# Patient Record
Sex: MALE | Race: WHITE | NOT HISPANIC OR LATINO
[De-identification: names, ages, dates, MRNs, and addresses within clinical notes are randomized per-mention and may not be internally consistent; named-entity substitution may affect disease eponyms.]

---

## 2017-02-17 ENCOUNTER — APPOINTMENT (OUTPATIENT)
Dept: NEUROLOGY | Facility: CLINIC | Age: 72
End: 2017-02-17

## 2017-02-17 VITALS
OXYGEN SATURATION: 98 % | HEART RATE: 76 BPM | HEIGHT: 74 IN | BODY MASS INDEX: 27.21 KG/M2 | DIASTOLIC BLOOD PRESSURE: 72 MMHG | WEIGHT: 212 LBS | SYSTOLIC BLOOD PRESSURE: 124 MMHG

## 2017-03-07 ENCOUNTER — LABORATORY RESULT (OUTPATIENT)
Age: 72
End: 2017-03-07

## 2017-03-07 ENCOUNTER — APPOINTMENT (OUTPATIENT)
Dept: FAMILY MEDICINE | Facility: CLINIC | Age: 72
End: 2017-03-07

## 2017-03-07 VITALS
HEIGHT: 74 IN | BODY MASS INDEX: 27.21 KG/M2 | SYSTOLIC BLOOD PRESSURE: 140 MMHG | TEMPERATURE: 98.4 F | OXYGEN SATURATION: 99 % | HEART RATE: 90 BPM | WEIGHT: 212 LBS | RESPIRATION RATE: 15 BRPM | DIASTOLIC BLOOD PRESSURE: 78 MMHG

## 2017-03-07 DIAGNOSIS — Z12.5 ENCOUNTER FOR SCREENING FOR MALIGNANT NEOPLASM OF PROSTATE: ICD-10-CM

## 2017-03-08 LAB
25(OH)D3 SERPL-MCNC: 37 NG/ML
ALBUMIN SERPL ELPH-MCNC: 4.5 G/DL
ALP BLD-CCNC: 34 U/L
ALT SERPL-CCNC: 13 U/L
ANION GAP SERPL CALC-SCNC: 12 MMOL/L
APPEARANCE: CLEAR
AST SERPL-CCNC: 19 U/L
BASOPHILS # BLD AUTO: 0.01 K/UL
BASOPHILS NFR BLD AUTO: 0.2 %
BILIRUB SERPL-MCNC: 1.1 MG/DL
BILIRUBIN URINE: NEGATIVE
BLOOD URINE: NEGATIVE
BUN SERPL-MCNC: 14 MG/DL
CALCIUM SERPL-MCNC: 10 MG/DL
CHLORIDE SERPL-SCNC: 91 MMOL/L
CHOLEST SERPL-MCNC: 148 MG/DL
CHOLEST/HDLC SERPL: 2.1 RATIO
CO2 SERPL-SCNC: 24 MMOL/L
COLOR: ABNORMAL
CREAT SERPL-MCNC: 1.12 MG/DL
EOSINOPHIL # BLD AUTO: 0.06 K/UL
EOSINOPHIL NFR BLD AUTO: 1 %
FERRITIN SERPL-MCNC: 645.5 NG/ML
FOLATE SERPL-MCNC: >20 NG/ML
GLUCOSE QUALITATIVE U: NORMAL MG/DL
GLUCOSE SERPL-MCNC: 109 MG/DL
HBA1C MFR BLD HPLC: 5.8 %
HCT VFR BLD CALC: 36.1 %
HDLC SERPL-MCNC: 71 MG/DL
HGB BLD-MCNC: 12.6 G/DL
IMM GRANULOCYTES NFR BLD AUTO: 0.2 %
IRON SATN MFR SERPL: 40 %
IRON SERPL-MCNC: 104 UG/DL
KETONES URINE: ABNORMAL
LDLC SERPL CALC-MCNC: 52 MG/DL
LEUKOCYTE ESTERASE URINE: NEGATIVE
LYMPHOCYTES # BLD AUTO: 1.91 K/UL
LYMPHOCYTES NFR BLD AUTO: 32.6 %
MAN DIFF?: NORMAL
MCHC RBC-ENTMCNC: 31.9 PG
MCHC RBC-ENTMCNC: 34.9 GM/DL
MCV RBC AUTO: 91.4 FL
MONOCYTES # BLD AUTO: 0.63 K/UL
MONOCYTES NFR BLD AUTO: 10.8 %
NEUTROPHILS # BLD AUTO: 3.23 K/UL
NEUTROPHILS NFR BLD AUTO: 55.2 %
NITRITE URINE: NEGATIVE
PH URINE: 7
PLATELET # BLD AUTO: 221 K/UL
POTASSIUM SERPL-SCNC: 5.3 MMOL/L
PROT SERPL-MCNC: 7 G/DL
PROTEIN URINE: NEGATIVE MG/DL
PSA SERPL-MCNC: NORMAL NG/ML
RBC # BLD: 3.95 M/UL
RBC # FLD: 12.6 %
SODIUM SERPL-SCNC: 128 MMOL/L
SPECIFIC GRAVITY URINE: 1.02
TIBC SERPL-MCNC: 260 UG/DL
TRIGL SERPL-MCNC: 123 MG/DL
TSH SERPL-ACNC: 2.27 UIU/ML
UIBC SERPL-MCNC: 156 UG/DL
UROBILINOGEN URINE: 1 MG/DL
VIT B12 SERPL-MCNC: 724 PG/ML
WBC # FLD AUTO: 5.85 K/UL

## 2017-03-10 ENCOUNTER — APPOINTMENT (OUTPATIENT)
Dept: FAMILY MEDICINE | Facility: CLINIC | Age: 72
End: 2017-03-10

## 2017-03-10 VITALS
HEART RATE: 70 BPM | WEIGHT: 209 LBS | RESPIRATION RATE: 15 BRPM | OXYGEN SATURATION: 98 % | SYSTOLIC BLOOD PRESSURE: 140 MMHG | TEMPERATURE: 98.4 F | BODY MASS INDEX: 26.82 KG/M2 | HEIGHT: 74 IN | DIASTOLIC BLOOD PRESSURE: 70 MMHG

## 2017-03-13 ENCOUNTER — OTHER (OUTPATIENT)
Age: 72
End: 2017-03-13

## 2017-03-13 LAB
ANION GAP SERPL CALC-SCNC: 11 MMOL/L
BUN SERPL-MCNC: 16 MG/DL
CALCIUM SERPL-MCNC: 10.2 MG/DL
CHLORIDE SERPL-SCNC: 89 MMOL/L
CO2 SERPL-SCNC: 27 MMOL/L
CREAT SERPL-MCNC: 1.08 MG/DL
CRP SERPL-MCNC: 0.94 MG/DL
ERYTHROCYTE [SEDIMENTATION RATE] IN BLOOD BY WESTERGREN METHOD: 11 MM/HR
GLUCOSE SERPL-MCNC: 101 MG/DL
OSMOLALITY SERPL: 276 MOS/KG
OSMOLALITY UR: 337 MOS/KG
POTASSIUM SERPL-SCNC: 4.3 MMOL/L
SODIUM ?TM SUB UR QN: <20 MMOL/L
SODIUM SERPL-SCNC: 127 MMOL/L
TRANSFERRIN SERPL-MCNC: 239 MG/DL

## 2017-03-14 ENCOUNTER — APPOINTMENT (OUTPATIENT)
Dept: HEMATOLOGY ONCOLOGY | Facility: CLINIC | Age: 72
End: 2017-03-14

## 2017-03-17 ENCOUNTER — APPOINTMENT (OUTPATIENT)
Dept: NEUROLOGY | Facility: CLINIC | Age: 72
End: 2017-03-17

## 2017-03-17 VITALS
DIASTOLIC BLOOD PRESSURE: 72 MMHG | WEIGHT: 212 LBS | BODY MASS INDEX: 27.21 KG/M2 | HEART RATE: 71 BPM | SYSTOLIC BLOOD PRESSURE: 118 MMHG | HEIGHT: 74 IN | OXYGEN SATURATION: 99 %

## 2017-03-20 ENCOUNTER — APPOINTMENT (OUTPATIENT)
Dept: CT IMAGING | Facility: HOSPITAL | Age: 72
End: 2017-03-20

## 2017-03-20 ENCOUNTER — OUTPATIENT (OUTPATIENT)
Dept: OUTPATIENT SERVICES | Facility: HOSPITAL | Age: 72
LOS: 1 days | End: 2017-03-20
Payer: MEDICARE

## 2017-03-20 DIAGNOSIS — K42.9 UMBILICAL HERNIA WITHOUT OBSTRUCTION OR GANGRENE: ICD-10-CM

## 2017-03-20 PROCEDURE — 74178 CT ABD&PLV WO CNTR FLWD CNTR: CPT

## 2017-03-22 ENCOUNTER — FORM ENCOUNTER (OUTPATIENT)
Age: 72
End: 2017-03-22

## 2017-03-23 ENCOUNTER — APPOINTMENT (OUTPATIENT)
Dept: RADIOLOGY | Facility: HOSPITAL | Age: 72
End: 2017-03-23

## 2017-03-23 ENCOUNTER — OUTPATIENT (OUTPATIENT)
Dept: OUTPATIENT SERVICES | Facility: HOSPITAL | Age: 72
LOS: 1 days | End: 2017-03-23
Payer: MEDICARE

## 2017-03-23 DIAGNOSIS — E87.1 HYPO-OSMOLALITY AND HYPONATREMIA: ICD-10-CM

## 2017-03-23 PROCEDURE — 71046 X-RAY EXAM CHEST 2 VIEWS: CPT

## 2017-03-28 ENCOUNTER — APPOINTMENT (OUTPATIENT)
Dept: HEMATOLOGY ONCOLOGY | Facility: CLINIC | Age: 72
End: 2017-03-28

## 2017-03-30 ENCOUNTER — APPOINTMENT (OUTPATIENT)
Dept: HEMATOLOGY ONCOLOGY | Facility: CLINIC | Age: 72
End: 2017-03-30

## 2017-03-30 VITALS
BODY MASS INDEX: 28.1 KG/M2 | WEIGHT: 212 LBS | HEIGHT: 73 IN | DIASTOLIC BLOOD PRESSURE: 60 MMHG | HEART RATE: 72 BPM | TEMPERATURE: 96.9 F | SYSTOLIC BLOOD PRESSURE: 146 MMHG

## 2017-03-30 RX ORDER — VENLAFAXINE HYDROCHLORIDE 75 MG/1
75 CAPSULE, EXTENDED RELEASE ORAL DAILY
Qty: 60 | Refills: 0 | Status: COMPLETED | COMMUNITY
Start: 2017-02-17 | End: 2017-03-30

## 2017-04-05 ENCOUNTER — LABORATORY RESULT (OUTPATIENT)
Age: 72
End: 2017-04-05

## 2017-04-05 ENCOUNTER — RX RENEWAL (OUTPATIENT)
Age: 72
End: 2017-04-05

## 2017-04-14 ENCOUNTER — APPOINTMENT (OUTPATIENT)
Dept: HEMATOLOGY ONCOLOGY | Facility: CLINIC | Age: 72
End: 2017-04-14

## 2017-04-14 VITALS
BODY MASS INDEX: 27.71 KG/M2 | TEMPERATURE: 97.9 F | WEIGHT: 210 LBS | SYSTOLIC BLOOD PRESSURE: 130 MMHG | DIASTOLIC BLOOD PRESSURE: 70 MMHG | HEART RATE: 72 BPM

## 2017-05-16 ENCOUNTER — OUTPATIENT (OUTPATIENT)
Dept: OUTPATIENT SERVICES | Facility: HOSPITAL | Age: 72
LOS: 1 days | End: 2017-05-16
Payer: MEDICARE

## 2017-05-16 ENCOUNTER — APPOINTMENT (OUTPATIENT)
Dept: NEUROLOGY | Facility: CLINIC | Age: 72
End: 2017-05-16

## 2017-05-16 VITALS
DIASTOLIC BLOOD PRESSURE: 60 MMHG | SYSTOLIC BLOOD PRESSURE: 135 MMHG | BODY MASS INDEX: 26.31 KG/M2 | HEIGHT: 74 IN | WEIGHT: 205 LBS

## 2017-05-16 PROCEDURE — 81003 URINALYSIS AUTO W/O SCOPE: CPT

## 2017-05-16 PROCEDURE — G0463: CPT

## 2017-05-16 PROCEDURE — 87086 URINE CULTURE/COLONY COUNT: CPT

## 2017-05-16 PROCEDURE — 80048 BASIC METABOLIC PNL TOTAL CA: CPT

## 2017-05-16 PROCEDURE — 85025 COMPLETE CBC W/AUTO DIFF WBC: CPT

## 2017-05-16 PROCEDURE — 93010 ELECTROCARDIOGRAM REPORT: CPT | Mod: NC

## 2017-05-16 PROCEDURE — 36415 COLL VENOUS BLD VENIPUNCTURE: CPT

## 2017-05-16 PROCEDURE — 93005 ELECTROCARDIOGRAM TRACING: CPT

## 2017-05-23 ENCOUNTER — APPOINTMENT (OUTPATIENT)
Dept: FAMILY MEDICINE | Facility: CLINIC | Age: 72
End: 2017-05-23

## 2017-05-23 VITALS
DIASTOLIC BLOOD PRESSURE: 80 MMHG | SYSTOLIC BLOOD PRESSURE: 160 MMHG | OXYGEN SATURATION: 98 % | RESPIRATION RATE: 14 BRPM | BODY MASS INDEX: 26.31 KG/M2 | WEIGHT: 205 LBS | TEMPERATURE: 98.2 F | HEART RATE: 82 BPM | HEIGHT: 74 IN

## 2017-05-23 DIAGNOSIS — N32.0 BLADDER-NECK OBSTRUCTION: ICD-10-CM

## 2017-05-23 RX ORDER — VITAMIN A 2400 MCG
100 CAPSULE ORAL
Qty: 90 | Refills: 0 | Status: DISCONTINUED | COMMUNITY
Start: 2017-05-16 | End: 2017-05-23

## 2017-05-24 ENCOUNTER — OTHER (OUTPATIENT)
Age: 72
End: 2017-05-24

## 2017-05-24 LAB
ANION GAP SERPL CALC-SCNC: 18 MMOL/L
BUN SERPL-MCNC: 15 MG/DL
CALCIUM SERPL-MCNC: 10.4 MG/DL
CHLORIDE SERPL-SCNC: 100 MMOL/L
CO2 SERPL-SCNC: 22 MMOL/L
CREAT SERPL-MCNC: 1.13 MG/DL
GLUCOSE SERPL-MCNC: 108 MG/DL
POTASSIUM SERPL-SCNC: 5.5 MMOL/L
SODIUM SERPL-SCNC: 140 MMOL/L

## 2017-05-25 LAB
ANION GAP SERPL CALC-SCNC: 15 MMOL/L
BUN SERPL-MCNC: 18 MG/DL
CALCIUM SERPL-MCNC: 9 MG/DL
CHLORIDE SERPL-SCNC: 96 MMOL/L
CO2 SERPL-SCNC: 23 MMOL/L
CREAT SERPL-MCNC: 0.98 MG/DL
GLUCOSE SERPL-MCNC: 84 MG/DL
POTASSIUM SERPL-SCNC: 4.1 MMOL/L
SODIUM SERPL-SCNC: 134 MMOL/L

## 2017-05-30 ENCOUNTER — RX RENEWAL (OUTPATIENT)
Age: 72
End: 2017-05-30

## 2017-05-30 ENCOUNTER — OUTPATIENT (OUTPATIENT)
Dept: OUTPATIENT SERVICES | Facility: HOSPITAL | Age: 72
LOS: 1 days | Discharge: HOME | End: 2017-05-30
Payer: MEDICARE

## 2017-05-30 DIAGNOSIS — E78.5 HYPERLIPIDEMIA, UNSPECIFIED: ICD-10-CM

## 2017-05-30 DIAGNOSIS — N32.0 BLADDER-NECK OBSTRUCTION: ICD-10-CM

## 2017-05-30 DIAGNOSIS — Z85.46 PERSONAL HISTORY OF MALIGNANT NEOPLASM OF PROSTATE: ICD-10-CM

## 2017-05-30 DIAGNOSIS — I10 ESSENTIAL (PRIMARY) HYPERTENSION: ICD-10-CM

## 2017-05-30 DIAGNOSIS — R31.9 HEMATURIA, UNSPECIFIED: ICD-10-CM

## 2017-05-30 PROCEDURE — 52000 CYSTOURETHROSCOPY: CPT

## 2017-06-01 ENCOUNTER — TRANSCRIPTION ENCOUNTER (OUTPATIENT)
Age: 72
End: 2017-06-01

## 2017-06-12 ENCOUNTER — FORM ENCOUNTER (OUTPATIENT)
Age: 72
End: 2017-06-12

## 2017-06-13 ENCOUNTER — OUTPATIENT (OUTPATIENT)
Dept: OUTPATIENT SERVICES | Facility: HOSPITAL | Age: 72
LOS: 1 days | End: 2017-06-13
Payer: MEDICARE

## 2017-06-13 ENCOUNTER — APPOINTMENT (OUTPATIENT)
Dept: RADIOLOGY | Facility: HOSPITAL | Age: 72
End: 2017-06-13

## 2017-06-13 DIAGNOSIS — M19.041 PRIMARY OSTEOARTHRITIS, RIGHT HAND: ICD-10-CM

## 2017-06-13 PROCEDURE — 73140 X-RAY EXAM OF FINGER(S): CPT

## 2017-06-13 PROCEDURE — 73140 X-RAY EXAM OF FINGER(S): CPT | Mod: 26,RT

## 2017-06-22 ENCOUNTER — LABORATORY RESULT (OUTPATIENT)
Age: 72
End: 2017-06-22

## 2017-06-23 ENCOUNTER — RESULT REVIEW (OUTPATIENT)
Age: 72
End: 2017-06-23

## 2017-06-25 ENCOUNTER — FORM ENCOUNTER (OUTPATIENT)
Age: 72
End: 2017-06-25

## 2017-06-26 ENCOUNTER — APPOINTMENT (OUTPATIENT)
Dept: RADIOLOGY | Facility: HOSPITAL | Age: 72
End: 2017-06-26

## 2017-06-26 ENCOUNTER — OUTPATIENT (OUTPATIENT)
Dept: OUTPATIENT SERVICES | Facility: HOSPITAL | Age: 72
LOS: 1 days | End: 2017-06-26
Payer: MEDICARE

## 2017-06-26 ENCOUNTER — APPOINTMENT (OUTPATIENT)
Dept: FAMILY MEDICINE | Facility: CLINIC | Age: 72
End: 2017-06-26

## 2017-06-26 VITALS
TEMPERATURE: 98.2 F | BODY MASS INDEX: 26.31 KG/M2 | HEART RATE: 73 BPM | DIASTOLIC BLOOD PRESSURE: 78 MMHG | OXYGEN SATURATION: 98 % | RESPIRATION RATE: 15 BRPM | HEIGHT: 74 IN | SYSTOLIC BLOOD PRESSURE: 130 MMHG | WEIGHT: 205 LBS
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
## 2017-06-26 DIAGNOSIS — M25.40 EFFUSION, UNSPECIFIED JOINT: ICD-10-CM

## 2017-06-26 DIAGNOSIS — M54.5 LOW BACK PAIN: ICD-10-CM

## 2017-06-26 PROCEDURE — 72110 X-RAY EXAM L-2 SPINE 4/>VWS: CPT

## 2017-06-26 RX ORDER — DULOXETINE HYDROCHLORIDE 30 MG/1
30 CAPSULE, DELAYED RELEASE PELLETS ORAL TWICE DAILY
Qty: 120 | Refills: 0 | Status: DISCONTINUED | COMMUNITY
Start: 2017-03-17 | End: 2017-06-26

## 2017-06-29 ENCOUNTER — APPOINTMENT (OUTPATIENT)
Dept: HEMATOLOGY ONCOLOGY | Facility: CLINIC | Age: 72
End: 2017-06-29

## 2017-06-29 VITALS
HEART RATE: 72 BPM | BODY MASS INDEX: 26.83 KG/M2 | WEIGHT: 209 LBS | SYSTOLIC BLOOD PRESSURE: 122 MMHG | DIASTOLIC BLOOD PRESSURE: 68 MMHG | TEMPERATURE: 98 F

## 2017-06-30 ENCOUNTER — TRANSCRIPTION ENCOUNTER (OUTPATIENT)
Age: 72
End: 2017-06-30

## 2017-07-03 ENCOUNTER — APPOINTMENT (OUTPATIENT)
Dept: ORTHOPEDIC SURGERY | Facility: CLINIC | Age: 72
End: 2017-07-03

## 2017-07-03 VITALS
BODY MASS INDEX: 26.69 KG/M2 | DIASTOLIC BLOOD PRESSURE: 64 MMHG | SYSTOLIC BLOOD PRESSURE: 138 MMHG | HEART RATE: 91 BPM | WEIGHT: 208 LBS | HEIGHT: 74 IN

## 2017-07-03 NOTE — CONSULT LETTER
[Dear  ___] : Dear  [unfilled], [I had the pleasure of evaluating your patient, [unfilled].] : I had the pleasure of evaluating your patient, [unfilled]. [FreeTextEntry2] : Lizeth Ross [FreeTextEntry1] : Thank you for this referral. I have enclosed my note for your review. Please feel free to contact my office if you have additional questions regarding this patient.\par \par Regards,\par Bernabe Stone MD, FACS, FAAOS\par \par  of Orthopaedic Surgery\par [employer] [facility] [employer]\par Spinal Reconstruction Surgery\par Minimally Invasive Spinal Surgery\par [facility]

## 2017-07-03 NOTE — DISCUSSION/SUMMARY
[Medication Risks Reviewed] : Medication risks reviewed [de-identified] : The patient presents for evaluation of acute compression fracture at T12 vertebral body after a fall at home. There appears to be some progression of his fracture based on comparison x-rays. I have recommended bracing for him in a prescription for TLSO was provided. I was recommended an MRI of the lumbar spine to better evaluate the T12 fracture seen along with a bone density study as well. I will see him back after the MRI has been performed to discuss further treatment options and to  his response to the bracing. He is not interested prescription medication I recommend use of Tylenol as well as NSAIDs as needed. If there is further compression of his fracture deformity or increased pain or followup cement augmentation to consider at that time.\par \par The patient was educated regarding their condition, treatment options as well as prescribed course of treatment. \par Risks and benefits as well as alternatives to the proposed treatment were also provided to the patient \par They were given the opportunity to have all their questions answered to their satisfaction.\par \par Vital signs were reviewed with the patient and the patient was instructed to followup with their primary care provider for further management.\par \par Healthy lifestyle recommendations were also made including a tobacco free lifestyle, proper diet, and weight control.

## 2017-07-03 NOTE — HISTORY OF PRESENT ILLNESS
[Pain] : pain [Stable] : stable [___ wks] : [unfilled] week(s) ago [8] : currently ~his/her~ pain is 8 out of 10 [Constant] : ~He/She~ states the symptoms seem to be constant [Joint Pain] : joint pain [Joint Stiffness] : joint stiffness [Joint Swelling] : joint swelling [Muscle Aches] : muscle aches [All Other ROS Normal] : All other review of systems are negative except as noted [All Hx] : past medical, family, and social [All] : medication and allergy [FreeTextEntry1] : Low back pain [FreeTextEntry2] : Pt presents here today for evaluation of low back pain following fall at home 6/21/17. pt states slipped while walking into his kitchen, landing on his back on the rug. Pt states seen by chiropractor same day for approximately 3 sessions with no relief noted. pt states then seen by PCP who ordered lumbar xrays with established diagnosis of mild compression fx of T12. Pt states 8/10 in intensity in the morning, easing up slightly throughout the day. Currently taking ibuprofen prn for relief. Pt states pain localized to low back, denies radiation, denies numbness, tingling/weakness.  [de-identified] : laying down.

## 2017-07-03 NOTE — PHYSICAL EXAM
[Poor Appearance] : well-appearing [Acute Distress] : not in acute distress [Obese] : not obese [Abl Mood] : in a normal mood [Abl Affect] : with normal affect [Poor Coordination] : normal coordination [Disorientation] : oriented x 3 [Stooped] : stooped [Limited] : is limited [Painful] : is painful [SLR] : negative straight leg raise [LE] : Sensory: Intact in bilateral lower extremities [1+] : left ankle jerk 1+ [Plantar Reflex Right Only] : absent on the right [Plantar Reflex Left Only] : absent on the left [DTR Reflexes Clonus Of Right Ankle (___ Beats)] : absent on the right [DTR Reflexes Clonus Of Left Ankle (___ Beats)] : absent on the left [DP] : dorsalis pedis 2+ and symmetric bilaterally [PT] : posterior tibial 2+ and symmetric bilaterally [FreeTextEntry2] : The pt is awake, alert and oriented to self, place and time, is comfortable and in no acute distress. Gait examination reveals a narrow based, non-ataxic, non-antalgic gait. Can heel and toe walk without difficulty. Inspection of neck, back and lower extremities bilaterally reveals no rashes or ecchymotic lesions.  There is no obvious abnormal spinal curvature in the sagittal and coronal planes. There is no tenderness over the cervical, thoracic or lumbar spine, or the  upper and lower extremities musculature. Paraspinal lumbar tenderness noted. There is no sacroiliac tenderness. No greater trochanteric tenderness bilaterally. No atrophy or abnormal movements noted in the upper or lower extremities. There is no swelling noted in the upper or lower extremities bilaterally. No cervical lymphadenopathy noted anteriorly. No joint laxity noted in the upper and lower extremity joints bilaterally.\par  Hip range of motion is degrees internal rotation 30° external rotation without pain. Full range of motion of the shoulders bilaterally with no significant pain\par Negative straight leg raise to 45° in the sitting position bilaterally. There is no groin pain with hip internal rotation and a negative CUCA test bilaterally.  [de-identified] : He can forward flex to mid tibia with increased back pain. Extension is 30° with less pain. [de-identified] : Painful change of position [de-identified] : AP and lateral images of the lumbar spine obtained today were compared to x-rays obtained on June 26, 2017 [facility]. Minimal right-sided thoracolumbar curve noted. The lateral projection compression deformities seen of the T12 vertebral body with segmental kyphosis measuring approximately 26 there is interval change in kyphosis as well as compression deformity of T12. No additional fracture. Degenerative changes seen at the L1-2\par _________________________________________________________________________\par \par Patient  ID:  [id number]          Patient  Name:  SHANE JUNG            \par YOB: 1945          Sex:  M\par \par EXAM:    SPINE  LUMBOSACRAL  MIN  4  VIEW\par \par PROCEDURE  DATE:    06/26/2017\par \par INTERPRETATION:    INDICATION:  Lower  back  pain  following  fall\par \par PRIORS:  No  prior  conventional  radiographic  evaluations  of  the  lumbar  spine\par available;  reformatted  imaging  of  the  lumbar  spine  is  available  on  CT\par examination  performed  March 20, 2017.\par \par VIEWS:  AP  radiography  of  the  pelvis  and  AP  and  lateral  radiographs  of  the\par lumbar  spine  were  performed.\par \par FINDINGS:\par \par There  is  no  evidence  for  acute  fracture  of  the  osseous  pelvis.  No  widening\par of  the  symphysis  pubis  or  sacroiliac  joints  identified.  No  hip  dislocation\par is  noted.  The  lumbar  vertebral  bodies  maintain  normal  height.  Since  prior\par evaluation  there  is  evidence  for  partial  loss  of  height  of  the  T12  vertebral\par body,  suggesting  mild  compression  fracture.  No  significant  intervertebral\par disc  space  narrowing  is  identified.    Stable  calcification  is  identified\par within  the  L1-L2  intervertebral  disc  space.  There  is  no  evidence  for  acute\par fracture  in  the  lumbar  spine.  Vertebral  alignment  appears  unremarkable.  No\par fractures  evident  within  the  visualized  portions  of  the  sacrum.  Metallic\par clips  overlie  the  mid  to  lower  pelvis.  Vascular  calcifications  identified.\par \par IMPRESSION:  Partial  loss  of  height  of  the  T12  vertebral  body  suggesting  mild\par compression  fracture,  representing  an  interval  change  from  prior  examination\par dated  March  20,  2017.  No  evidence  for  compression  fracture  deformity  of  the\par lumbar  vertebrae.\par \par PRISCILA CALLOWAY\par This  document  has  been  electronically  signed.  Jun 26 2017    2:17PM

## 2017-07-05 ENCOUNTER — FORM ENCOUNTER (OUTPATIENT)
Age: 72
End: 2017-07-05

## 2017-07-06 ENCOUNTER — OUTPATIENT (OUTPATIENT)
Dept: OUTPATIENT SERVICES | Facility: HOSPITAL | Age: 72
LOS: 1 days | End: 2017-07-06
Payer: MEDICARE

## 2017-07-06 ENCOUNTER — APPOINTMENT (OUTPATIENT)
Dept: MRI IMAGING | Facility: HOSPITAL | Age: 72
End: 2017-07-06

## 2017-07-06 DIAGNOSIS — M51.26 OTHER INTERVERTEBRAL DISC DISPLACEMENT, LUMBAR REGION: ICD-10-CM

## 2017-07-06 DIAGNOSIS — M51.27 OTHER INTERVERTEBRAL DISC DISPLACEMENT, LUMBOSACRAL REGION: ICD-10-CM

## 2017-07-06 PROCEDURE — 72148 MRI LUMBAR SPINE W/O DYE: CPT

## 2017-07-12 ENCOUNTER — FORM ENCOUNTER (OUTPATIENT)
Age: 72
End: 2017-07-12

## 2017-07-13 ENCOUNTER — OUTPATIENT (OUTPATIENT)
Dept: OUTPATIENT SERVICES | Facility: HOSPITAL | Age: 72
LOS: 1 days | End: 2017-07-13
Payer: MEDICARE

## 2017-07-13 ENCOUNTER — APPOINTMENT (OUTPATIENT)
Dept: RADIOLOGY | Facility: HOSPITAL | Age: 72
End: 2017-07-13

## 2017-07-13 DIAGNOSIS — M85.9 DISORDER OF BONE DENSITY AND STRUCTURE, UNSPECIFIED: ICD-10-CM

## 2017-07-13 PROCEDURE — 77080 DXA BONE DENSITY AXIAL: CPT | Mod: 26

## 2017-07-13 PROCEDURE — 77080 DXA BONE DENSITY AXIAL: CPT

## 2017-07-17 ENCOUNTER — CHART COPY (OUTPATIENT)
Age: 72
End: 2017-07-17

## 2017-07-17 ENCOUNTER — APPOINTMENT (OUTPATIENT)
Dept: ORTHOPEDIC SURGERY | Facility: CLINIC | Age: 72
End: 2017-07-17

## 2017-07-17 VITALS
HEIGHT: 74 IN | BODY MASS INDEX: 26.56 KG/M2 | WEIGHT: 207 LBS | HEART RATE: 93 BPM | DIASTOLIC BLOOD PRESSURE: 70 MMHG | SYSTOLIC BLOOD PRESSURE: 113 MMHG

## 2017-07-20 ENCOUNTER — OUTPATIENT (OUTPATIENT)
Dept: OUTPATIENT SERVICES | Facility: HOSPITAL | Age: 72
LOS: 1 days | End: 2017-07-20
Payer: MEDICARE

## 2017-07-20 VITALS
SYSTOLIC BLOOD PRESSURE: 138 MMHG | HEIGHT: 74 IN | OXYGEN SATURATION: 98 % | RESPIRATION RATE: 14 BRPM | DIASTOLIC BLOOD PRESSURE: 72 MMHG | TEMPERATURE: 98 F | HEART RATE: 87 BPM | WEIGHT: 207.01 LBS

## 2017-07-20 DIAGNOSIS — M84.48XD PATHOLOGICAL FRACTURE, OTHER SITE, SUBSEQUENT ENCOUNTER FOR FRACTURE WITH ROUTINE HEALING: ICD-10-CM

## 2017-07-20 DIAGNOSIS — Z98.890 OTHER SPECIFIED POSTPROCEDURAL STATES: Chronic | ICD-10-CM

## 2017-07-20 DIAGNOSIS — Z01.818 ENCOUNTER FOR OTHER PREPROCEDURAL EXAMINATION: ICD-10-CM

## 2017-07-20 DIAGNOSIS — Z90.79 ACQUIRED ABSENCE OF OTHER GENITAL ORGAN(S): Chronic | ICD-10-CM

## 2017-07-20 DIAGNOSIS — M54.5 LOW BACK PAIN: ICD-10-CM

## 2017-07-20 DIAGNOSIS — M54.40 LUMBAGO WITH SCIATICA, UNSPECIFIED SIDE: ICD-10-CM

## 2017-07-20 DIAGNOSIS — S22.009A UNSPECIFIED FRACTURE OF UNSPECIFIED THORACIC VERTEBRA, INITIAL ENCOUNTER FOR CLOSED FRACTURE: ICD-10-CM

## 2017-07-20 LAB
ANION GAP SERPL CALC-SCNC: 4 MMOL/L — LOW (ref 5–17)
APTT BLD: 29.1 SEC — SIGNIFICANT CHANGE UP (ref 27.5–37.4)
BLD GP AB SCN SERPL QL: SIGNIFICANT CHANGE UP
BUN SERPL-MCNC: 17 MG/DL — SIGNIFICANT CHANGE UP (ref 7–23)
CALCIUM SERPL-MCNC: 9.3 MG/DL — SIGNIFICANT CHANGE UP (ref 8.4–10.5)
CHLORIDE SERPL-SCNC: 97 MMOL/L — SIGNIFICANT CHANGE UP (ref 96–108)
CO2 SERPL-SCNC: 30 MMOL/L — SIGNIFICANT CHANGE UP (ref 22–31)
CREAT SERPL-MCNC: 1.15 MG/DL — SIGNIFICANT CHANGE UP (ref 0.5–1.3)
GLUCOSE SERPL-MCNC: 110 MG/DL — HIGH (ref 70–99)
HCT VFR BLD CALC: 35.9 % — LOW (ref 39–50)
HGB BLD-MCNC: 12.4 G/DL — LOW (ref 13–17)
INR BLD: 1.04 RATIO — SIGNIFICANT CHANGE UP (ref 0.88–1.16)
MCHC RBC-ENTMCNC: 32 PG — SIGNIFICANT CHANGE UP (ref 27–34)
MCHC RBC-ENTMCNC: 34.5 GM/DL — SIGNIFICANT CHANGE UP (ref 32–36)
MCV RBC AUTO: 92.7 FL — SIGNIFICANT CHANGE UP (ref 80–100)
PLATELET # BLD AUTO: 201 K/UL — SIGNIFICANT CHANGE UP (ref 150–400)
POTASSIUM SERPL-MCNC: 4.4 MMOL/L — SIGNIFICANT CHANGE UP (ref 3.5–5.3)
POTASSIUM SERPL-SCNC: 4.4 MMOL/L — SIGNIFICANT CHANGE UP (ref 3.5–5.3)
PROTHROM AB SERPL-ACNC: 11.3 SEC — SIGNIFICANT CHANGE UP (ref 9.8–12.7)
RBC # BLD: 3.87 M/UL — LOW (ref 4.2–5.8)
RBC # FLD: 11.3 % — SIGNIFICANT CHANGE UP (ref 10.3–14.5)
SODIUM SERPL-SCNC: 131 MMOL/L — LOW (ref 135–145)
WBC # BLD: 7.4 K/UL — SIGNIFICANT CHANGE UP (ref 3.8–10.5)
WBC # FLD AUTO: 7.4 K/UL — SIGNIFICANT CHANGE UP (ref 3.8–10.5)

## 2017-07-20 PROCEDURE — 86901 BLOOD TYPING SEROLOGIC RH(D): CPT

## 2017-07-20 PROCEDURE — 85730 THROMBOPLASTIN TIME PARTIAL: CPT

## 2017-07-20 PROCEDURE — 86850 RBC ANTIBODY SCREEN: CPT

## 2017-07-20 PROCEDURE — 85610 PROTHROMBIN TIME: CPT

## 2017-07-20 PROCEDURE — G0463: CPT

## 2017-07-20 PROCEDURE — 85027 COMPLETE CBC AUTOMATED: CPT

## 2017-07-20 PROCEDURE — 80048 BASIC METABOLIC PNL TOTAL CA: CPT

## 2017-07-20 PROCEDURE — 86900 BLOOD TYPING SEROLOGIC ABO: CPT

## 2017-07-20 PROCEDURE — 36415 COLL VENOUS BLD VENIPUNCTURE: CPT

## 2017-07-20 NOTE — H&P PST ADULT - RS GEN PE MLT RESP DETAILS PC
airway patent/no rhonchi/good air movement/breath sounds equal/no wheezes/clear to auscultation bilaterally/respirations non-labored/no rales

## 2017-07-20 NOTE — H&P PST ADULT - HISTORY OF PRESENT ILLNESS
72 yo male presents s/p fall at home on June 21, 2017 resulting in a fracture at T12. Now with c/o 5-6/10 mid back pain. Denies radiation to the arms or legs. Pain unrelieved with tylenol.

## 2017-07-20 NOTE — H&P PST ADULT - NSANTHOSAYNRD_GEN_A_CORE
neck 15 inches/No. TRAM screening performed.  STOP BANG Legend: 0-2 = LOW Risk; 3-4 = INTERMEDIATE Risk; 5-8 = HIGH Risk

## 2017-07-20 NOTE — H&P PST ADULT - FAMILY HISTORY
Mother  Still living? No  Family history of myocardial infarction, Age at diagnosis: Age Unknown     Father  Still living? No  Family history of Alzheimer's disease, Age at diagnosis: Age Unknown

## 2017-07-20 NOTE — H&P PST ADULT - PSH
S/P colonoscopy  2017, negative  S/P cystoscopy  June 2017, "removal of scar tissue at the base of the bladder"  S/P prostatectomy  2008

## 2017-07-20 NOTE — H&P PST ADULT - PMH
Anemia  mild, cause unknown  BPH (benign prostatic hypertrophy)    Hyperlipidemia    Hypertension    Neuropathy  bilateral feet, cause unknown  Nocturia  x2-3  Osteoarthritis    Prostate cancer  2008, no chemo or RT  Thoracic spine fracture  T12, June 21, 2017

## 2017-07-20 NOTE — H&P PST ADULT - PROBLEM SELECTOR PLAN 1
kyphoplasty. medical clearance requested. nifedipine AM of surgery with sips of water. pepcid instructions reviewed and verbalized understanding

## 2017-07-20 NOTE — PROVIDER CONTACT NOTE (OTHER) - ASSESSMENT
The Spine Pre-Operative Education packet was given to the patient on 7/17/17. The patient and I reviewed the information included in the packet. All his questions were answered and he gave a clear understanding of the instructions. He was advised to call the office at any time with further questions or concerns.

## 2017-07-21 ENCOUNTER — OTHER (OUTPATIENT)
Age: 72
End: 2017-07-21

## 2017-07-25 ENCOUNTER — APPOINTMENT (OUTPATIENT)
Dept: ORTHOPEDIC SURGERY | Facility: CLINIC | Age: 72
End: 2017-07-25

## 2017-07-25 LAB
ALBUMIN SERPL ELPH-MCNC: 4.4 G/DL
ALP BLD-CCNC: 70 U/L
ALT SERPL-CCNC: 24 U/L
ANION GAP SERPL CALC-SCNC: 14 MMOL/L
AST SERPL-CCNC: 19 U/L
BILIRUB SERPL-MCNC: 0.7 MG/DL
BUN SERPL-MCNC: 21 MG/DL
CALCIUM SERPL-MCNC: 10.3 MG/DL
CHLORIDE SERPL-SCNC: 97 MMOL/L
CO2 SERPL-SCNC: 24 MMOL/L
CREAT SERPL-MCNC: 1.16 MG/DL
GLUCOSE SERPL-MCNC: 110 MG/DL
OSMOLALITY SERPL: 290 MOS/KG
OSMOLALITY UR: 774 MOS/KG
POTASSIUM SERPL-SCNC: 5.3 MMOL/L
PROT SERPL-MCNC: 7.2 G/DL
SODIUM SERPL-SCNC: 135 MMOL/L

## 2017-07-26 ENCOUNTER — APPOINTMENT (OUTPATIENT)
Dept: FAMILY MEDICINE | Facility: CLINIC | Age: 72
End: 2017-07-26

## 2017-07-26 VITALS
HEIGHT: 74 IN | SYSTOLIC BLOOD PRESSURE: 148 MMHG | WEIGHT: 207 LBS | DIASTOLIC BLOOD PRESSURE: 80 MMHG | OXYGEN SATURATION: 98 % | TEMPERATURE: 98 F | RESPIRATION RATE: 16 BRPM | HEART RATE: 91 BPM | BODY MASS INDEX: 26.56 KG/M2

## 2017-07-26 DIAGNOSIS — M54.5 LOW BACK PAIN: ICD-10-CM

## 2017-07-26 DIAGNOSIS — Z01.818 ENCOUNTER FOR OTHER PREPROCEDURAL EXAMINATION: ICD-10-CM

## 2017-07-26 DIAGNOSIS — M84.48XD PATHOLOGICAL FRACTURE, OTHER SITE, SUBSEQUENT ENCOUNTER FOR FRACTURE WITH ROUTINE HEALING: ICD-10-CM

## 2017-07-26 RX ORDER — SODIUM SULFATE, POTASSIUM SULFATE, MAGNESIUM SULFATE 17.5; 3.13; 1.6 G/ML; G/ML; G/ML
17.5-3.13-1.6 SOLUTION, CONCENTRATE ORAL
Qty: 354 | Refills: 0 | Status: DISCONTINUED | COMMUNITY
Start: 2017-03-24 | End: 2017-07-26

## 2017-07-26 RX ORDER — MELOXICAM 7.5 MG/1
7.5 TABLET ORAL DAILY
Qty: 30 | Refills: 0 | Status: DISCONTINUED | COMMUNITY
Start: 2017-06-26 | End: 2017-07-26

## 2017-07-26 RX ORDER — FAMOTIDINE 10 MG/ML
0 INJECTION INTRAVENOUS
Qty: 0 | Refills: 0 | COMMUNITY
Start: 2017-07-26 | End: 2017-07-27

## 2017-07-26 RX ORDER — DULOXETINE HYDROCHLORIDE 30 MG/1
30 CAPSULE, DELAYED RELEASE PELLETS ORAL TWICE DAILY
Qty: 180 | Refills: 0 | Status: DISCONTINUED | COMMUNITY
End: 2017-07-26

## 2017-07-27 ENCOUNTER — OUTPATIENT (OUTPATIENT)
Dept: OUTPATIENT SERVICES | Facility: HOSPITAL | Age: 72
LOS: 1 days | End: 2017-07-27
Payer: MEDICARE

## 2017-07-27 ENCOUNTER — RESULT REVIEW (OUTPATIENT)
Age: 72
End: 2017-07-27

## 2017-07-27 ENCOUNTER — APPOINTMENT (OUTPATIENT)
Dept: ORTHOPEDIC SURGERY | Facility: HOSPITAL | Age: 72
End: 2017-07-27
Payer: MEDICARE

## 2017-07-27 VITALS
OXYGEN SATURATION: 100 % | HEART RATE: 85 BPM | RESPIRATION RATE: 12 BRPM | DIASTOLIC BLOOD PRESSURE: 69 MMHG | HEIGHT: 74 IN | SYSTOLIC BLOOD PRESSURE: 154 MMHG | TEMPERATURE: 98 F | WEIGHT: 199.74 LBS

## 2017-07-27 VITALS
HEART RATE: 81 BPM | OXYGEN SATURATION: 99 % | DIASTOLIC BLOOD PRESSURE: 76 MMHG | SYSTOLIC BLOOD PRESSURE: 139 MMHG | RESPIRATION RATE: 16 BRPM

## 2017-07-27 DIAGNOSIS — Z98.890 OTHER SPECIFIED POSTPROCEDURAL STATES: Chronic | ICD-10-CM

## 2017-07-27 DIAGNOSIS — M84.48XD PATHOLOGICAL FRACTURE, OTHER SITE, SUBSEQUENT ENCOUNTER FOR FRACTURE WITH ROUTINE HEALING: ICD-10-CM

## 2017-07-27 DIAGNOSIS — Z90.79 ACQUIRED ABSENCE OF OTHER GENITAL ORGAN(S): Chronic | ICD-10-CM

## 2017-07-27 DIAGNOSIS — M54.40 LUMBAGO WITH SCIATICA, UNSPECIFIED SIDE: ICD-10-CM

## 2017-07-27 PROCEDURE — 76000 FLUOROSCOPY <1 HR PHYS/QHP: CPT

## 2017-07-27 PROCEDURE — C1713: CPT

## 2017-07-27 PROCEDURE — C1726: CPT

## 2017-07-27 PROCEDURE — 88304 TISSUE EXAM BY PATHOLOGIST: CPT

## 2017-07-27 PROCEDURE — G8978: CPT | Mod: CI,CI

## 2017-07-27 PROCEDURE — 97161 PT EVAL LOW COMPLEX 20 MIN: CPT | Mod: CI,CI

## 2017-07-27 PROCEDURE — G8979: CPT | Mod: CI,CI

## 2017-07-27 PROCEDURE — 88304 TISSUE EXAM BY PATHOLOGIST: CPT | Mod: 26

## 2017-07-27 PROCEDURE — ZZZZZ: CPT

## 2017-07-27 PROCEDURE — G8980: CPT | Mod: CI,CI

## 2017-07-27 RX ORDER — CEFAZOLIN SODIUM 1 G
2000 VIAL (EA) INJECTION ONCE
Qty: 0 | Refills: 0 | Status: COMPLETED | OUTPATIENT
Start: 2017-07-27 | End: 2017-07-27

## 2017-07-27 RX ORDER — HYDROMORPHONE HYDROCHLORIDE 2 MG/ML
0.5 INJECTION INTRAMUSCULAR; INTRAVENOUS; SUBCUTANEOUS
Qty: 0 | Refills: 0 | Status: DISCONTINUED | OUTPATIENT
Start: 2017-07-27 | End: 2017-07-28

## 2017-07-27 RX ORDER — OXYCODONE AND ACETAMINOPHEN 5; 325 MG/1; MG/1
1 TABLET ORAL EVERY 4 HOURS
Qty: 0 | Refills: 0 | Status: DISCONTINUED | OUTPATIENT
Start: 2017-07-27 | End: 2017-07-28

## 2017-07-27 RX ORDER — OXYCODONE HYDROCHLORIDE 5 MG/1
1 TABLET ORAL
Qty: 10 | Refills: 0 | OUTPATIENT
Start: 2017-07-27

## 2017-07-27 RX ORDER — SODIUM CHLORIDE 9 MG/ML
1000 INJECTION, SOLUTION INTRAVENOUS
Qty: 0 | Refills: 0 | Status: DISCONTINUED | OUTPATIENT
Start: 2017-07-27 | End: 2017-07-28

## 2017-07-27 RX ORDER — ONDANSETRON 8 MG/1
4 TABLET, FILM COATED ORAL ONCE
Qty: 0 | Refills: 0 | Status: DISCONTINUED | OUTPATIENT
Start: 2017-07-27 | End: 2017-07-28

## 2017-07-27 NOTE — ASU DISCHARGE PLAN (ADULT/PEDIATRIC). - MEDICATION SUMMARY - MEDICATIONS TO STOP TAKING
I will STOP taking the medications listed below when I get home from the hospital:    Pepcid 20 mg oral tablet  --  by mouth twice, preoperatively

## 2017-07-27 NOTE — ASU DISCHARGE PLAN (ADULT/PEDIATRIC). - INSTRUCTIONS
For Constipation :   • Increase your water intake. Drink at least 8 glasses of water daily.  • Try adding fiber to your diet by eating fruits, vegetables and foods that are rich in grains.  • If you do experience constipation, you may take an over-the-counter stool softener/laxative such as Skylar Colace, Senekot or  Milk of Magnesia.

## 2017-07-27 NOTE — ASU DISCHARGE PLAN (ADULT/PEDIATRIC). - SPECIAL INSTRUCTIONS
You have just had spine surgery.  Please take care of your back by adhering to some basic recommendations.    Your surgeon recommends taking acetaminophen (tylenol) 1000mg 3 times per day for the next 10 days.  This can help to minimize the need for stronger medications.    No heavy lifting. Do not lift more than 10 pounds.  Avoid twisting and bending over.  Do NOT drive a car.  You may be driven in a car.    You may shower if the dressing is the clear plastic type or if you cover the existing dressing with plastic and tape to prevent it from getting wet.  Change dressing with dry, sterile gauze and tape if it becomes loose, wet or soiled.     Walking is your best exercise.  It is best not to remain in one position for long periods such as long car rides.    Constipation is a common problem associated with surgery and pain medications.  You should ensure that you are drinking plenty of fluids and increasing your fruit and vegetable intake.  Over the counter preparations such as colace, senokot and milk of magnesia may be helpful in relieving and prevention this problem.    Smoking should be avoided.  Tobacco products are associated with back problems.       Call the doctor with questions, fevers, severe headache, bowel or bladder dysfunction, new numbness/weakness or new pain not relieved by medication, ice pack and change of position.

## 2017-07-27 NOTE — ASU DISCHARGE PLAN (ADULT/PEDIATRIC). - MEDICATION SUMMARY - MEDICATIONS TO TAKE
I will START or STAY ON the medications listed below when I get home from the hospital:    oxyCODONE 5 mg oral tablet  -- 1 tab(s) by mouth every 4 hours as needed for pain.  MDD:6 tabs  -- Caution federal law prohibits the transfer of this drug to any person other  than the person for whom it was prescribed.  It is very important that you take or use this exactly as directed.  Do not skip doses or discontinue unless directed by your doctor.  May cause drowsiness.  Alcohol may intensify this effect.  Use care when operating dangerous machinery.  This prescription cannot be refilled.  Using more of this medication than prescribed may cause serious breathing problems.    -- Indication: For moderate to severe pain    Tylenol 500 mg oral tablet  -- 1 tab(s) by mouth 3 times a day, As Needed - for moderate pain  -- Indication: For mild pain    terazosin 5 mg oral capsule  -- 1 cap(s) by mouth once a day (at bedtime)  -- Indication: For Prostate    gabapentin 800 mg oral tablet  -- 1 tab(s) by mouth 3 times a day  -- Indication: For nerve pain    atorvastatin 10 mg oral tablet  -- 1 tab(s) by mouth once a day (at bedtime)  -- Indication: For cholesterol     NIFEdipine 30 mg oral tablet, extended release  -- 1 tab(s) by mouth once a day  -- Indication: For blood pressure

## 2017-07-27 NOTE — ASU PREOP CHECKLIST - TEMPERATURE IN FAHRENHEIT (DEGREES F)
Subjective Finding:    Chief compalint: Patient presents with:  Back Pain  , Pain Scale: 5/10, Intensity: sharp, Duration: 1 weeks, Change since last visit: , Radiating: no.    Date of injury:     Activities that the pain restricts:   Home/household activities: no.  Work duties: no.  Hobbies/social: no.  Sleep: no.  Makes symptoms better: rest.  Makes symptoms worse: activity, cervical extension and cervical flexion.  Have you seen anyone else for the symptoms? No.  Work related: no.  Automobile related injury: no.    Objective and Assessment:    Posture Analysis:   High shoulder: right.  Head tilt: .  High iliac crest: .  Head carriage: forward.  Thoracic Kyphosis: neutral.  Lumbar Lordosis: neutral.    Lumbar Range of Motion: flexion decreased.  Cervical Range of Motion: flexion decreased.  Thoracic Range of Motion: .  Extremity Range of Motion: .    Palpation:   Lumbar paraspine tightness with restricted ROM      Segmental dysfunction pre-treatment: T5  L4-5  Right SI.  C7      Assessment post-treatment:  Cervical: ROM increased and pain and tenderness decreased.  Thoracic: ROM increased.  Lumbar: ROM increased.    Comments: .      Complicating Factors: .    Plan / Procedure:    Expected release date: .  Treatment plan: PRN.  Instructed patient: ice 20 minutes every other hour as needed.  Short term goals: reduce pain.  Long term goals: restore normal function.  Prognosis: excellent.                                     
97.9

## 2017-07-31 LAB — SURGICAL PATHOLOGY FINAL REPORT - CH: SIGNIFICANT CHANGE UP

## 2017-08-08 ENCOUNTER — APPOINTMENT (OUTPATIENT)
Dept: ORTHOPEDIC SURGERY | Facility: CLINIC | Age: 72
End: 2017-08-08
Payer: MEDICARE

## 2017-08-08 PROCEDURE — 99024 POSTOP FOLLOW-UP VISIT: CPT

## 2017-08-08 PROCEDURE — 72070 X-RAY EXAM THORAC SPINE 2VWS: CPT

## 2017-09-08 ENCOUNTER — APPOINTMENT (OUTPATIENT)
Dept: ORTHOPEDIC SURGERY | Facility: CLINIC | Age: 72
End: 2017-09-08
Payer: MEDICARE

## 2017-09-08 VITALS
DIASTOLIC BLOOD PRESSURE: 65 MMHG | SYSTOLIC BLOOD PRESSURE: 108 MMHG | HEIGHT: 74 IN | HEART RATE: 69 BPM | WEIGHT: 207 LBS | BODY MASS INDEX: 26.56 KG/M2

## 2017-09-08 DIAGNOSIS — Z98.890 OTHER SPECIFIED POSTPROCEDURAL STATES: ICD-10-CM

## 2017-09-08 DIAGNOSIS — M40.204 UNSPECIFIED KYPHOSIS, THORACIC REGION: ICD-10-CM

## 2017-09-08 PROCEDURE — 99024 POSTOP FOLLOW-UP VISIT: CPT

## 2017-09-08 PROCEDURE — 72100 X-RAY EXAM L-S SPINE 2/3 VWS: CPT

## 2017-10-10 ENCOUNTER — RX RENEWAL (OUTPATIENT)
Age: 72
End: 2017-10-10

## 2017-10-12 ENCOUNTER — APPOINTMENT (OUTPATIENT)
Dept: HEMATOLOGY ONCOLOGY | Facility: CLINIC | Age: 72
End: 2017-10-12

## 2017-10-31 ENCOUNTER — APPOINTMENT (OUTPATIENT)
Dept: ENDOCRINOLOGY | Facility: CLINIC | Age: 72
End: 2017-10-31
Payer: MEDICARE

## 2017-10-31 VITALS
HEIGHT: 74 IN | SYSTOLIC BLOOD PRESSURE: 140 MMHG | RESPIRATION RATE: 16 BRPM | BODY MASS INDEX: 26.56 KG/M2 | DIASTOLIC BLOOD PRESSURE: 70 MMHG | HEART RATE: 86 BPM | WEIGHT: 207 LBS | OXYGEN SATURATION: 98 %

## 2017-10-31 PROCEDURE — 99205 OFFICE O/P NEW HI 60 MIN: CPT

## 2017-11-03 LAB
25(OH)D3 SERPL-MCNC: 38.9 NG/ML
CALCIUM SERPL-MCNC: 10.3 MG/DL
PARATHYROID HORMONE INTACT: 41 PG/ML
TSH SERPL-ACNC: 3.29 UIU/ML

## 2017-11-27 ENCOUNTER — OTHER (OUTPATIENT)
Age: 72
End: 2017-11-27

## 2017-11-28 ENCOUNTER — RX RENEWAL (OUTPATIENT)
Age: 72
End: 2017-11-28

## 2017-12-18 ENCOUNTER — RX RENEWAL (OUTPATIENT)
Age: 72
End: 2017-12-18

## 2018-01-26 ENCOUNTER — APPOINTMENT (OUTPATIENT)
Dept: NEUROLOGY | Facility: CLINIC | Age: 73
End: 2018-01-26
Payer: MEDICARE

## 2018-01-26 VITALS
SYSTOLIC BLOOD PRESSURE: 130 MMHG | TEMPERATURE: 97.8 F | BODY MASS INDEX: 26.56 KG/M2 | OXYGEN SATURATION: 98 % | DIASTOLIC BLOOD PRESSURE: 64 MMHG | HEART RATE: 82 BPM | HEIGHT: 74 IN | WEIGHT: 207 LBS

## 2018-01-26 PROCEDURE — 99214 OFFICE O/P EST MOD 30 MIN: CPT

## 2018-02-12 NOTE — PHYSICAL THERAPY INITIAL EVALUATION ADULT - LONG TERM MEMORY, REHAB EVAL
Diet for Stomach Ulcers and Gastritis   WHAT YOU NEED TO KNOW:   A diet for stomach ulcers and gastritis is a meal plan that limits foods that irritate your stomach  Certain foods may worsen symptoms such as stomach pain, bloating, heartburn, or indigestion  DISCHARGE INSTRUCTIONS:   Foods to limit or avoid:  You may need to avoid acidic, spicy, or high-fat foods  Not all foods affect everyone the same way  You will need to learn which foods worsen your symptoms and limit those foods  The following are some foods that may worsen ulcer or gastritis symptoms:  · Beverages:      ¨ Whole milk and chocolate milk    ¨ Hot cocoa and cola    ¨ Any beverage with caffeine    ¨ Regular and decaffeinated coffee    ¨ Peppermint and spearmint tea    ¨ Green and black tea, with or without caffeine    ¨ Orange and grapefruit juices    ¨ Drinks that contain alcohol    · Spices and seasonings:      ¨ Black and red pepper    ¨ Chili powder    ¨ Mustard seed and nutmeg    · Other foods:      ¨ Dairy foods made from whole milk or cream    ¨ Chocolate    ¨ Spicy or strongly flavored cheeses, such as jalapeno or black pepper    ¨ Highly seasoned, high-fat meats, such as sausage, salami, plascencia, ham, and cold cuts    ¨ Hot chiles and peppers    ¨ Tomato products, such as tomato paste, tomato sauce, or tomato juice  Foods to include:  Eat a variety of healthy foods from all the food groups  Eat fruits, vegetables, whole grains, and fat-free or low-fat dairy foods  Whole grains include whole-wheat breads, cereals, pasta, and brown rice  Choose lean meats, poultry (chicken and turkey), fish, beans, eggs, and nuts  A healthy meal plan is low in unhealthy fats, salt, and added sugar  Healthy fats include olive oil and canola oil  Ask your dietitian for more information about a healthy diet  Other helpful guidelines:   · Do not eat right before bedtime  Stop eating at least 2 hours before bedtime  · Eat small, frequent meals    Your stomach may tolerate small, frequent meals better than large meals  © 2017 [facility] Information is for End User's use only and may not be sold, redistributed or otherwise used for commercial purposes  All illustrations and images included in CareNotes® are the copyrighted property of A D A M [employer] Inc  or Frank Ackerman  The above information is an  only  It is not intended as medical advice for individual conditions or treatments  Talk to your doctor, nurse or pharmacist before following any medical regimen to see if it is safe and effective for you  intact

## 2018-04-11 ENCOUNTER — APPOINTMENT (OUTPATIENT)
Dept: FAMILY MEDICINE | Facility: CLINIC | Age: 73
End: 2018-04-11

## 2018-04-16 NOTE — H&P PST ADULT - TEACHING/LEARNING DEVELOPMENTAL CONSIDERATIONS
22 
none

## 2018-05-01 NOTE — H&P PST ADULT - SURGICAL SITE INCISION
Patient will continue alternating coumadin 7.5mg and 10mg and repeat INR in 2 weeks. He voices understanding and acceptance of this advice and will call back if any further questions or concerns.
no

## 2018-07-10 ENCOUNTER — APPOINTMENT (OUTPATIENT)
Dept: FAMILY MEDICINE | Facility: CLINIC | Age: 73
End: 2018-07-10
Payer: MEDICARE

## 2018-07-10 VITALS
HEIGHT: 74 IN | OXYGEN SATURATION: 97 % | TEMPERATURE: 97.7 F | WEIGHT: 213 LBS | HEART RATE: 82 BPM | BODY MASS INDEX: 27.34 KG/M2 | DIASTOLIC BLOOD PRESSURE: 68 MMHG | SYSTOLIC BLOOD PRESSURE: 142 MMHG

## 2018-07-10 DIAGNOSIS — Z23 ENCOUNTER FOR IMMUNIZATION: ICD-10-CM

## 2018-07-10 DIAGNOSIS — S22.080A WEDGE COMPRESSION FRACTURE OF T11-T12 VERTEBRA, INITIAL ENCOUNTER FOR CLOSED FRACTURE: ICD-10-CM

## 2018-07-10 PROCEDURE — G0009: CPT

## 2018-07-10 PROCEDURE — 99214 OFFICE O/P EST MOD 30 MIN: CPT | Mod: 25

## 2018-07-10 PROCEDURE — G0444 DEPRESSION SCREEN ANNUAL: CPT | Mod: 59

## 2018-07-10 PROCEDURE — 90732 PPSV23 VACC 2 YRS+ SUBQ/IM: CPT

## 2018-07-10 NOTE — COUNSELING
[Healthy eating counseling provided] : healthy eating [Activity counseling provided] : activity [Behavioral health counseling provided] : behavioral health  [Low Salt Diet] : Low salt diet [Walking] : Walking [Engage in a relaxing activity] : Engage in a relaxing activity [None] : None [Good understanding] : Patient has a good understanding of lifestyle changes and the steps needed to achieve self management goals [ - Annual Depression Screening] : Annual Depression Screening

## 2018-07-17 ENCOUNTER — RX RENEWAL (OUTPATIENT)
Age: 73
End: 2018-07-17

## 2018-08-21 ENCOUNTER — RX RENEWAL (OUTPATIENT)
Age: 73
End: 2018-08-21

## 2018-08-22 ENCOUNTER — RX RENEWAL (OUTPATIENT)
Age: 73
End: 2018-08-22

## 2018-09-07 LAB
25(OH)D3 SERPL-MCNC: 38.7 NG/ML
ALBUMIN SERPL ELPH-MCNC: 4.4 G/DL
ALP BLD-CCNC: 31 U/L
ALT SERPL-CCNC: 17 U/L
ANION GAP SERPL CALC-SCNC: 12 MMOL/L
APPEARANCE: CLEAR
AST SERPL-CCNC: 18 U/L
BACTERIA: NEGATIVE
BASOPHILS # BLD AUTO: 0.02 K/UL
BASOPHILS NFR BLD AUTO: 0.3 %
BILIRUB SERPL-MCNC: 1.2 MG/DL
BILIRUBIN URINE: NEGATIVE
BLOOD URINE: NEGATIVE
BUN SERPL-MCNC: 15 MG/DL
CALCIUM SERPL-MCNC: 9.7 MG/DL
CHLORIDE SERPL-SCNC: 99 MMOL/L
CHOLEST SERPL-MCNC: 132 MG/DL
CHOLEST/HDLC SERPL: 2.5 RATIO
CO2 SERPL-SCNC: 26 MMOL/L
COLOR: YELLOW
CREAT SERPL-MCNC: 0.92 MG/DL
CREAT SPEC-SCNC: 88 MG/DL
EOSINOPHIL # BLD AUTO: 0.13 K/UL
EOSINOPHIL NFR BLD AUTO: 2.1 %
GLUCOSE QUALITATIVE U: NEGATIVE MG/DL
GLUCOSE SERPL-MCNC: 97 MG/DL
HBA1C MFR BLD HPLC: 5.8 %
HCT VFR BLD CALC: 35.7 %
HDLC SERPL-MCNC: 53 MG/DL
HGB BLD-MCNC: 12.2 G/DL
HYALINE CASTS: 8 /LPF
IMM GRANULOCYTES NFR BLD AUTO: 0.3 %
KETONES URINE: NEGATIVE
LDLC SERPL CALC-MCNC: 62 MG/DL
LEUKOCYTE ESTERASE URINE: NEGATIVE
LYMPHOCYTES # BLD AUTO: 2.37 K/UL
LYMPHOCYTES NFR BLD AUTO: 38 %
MAN DIFF?: NORMAL
MCHC RBC-ENTMCNC: 31.7 PG
MCHC RBC-ENTMCNC: 34.2 GM/DL
MCV RBC AUTO: 92.7 FL
MICROALBUMIN 24H UR DL<=1MG/L-MCNC: <1.2 MG/DL
MICROALBUMIN/CREAT 24H UR-RTO: NORMAL
MICROSCOPIC-UA: NORMAL
MONOCYTES # BLD AUTO: 0.76 K/UL
MONOCYTES NFR BLD AUTO: 12.2 %
NEUTROPHILS # BLD AUTO: 2.94 K/UL
NEUTROPHILS NFR BLD AUTO: 47.1 %
NITRITE URINE: NEGATIVE
PH URINE: 7
PLATELET # BLD AUTO: 225 K/UL
POTASSIUM SERPL-SCNC: 5.2 MMOL/L
PROT SERPL-MCNC: 7 G/DL
PROTEIN URINE: NEGATIVE MG/DL
RBC # BLD: 3.85 M/UL
RBC # FLD: 12.9 %
RED BLOOD CELLS URINE: 3 /HPF
SODIUM SERPL-SCNC: 136 MMOL/L
SPECIFIC GRAVITY URINE: 1.02
SQUAMOUS EPITHELIAL CELLS: 0 /HPF
TRIGL SERPL-MCNC: 84 MG/DL
TSH SERPL-ACNC: 4.95 UIU/ML
UROBILINOGEN URINE: 1 MG/DL
VZV AB TITR SER: POSITIVE
VZV IGG SER IF-ACNC: 2661 INDEX
WBC # FLD AUTO: 6.24 K/UL
WHITE BLOOD CELLS URINE: 1 /HPF

## 2018-09-07 NOTE — HISTORY OF PRESENT ILLNESS
[FreeTextEntry1] : here for f/u HTN.  [de-identified] : here for f/u HTN. he takes nifedipine ER and was sent notice by [employer] to consider switching to amlodipine. denies chest pain , sob , fever, chills.

## 2018-09-07 NOTE — PLAN
[FreeTextEntry1] : fasting labs ordered. \par  f/u for CPE for results. monitor BP. switch to amlodipine after tapering off nifedipine. \par  start with 5 mg and go to 10 mg in a week if BP is uncontrolled. \par

## 2018-09-07 NOTE — HEALTH RISK ASSESSMENT
[Discussed at today's visit] : Advance Directives Discussed at today's visit [No changes since last discussed ___] : No changes since last discussed  [unfilled] [Patient reported bone density results were abnormal] : Patient reported bone density results were abnormal [Patient reported colonoscopy was normal] : Patient reported colonoscopy was normal [HIV test declined] : HIV test declined [Hepatitis C test declined] : Hepatitis C test declined [Fully functional (bathing, dressing, toileting, transferring, walking, feeding)] : Fully functional (bathing, dressing, toileting, transferring, walking, feeding) [Fully functional (using the telephone, shopping, preparing meals, housekeeping, doing laundry, using] : Fully functional and needs no help or supervision to perform IADLs (using the telephone, shopping, preparing meals, housekeeping, doing laundry, using transportation, managing medications and managing finances) [Independent] : managing finances [No falls in past year] : Patient reported no falls in the past year [0] : 2) Feeling down, depressed, or hopeless: Not at all (0) [Change in mental status noted] : No change in mental status noted [Language] : denies difficulty with language [BoneDensityDate] : 08/2017 [BoneDensityComments] : osteoporosis [ColonoscopyDate] : 05/2017 [ColonoscopyComments] : recommend repeat in 3 years due to poor prep. [] : No [de-identified] : dr. Mathis, dr. Wyatt(endocrine) [[place]] : 0

## 2018-09-12 ENCOUNTER — APPOINTMENT (OUTPATIENT)
Dept: FAMILY MEDICINE | Facility: CLINIC | Age: 73
End: 2018-09-12
Payer: MEDICARE

## 2018-09-12 VITALS
HEART RATE: 86 BPM | BODY MASS INDEX: 27.98 KG/M2 | TEMPERATURE: 97.8 F | HEIGHT: 74 IN | WEIGHT: 218 LBS | SYSTOLIC BLOOD PRESSURE: 144 MMHG | OXYGEN SATURATION: 98 % | DIASTOLIC BLOOD PRESSURE: 66 MMHG

## 2018-09-12 PROCEDURE — 93000 ELECTROCARDIOGRAM COMPLETE: CPT | Mod: 59

## 2018-09-12 PROCEDURE — G0439: CPT

## 2018-09-12 RX ORDER — NIFEDIPINE 30 MG/1
30 TABLET, FILM COATED, EXTENDED RELEASE ORAL
Qty: 90 | Refills: 3 | Status: DISCONTINUED | COMMUNITY
Start: 2017-04-05 | End: 2018-09-12

## 2018-09-12 NOTE — COUNSELING
[Weight management counseling provided] : Weight management [Healthy eating counseling provided] : healthy eating [Activity counseling provided] : activity [Behavioral health counseling provided] : behavioral health  [___ min/wk activity recommended] : [unfilled] min/wk activity recommended [Walking] : Walking [Engage in a relaxing activity] : Engage in a relaxing activity [None] : None [Good understanding] : Patient has a good understanding of lifestyle changes and the steps needed to achieve self management goals

## 2018-09-21 NOTE — BEGINNING OF VISIT
[Patient] : patient Implemented All Fall Risk Interventions:  [place] to call system. Call bell, personal items and telephone within reach. Instruct patient to call for assistance. Room bathroom lighting operational. Non-slip footwear when patient is off stretcher. Physically safe environment: no spills, clutter or unnecessary equipment. Stretcher in lowest position, wheels locked, appropriate side rails in place. Provide visual cue, wrist band, yellow gown, etc. Monitor gait and stability. Monitor for mental status changes and reorient to person, place, and time. Review medications for side effects contributing to fall risk. Reinforce activity limits and safety measures with patient and family.

## 2018-11-19 LAB
BASOPHILS # BLD AUTO: 0.02 K/UL
BASOPHILS NFR BLD AUTO: 0.3 %
EOSINOPHIL # BLD AUTO: 0.13 K/UL
EOSINOPHIL NFR BLD AUTO: 2.1 %
HCT VFR BLD CALC: 34.6 %
HGB BLD-MCNC: 11.5 G/DL
IMM GRANULOCYTES NFR BLD AUTO: 0.2 %
IRON SATN MFR SERPL: 46 %
IRON SERPL-MCNC: 118 UG/DL
LDH SERPL-CCNC: 155 U/L
LYMPHOCYTES # BLD AUTO: 2.17 K/UL
LYMPHOCYTES NFR BLD AUTO: 35.9 %
MAN DIFF?: NORMAL
MCHC RBC-ENTMCNC: 32.1 PG
MCHC RBC-ENTMCNC: 33.2 GM/DL
MCV RBC AUTO: 96.6 FL
MONOCYTES # BLD AUTO: 0.68 K/UL
MONOCYTES NFR BLD AUTO: 11.2 %
NEUTROPHILS # BLD AUTO: 3.04 K/UL
NEUTROPHILS NFR BLD AUTO: 50.3 %
PLATELET # BLD AUTO: 226 K/UL
RBC # BLD: 3.58 M/UL
RBC # BLD: 3.58 M/UL
RBC # FLD: 13.5 %
RETICS # AUTO: 1.6 %
RETICS AGGREG/RBC NFR: 56.2 K/UL
TIBC SERPL-MCNC: 258 UG/DL
UIBC SERPL-MCNC: 140 UG/DL
WBC # FLD AUTO: 6.05 K/UL

## 2018-11-20 LAB
FERRITIN SERPL-MCNC: 471 NG/ML
FOLATE SERPL-MCNC: >20 NG/ML
T3FREE SERPL-MCNC: 2.35 PG/ML
T4 FREE SERPL-MCNC: 0.9 NG/DL
THYROGLOB AB SERPL-ACNC: <20 IU/ML
THYROPEROXIDASE AB SERPL IA-ACNC: <10 IU/ML
TSH SERPL-ACNC: 2.78 UIU/ML
VIT B12 SERPL-MCNC: 602 PG/ML

## 2018-11-27 ENCOUNTER — RX RENEWAL (OUTPATIENT)
Age: 73
End: 2018-11-27

## 2019-01-28 ENCOUNTER — INPATIENT (INPATIENT)
Facility: HOSPITAL | Age: 74
LOS: 22 days | Discharge: HOME | DRG: 481 | End: 2019-02-20
Attending: ORTHOPAEDIC SURGERY | Admitting: ORTHOPAEDIC SURGERY
Payer: COMMERCIAL

## 2019-01-28 ENCOUNTER — TRANSCRIPTION ENCOUNTER (OUTPATIENT)
Age: 74
End: 2019-01-28

## 2019-01-28 VITALS
OXYGEN SATURATION: 99 % | RESPIRATION RATE: 16 BRPM | SYSTOLIC BLOOD PRESSURE: 160 MMHG | DIASTOLIC BLOOD PRESSURE: 80 MMHG | WEIGHT: 218.04 LBS | HEART RATE: 84 BPM | TEMPERATURE: 98 F

## 2019-01-28 DIAGNOSIS — I10 ESSENTIAL (PRIMARY) HYPERTENSION: ICD-10-CM

## 2019-01-28 DIAGNOSIS — Z98.890 OTHER SPECIFIED POSTPROCEDURAL STATES: Chronic | ICD-10-CM

## 2019-01-28 DIAGNOSIS — D64.9 ANEMIA, UNSPECIFIED: ICD-10-CM

## 2019-01-28 DIAGNOSIS — S72.009A FRACTURE OF UNSPECIFIED PART OF NECK OF UNSPECIFIED FEMUR, INITIAL ENCOUNTER FOR CLOSED FRACTURE: ICD-10-CM

## 2019-01-28 DIAGNOSIS — Z90.79 ACQUIRED ABSENCE OF OTHER GENITAL ORGAN(S): Chronic | ICD-10-CM

## 2019-01-28 DIAGNOSIS — E78.5 HYPERLIPIDEMIA, UNSPECIFIED: ICD-10-CM

## 2019-01-28 PROBLEM — S22.009A UNSPECIFIED FRACTURE OF UNSPECIFIED THORACIC VERTEBRA, INITIAL ENCOUNTER FOR CLOSED FRACTURE: Chronic | Status: ACTIVE | Noted: 2017-07-20

## 2019-01-28 PROBLEM — M19.90 UNSPECIFIED OSTEOARTHRITIS, UNSPECIFIED SITE: Chronic | Status: ACTIVE | Noted: 2017-07-20

## 2019-01-28 PROBLEM — R35.1 NOCTURIA: Chronic | Status: ACTIVE | Noted: 2017-07-20

## 2019-01-28 PROBLEM — C61 MALIGNANT NEOPLASM OF PROSTATE: Chronic | Status: ACTIVE | Noted: 2017-07-20

## 2019-01-28 PROBLEM — G62.9 POLYNEUROPATHY, UNSPECIFIED: Chronic | Status: ACTIVE | Noted: 2017-07-20

## 2019-01-28 PROBLEM — N40.0 BENIGN PROSTATIC HYPERPLASIA WITHOUT LOWER URINARY TRACT SYMPTOMS: Chronic | Status: ACTIVE | Noted: 2017-07-20

## 2019-01-28 LAB
ALBUMIN SERPL ELPH-MCNC: 3.9 G/DL — SIGNIFICANT CHANGE UP (ref 3.3–5)
ALP SERPL-CCNC: 28 U/L — LOW (ref 40–120)
ALT FLD-CCNC: 14 U/L — SIGNIFICANT CHANGE UP (ref 10–45)
ANION GAP SERPL CALC-SCNC: 12 MMOL/L — SIGNIFICANT CHANGE UP (ref 5–17)
APPEARANCE UR: CLEAR — SIGNIFICANT CHANGE UP
APTT BLD: 27.9 SEC — SIGNIFICANT CHANGE UP (ref 27.5–36.3)
AST SERPL-CCNC: 19 U/L — SIGNIFICANT CHANGE UP (ref 10–40)
BILIRUB SERPL-MCNC: 0.8 MG/DL — SIGNIFICANT CHANGE UP (ref 0.2–1.2)
BILIRUB UR-MCNC: NEGATIVE — SIGNIFICANT CHANGE UP
BLD GP AB SCN SERPL QL: NEGATIVE — SIGNIFICANT CHANGE UP
BUN SERPL-MCNC: 23 MG/DL — SIGNIFICANT CHANGE UP (ref 7–23)
CALCIUM SERPL-MCNC: 9.5 MG/DL — SIGNIFICANT CHANGE UP (ref 8.4–10.5)
CHLORIDE SERPL-SCNC: 100 MMOL/L — SIGNIFICANT CHANGE UP (ref 96–108)
CO2 SERPL-SCNC: 22 MMOL/L — SIGNIFICANT CHANGE UP (ref 22–31)
COLOR SPEC: YELLOW — SIGNIFICANT CHANGE UP
CREAT SERPL-MCNC: 1.25 MG/DL — SIGNIFICANT CHANGE UP (ref 0.5–1.3)
DIFF PNL FLD: NEGATIVE — SIGNIFICANT CHANGE UP
GLUCOSE SERPL-MCNC: 122 MG/DL — HIGH (ref 70–99)
GLUCOSE UR QL: NEGATIVE — SIGNIFICANT CHANGE UP
HCT VFR BLD CALC: 32.1 % — LOW (ref 39–50)
HGB BLD-MCNC: 11.3 G/DL — LOW (ref 13–17)
INR BLD: 1.04 RATIO — SIGNIFICANT CHANGE UP (ref 0.88–1.16)
KETONES UR-MCNC: ABNORMAL
LEUKOCYTE ESTERASE UR-ACNC: NEGATIVE — SIGNIFICANT CHANGE UP
MCHC RBC-ENTMCNC: 32.5 PG — SIGNIFICANT CHANGE UP (ref 27–34)
MCHC RBC-ENTMCNC: 35.4 GM/DL — SIGNIFICANT CHANGE UP (ref 32–36)
MCV RBC AUTO: 91.9 FL — SIGNIFICANT CHANGE UP (ref 80–100)
NITRITE UR-MCNC: NEGATIVE — SIGNIFICANT CHANGE UP
PH UR: 5.5 — SIGNIFICANT CHANGE UP (ref 5–8)
PLATELET # BLD AUTO: 181 K/UL — SIGNIFICANT CHANGE UP (ref 150–400)
POTASSIUM SERPL-MCNC: 4.2 MMOL/L — SIGNIFICANT CHANGE UP (ref 3.5–5.3)
POTASSIUM SERPL-SCNC: 4.2 MMOL/L — SIGNIFICANT CHANGE UP (ref 3.5–5.3)
PROT SERPL-MCNC: 6.6 G/DL — SIGNIFICANT CHANGE UP (ref 6–8.3)
PROT UR-MCNC: SIGNIFICANT CHANGE UP
PROTHROM AB SERPL-ACNC: 12 SEC — SIGNIFICANT CHANGE UP (ref 10–12.9)
RBC # BLD: 3.49 M/UL — LOW (ref 4.2–5.8)
RBC # FLD: 11.2 % — SIGNIFICANT CHANGE UP (ref 10.3–14.5)
RH IG SCN BLD-IMP: POSITIVE — SIGNIFICANT CHANGE UP
SODIUM SERPL-SCNC: 134 MMOL/L — LOW (ref 135–145)
SP GR SPEC: 1.02 — SIGNIFICANT CHANGE UP (ref 1.01–1.02)
UROBILINOGEN FLD QL: NEGATIVE — SIGNIFICANT CHANGE UP
WBC # BLD: 6.2 K/UL — SIGNIFICANT CHANGE UP (ref 3.8–10.5)
WBC # FLD AUTO: 6.2 K/UL — SIGNIFICANT CHANGE UP (ref 3.8–10.5)

## 2019-01-28 PROCEDURE — 73552 X-RAY EXAM OF FEMUR 2/>: CPT | Mod: 26,RT

## 2019-01-28 PROCEDURE — 93010 ELECTROCARDIOGRAM REPORT: CPT

## 2019-01-28 PROCEDURE — 71045 X-RAY EXAM CHEST 1 VIEW: CPT | Mod: 26

## 2019-01-28 PROCEDURE — 73502 X-RAY EXAM HIP UNI 2-3 VIEWS: CPT | Mod: 26,RT

## 2019-01-28 PROCEDURE — 99285 EMERGENCY DEPT VISIT HI MDM: CPT | Mod: GC,25

## 2019-01-28 RX ORDER — DIPHENHYDRAMINE HCL 50 MG
25 CAPSULE ORAL EVERY 4 HOURS
Qty: 0 | Refills: 0 | Status: DISCONTINUED | OUTPATIENT
Start: 2019-01-28 | End: 2019-02-20

## 2019-01-28 RX ORDER — CHLORHEXIDINE GLUCONATE 213 G/1000ML
1 SOLUTION TOPICAL ONCE
Qty: 0 | Refills: 0 | Status: COMPLETED | OUTPATIENT
Start: 2019-01-28 | End: 2019-01-29

## 2019-01-28 RX ORDER — SODIUM CHLORIDE 9 MG/ML
1000 INJECTION, SOLUTION INTRAVENOUS ONCE
Qty: 0 | Refills: 0 | Status: COMPLETED | OUTPATIENT
Start: 2019-01-28 | End: 2019-01-28

## 2019-01-28 RX ORDER — OXYCODONE HYDROCHLORIDE 5 MG/1
5 TABLET ORAL EVERY 4 HOURS
Qty: 0 | Refills: 0 | Status: DISCONTINUED | OUTPATIENT
Start: 2019-01-28 | End: 2019-02-04

## 2019-01-28 RX ORDER — HYDROMORPHONE HYDROCHLORIDE 2 MG/ML
0.5 INJECTION INTRAMUSCULAR; INTRAVENOUS; SUBCUTANEOUS EVERY 6 HOURS
Qty: 0 | Refills: 0 | Status: DISCONTINUED | OUTPATIENT
Start: 2019-01-28 | End: 2019-01-29

## 2019-01-28 RX ORDER — FOLIC ACID 0.8 MG
1 TABLET ORAL DAILY
Qty: 0 | Refills: 0 | Status: DISCONTINUED | OUTPATIENT
Start: 2019-01-28 | End: 2019-02-20

## 2019-01-28 RX ORDER — ATORVASTATIN CALCIUM 80 MG/1
10 TABLET, FILM COATED ORAL AT BEDTIME
Qty: 0 | Refills: 0 | Status: DISCONTINUED | OUTPATIENT
Start: 2019-01-28 | End: 2019-02-20

## 2019-01-28 RX ORDER — OXYCODONE HYDROCHLORIDE 5 MG/1
10 TABLET ORAL EVERY 4 HOURS
Qty: 0 | Refills: 0 | Status: DISCONTINUED | OUTPATIENT
Start: 2019-01-28 | End: 2019-02-04

## 2019-01-28 RX ORDER — AMLODIPINE BESYLATE 2.5 MG/1
10 TABLET ORAL ONCE
Qty: 0 | Refills: 0 | Status: COMPLETED | OUTPATIENT
Start: 2019-01-28 | End: 2019-01-28

## 2019-01-28 RX ORDER — ACETAMINOPHEN 500 MG
1000 TABLET ORAL ONCE
Qty: 0 | Refills: 0 | Status: COMPLETED | OUTPATIENT
Start: 2019-01-28 | End: 2019-01-28

## 2019-01-28 RX ORDER — GABAPENTIN 400 MG/1
800 CAPSULE ORAL ONCE
Qty: 0 | Refills: 0 | Status: DISCONTINUED | OUTPATIENT
Start: 2019-01-28 | End: 2019-02-20

## 2019-01-28 RX ORDER — TRAMADOL HYDROCHLORIDE 50 MG/1
50 TABLET ORAL EVERY 4 HOURS
Qty: 0 | Refills: 0 | Status: DISCONTINUED | OUTPATIENT
Start: 2019-01-28 | End: 2019-02-04

## 2019-01-28 RX ORDER — HEPARIN SODIUM 5000 [USP'U]/ML
5000 INJECTION INTRAVENOUS; SUBCUTANEOUS EVERY 12 HOURS
Qty: 0 | Refills: 0 | Status: COMPLETED | OUTPATIENT
Start: 2019-01-28 | End: 2019-01-28

## 2019-01-28 RX ORDER — MORPHINE SULFATE 50 MG/1
4 CAPSULE, EXTENDED RELEASE ORAL ONCE
Qty: 0 | Refills: 0 | Status: DISCONTINUED | OUTPATIENT
Start: 2019-01-28 | End: 2019-01-28

## 2019-01-28 RX ORDER — ASPIRIN/CALCIUM CARB/MAGNESIUM 324 MG
81 TABLET ORAL DAILY
Qty: 0 | Refills: 0 | Status: COMPLETED | OUTPATIENT
Start: 2019-01-28 | End: 2019-01-28

## 2019-01-28 RX ORDER — TERAZOSIN HYDROCHLORIDE 10 MG/1
5 CAPSULE ORAL DAILY
Qty: 0 | Refills: 0 | Status: DISCONTINUED | OUTPATIENT
Start: 2019-01-28 | End: 2019-02-09

## 2019-01-28 RX ORDER — LANOLIN ALCOHOL/MO/W.PET/CERES
3 CREAM (GRAM) TOPICAL AT BEDTIME
Qty: 0 | Refills: 0 | Status: DISCONTINUED | OUTPATIENT
Start: 2019-01-28 | End: 2019-02-20

## 2019-01-28 RX ORDER — ASCORBIC ACID 60 MG
500 TABLET,CHEWABLE ORAL
Qty: 0 | Refills: 0 | Status: DISCONTINUED | OUTPATIENT
Start: 2019-01-28 | End: 2019-02-20

## 2019-01-28 RX ORDER — HYDROMORPHONE HYDROCHLORIDE 2 MG/ML
1 INJECTION INTRAMUSCULAR; INTRAVENOUS; SUBCUTANEOUS
Qty: 0 | Refills: 0 | Status: DISCONTINUED | OUTPATIENT
Start: 2019-01-28 | End: 2019-01-28

## 2019-01-28 RX ORDER — DOCUSATE SODIUM 100 MG
100 CAPSULE ORAL THREE TIMES A DAY
Qty: 0 | Refills: 0 | Status: DISCONTINUED | OUTPATIENT
Start: 2019-01-28 | End: 2019-02-18

## 2019-01-28 RX ORDER — SODIUM CHLORIDE 9 MG/ML
1000 INJECTION, SOLUTION INTRAVENOUS
Qty: 0 | Refills: 0 | Status: DISCONTINUED | OUTPATIENT
Start: 2019-01-28 | End: 2019-02-04

## 2019-01-28 RX ADMIN — Medication 1000 MILLIGRAM(S): at 12:00

## 2019-01-28 RX ADMIN — HYDROMORPHONE HYDROCHLORIDE 0.5 MILLIGRAM(S): 2 INJECTION INTRAMUSCULAR; INTRAVENOUS; SUBCUTANEOUS at 22:56

## 2019-01-28 RX ADMIN — HEPARIN SODIUM 5000 UNIT(S): 5000 INJECTION INTRAVENOUS; SUBCUTANEOUS at 18:25

## 2019-01-28 RX ADMIN — SODIUM CHLORIDE 1000 MILLILITER(S): 9 INJECTION, SOLUTION INTRAVENOUS at 11:31

## 2019-01-28 RX ADMIN — OXYCODONE HYDROCHLORIDE 10 MILLIGRAM(S): 5 TABLET ORAL at 21:29

## 2019-01-28 RX ADMIN — ATORVASTATIN CALCIUM 10 MILLIGRAM(S): 80 TABLET, FILM COATED ORAL at 20:29

## 2019-01-28 RX ADMIN — Medication 400 MILLIGRAM(S): at 11:31

## 2019-01-28 RX ADMIN — Medication 500 MILLIGRAM(S): at 18:25

## 2019-01-28 RX ADMIN — Medication 1 MILLIGRAM(S): at 18:25

## 2019-01-28 RX ADMIN — OXYCODONE HYDROCHLORIDE 5 MILLIGRAM(S): 5 TABLET ORAL at 16:03

## 2019-01-28 RX ADMIN — OXYCODONE HYDROCHLORIDE 5 MILLIGRAM(S): 5 TABLET ORAL at 18:16

## 2019-01-28 RX ADMIN — MORPHINE SULFATE 4 MILLIGRAM(S): 50 CAPSULE, EXTENDED RELEASE ORAL at 11:30

## 2019-01-28 RX ADMIN — MORPHINE SULFATE 4 MILLIGRAM(S): 50 CAPSULE, EXTENDED RELEASE ORAL at 12:00

## 2019-01-28 RX ADMIN — OXYCODONE HYDROCHLORIDE 10 MILLIGRAM(S): 5 TABLET ORAL at 19:42

## 2019-01-28 RX ADMIN — HYDROMORPHONE HYDROCHLORIDE 1 MILLIGRAM(S): 2 INJECTION INTRAMUSCULAR; INTRAVENOUS; SUBCUTANEOUS at 23:14

## 2019-01-28 RX ADMIN — HYDROMORPHONE HYDROCHLORIDE 0.5 MILLIGRAM(S): 2 INJECTION INTRAMUSCULAR; INTRAVENOUS; SUBCUTANEOUS at 21:34

## 2019-01-28 NOTE — ED PROVIDER NOTE - OBJECTIVE STATEMENT
73M w/ pmh HTN, HLD, BPH, prostate cancer s/p prostatectomy p/w R hip pain - started around 10pm, pt was at home and tried to kick some trash but lost balance, spun around and fell down onto his R hip. Unable to ambulate since then, no pain elsewhere. No other complaints of fever, n/v/d/c, sob, cough, chest / abd pain. No recent travel, medication change, illness, or hospitalization. No preceding symptoms prior to fall.

## 2019-01-28 NOTE — ED PROVIDER NOTE - PHYSICAL EXAMINATION
*GEN:   comfortable, in no acute distress, AOx3  *EYES:   pupils equally round and reactive to light, extra-occular movements intact  *HEENT:   airway patent, moist mucosal membranes, no chipped teeth, full ROM neck w/out midline tenderness to palpation, no demarco sign, no septal hematoma or deviation, no maxillary/mandibular mobility  *CV:   regular rate and rhythm  *RESP:   clear to auscultation bilaterally, non-labored  *ABD:   soft, non-tender  *:   no cva/flank tenderness  *MSK:   no pelvic mobility, R hip internally rotated and shortened, very limited ROM 2/2 pain, no tenderness to palpation elsewhere, no limited ROM elsewhere  *SKIN:  no abrasions \ bruising \ laceration noted  *NEURO:   AOx3, cranial nerves intact throughout, strength 5/5 except for R hip, no focal loss of sensation, no pronator drift, finger/nose normal

## 2019-01-28 NOTE — H&P ADULT - NSHPLABSRESULTS_GEN_ALL_CORE
XRAY R Hip and Pelvis:     Comminuted fracture of the right proximal femur involving the   subtrochanteric femoral diaphysis and lesser trochanter.

## 2019-01-28 NOTE — H&P ADULT - NSHPPHYSICALEXAM_GEN_ALL_CORE
GEN: NAD, resting comfortably    Neuro: A&O x 3, no focal neurological disturbances     RLE  Skin intact, no ecchymosis or erythema   TTP over th R hip/groin area  non TTp over the medial/lateral knee joint lines   Full active/passive painless ROM of the knee joint  mild swelling over the R hip  SILT L3-S1  Unable to SLR  Pain with log roll  EHL/FHL/TA/GSc intact  Dp pulse 2+  compartments soft and compressible

## 2019-01-28 NOTE — H&P ADULT - ATTENDING COMMENTS
R Reverse obliquity IT Fx, indciated for ORIF. All RBAs discussed. All questions answered. Informed consent obtained.

## 2019-01-28 NOTE — ED ADULT NURSE NOTE - NSIMPLEMENTINTERV_GEN_ALL_ED
Implemented All Fall with Harm Risk Interventions:  [place] to call system. Call bell, personal items and telephone within reach. Instruct patient to call for assistance. Room bathroom lighting operational. Non-slip footwear when patient is off stretcher. Physically safe environment: no spills, clutter or unnecessary equipment. Stretcher in lowest position, wheels locked, appropriate side rails in place. Provide visual cue, wrist band, yellow gown, etc. Monitor gait and stability. Monitor for mental status changes and reorient to person, place, and time. Review medications for side effects contributing to fall risk. Reinforce activity limits and safety measures with patient and family. Provide visual clues: red socks.

## 2019-01-28 NOTE — CONSULT NOTE ADULT - SUBJECTIVE AND OBJECTIVE BOX
73M w/ pmh HTN, HLD, BPH, prostate cancer s/p prostatectomy p/w R hip pain - started around 10pm, pt was at home and tried to kick some trash but lost balance, spun around and fell down onto his R hip. Unable to ambulate since then, no pain elsewhere. No other complaints of fever, n/v/d/c, sob, cough, chest / abd pain. No recent travel, medication change, illness, or hospitalization. No preceding symptoms prior to fall. Patient was found to have a right hip fx and is scheduled for an ORIF of the right hip. Patient states he is very active, denies any CP or shortness of breath.     PAST MEDICAL & SURGICAL HISTORY:  Prostate cancer: 2008, no chemo or RT  Anemia: mild, cause unknown  Osteoarthritis  Hypertension  Hyperlipidemia  Neuropathy: bilateral feet, cause unknown  Thoracic spine fracture: T12, June 21, 2017  Nocturia: x2-3  BPH (benign prostatic hypertrophy)  History of kyphoplasty  S/P colonoscopy: 2017, negative  S/P cystoscopy: June 2017, &quot;removal of scar tissue at the base of the bladder&quot;  S/P prostatectomy: 2008        MEDICATIONS  (STANDING):  amLODIPine   Tablet 10 milliGRAM(s) Oral Once  aspirin enteric coated 81 milliGRAM(s) Oral daily  atorvastatin 10 milliGRAM(s) Oral at bedtime  gabapentin 800 milliGRAM(s) Oral Once    MEDICATIONS  (PRN):        CONSTITUTIONAL: No weakness, fevers or chills  EYES/ENT: No visual changes;  No vertigo or throat pain   NECK: No pain or stiffness  RESPIRATORY: No cough, wheezing, hemoptysis; No shortness of breath  CARDIOVASCULAR: No chest pain or palpitations  GASTROINTESTINAL: No abdominal or epigastric pain. No nausea, vomiting, or hematemesis; No diarrhea or constipation. No melena or hematochezia.  GENITOURINARY: No dysuria, frequency or hematuria  NEUROLOGICAL: No numbness or weakness  SKIN: No itching, burning, rashes, or lesions   MUSCULOSKELETAL: right hip pain    INTERVAL HPI/OVERNIGHT EVENTS:  T(C): 36.9 (01-28-19 @ 11:05), Max: 36.9 (01-28-19 @ 11:05)  HR: 84 (01-28-19 @ 11:05) (84 - 84)  BP: 160/80 (01-28-19 @ 11:05) (160/80 - 160/80)  RR: 16 (01-28-19 @ 11:05) (16 - 16)  SpO2: 99% (01-28-19 @ 11:05) (99% - 99%)  Wt(kg): --  I&O's Summary      PHYSICAL EXAM:  GENERAL: NAD, well-groomed, well-developed  HEAD:  Atraumatic, Normocephalic  EYES: EOMI, PERRLA, conjunctiva and sclera clear  ENMT: No tonsillar erythema, exudates, or enlargement; Moist mucous membranes, Good dentition, No lesions  NECK: Supple, No JVD, Normal thyroid  NERVOUS SYSTEM:  Alert & Oriented X3, Good concentration; Motor Strength 5/5 B/L upper and lower extremities; DTRs 2+ intact and symmetric  CHEST/LUNG: Clear to percussion bilaterally; No rales, rhonchi, wheezing, or rubs  HEART: Regular rate and rhythm; No murmurs, rubs, or gallops  ABDOMEN: Soft, Nontender, Nondistended; Bowel sounds present  EXTREMITIES: right leg externally rotated and shortened  LYMPH: No lymphadenopathy noted  SKIN: No rashes or lesions        LABS:                        11.3   6.2   )-----------( 181      ( 28 Jan 2019 11:51 )             32.1     01-28    134<L>  |  100  |  23  ----------------------------<  122<H>  4.2   |  22  |  1.25    Ca    9.5      28 Jan 2019 11:51    TPro  6.6  /  Alb  3.9  /  TBili  0.8  /  DBili  x   /  AST  19  /  ALT  14  /  AlkPhos  28<L>  01-28    PT/INR - ( 28 Jan 2019 11:51 )   PT: 12.0 sec;   INR: 1.04 ratio         PTT - ( 28 Jan 2019 11:51 )  PTT:27.9 sec    EKG NSR @ 87 with PVC                Radiology reports:

## 2019-01-28 NOTE — CONSULT NOTE ADULT - PROBLEM SELECTOR RECOMMENDATION 9
scheduled for ORIF of right hip  No contraindication to scheduled procedure  pain meds as needed  DVT and GI prophylaxis

## 2019-01-28 NOTE — H&P ADULT - ASSESSMENT
A/P: 73 y M s/p MF w/ R Hip Intertrochanteric Fracture with subtrochanteric extension       Imaging and case discussed with attending, Dr. Rodgers, Plan for OR tomorrow 1/29 for Operative fixation with orthopedic hardware (IMN) of the Right Hip    Analgesia  ice packs to affected area prn  Non weight bearing RLE  DVT ppx, hold all chemical dvt ppx after midnight  NPO after midnight except for medication  IV fluids while NPO  FU am Labs  FU Medicine, please document clearance for OR tomorrow 1/29  Attending aware and agrees with plan

## 2019-01-28 NOTE — ED ADULT NURSE NOTE - OBJECTIVE STATEMENT
73 year old male presents to ED via EMS complaining of right hip pain s/p fall. History of Prostate CA, anemia, osteoarthritis, HTN, HLD, BPH. Works at a  home, states that a garbage was overflowing and went to compact it, lost his balance and fell. denies hitting head, LOC, ha, chest pain, shortness of breath, abdominal pain, NVD, fevers, chills. Patient undressed and placed into gown, call bell in hand and side rails up with bed in lowest position for safety. blanket provided. Comfort and safety provided.

## 2019-01-28 NOTE — CONSULT NOTE ADULT - ATTENDING COMMENTS
The patient is medically stable, medically optimized and has no medical contraindication to surgery today as required. Exam time 30 minutes including > than 50 % for bedside discussion and counseling.

## 2019-01-28 NOTE — H&P ADULT - PSH
History of kyphoplasty    S/P colonoscopy  2017, negative  S/P cystoscopy  June 2017, "removal of scar tissue at the base of the bladder"  S/P prostatectomy  2008

## 2019-01-28 NOTE — H&P ADULT - HISTORY OF PRESENT ILLNESS
73 y M community ambulator without assistive devices presents to the ED with Right sided hip pain following a MF that occurred yesterday evening. Patient denies HH or LOC. He reports he was kick in and compress some trash in a garbage can , when he lost his balance and landed on his right hip. He has pain immediately after the fall and was unable to ambulate. Denies numbness or tingling in the RLE. Pt has a hx of HTN/HLD/BPH. prostate cancer s/prostatectomy. He does not follow with an orthopedic surgeon. Denies fever, chills, sob, chest pain, n/v. No other traumatic injuries or complaints at this time.

## 2019-01-29 LAB
ANION GAP SERPL CALC-SCNC: 13 MMOL/L — SIGNIFICANT CHANGE UP (ref 5–17)
ANION GAP SERPL CALC-SCNC: 9 MMOL/L — SIGNIFICANT CHANGE UP (ref 5–17)
APTT BLD: 26.7 SEC — LOW (ref 27.5–36.3)
BLD GP AB SCN SERPL QL: NEGATIVE — SIGNIFICANT CHANGE UP
BUN SERPL-MCNC: 15 MG/DL — SIGNIFICANT CHANGE UP (ref 7–23)
BUN SERPL-MCNC: 16 MG/DL — SIGNIFICANT CHANGE UP (ref 7–23)
CALCIUM SERPL-MCNC: 8.3 MG/DL — LOW (ref 8.4–10.5)
CALCIUM SERPL-MCNC: 8.8 MG/DL — SIGNIFICANT CHANGE UP (ref 8.4–10.5)
CHLORIDE SERPL-SCNC: 100 MMOL/L — SIGNIFICANT CHANGE UP (ref 96–108)
CHLORIDE SERPL-SCNC: 98 MMOL/L — SIGNIFICANT CHANGE UP (ref 96–108)
CO2 SERPL-SCNC: 22 MMOL/L — SIGNIFICANT CHANGE UP (ref 22–31)
CO2 SERPL-SCNC: 24 MMOL/L — SIGNIFICANT CHANGE UP (ref 22–31)
CREAT SERPL-MCNC: 1.02 MG/DL — SIGNIFICANT CHANGE UP (ref 0.5–1.3)
CREAT SERPL-MCNC: 1.25 MG/DL — SIGNIFICANT CHANGE UP (ref 0.5–1.3)
GLUCOSE SERPL-MCNC: 113 MG/DL — HIGH (ref 70–99)
GLUCOSE SERPL-MCNC: 151 MG/DL — HIGH (ref 70–99)
HCT VFR BLD CALC: 27.3 % — LOW (ref 39–50)
HCT VFR BLD CALC: 28.6 % — LOW (ref 39–50)
HGB BLD-MCNC: 9.6 G/DL — LOW (ref 13–17)
HGB BLD-MCNC: 9.8 G/DL — LOW (ref 13–17)
INR BLD: 1.08 RATIO — SIGNIFICANT CHANGE UP (ref 0.88–1.16)
MCHC RBC-ENTMCNC: 31.5 PG — SIGNIFICANT CHANGE UP (ref 27–34)
MCHC RBC-ENTMCNC: 33.2 PG — SIGNIFICANT CHANGE UP (ref 27–34)
MCHC RBC-ENTMCNC: 33.6 GM/DL — SIGNIFICANT CHANGE UP (ref 32–36)
MCHC RBC-ENTMCNC: 35.7 GM/DL — SIGNIFICANT CHANGE UP (ref 32–36)
MCV RBC AUTO: 92.9 FL — SIGNIFICANT CHANGE UP (ref 80–100)
MCV RBC AUTO: 93.8 FL — SIGNIFICANT CHANGE UP (ref 80–100)
PLATELET # BLD AUTO: 138 K/UL — LOW (ref 150–400)
PLATELET # BLD AUTO: 175 K/UL — SIGNIFICANT CHANGE UP (ref 150–400)
POTASSIUM SERPL-MCNC: 4.4 MMOL/L — SIGNIFICANT CHANGE UP (ref 3.5–5.3)
POTASSIUM SERPL-MCNC: 4.9 MMOL/L — SIGNIFICANT CHANGE UP (ref 3.5–5.3)
POTASSIUM SERPL-SCNC: 4.4 MMOL/L — SIGNIFICANT CHANGE UP (ref 3.5–5.3)
POTASSIUM SERPL-SCNC: 4.9 MMOL/L — SIGNIFICANT CHANGE UP (ref 3.5–5.3)
PROTHROM AB SERPL-ACNC: 12.4 SEC — SIGNIFICANT CHANGE UP (ref 10–13.1)
RBC # BLD: 2.94 M/UL — LOW (ref 4.2–5.8)
RBC # BLD: 3.05 M/UL — LOW (ref 4.2–5.8)
RBC # FLD: 11.6 % — SIGNIFICANT CHANGE UP (ref 10.3–14.5)
RBC # FLD: 13.2 % — SIGNIFICANT CHANGE UP (ref 10.3–14.5)
RH IG SCN BLD-IMP: POSITIVE — SIGNIFICANT CHANGE UP
SODIUM SERPL-SCNC: 133 MMOL/L — LOW (ref 135–145)
SODIUM SERPL-SCNC: 133 MMOL/L — LOW (ref 135–145)
WBC # BLD: 12.6 K/UL — HIGH (ref 3.8–10.5)
WBC # BLD: 8.46 K/UL — SIGNIFICANT CHANGE UP (ref 3.8–10.5)
WBC # FLD AUTO: 12.6 K/UL — HIGH (ref 3.8–10.5)
WBC # FLD AUTO: 8.46 K/UL — SIGNIFICANT CHANGE UP (ref 3.8–10.5)

## 2019-01-29 RX ORDER — ONDANSETRON 8 MG/1
4 TABLET, FILM COATED ORAL EVERY 6 HOURS
Qty: 0 | Refills: 0 | Status: DISCONTINUED | OUTPATIENT
Start: 2019-01-29 | End: 2019-02-20

## 2019-01-29 RX ORDER — HYDROMORPHONE HYDROCHLORIDE 2 MG/ML
0.25 INJECTION INTRAMUSCULAR; INTRAVENOUS; SUBCUTANEOUS
Qty: 0 | Refills: 0 | Status: DISCONTINUED | OUTPATIENT
Start: 2019-01-29 | End: 2019-01-29

## 2019-01-29 RX ORDER — OXYCODONE HYDROCHLORIDE 5 MG/1
10 TABLET ORAL EVERY 4 HOURS
Qty: 0 | Refills: 0 | Status: DISCONTINUED | OUTPATIENT
Start: 2019-01-29 | End: 2019-01-29

## 2019-01-29 RX ORDER — ONDANSETRON 8 MG/1
4 TABLET, FILM COATED ORAL ONCE
Qty: 0 | Refills: 0 | Status: DISCONTINUED | OUTPATIENT
Start: 2019-01-29 | End: 2019-01-29

## 2019-01-29 RX ORDER — DOCUSATE SODIUM 100 MG
100 CAPSULE ORAL THREE TIMES A DAY
Qty: 0 | Refills: 0 | Status: DISCONTINUED | OUTPATIENT
Start: 2019-01-29 | End: 2019-01-30

## 2019-01-29 RX ORDER — OXYCODONE HYDROCHLORIDE 5 MG/1
5 TABLET ORAL EVERY 4 HOURS
Qty: 0 | Refills: 0 | Status: DISCONTINUED | OUTPATIENT
Start: 2019-01-29 | End: 2019-01-29

## 2019-01-29 RX ORDER — SODIUM CHLORIDE 9 MG/ML
1000 INJECTION, SOLUTION INTRAVENOUS
Qty: 0 | Refills: 0 | Status: DISCONTINUED | OUTPATIENT
Start: 2019-01-29 | End: 2019-02-04

## 2019-01-29 RX ORDER — ENOXAPARIN SODIUM 100 MG/ML
40 INJECTION SUBCUTANEOUS EVERY 24 HOURS
Qty: 0 | Refills: 0 | Status: DISCONTINUED | OUTPATIENT
Start: 2019-01-29 | End: 2019-02-20

## 2019-01-29 RX ORDER — SODIUM CHLORIDE 9 MG/ML
1000 INJECTION, SOLUTION INTRAVENOUS
Qty: 0 | Refills: 0 | Status: DISCONTINUED | OUTPATIENT
Start: 2019-01-29 | End: 2019-01-29

## 2019-01-29 RX ORDER — SODIUM CHLORIDE 9 MG/ML
500 INJECTION INTRAMUSCULAR; INTRAVENOUS; SUBCUTANEOUS ONCE
Qty: 0 | Refills: 0 | Status: COMPLETED | OUTPATIENT
Start: 2019-01-29 | End: 2019-01-29

## 2019-01-29 RX ORDER — ACETAMINOPHEN 500 MG
650 TABLET ORAL EVERY 6 HOURS
Qty: 0 | Refills: 0 | Status: DISCONTINUED | OUTPATIENT
Start: 2019-01-29 | End: 2019-02-20

## 2019-01-29 RX ORDER — CEFAZOLIN SODIUM 1 G
2000 VIAL (EA) INJECTION EVERY 8 HOURS
Qty: 0 | Refills: 0 | Status: COMPLETED | OUTPATIENT
Start: 2019-01-30 | End: 2019-01-30

## 2019-01-29 RX ADMIN — SODIUM CHLORIDE 125 MILLILITER(S): 9 INJECTION, SOLUTION INTRAVENOUS at 20:49

## 2019-01-29 RX ADMIN — CHLORHEXIDINE GLUCONATE 1 APPLICATION(S): 213 SOLUTION TOPICAL at 06:45

## 2019-01-29 RX ADMIN — SODIUM CHLORIDE 1000 MILLILITER(S): 9 INJECTION INTRAMUSCULAR; INTRAVENOUS; SUBCUTANEOUS at 21:31

## 2019-01-29 RX ADMIN — OXYCODONE HYDROCHLORIDE 10 MILLIGRAM(S): 5 TABLET ORAL at 02:43

## 2019-01-29 RX ADMIN — Medication 500 MILLIGRAM(S): at 06:45

## 2019-01-29 RX ADMIN — OXYCODONE HYDROCHLORIDE 10 MILLIGRAM(S): 5 TABLET ORAL at 03:18

## 2019-01-29 RX ADMIN — ATORVASTATIN CALCIUM 10 MILLIGRAM(S): 80 TABLET, FILM COATED ORAL at 21:28

## 2019-01-29 RX ADMIN — SODIUM CHLORIDE 125 MILLILITER(S): 9 INJECTION, SOLUTION INTRAVENOUS at 21:31

## 2019-01-29 RX ADMIN — Medication 100 MILLIGRAM(S): at 21:28

## 2019-01-29 RX ADMIN — HYDROMORPHONE HYDROCHLORIDE 1 MILLIGRAM(S): 2 INJECTION INTRAMUSCULAR; INTRAVENOUS; SUBCUTANEOUS at 00:14

## 2019-01-29 RX ADMIN — SODIUM CHLORIDE 100 MILLILITER(S): 9 INJECTION, SOLUTION INTRAVENOUS at 21:32

## 2019-01-29 RX ADMIN — SODIUM CHLORIDE 125 MILLILITER(S): 9 INJECTION, SOLUTION INTRAVENOUS at 10:11

## 2019-01-29 RX ADMIN — TERAZOSIN HYDROCHLORIDE 5 MILLIGRAM(S): 10 CAPSULE ORAL at 06:52

## 2019-01-29 RX ADMIN — OXYCODONE HYDROCHLORIDE 10 MILLIGRAM(S): 5 TABLET ORAL at 10:40

## 2019-01-29 RX ADMIN — SODIUM CHLORIDE 125 MILLILITER(S): 9 INJECTION, SOLUTION INTRAVENOUS at 00:21

## 2019-01-29 RX ADMIN — Medication 100 MILLIGRAM(S): at 06:45

## 2019-01-29 RX ADMIN — HYDROMORPHONE HYDROCHLORIDE 0.5 MILLIGRAM(S): 2 INJECTION INTRAMUSCULAR; INTRAVENOUS; SUBCUTANEOUS at 16:30

## 2019-01-29 RX ADMIN — HYDROMORPHONE HYDROCHLORIDE 0.5 MILLIGRAM(S): 2 INJECTION INTRAMUSCULAR; INTRAVENOUS; SUBCUTANEOUS at 16:45

## 2019-01-29 RX ADMIN — OXYCODONE HYDROCHLORIDE 10 MILLIGRAM(S): 5 TABLET ORAL at 10:10

## 2019-01-29 NOTE — PROGRESS NOTE ADULT - SUBJECTIVE AND OBJECTIVE BOX
Patient is a 73y old  Male who presents with a chief complaint of Right Hip Fracture (2019 06:32)        MEDICATIONS  (STANDING):  ascorbic acid 500 milliGRAM(s) Oral two times a day  atorvastatin 10 milliGRAM(s) Oral at bedtime  docusate sodium 100 milliGRAM(s) Oral three times a day  folic acid 1 milliGRAM(s) Oral daily  gabapentin 800 milliGRAM(s) Oral Once  lactated ringers. 1000 milliLiter(s) (125 mL/Hr) IV Continuous <Continuous>  multivitamin 1 Tablet(s) Oral daily  terazosin 5 milliGRAM(s) Oral daily    MEDICATIONS  (PRN):  diphenhydrAMINE 25 milliGRAM(s) Oral every 4 hours PRN Rash and/or Itching  HYDROmorphone  Injectable 0.5 milliGRAM(s) IV Push every 6 hours PRN Severe Pain (7 - 10)  HYDROmorphone  Injectable 1 milliGRAM(s) IV Push every 3 hours PRN breakthrough pain  melatonin 3 milliGRAM(s) Oral at bedtime PRN Insomnia  oxyCODONE    IR 10 milliGRAM(s) Oral every 4 hours PRN Severe Pain (7 - 10)  oxyCODONE    IR 5 milliGRAM(s) Oral every 4 hours PRN Moderate Pain (4 - 6)  traMADol 50 milliGRAM(s) Oral every 4 hours PRN Mild Pain (1 - 3)      Allergies    No Known Allergies    Intolerances      VITALS:   T(C): 37 (19 @ 14:49), Max: 37.7 (19 @ 04:38)  HR: 92 (19 @ 14:49) (78 - 92)  BP: 150/65 (19 @ 14:49) (127/56 - 155/73)  RR: 18 (19 @ 14:49) (16 - 18)  SpO2: 95% (19 @ 14:49) (94% - 98%)  Wt(kg): --    PHYSICAL EXAM:  GENERAL: NAD, well nourished and conversant  HEAD:  Atraumatic  EYES: EOM, PERRLA, conjunctiva pink and sclera white  ENT: No tonsillar erythema, exudates, or enlargement, moist mucous membranes, good dentition, no lesions  NECK: Supple, No JVD, normal thyroid, carotids with normal upstrokes and no bruits  CHEST/LUNG: Clear to auscultation bilaterally, No rales, rhonchi, wheezing, or rubs  HEART: Regular rate and rhythm, No murmurs, rubs, or gallops  ABDOMEN: Soft, nondistended, no masses, guarding, tenderness or rebound, bowel sounds present  EXTREMITIES:  2+ Peripheral Pulses, No clubbing, cyanosis, or edema. No arthritis of shoulders, elbows, hands, hips, knees, ankles, or feet. No DJD C spine, T spine, or L/S spine  LYMPH: No lymphadenopathy noted  SKIN: No rashes or lesions  NERVOUS SYSTEM:  Alert & Oriented X3, normal cognitive function. Motor Strength 5/5 right upper and right lower.  5/5 left upper and left lower extremities, DTRs 2+ intact and symmetric    LABS:        CBC Full  -  ( 2019 07:55 )  WBC Count : 8.46 K/uL  Hemoglobin : 9.6 g/dL  Hematocrit : 28.6 %  Platelet Count - Automated : 175 K/uL  Mean Cell Volume : 93.8 fl  Mean Cell Hemoglobin : 31.5 pg  Mean Cell Hemoglobin Concentration : 33.6 gm/dL  Auto Neutrophil # : x  Auto Lymphocyte # : x  Auto Monocyte # : x  Auto Eosinophil # : x  Auto Basophil # : x  Auto Neutrophil % : x  Auto Lymphocyte % : x  Auto Monocyte % : x  Auto Eosinophil % : x  Auto Basophil % : x        133<L>  |  100  |  16  ----------------------------<  113<H>  4.4   |  24  |  1.02    Ca    8.8      2019 05:30    TPro  6.6  /  Alb  3.9  /  TBili  0.8  /  DBili  x   /  AST  19  /  ALT  14  /  AlkPhos  28<L>      LIVER FUNCTIONS - ( 2019 11:51 )  Alb: 3.9 g/dL / Pro: 6.6 g/dL / ALK PHOS: 28 U/L / ALT: 14 U/L / AST: 19 U/L / GGT: x           PT/INR - ( 2019 07:58 )   PT: 12.4 sec;   INR: 1.08 ratio         PTT - ( 2019 07:58 )  PTT:26.7 sec  Urinalysis Basic - ( 2019 21:35 )    Color: Yellow / Appearance: Clear / S.024 / pH: x  Gluc: x / Ketone: Small  / Bili: Negative / Urobili: Negative   Blood: x / Protein: Trace / Nitrite: Negative   Leuk Esterase: Negative / RBC: x / WBC x   Sq Epi: x / Non Sq Epi: x / Bacteria: x      CAPILLARY BLOOD GLUCOSE          RADIOLOGY & ADDITIONAL TESTS:      Consultant(s):    Care Discussed with Consultants/Other Providers [ ] YES  [ ] NO Patient is a 73y old  Male who presents with a chief complaint of Right Hip Fracture           MEDICATIONS  (STANDING):  ascorbic acid 500 milliGRAM(s) Oral two times a day  atorvastatin 10 milliGRAM(s) Oral at bedtime  docusate sodium 100 milliGRAM(s) Oral three times a day  folic acid 1 milliGRAM(s) Oral daily  gabapentin 800 milliGRAM(s) Oral Once  lactated ringers. 1000 milliLiter(s) (125 mL/Hr) IV Continuous <Continuous>  multivitamin 1 Tablet(s) Oral daily  terazosin 5 milliGRAM(s) Oral daily    MEDICATIONS  (PRN):  diphenhydrAMINE 25 milliGRAM(s) Oral every 4 hours PRN Rash and/or Itching  HYDROmorphone  Injectable 0.5 milliGRAM(s) IV Push every 6 hours PRN Severe Pain (7 - 10)  HYDROmorphone  Injectable 1 milliGRAM(s) IV Push every 3 hours PRN breakthrough pain  melatonin 3 milliGRAM(s) Oral at bedtime PRN Insomnia  oxyCODONE    IR 10 milliGRAM(s) Oral every 4 hours PRN Severe Pain (7 - 10)  oxyCODONE    IR 5 milliGRAM(s) Oral every 4 hours PRN Moderate Pain (4 - 6)  traMADol 50 milliGRAM(s) Oral every 4 hours PRN Mild Pain (1 - 3)      Allergies    No Known Allergies    Intolerances      VITALS:   T(C): 37 (19 @ 14:49), Max: 37.7 (19 @ 04:38)  HR: 92 (19 @ 14:49) (78 - 92)  BP: 150/65 (19 @ 14:49) (127/56 - 155/73)  RR: 18 (19 @ 14:49) (16 - 18)  SpO2: 95% (19 @ 14:49) (94% - 98%)  Wt(kg): --    PHYSICAL EXAM:  GENERAL: NAD, well nourished and conversant  HEAD:  Atraumatic  EYES: EOM, PERRLA, conjunctiva pink and sclera white  ENT: No tonsillar erythema, exudates, or enlargement, moist mucous membranes, good dentition, no lesions  NECK: Supple, No JVD, normal thyroid, carotids with normal upstrokes and no bruits  CHEST/LUNG: Clear to auscultation bilaterally, No rales, rhonchi, wheezing, or rubs  HEART: Regular rate and rhythm, No murmurs, rubs, or gallops  ABDOMEN: Soft, nondistended, no masses, guarding, tenderness or rebound, bowel sounds present  EXTREMITIES:  2+ Peripheral Pulses, No clubbing, cyanosis, or edema. No arthritis of shoulders, elbows, hands, hips, knees, ankles, or feet. No DJD C spine, T spine, or L/S spine  LYMPH: No lymphadenopathy noted  SKIN: No rashes or lesions  NERVOUS SYSTEM:  Alert & Oriented X3, normal cognitive function. Motor Strength 5/5 right upper and right lower.  5/5 left upper and left lower extremities, DTRs 2+ intact and symmetric    LABS:        CBC Full  -  ( 2019 07:55 )  WBC Count : 8.46 K/uL  Hemoglobin : 9.6 g/dL  Hematocrit : 28.6 %  Platelet Count - Automated : 175 K/uL  Mean Cell Volume : 93.8 fl  Mean Cell Hemoglobin : 31.5 pg  Mean Cell Hemoglobin Concentration : 33.6 gm/dL  Auto Neutrophil # : x  Auto Lymphocyte # : x  Auto Monocyte # : x  Auto Eosinophil # : x  Auto Basophil # : x  Auto Neutrophil % : x  Auto Lymphocyte % : x  Auto Monocyte % : x  Auto Eosinophil % : x  Auto Basophil % : x        133<L>  |  100  |  16  ----------------------------<  113<H>  4.4   |  24  |  1.02    Ca    8.8      2019 05:30    TPro  6.6  /  Alb  3.9  /  TBili  0.8  /  DBili  x   /  AST  19  /  ALT  14  /  AlkPhos  28<L>      LIVER FUNCTIONS - ( 2019 11:51 )  Alb: 3.9 g/dL / Pro: 6.6 g/dL / ALK PHOS: 28 U/L / ALT: 14 U/L / AST: 19 U/L / GGT: x           PT/INR - ( 2019 07:58 )   PT: 12.4 sec;   INR: 1.08 ratio         PTT - ( 2019 07:58 )  PTT:26.7 sec  Urinalysis Basic - ( 2019 21:35 )    Color: Yellow / Appearance: Clear / S.024 / pH: x  Gluc: x / Ketone: Small  / Bili: Negative / Urobili: Negative   Blood: x / Protein: Trace / Nitrite: Negative   Leuk Esterase: Negative / RBC: x / WBC x   Sq Epi: x / Non Sq Epi: x / Bacteria: x      CAPILLARY BLOOD GLUCOSE          RADIOLOGY & ADDITIONAL TESTS:      Consultant(s):    Care Discussed with Consultants/Other Providers [ ] YES  [ ] NO 73M w/ pmh HTN, HLD, BPH, prostate cancer s/p prostatectomy p/w R hip pain - started around 10pm, pt was at home and tried to kick some trash but lost balance, spun around and fell down onto his R hip. Unable to ambulate since then, no pain elsewhere. No other complaints of fever, n/v/d/c, sob, cough, chest / abd pain. No recent travel, medication change, illness, or hospitalization. No preceding symptoms prior to fall. Patient was found to have a right hip fx and is scheduled for an ORIF of the right hip. Patient states he is very active, denies any CP or shortness of breath.     MEDICATIONS  (STANDING):  ascorbic acid 500 milliGRAM(s) Oral two times a day  atorvastatin 10 milliGRAM(s) Oral at bedtime  docusate sodium 100 milliGRAM(s) Oral three times a day  folic acid 1 milliGRAM(s) Oral daily  gabapentin 800 milliGRAM(s) Oral Once  lactated ringers. 1000 milliLiter(s) (125 mL/Hr) IV Continuous <Continuous>  multivitamin 1 Tablet(s) Oral daily  terazosin 5 milliGRAM(s) Oral daily    MEDICATIONS  (PRN):  diphenhydrAMINE 25 milliGRAM(s) Oral every 4 hours PRN Rash and/or Itching  HYDROmorphone  Injectable 0.5 milliGRAM(s) IV Push every 6 hours PRN Severe Pain (7 - 10)  HYDROmorphone  Injectable 1 milliGRAM(s) IV Push every 3 hours PRN breakthrough pain  melatonin 3 milliGRAM(s) Oral at bedtime PRN Insomnia  oxyCODONE    IR 10 milliGRAM(s) Oral every 4 hours PRN Severe Pain (7 - 10)  oxyCODONE    IR 5 milliGRAM(s) Oral every 4 hours PRN Moderate Pain (4 - 6)  traMADol 50 milliGRAM(s) Oral every 4 hours PRN Mild Pain (1 - 3)      Allergies    No Known Allergies    Intolerances      VITALS:   T(C): 37 (19 @ 14:49), Max: 37.7 (19 @ 04:38)  HR: 92 (19 @ 14:49) (78 - 92)  BP: 150/65 (19 @ 14:49) (127/56 - 155/73)  RR: 18 (19 @ 14:49) (16 - 18)  SpO2: 95% (19 @ 14:49) (94% - 98%)  Wt(kg): --    PHYSICAL EXAM:  GENERAL: NAD, well nourished and conversant  HEAD:  Atraumatic  EYES: EOM, PERRLA, conjunctiva pink and sclera white  ENT: No tonsillar erythema, exudates, or enlargement, moist mucous membranes, good dentition, no lesions  NECK: Supple, No JVD, normal thyroid, carotids with normal upstrokes and no bruits  CHEST/LUNG: Clear to auscultation bilaterally, No rales, rhonchi, wheezing, or rubs  HEART: Regular rate and rhythm, No murmurs, rubs, or gallops  ABDOMEN: Soft, nondistended, no masses, guarding, tenderness or rebound, bowel sounds present  EXTREMITIES:  2+ Peripheral Pulses, No clubbing, cyanosis, or edema. No arthritis of shoulders, elbows, hands, hips, knees, ankles, or feet. No DJD C spine, T spine, or L/S spine  LYMPH: No lymphadenopathy noted  SKIN: No rashes or lesions  NERVOUS SYSTEM:  Alert & Oriented X3, normal cognitive function. Motor Strength 5/5 right upper and right lower.  5/5 left upper and left lower extremities, DTRs 2+ intact and symmetric    LABS:        CBC Full  -  ( 2019 07:55 )  WBC Count : 8.46 K/uL  Hemoglobin : 9.6 g/dL  Hematocrit : 28.6 %  Platelet Count - Automated : 175 K/uL  Mean Cell Volume : 93.8 fl  Mean Cell Hemoglobin : 31.5 pg  Mean Cell Hemoglobin Concentration : 33.6 gm/dL  Auto Neutrophil # : x  Auto Lymphocyte # : x  Auto Monocyte # : x  Auto Eosinophil # : x  Auto Basophil # : x  Auto Neutrophil % : x  Auto Lymphocyte % : x  Auto Monocyte % : x  Auto Eosinophil % : x  Auto Basophil % : x        133<L>  |  100  |  16  ----------------------------<  113<H>  4.4   |  24  |  1.02    Ca    8.8      2019 05:30    TPro  6.6  /  Alb  3.9  /  TBili  0.8  /  DBili  x   /  AST  19  /  ALT  14  /  AlkPhos  28<L>  01-28    LIVER FUNCTIONS - ( 2019 11:51 )  Alb: 3.9 g/dL / Pro: 6.6 g/dL / ALK PHOS: 28 U/L / ALT: 14 U/L / AST: 19 U/L / GGT: x           PT/INR - ( 2019 07:58 )   PT: 12.4 sec;   INR: 1.08 ratio         PTT - ( 2019 07:58 )  PTT:26.7 sec  Urinalysis Basic - ( 2019 21:35 )    Color: Yellow / Appearance: Clear / S.024 / pH: x  Gluc: x / Ketone: Small  / Bili: Negative / Urobili: Negative   Blood: x / Protein: Trace / Nitrite: Negative   Leuk Esterase: Negative / RBC: x / WBC x   Sq Epi: x / Non Sq Epi: x / Bacteria: x      CAPILLARY BLOOD GLUCOSE          RADIOLOGY & ADDITIONAL TESTS:      Consultant(s):    Care Discussed with Consultants/Other Providers [ ] YES  [ ] NO

## 2019-01-29 NOTE — PROGRESS NOTE ADULT - SUBJECTIVE AND OBJECTIVE BOX
ORTHO PROGRESS NOTE     Pt seen and examined at bedside. No significant overnight events. Pain well controlled.     Vital Signs Last 24 Hrs  T(C): 37.7 (29 Jan 2019 04:38), Max: 37.7 (29 Jan 2019 04:38)  T(F): 99.8 (29 Jan 2019 04:38), Max: 99.8 (29 Jan 2019 04:38)  HR: 80 (29 Jan 2019 04:38) (78 - 84)  BP: 134/68 (29 Jan 2019 04:38) (133/73 - 160/80)  BP(mean): --  RR: 18 (29 Jan 2019 04:38) (16 - 18)  SpO2: 94% (29 Jan 2019 04:38) (94% - 99%)    Gen: No apparent distress, alert  LE:  Dressing C/D/I;          +DF/PF. moving toes          SILT, wwp at foot          compartments soft    Labs:  CBC Full  -  ( 28 Jan 2019 11:51 )  WBC Count : 6.2 K/uL  Hemoglobin : 11.3 g/dL  Hematocrit : 32.1 %  Platelet Count - Automated : 181 K/uL  Mean Cell Volume : 91.9 fl  Mean Cell Hemoglobin : 32.5 pg  Mean Cell Hemoglobin Concentration : 35.4 gm/dL  Auto Neutrophil # : x  Auto Lymphocyte # : x  Auto Monocyte # : x  Auto Eosinophil # : x  Auto Basophil # : x  Auto Neutrophil % : x  Auto Lymphocyte % : x  Auto Monocyte % : x  Auto Eosinophil % : x  Auto Basophil % : x      01-29    133<L>  |  100  |  16  ----------------------------<  113<H>  4.4   |  24  |  1.02    Ca    8.8      29 Jan 2019 05:30    TPro  6.6  /  Alb  3.9  /  TBili  0.8  /  DBili  x   /  AST  19  /  ALT  14  /  AlkPhos  28<L>  01-28      A/P  Pt is a 73y old Male with R hip fracture    - Pain control/ Analgesia  -OR today  -NPO  -NWB

## 2019-01-29 NOTE — PROGRESS NOTE ADULT - ASSESSMENT
73M w/ pmh HTN, HLD, BPH, prostate cancer s/p prostatectomy p/w R hip pain - started around 10pm, pt was at home and tried to kick some trash but lost balance, spun around and fell down onto his R hip. Unable to ambulate since then, no pain elsewhere. No other complaints of fever, n/v/d/c, sob, cough, chest / abd pain. No recent travel, medication change, illness, or hospitalization. No preceding symptoms prior to fall. Patient was found to have a right hip fx and is scheduled for an ORIF of the right hip. Patient states he is very active, denies any CP or shortness of breath.

## 2019-01-29 NOTE — CHART NOTE - NSCHARTNOTEFT_GEN_A_CORE
Patient seen in RR. Resting without complaints. States pain is well controlled. Denies Chest Pain, SOB, N/V.    T(C): 36.9 (01-29-19 @ 21:00), Max: 37.7 (01-29-19 @ 04:38)  HR: 109 (01-29-19 @ 21:30) (80 - 109)  BP: 144/75 (01-29-19 @ 21:30) (127/56 - 160/69)  RR: 16 (01-29-19 @ 21:30) (16 - 18)  SpO2: 98% (01-29-19 @ 21:30) (94% - 100%)  Wt(kg): --    Exam:  Alert and [place], No Acute Distress  Card: +S1/S2, regular rhythm, tachycardic  Pulm: CTAB  Laterality: R hip dressings c/d/i  Calves soft, non-tender bilaterally  +PF/DF/EHL/FHL  SILT  + DP pulse    Xray: intra-op xrays in chart                          9.8    12.6  )-----------( 138      ( 29 Jan 2019 21:00 )             27.3    01-29    133<L>  |  98  |  15  ----------------------------<  151<H>  4.9   |  22  |  1.25    Ca    8.3<L>      29 Jan 2019 21:00    TPro  6.6  /  Alb  3.9  /  TBili  0.8  /  DBili  x   /  AST  19  /  ALT  14  /  AlkPhos  28<L>  01-28      A/P: Patient is a 73y Male S/p R IMN. Patient tachycardic, but denies CP, SOB, or pain. NAD  -PT/OT-WBAT   -IS encouraged  -500 cc NS bolus ordered - monitor vitals  -DVT PPx- Lovenox 40 mg SC  -Pain Control PRN  -Resume DASH Diet  -FU AM labs    Brooke Oliva PA-C  Team Pager #[phone]

## 2019-01-29 NOTE — BRIEF OPERATIVE NOTE - PROCEDURE
<<-----Click on this checkbox to enter Procedure Intramedullary nailing for fracture of femur  01/29/2019    Active  [facility]

## 2019-01-30 ENCOUNTER — TRANSCRIPTION ENCOUNTER (OUTPATIENT)
Age: 74
End: 2019-01-30

## 2019-01-30 LAB
ANION GAP SERPL CALC-SCNC: 10 MMOL/L — SIGNIFICANT CHANGE UP (ref 5–17)
BUN SERPL-MCNC: 17 MG/DL — SIGNIFICANT CHANGE UP (ref 7–23)
CALCIUM SERPL-MCNC: 8.5 MG/DL — SIGNIFICANT CHANGE UP (ref 8.4–10.5)
CHLORIDE SERPL-SCNC: 101 MMOL/L — SIGNIFICANT CHANGE UP (ref 96–108)
CO2 SERPL-SCNC: 23 MMOL/L — SIGNIFICANT CHANGE UP (ref 22–31)
CREAT SERPL-MCNC: 1.08 MG/DL — SIGNIFICANT CHANGE UP (ref 0.5–1.3)
GLUCOSE SERPL-MCNC: 153 MG/DL — HIGH (ref 70–99)
HCT VFR BLD CALC: 24.8 % — LOW (ref 39–50)
HGB BLD-MCNC: 8.5 G/DL — LOW (ref 13–17)
MCHC RBC-ENTMCNC: 31.4 PG — SIGNIFICANT CHANGE UP (ref 27–34)
MCHC RBC-ENTMCNC: 34.3 GM/DL — SIGNIFICANT CHANGE UP (ref 32–36)
MCV RBC AUTO: 91.5 FL — SIGNIFICANT CHANGE UP (ref 80–100)
PLATELET # BLD AUTO: 131 K/UL — LOW (ref 150–400)
POTASSIUM SERPL-MCNC: 4.7 MMOL/L — SIGNIFICANT CHANGE UP (ref 3.5–5.3)
POTASSIUM SERPL-SCNC: 4.7 MMOL/L — SIGNIFICANT CHANGE UP (ref 3.5–5.3)
RBC # BLD: 2.71 M/UL — LOW (ref 4.2–5.8)
RBC # FLD: 13.3 % — SIGNIFICANT CHANGE UP (ref 10.3–14.5)
SODIUM SERPL-SCNC: 134 MMOL/L — LOW (ref 135–145)
WBC # BLD: 9.05 K/UL — SIGNIFICANT CHANGE UP (ref 3.8–10.5)
WBC # FLD AUTO: 9.05 K/UL — SIGNIFICANT CHANGE UP (ref 3.8–10.5)

## 2019-01-30 RX ADMIN — OXYCODONE HYDROCHLORIDE 5 MILLIGRAM(S): 5 TABLET ORAL at 23:20

## 2019-01-30 RX ADMIN — Medication 500 MILLIGRAM(S): at 17:57

## 2019-01-30 RX ADMIN — OXYCODONE HYDROCHLORIDE 10 MILLIGRAM(S): 5 TABLET ORAL at 13:46

## 2019-01-30 RX ADMIN — Medication 100 MILLIGRAM(S): at 05:12

## 2019-01-30 RX ADMIN — Medication 100 MILLIGRAM(S): at 09:56

## 2019-01-30 RX ADMIN — OXYCODONE HYDROCHLORIDE 10 MILLIGRAM(S): 5 TABLET ORAL at 13:16

## 2019-01-30 RX ADMIN — SODIUM CHLORIDE 75 MILLILITER(S): 9 INJECTION, SOLUTION INTRAVENOUS at 07:30

## 2019-01-30 RX ADMIN — Medication 650 MILLIGRAM(S): at 23:20

## 2019-01-30 RX ADMIN — ATORVASTATIN CALCIUM 10 MILLIGRAM(S): 80 TABLET, FILM COATED ORAL at 22:49

## 2019-01-30 RX ADMIN — Medication 100 MILLIGRAM(S): at 22:49

## 2019-01-30 RX ADMIN — ENOXAPARIN SODIUM 40 MILLIGRAM(S): 100 INJECTION SUBCUTANEOUS at 09:56

## 2019-01-30 RX ADMIN — Medication 1 MILLIGRAM(S): at 12:36

## 2019-01-30 RX ADMIN — Medication 100 MILLIGRAM(S): at 02:26

## 2019-01-30 RX ADMIN — TERAZOSIN HYDROCHLORIDE 5 MILLIGRAM(S): 10 CAPSULE ORAL at 05:12

## 2019-01-30 RX ADMIN — Medication 500 MILLIGRAM(S): at 05:12

## 2019-01-30 RX ADMIN — Medication 100 MILLIGRAM(S): at 12:36

## 2019-01-30 RX ADMIN — OXYCODONE HYDROCHLORIDE 5 MILLIGRAM(S): 5 TABLET ORAL at 22:47

## 2019-01-30 RX ADMIN — OXYCODONE HYDROCHLORIDE 5 MILLIGRAM(S): 5 TABLET ORAL at 05:12

## 2019-01-30 RX ADMIN — Medication 1 TABLET(S): at 12:36

## 2019-01-30 RX ADMIN — Medication 650 MILLIGRAM(S): at 22:50

## 2019-01-30 RX ADMIN — OXYCODONE HYDROCHLORIDE 5 MILLIGRAM(S): 5 TABLET ORAL at 05:42

## 2019-01-30 NOTE — DISCHARGE NOTE ADULT - PLAN OF CARE
Pain control. Return to normal activities. Follow up with Dr. Rodgers   DVT Prophylaxis:   Activity: Weight Bearing as Tolerated on Right Lower Extremity. Follow up with Dr. Rodgers after discharge from rehab  DVT Prophylaxis:  Lovenox x 6 weeks    Activity: Weight Bearing as Tolerated on Right Lower Extremity. Follow up with Dr. Rodgers upon discharge from Rehab  DVT Prophylaxis:  Lovenox x 3 weeks    Activity: Weight Bearing as Tolerated on Right Lower Extremity.

## 2019-01-30 NOTE — DISCHARGE NOTE ADULT - OTHER SIGNIFICANT FINDINGS
H/H  8.5/26.4 < from: Transthoracic Echocardiogram (02.13.19 @ 14:48) >  Patient name: SHANE JUNG  YOB: 1945   Age: 73 (M)   MR#: [id number]  Study Date: 2/13/2019  ------------------------------------------------------------------------  PROCEDURE: Transthoracic echocardiogram with 2-D, M-Mode  and complete spectral and color flow Doppler.  INDICATION: Heart failure, unspecified (I50.9)  ------------------------------------------------------------------------  Dimensions:    Normal Values:  LA:     3.2    2.0 - 4.0 cm  Ao:     3.3    2.0 - 3.8 cm  SEPTUM: 1.0    0.6 - 1.2 cm  PWT:    1.0    0.6 - 1.1 cm  LVIDd:  5.3    3.0 - 5.6 cm  LVIDs:  3.3    1.8 - 4.0 cm  Derived variables:  LVMI: 89 g/m2  RWT: 0.37  Fractional short: 38 %  EF (Visual Estimate): 55 %  ------------------------------------------------------------------------  Observations:  Mitral Valve: Mitral annular calcification, otherwise  normal mitral valve. Mild mitral regurgitation.  Aortic Valve/Aorta: Aortic valve not well visualized.  Aortic Root: 3.3 cm.  Left Atrium: Normal left atrium.  LA volume index = 30  cc/m2.  LeftVentricle: Normal left ventricular systolic function.  No segmental wall motion abnormalities. Normal left  ventricular internal dimensions and wall thicknesses.  Right Heart: Normal right atrium. The right ventricle is  not well visualized; grossly normal right ventricular  systolic function. Tricuspid valve not well visualized,  probably normal. Normal pulmonic valve.  Pericardium/Pleura: Normal pericardium with no pericardial  effusion.  Hemodynamic: Estimated right atrial pressure is 8 mm Hg.  Estimated right ventricular systolic pressure equals 46 mm  Hg, assuming right atrial pressure equals 8 mm Hg,  consistent with mild pulmonary hypertension.  ------------------------------------------------------------------------  Conclusions:  1. Mitral annular calcification, otherwise normal mitral  valve. Mild mitral regurgitation.  2. Aortic valve not well visualized.  3. Normal left ventricular systolic function. No segmental  wall motion abnormalities.  4. The right ventricle is not well visualized; grossly  normal right ventricular systolic function.  5. Estimated pulmonary artery systolic pressure equals 46  mm Hg, assuming right atrial pressure equals 8 mm Hg,  consistent with mild pulmonary pressures.  ------------------------------------------------------------------------  Confirmed on  2/13/2019 - 18:19:03 by Lucinda Del Toro M.D.  ------------------------------------------------------------------------  < end of copied text >

## 2019-01-30 NOTE — DISCHARGE NOTE ADULT - ADDITIONAL INSTRUCTIONS
Continue weight bearing as tolerated ambulation/physical therapy with rolling walker. Follow up with Dr Rodgers 3-4 weeks post op. Follow up with your primary care provider once discharged from rehab facility. Continue weight bearing as tolerated ambulation/physical therapy with rolling walker. Follow up with Dr Rodgers 3-4 weeks post op. Follow up with your primary care provider once discharged from rehab facility.  Consider tapering Midodrine once the orthostatic episodes are corrected Continue weight bearing as tolerated ambulation/physical therapy with rolling walker. Follow up with Dr. Rodgers upon discharge from Rehab. Follow up with your primary care provider once discharged from rehab facility for general checkup/possible medication adjustment/Consider tapering Midodrine once the orthostatic episodes are corrected. Continue weight bearing as tolerated ambulation/physical therapy with rolling walker.   Follow up with Dr. Rodgers upon discharge from Rehab.   FOLLOW UP WITH DR. MORRIS (CARDIOLOGY) UPON DISCHARGE FROM REHAB. YOU WERE STARTED ON MIDODRINE FOR PERSISTANT HYPOTENSION AND ALL YOUR BLOOD PRESSURE MEDICATIONS WERE DISCONTINUED. PLEASE FOLLOW UP FOR POSSIBLE MIDODRINE TAPER.  Follow up with your primary care provider once discharged from rehab facility to discuss recent hospitalization/ general checkup/discuss antihypertensives discontinued this hospitalization.

## 2019-01-30 NOTE — PROGRESS NOTE ADULT - ASSESSMENT
Impression: Stable       Plan:   Continue present treatment                 Out of bed, ambulate, weight bearing as tolerated                  Physical therapy evaluation                  Continue to monitor                   Yosef Figueredo PA-C  Orthopaedic Surgery  Team pager [phone]  [phone]

## 2019-01-30 NOTE — DISCHARGE NOTE ADULT - NS AS ACTIVITY OBS
Do not drive or operate machinery/Do not make important decisions/Walking-Indoors allowed/No Heavy lifting/straining/Walking-Outdoors allowed/Stairs allowed No Heavy lifting/straining/Walking-Indoors allowed/Do not drive or operate machinery/Do not make important decisions/Continue weight bearing as tolerated ambulation/physical therapy with rolling walker/Walking-Outdoors allowed/Stairs allowed Do not drive or operate machinery/No Heavy lifting/straining/Do not make important decisions/Walking-Indoors allowed/Showering allowed/Continue weight bearing as tolerated ambulation/physical therapy with rolling walker/Walking-Outdoors allowed/Stairs allowed

## 2019-01-30 NOTE — DISCHARGE NOTE ADULT - PATIENT PORTAL LINK FT
You can access the [facility] Patient Portal, offered by [facility], by registering with the following website: [email or web address]

## 2019-01-30 NOTE — PROGRESS NOTE ADULT - ASSESSMENT
73M w/ pmh HTN, HLD, BPH, prostate cancer s/p prostatectomy p/w R hip pain - started around 10pm, pt was at home and tried to kick some trash but lost balance, spun around and fell down onto his R hip. Unable to ambulate since then, no pain elsewhere. No other complaints of fever, n/v/d/c, sob, cough, chest / abd pain. No recent travel, medication change, illness, or hospitalization. No preceding symptoms prior to fall. . Patient states he is very active, denies any CP or shortness of breath. Patient was found to have a right hip fx and is S/P ORIF of the right hip

## 2019-01-30 NOTE — PROGRESS NOTE ADULT - SUBJECTIVE AND OBJECTIVE BOX
73M w/ pmh HTN, HLD, BPH, prostate cancer s/p prostatectomy p/w R hip pain - started around 10pm, pt was at home and tried to kick some trash but lost balance, spun around and fell down onto his R hip. Unable to ambulate since then, no pain elsewhere. No other complaints of fever, n/v/d/c, sob, cough, chest / abd pain. No recent travel, medication change, illness, or hospitalization. No preceding symptoms prior to fall. Patient was found to have a right hip fx and is S/P ORIF of the right hip. Patient states he is very active, denies any CP or shortness of breath.     MEDICATIONS  (STANDING):  ascorbic acid 500 milliGRAM(s) Oral two times a day  atorvastatin 10 milliGRAM(s) Oral at bedtime  docusate sodium 100 milliGRAM(s) Oral three times a day  enoxaparin Injectable 40 milliGRAM(s) SubCutaneous every 24 hours  folic acid 1 milliGRAM(s) Oral daily  gabapentin 800 milliGRAM(s) Oral Once  lactated ringers. 1000 milliLiter(s) (125 mL/Hr) IV Continuous <Continuous>  lactated ringers. 1000 milliLiter(s) (75 mL/Hr) IV Continuous <Continuous>  multivitamin 1 Tablet(s) Oral daily  terazosin 5 milliGRAM(s) Oral daily    MEDICATIONS  (PRN):  acetaminophen   Tablet .. 650 milliGRAM(s) Oral every 6 hours PRN Mild Pain (1 - 3)  bisacodyl Suppository 10 milliGRAM(s) Rectal daily PRN If no bowel movement by POD#2  diphenhydrAMINE 25 milliGRAM(s) Oral every 4 hours PRN Rash and/or Itching  HYDROmorphone  Injectable 0.5 milliGRAM(s) IV Push every 6 hours PRN Severe Pain (7 - 10)  HYDROmorphone  Injectable 1 milliGRAM(s) IV Push every 3 hours PRN breakthrough pain  melatonin 3 milliGRAM(s) Oral at bedtime PRN Insomnia  ondansetron Injectable 4 milliGRAM(s) IV Push every 6 hours PRN Nausea and/or Vomiting  oxyCODONE    IR 10 milliGRAM(s) Oral every 4 hours PRN Severe Pain (7 - 10)  oxyCODONE    IR 5 milliGRAM(s) Oral every 4 hours PRN Moderate Pain (4 - 6)  traMADol 50 milliGRAM(s) Oral every 4 hours PRN Mild Pain (1 - 3)          VITALS:   T(C): 37.2 (19 @ 08:35), Max: 37.5 (19 @ 17:03)  HR: 100 (19 @ 08:35) (85 - 109)  BP: 149/66 (19 @ 08:35) (123/69 - 160/69)  RR: 16 (19 @ 08:35) (15 - 18)  SpO2: 94% (19 @ 08:35) (93% - 100%)  Wt(kg): --      PHYSICAL EXAM:  GENERAL: NAD, well nourished and conversant  HEAD:  Atraumatic  EYES: EOM, PERRLA, conjunctiva pink and sclera white  ENT: No tonsillar erythema, exudates, or enlargement, moist mucous membranes, good dentition, no lesions  NECK: Supple, No JVD, normal thyroid, carotids with normal upstrokes and no bruits  CHEST/LUNG: Clear to auscultation bilaterally, No rales, rhonchi, wheezing, or rubs  HEART: Regular rate and rhythm, No murmurs, rubs, or gallops  ABDOMEN: Soft, nondistended, no masses, guarding, tenderness or rebound, bowel sounds present  EXTREMITIES:  2+ Peripheral Pulses, No clubbing, cyanosis, or edema. No arthritis of shoulders, elbows, hands, hips, knees, ankles, or feet. No DJD C spine, T spine, or L/S spine  LYMPH: No lymphadenopathy noted  SKIN: No rashes or lesions  NERVOUS SYSTEM:  Alert & Oriented X3, normal cognitive function. Motor Strength 5/5 right upper and right lower.  5/5 left upper and left lower extremities, DTRs 2+ intact and symmetric  LABS:        CBC Full  -  ( 2019 08:02 )  WBC Count : 9.05 K/uL  Hemoglobin : 8.5 g/dL  Hematocrit : 24.8 %  Platelet Count - Automated : 131 K/uL  Mean Cell Volume : 91.5 fl  Mean Cell Hemoglobin : 31.4 pg  Mean Cell Hemoglobin Concentration : 34.3 gm/dL  Auto Neutrophil # : x  Auto Lymphocyte # : x  Auto Monocyte # : x  Auto Eosinophil # : x  Auto Basophil # : x  Auto Neutrophil % : x  Auto Lymphocyte % : x  Auto Monocyte % : x  Auto Eosinophil % : x  Auto Basophil % : x        134<L>  |  101  |  17  ----------------------------<  153<H>  4.7   |  23  |  1.08    Ca    8.5      2019 07:20    TPro  6.6  /  Alb  3.9  /  TBili  0.8  /  DBili  x   /  AST  19  /  ALT  14  /  AlkPhos  28<L>      LIVER FUNCTIONS - ( 2019 11:51 )  Alb: 3.9 g/dL / Pro: 6.6 g/dL / ALK PHOS: 28 U/L / ALT: 14 U/L / AST: 19 U/L / GGT: x           PT/INR - ( 2019 07:58 )   PT: 12.4 sec;   INR: 1.08 ratio         PTT - ( 2019 07:58 )  PTT:26.7 sec  Urinalysis Basic - ( 2019 21:35 )    Color: Yellow / Appearance: Clear / S.024 / pH: x  Gluc: x / Ketone: Small  / Bili: Negative / Urobili: Negative   Blood: x / Protein: Trace / Nitrite: Negative   Leuk Esterase: Negative / RBC: x / WBC x   Sq Epi: x / Non Sq Epi: x / Bacteria: x      CAPILLARY BLOOD GLUCOSE          RADIOLOGY & ADDITIONAL TESTS:

## 2019-01-30 NOTE — DISCHARGE NOTE ADULT - FINDINGS/TREATMENT
See OR dictation report Continue weight bearing as tolerated ambulation/physical therapy with rolling walker.

## 2019-01-30 NOTE — DISCHARGE NOTE ADULT - HOSPITAL COURSE
Reason for Admission: Right Hip Fracture	  History of Present Illness: 	  73 y M community ambulator without assistive devices presents to the ED with Right sided hip pain following a MF that occurred yesterday evening. Patient denies HH or LOC. He reports he was kick in and compress some trash in a garbage can , when he lost his balance and landed on his right hip. He has pain immediately after the fall and was unable to ambulate. Denies numbness or tingling in the RLE. Pt has a hx of HTN/HLD/BPH. prostate cancer s/prostatectomy. He does not follow with an orthopedic surgeon. Denies fever, chills, sob, chest pain, n/v. No other traumatic injuries or complaints at this time.     Review of Systems:  Other Review of Systems: All other review of systems negative, except as noted in HPI	      Allergies and Intolerances:        Allergies:  	No Known Allergies:     Home Medications:   * Patient Currently Takes Medications as of 27-Jul-2017 17:16 documented in Structured Notes  · 	oxyCODONE 5 mg oral tablet: 1 tab(s) orally every 4 hours as needed for pain.  MDD:6 tabs  · 	gabapentin 800 mg oral tablet: 1 tab(s) orally 3 times a day  · 	atorvastatin 10 mg oral tablet: 1 tab(s) orally once a day (at bedtime)  · 	NIFEdipine 30 mg oral tablet, extended release: 1 tab(s) orally once a day  · 	terazosin 5 mg oral capsule: 1 cap(s) orally once a day (at bedtime)  · 	Tylenol 500 mg oral tablet: 1 tab(s) orally 3 times a day, As Needed - for moderate pain    .    Patient History:    Past Medical History:  Anemia  mild, cause unknown  BPH (benign prostatic hypertrophy)    Hyperlipidemia    Hypertension    Neuropathy  bilateral feet, cause unknown  Nocturia  x2-3  Osteoarthritis    Prostate cancer  2008, no chemo or RT  Thoracic spine fracture  T12, June 21, 2017.     Past Surgical History:  History of kyphoplasty    S/P colonoscopy  2017, negative  S/P cystoscopy  June 2017, "removal of scar tissue at the base of the bladder"  S/P prostatectomy  2008.    Hospital Course:  1/28/19: Patient presented to [facility] s/p Fall. In the Emergency Room found to have a Right Intertrochanteric Fracture in need   of surgical fixation. Underwent Right Intramedullary Nailing without complications.   1/29/19: Received 1 Unit of Packed Red Blood Cells for postoperative anemia secondary to blood loss. Reason for Admission: Right Hip Fracture	  History of Present Illness: 	  73 y M community ambulator without assistive devices presents to the ED with Right sided hip pain following a MF that occurred yesterday evening. Patient denies HH or LOC. He reports he was kick in and compress some trash in a garbage can , when he lost his balance and landed on his right hip. He has pain immediately after the fall and was unable to ambulate. Denies numbness or tingling in the RLE. Pt has a hx of HTN/HLD/BPH. prostate cancer s/prostatectomy. He does not follow with an orthopedic surgeon. Denies fever, chills, sob, chest pain, n/v. No other traumatic injuries or complaints at this time.     Review of Systems:  Other Review of Systems: All other review of systems negative, except as noted in HPI	      Allergies and Intolerances:        Allergies:  	No Known Allergies:     Home Medications:   * Patient Currently Takes Medications as of 27-Jul-2017 17:16 documented in Structured Notes  · 	oxyCODONE 5 mg oral tablet: 1 tab(s) orally every 4 hours as needed for pain.  MDD:6 tabs  · 	gabapentin 800 mg oral tablet: 1 tab(s) orally 3 times a day  · 	atorvastatin 10 mg oral tablet: 1 tab(s) orally once a day (at bedtime)  · 	NIFEdipine 30 mg oral tablet, extended release: 1 tab(s) orally once a day  · 	terazosin 5 mg oral capsule: 1 cap(s) orally once a day (at bedtime)  · 	Tylenol 500 mg oral tablet: 1 tab(s) orally 3 times a day, As Needed - for moderate pain    .    Patient History:    Past Medical History:  Anemia  mild, cause unknown  BPH (benign prostatic hypertrophy)    Hyperlipidemia    Hypertension    Neuropathy  bilateral feet, cause unknown  Nocturia  x2-3  Osteoarthritis    Prostate cancer  2008, no chemo or RT  Thoracic spine fracture  T12, June 21, 2017.     Past Surgical History:  History of kyphoplasty    S/P colonoscopy  2017, negative  S/P cystoscopy  June 2017, "removal of scar tissue at the base of the bladder"  S/P prostatectomy  2008.    Hospital Course:  1/28/19: Patient presented to [facility] s/p Fall. In the Emergency Room found to have a Right Intertrochanteric Fracture in need   of surgical fixation. Underwent Right Intramedullary Nailing without complications.   Patient Received  Packed Red Blood Cells for postoperative anemia secondary to blood loss.    Patient had multiple episodes of orthostatic hypotension and was evaluated by cardiologist Dr. Hamilton, and was started on Midodrine, which  is to be tapered, in the future.   Patient was evaluated by physical/occupational therapists for weight bearing as tolerated ambulation, and advised that patient would benefit from admission to rehab facility. Patient should follow up with Dr Rodgers  3-4 weeks post op Reason for Admission: Right Hip Fracture	  History of Present Illness: 	  73 y M community ambulator without assistive devices presents to the ED with Right sided hip pain following a MF that occurred yesterday evening. Patient denies HH or LOC. He reports he was kick in and compress some trash in a garbage can , when he lost his balance and landed on his right hip. He has pain immediately after the fall and was unable to ambulate. Denies numbness or tingling in the RLE. Pt has a hx of HTN/HLD/BPH. prostate cancer s/prostatectomy. He does not follow with an orthopedic surgeon. Denies fever, chills, sob, chest pain, n/v. No other traumatic injuries or complaints at this time.     Review of Systems:  Other Review of Systems: All other review of systems negative, except as noted in HPI	      Allergies and Intolerances:        Allergies:  	No Known Allergies:     Home Medications:   * Patient Currently Takes Medications as of 27-Jul-2017 17:16 documented in Structured Notes  · 	oxyCODONE 5 mg oral tablet: 1 tab(s) orally every 4 hours as needed for pain.  MDD:6 tabs  · 	gabapentin 800 mg oral tablet: 1 tab(s) orally 3 times a day  · 	atorvastatin 10 mg oral tablet: 1 tab(s) orally once a day (at bedtime)  · 	NIFEdipine 30 mg oral tablet, extended release: 1 tab(s) orally once a day  · 	terazosin 5 mg oral capsule: 1 cap(s) orally once a day (at bedtime)  · 	Tylenol 500 mg oral tablet: 1 tab(s) orally 3 times a day, As Needed - for moderate pain    .    Patient History:    Past Medical History:  Anemia  mild, cause unknown  BPH (benign prostatic hypertrophy)    Hyperlipidemia    Hypertension    Neuropathy  bilateral feet, cause unknown  Nocturia  x2-3  Osteoarthritis    Prostate cancer  2008, no chemo or RT  Thoracic spine fracture  T12, June 21, 2017.     Past Surgical History:  History of kyphoplasty    S/P colonoscopy  2017, negative  S/P cystoscopy  June 2017, "removal of scar tissue at the base of the bladder"  S/P prostatectomy  2008.    Hospital Course:  1/28/19: Patient presented to [facility] s/p Fall. In the Emergency Room found to have a Right Intertrochanteric Fracture in need   of surgical fixation. Underwent Right Intramedullary Nailing without complications.   Patient Received  Packed Red Blood Cells for postoperative anemia secondary to blood loss.    Patient had multiple episodes of orthostatic hypotension and was evaluated by cardiologist Dr. Hamilton, and was started on Midodrine, which  is to be tapered, in the future. Patient underwent TTE postoperatively.  Patient was evaluated by physical/occupational therapists for weight bearing as tolerated ambulation, and advised that patient would benefit from admission to rehab facility. Patient should follow up with Dr Rodgers  3-4 weeks post op Reason for Admission: Right Hip Fracture	  History of Present Illness: 	  73 y M community ambulator without assistive devices presents to the ED with Right sided hip pain following a MF that occurred yesterday evening. Patient denies HH or LOC. He reports he was kick in and compress some trash in a garbage can , when he lost his balance and landed on his right hip. He has pain immediately after the fall and was unable to ambulate. Denies numbness or tingling in the RLE. Pt has a hx of HTN/HLD/BPH. prostate cancer s/prostatectomy. He does not follow with an orthopedic surgeon. Denies fever, chills, sob, chest pain, n/v. No other traumatic injuries or complaints at this time.     Review of Systems:  Other Review of Systems: All other review of systems negative, except as noted in HPI	      Allergies and Intolerances:        Allergies:  	No Known Allergies:     Home Medications:   * Patient Currently Takes Medications as of 27-Jul-2017 17:16 documented in Structured Notes  · 	oxyCODONE 5 mg oral tablet: 1 tab(s) orally every 4 hours as needed for pain.  MDD:6 tabs  · 	gabapentin 800 mg oral tablet: 1 tab(s) orally 3 times a day  · 	atorvastatin 10 mg oral tablet: 1 tab(s) orally once a day (at bedtime)  · 	NIFEdipine 30 mg oral tablet, extended release: 1 tab(s) orally once a day  · 	terazosin 5 mg oral capsule: 1 cap(s) orally once a day (at bedtime)  · 	Tylenol 500 mg oral tablet: 1 tab(s) orally 3 times a day, As Needed - for moderate pain    .    Patient History:    Past Medical History:  Anemia  mild, cause unknown  BPH (benign prostatic hypertrophy)    Hyperlipidemia    Hypertension    Neuropathy  bilateral feet, cause unknown  Nocturia  x2-3  Osteoarthritis    Prostate cancer  2008, no chemo or RT  Thoracic spine fracture  T12, June 21, 2017.     Past Surgical History:  History of kyphoplasty    S/P colonoscopy  2017, negative  S/P cystoscopy  June 2017, "removal of scar tissue at the base of the bladder"  S/P prostatectomy  2008.    Hospital Course:  1/28/19: Patient presented to [facility] s/p Fall. In the Emergency Room found to have a Right Intertrochanteric Fracture in need   of surgical fixation. Underwent Right Intramedullary Nailing without complications.   Patient Received Packed Red Blood Cells for postoperative anemia secondary to blood loss.    Patient had multiple episodes of orthostatic hypotension and was evaluated by cardiologist Dr. Hamilton, and was started on Midodrine, which is to be tapered, in the future. Patient underwent TTE postoperatively.  Patient was evaluated by physical/occupational therapists for weight bearing as tolerated ambulation, and advised that patient would benefit from admission to rehab facility. Patient should follow up with Dr. Rodgers upon discharge from Rehab. Reason for Admission: Right Hip Fracture	  History of Present Illness: 	  73 y M community ambulator without assistive devices presents to the ED with Right sided hip pain following a MF that occurred yesterday evening. Patient denies HH or LOC. He reports he was kick in and compress some trash in a garbage can , when he lost his balance and landed on his right hip. He has pain immediately after the fall and was unable to ambulate. Denies numbness or tingling in the RLE. Pt has a hx of HTN/HLD/BPH. prostate cancer s/prostatectomy. He does not follow with an orthopedic surgeon. Denies fever, chills, sob, chest pain, n/v. No other traumatic injuries or complaints at this time.     Review of Systems:  Other Review of Systems: All other review of systems negative, except as noted in HPI	      Allergies and Intolerances:        Allergies:  	No Known Allergies:     Home Medications:   * Patient Currently Takes Medications as of 27-Jul-2017 17:16 documented in Structured Notes  · 	oxyCODONE 5 mg oral tablet: 1 tab(s) orally every 4 hours as needed for pain.  MDD:6 tabs  · 	gabapentin 800 mg oral tablet: 1 tab(s) orally 3 times a day  · 	atorvastatin 10 mg oral tablet: 1 tab(s) orally once a day (at bedtime)  · 	NIFEdipine 30 mg oral tablet, extended release: 1 tab(s) orally once a day  · 	terazosin 5 mg oral capsule: 1 cap(s) orally once a day (at bedtime)  · 	Tylenol 500 mg oral tablet: 1 tab(s) orally 3 times a day, As Needed - for moderate pain    .    Patient History:    Past Medical History:  Anemia  mild, cause unknown  BPH (benign prostatic hypertrophy)    Hyperlipidemia    Hypertension    Neuropathy  bilateral feet, cause unknown  Nocturia  x2-3  Osteoarthritis    Prostate cancer  2008, no chemo or RT  Thoracic spine fracture  T12, June 21, 2017.     Past Surgical History:  History of kyphoplasty    S/P colonoscopy  2017, negative  S/P cystoscopy  June 2017, "removal of scar tissue at the base of the bladder"  S/P prostatectomy  2008.    Hospital Course:  1/28/19: Patient presented to [facility] s/p Fall. In the Emergency Room found to have a Right Intertrochanteric Fracture in need   of surgical fixation. Underwent Right Intramedullary Nailing without complications.   Patient Received Packed Red Blood Cells for postoperative anemia secondary to blood loss.    Patient had multiple episodes of orthostatic hypotension and was evaluated by cardiologist Dr. Hamilton, and was started on Midodrine, which is to be tapered, in the future. All home antihypertensives were discontinued. Patient underwent TTE postoperatively.  Patient was evaluated by physical/occupational therapists for weight bearing as tolerated ambulation, and advised that patient would benefit from admission to rehab facility. Patient should follow up with Dr. Rodgers upon discharge from Rehab.

## 2019-01-30 NOTE — PHYSICAL THERAPY INITIAL EVALUATION ADULT - TRANSFER SAFETY CONCERNS NOTED: SIT/STAND, REHAB EVAL
losing balance/decreased step length/stepping too close to front of assistive device/decreased weight-shifting ability/inability to maintain weight-bearing restrictions w/o assist

## 2019-01-30 NOTE — DISCHARGE NOTE ADULT - CARE PLAN
Principal Discharge DX:	Hip fracture  Goal:	Pain control. Return to normal activities.  Assessment and plan of treatment:	Follow up with Dr. Rodgers   DVT Prophylaxis:   Activity: Weight Bearing as Tolerated on Right Lower Extremity. Principal Discharge DX:	Hip fracture  Goal:	Pain control. Return to normal activities.  Assessment and plan of treatment:	Follow up with Dr. Rodgers after discharge from rehab  DVT Prophylaxis:  Lovenox x 6 weeks    Activity: Weight Bearing as Tolerated on Right Lower Extremity. Principal Discharge DX:	Hip fracture  Goal:	Pain control. Return to normal activities.  Assessment and plan of treatment:	Follow up with Dr. Rodgers upon discharge from Rehab  DVT Prophylaxis:  Lovenox x 3 weeks    Activity: Weight Bearing as Tolerated on Right Lower Extremity.

## 2019-01-30 NOTE — PROGRESS NOTE ADULT - SUBJECTIVE AND OBJECTIVE BOX
ORTHO  Patient is a 73y old  Male who presents with a chief complaint of Right Hip Fracture (29 Jan 2019 15:32)    Pt. resting without complaint    VS-  T(C): 37.3 (01-30-19 @ 02:15), Max: 37.5 (01-29-19 @ 17:03)  HR: 91 (01-30-19 @ 05:11) (82 - 109)  BP: 134/66 (01-30-19 @ 05:11) (123/69 - 160/69)  RR: 18 (01-30-19 @ 02:15) (15 - 18)  SpO2: 93% (01-30-19 @ 05:11) (93% - 100%)  Wt(kg): --    M.S.  A&O  Extremity- Right LE- aqua cell dressings- C/D/I  Neuro-              Motor- (+)ankle DF/PF              Sensation-grossly intact to light touch              Calves- soft, nontender- PAS                               9.8    12.6  )-----------( 138      ( 29 Jan 2019 21:00 )             27.3     01-29    133<L>  |  98  |  15  ----------------------------<  151<H>  4.9   |  22  |  1.25    Ca    8.3<L>      29 Jan 2019 21:00    TPro  6.6  /  Alb  3.9  /  TBili  0.8  /  DBili  x   /  AST  19  /  ALT  14  /  AlkPhos  28<L>  01-28

## 2019-01-30 NOTE — DISCHARGE NOTE ADULT - CARE PROVIDER_API CALL
Candido Rodgers)  Orthopedics  [address], [address]  [place] [address]  Phone: [phone]  Fax: [phone] Candido Rodgers)  Orthopedics  [address], [address]  [place] [address]  Phone: [phone]  Fax: [phone]  Follow Up Time:     Shorty Hamilton)  Cardiovascular Disease; Internal Medicine  [address], [address]  [place] [address]  Phone: [phone]  Fax: [phone]  Follow Up Time:

## 2019-01-30 NOTE — DISCHARGE NOTE ADULT - MEDICATION SUMMARY - MEDICATIONS TO TAKE
I will START or STAY ON the medications listed below when I get home from the hospital:    oxyCODONE 5 mg oral tablet  -- 1 tab(s) by mouth every 4 hours as needed for pain.  MDD:6 tabs  -- Caution federal law prohibits the transfer of this drug to any person other  than the person for whom it was prescribed.  It is very important that you take or use this exactly as directed.  Do not skip doses or discontinue unless directed by your doctor.  May cause drowsiness.  Alcohol may intensify this effect.  Use care when operating dangerous machinery.  This prescription cannot be refilled.  Using more of this medication than prescribed may cause serious breathing problems.    -- Indication: For moderate to severe pain    Tylenol 500 mg oral tablet  -- 1 tab(s) by mouth 3 times a day, As Needed - for moderate pain  -- Indication: For fever, H/A, mild pain    aspirin 81 mg oral tablet  -- 1 tab(s) by mouth once a day  -- Indication: For Antiplatelet therapy    terazosin 5 mg oral capsule  -- 1 cap(s) by mouth once a day (at bedtime)  -- Indication: For peripherally acting cardiovascular agent    enoxaparin  -- 40 milligram(s) subcutaneous once a day for 6 weeks post op  -- Indication: For Anticoagulation prophylaxis, stay on this medication for 6 weeks post op    gabapentin 800 mg oral tablet  -- 1 tab(s) by mouth once  -- Indication: For neuropathic pain    atorvastatin 10 mg oral tablet  -- 1 tab(s) by mouth once a day (at bedtime)  -- Indication: For Antihyperlipidemic agent    amLODIPine 10 mg oral tablet  -- 1 tab(s) by mouth once a day  -- Indication: For Antihypertensive agent, HOLD until Midodrine is D/C'd    docusate sodium 100 mg oral capsule  -- 1 cap(s) by mouth 3 times a day  -- Indication: For stool softener    midodrine 10 mg oral tablet  -- 1 tab(s) by mouth 3 times a day  -- Indication: For Cardiovascular agent for orthostatic hypotension (taper off as tolerated) I will START or STAY ON the medications listed below when I get home from the hospital:    oxyCODONE 5 mg oral tablet  -- 1 tab(s) by mouth every 4 hours as needed for pain.  MDD:6 tabs  -- Caution federal law prohibits the transfer of this drug to any person other  than the person for whom it was prescribed.  It is very important that you take or use this exactly as directed.  Do not skip doses or discontinue unless directed by your doctor.  May cause drowsiness.  Alcohol may intensify this effect.  Use care when operating dangerous machinery.  This prescription cannot be refilled.  Using more of this medication than prescribed may cause serious breathing problems.    -- Indication: For moderate to severe pain    Tylenol 500 mg oral tablet  -- 1 tab(s) by mouth 3 times a day, As Needed - for moderate pain  -- Indication: For fever, H/A, mild pain    aspirin 81 mg oral tablet  -- 1 tab(s) by mouth once a day  -- Indication: For Antiplatelet therapy    terazosin 5 mg oral capsule  -- 1 cap(s) by mouth once a day (at bedtime)  -- Indication: For peripherally acting cardiovascular agent    enoxaparin  -- 40 milligram(s) subcutaneous once a day for 6 weeks post op  -- Indication: For Anticoagulation prophylaxis, stay on this medication for 6 weeks post op    gabapentin 800 mg oral tablet  -- 1 tab(s) by mouth once  -- Indication: For neuropathic pain    atorvastatin 10 mg oral tablet  -- 1 tab(s) by mouth once a day (at bedtime)  -- Indication: For Antihyperlipidemic agent    amLODIPine 10 mg oral tablet  -- 1 tab(s) by mouth once a day  -- Indication: For Antihypertensive agent, HOLD until Midodrine is D/C'd    docusate sodium 100 mg oral capsule  -- 1 cap(s) by mouth 3 times a day  -- Indication: For stool softener    midodrine 10 mg oral tablet  -- 1 tab(s) by mouth 3 times a day    Started during this hospitalization for orthostatic B/P.    Consider tapering once B/P's improved  -- Indication: For for orthostatic B/P's I will START or STAY ON the medications listed below when I get home from the hospital:    oxyCODONE 5 mg oral tablet  -- 1 tab(s) by mouth every 4 hours as needed for pain.  MDD:6 tabs  -- Caution federal law prohibits the transfer of this drug to any person other  than the person for whom it was prescribed.  It is very important that you take or use this exactly as directed.  Do not skip doses or discontinue unless directed by your doctor.  May cause drowsiness.  Alcohol may intensify this effect.  Use care when operating dangerous machinery.  This prescription cannot be refilled.  Using more of this medication than prescribed may cause serious breathing problems.    -- Indication: For moderate to severe pain    Tylenol 500 mg oral tablet  -- 1 tab(s) by mouth 3 times a day, As Needed - for moderate pain  -- Indication: For fever, H/A, mild pain    aspirin 81 mg oral tablet  -- 1 tab(s) by mouth once a day  -- Indication: For Antiplatelet therapy    terazosin 5 mg oral capsule  -- 1 cap(s) by mouth once a day (at bedtime)  -- Indication: For peripherally acting cardiovascular agent    enoxaparin  -- 40 milligram(s) subcutaneous once a day for 6 weeks post op  -- Indication: For Anticoagulation prophylaxis, stay on this medication for 6 weeks post op    gabapentin 800 mg oral tablet  -- 1 tab(s) by mouth once  -- Indication: For neuropathic pain    atorvastatin 10 mg oral tablet  -- 1 tab(s) by mouth once a day (at bedtime)  -- Indication: For Antihyperlipidemic agent    amLODIPine 10 mg oral tablet  -- 1 tab(s) by mouth once a day  -- Indication: For Antihypertensive agent, HOLD until Midodrine is D/C'd    docusate sodium 100 mg oral capsule  -- 1 cap(s) by mouth 3 times a day  -- Indication: For stool softener    midodrine 10 mg oral tablet  -- 1 tab(s) by mouth 3 times a day  Hold if SBP>160    Started during this hospitalization for orthostatic B/P.    Consider tapering once B/P's improved  -- Indication: For Orthostatic B/P's I will START or STAY ON the medications listed below when I get home from the hospital:    oxyCODONE 5 mg oral tablet  -- 1 tab(s) by mouth every 4 hours as needed for pain.  MDD:6 tabs  -- Caution federal law prohibits the transfer of this drug to any person other  than the person for whom it was prescribed.  It is very important that you take or use this exactly as directed.  Do not skip doses or discontinue unless directed by your doctor.  May cause drowsiness.  Alcohol may intensify this effect.  Use care when operating dangerous machinery.  This prescription cannot be refilled.  Using more of this medication than prescribed may cause serious breathing problems.    -- Indication: For moderate to severe pain    Tylenol 500 mg oral tablet  -- 1 tab(s) by mouth 3 times a day, As Needed - for moderate pain  -- Indication: For fever, H/A, mild pain    aspirin 81 mg oral tablet  -- 1 tab(s) by mouth once a day  -- Indication: For Antiplatelet therapy    terazosin 5 mg oral capsule  -- 1 cap(s) by mouth once a day (at bedtime)  -- Indication: For peripherally acting cardiovascular agent    enoxaparin  -- 40 milligram(s) subcutaneous once a day for 6 weeks post op  -- Indication: For Anticoagulation prophylaxis, stay on this medication for 6 weeks post op    gabapentin 800 mg oral tablet  -- 1 tab(s) by mouth once  -- Indication: For neuropathic pain    atorvastatin 10 mg oral tablet  -- 1 tab(s) by mouth once a day (at bedtime)  -- Indication: For Antihyperlipidemic agent    amLODIPine 10 mg oral tablet  -- 1 tab(s) by mouth once a day  -- Indication: For Antihypertensive agent, HOLD until Midodrine is D/C'd    docusate sodium 100 mg oral capsule  -- 1 cap(s) by mouth 3 times a day, As Needed  -- Indication: For stool softener    midodrine 10 mg oral tablet  -- 1 tab(s) by mouth 3 times a day  Hold if SBP>160    Started during this hospitalization for orthostatic B/P.    Consider tapering once B/P's improved  -- Indication: For Orthostatic B/P's I will START or STAY ON the medications listed below when I get home from the hospital:    aspirin 81 mg oral tablet  -- 1 tab(s) by mouth once a day  -- Indication: For Antiplatelet therapy    acetaminophen 325 mg oral tablet  -- 2 tab(s) by mouth every 6 hours, As needed, Mild Pain (1 - 3)  -- Indication: For Mild pain    oxyCODONE 5 mg oral tablet  -- 1 tab(s) by mouth every 6 hours  as needed for moderate to severe pain. MDD:6 tabs   -- Caution federal law prohibits the transfer of this drug to any person other  than the person for whom it was prescribed.  It is very important that you take or use this exactly as directed.  Do not skip doses or discontinue unless directed by your doctor.  May cause drowsiness.  Alcohol may intensify this effect.  Use care when operating dangerous machinery.  This prescription cannot be refilled.  Using more of this medication than prescribed may cause serious breathing problems.    -- Indication: For Moderate to Severe Pain    terazosin 5 mg oral capsule  -- 1 cap(s) by mouth once a day (at bedtime)  -- Indication: For peripherally acting cardiovascular agent    enoxaparin  -- 40 milligram(s) subcutaneous once a day for 3 weeks  -- Indication: For DVT ppx    gabapentin 800 mg oral tablet  -- 1 tab(s) by mouth 3 times a day  -- Indication: For Home med    diphenhydrAMINE 25 mg oral capsule  -- 1 cap(s) by mouth every 4 hours, As needed, Rash and/or Itching  -- Indication: For Rash/itching    atorvastatin 10 mg oral tablet  -- 1 tab(s) by mouth once a day (at bedtime)  -- Indication: For Antihyperlipidemic agent    docusate sodium 100 mg oral capsule  -- 1 cap(s) by mouth 3 times a day while taking oxycodone  -- Indication: For Laxative    midodrine 10 mg oral tablet  -- 1 tab(s) by mouth 3 times a day  Hold if SBP>160    Started during this hospitalization for orthostatic B/P.    Consider tapering once B/P's improved  -- Indication: For Orthostatic B/P's    melatonin 3 mg oral tablet  -- 1 tab(s) by mouth once a day (at bedtime), As needed, Insomnia  -- Indication: For Insomnia

## 2019-01-30 NOTE — PHYSICAL THERAPY INITIAL EVALUATION ADULT - BALANCE TRAINING, PT EVAL
GOAL: Pt's static/dynamic sitting/standing balance will improve to good to increase stability, and decrease risk for falls when ambulating or performing ADL's

## 2019-01-30 NOTE — DISCHARGE NOTE ADULT - CARE PROVIDERS DIRECT ADDRESSES
[email or web address]

## 2019-01-30 NOTE — PHYSICAL THERAPY INITIAL EVALUATION ADULT - ACTIVE RANGE OF MOTION EXAMINATION, REHAB EVAL
nick. upper extremity Active ROM was WNL (within normal limits)/Decreased R hip flexion, knee ext/Left LE Active ROM was WFL (within functional limits)

## 2019-01-30 NOTE — DISCHARGE NOTE ADULT - MEDICATION SUMMARY - MEDICATIONS TO STOP TAKING
I will STOP taking the medications listed below when I get home from the hospital:    NIFEdipine 30 mg oral tablet, extended release  -- 1 tab(s) by mouth once a day    amLODIPine 10 mg oral tablet  -- 1 tab(s) by mouth once a day    Fish Oil 1200 mg oral capsule  -- 1 cap(s) by mouth once a day I will STOP taking the medications listed below when I get home from the hospital:    NIFEdipine 30 mg oral tablet, extended release  -- 1 tab(s) by mouth once a day    Tylenol 500 mg oral tablet  -- 1 tab(s) by mouth 3 times a day, As Needed - for moderate pain    amLODIPine 10 mg oral tablet  -- 1 tab(s) by mouth once a day    Fish Oil 1200 mg oral capsule  -- 1 cap(s) by mouth once a day

## 2019-01-30 NOTE — PHYSICAL THERAPY INITIAL EVALUATION ADULT - LIVES WITH, PROFILE
who has Alzheimers and needs care. Pt lives in a house with 8 steps and 1 railing. Pt was I and active PTA, ambulating without a device/spouse

## 2019-01-31 LAB
ANION GAP SERPL CALC-SCNC: 10 MMOL/L — SIGNIFICANT CHANGE UP (ref 5–17)
BUN SERPL-MCNC: 22 MG/DL — SIGNIFICANT CHANGE UP (ref 7–23)
CALCIUM SERPL-MCNC: 8.4 MG/DL — SIGNIFICANT CHANGE UP (ref 8.4–10.5)
CHLORIDE SERPL-SCNC: 101 MMOL/L — SIGNIFICANT CHANGE UP (ref 96–108)
CO2 SERPL-SCNC: 25 MMOL/L — SIGNIFICANT CHANGE UP (ref 22–31)
CREAT SERPL-MCNC: 1.02 MG/DL — SIGNIFICANT CHANGE UP (ref 0.5–1.3)
GLUCOSE SERPL-MCNC: 107 MG/DL — HIGH (ref 70–99)
HCT VFR BLD CALC: 24.7 % — LOW (ref 39–50)
HCT VFR BLD CALC: 28 % — LOW (ref 39–50)
HGB BLD-MCNC: 8.2 G/DL — LOW (ref 13–17)
HGB BLD-MCNC: 9.6 G/DL — LOW (ref 13–17)
MCHC RBC-ENTMCNC: 30.5 PG — SIGNIFICANT CHANGE UP (ref 27–34)
MCHC RBC-ENTMCNC: 31.5 PG — SIGNIFICANT CHANGE UP (ref 27–34)
MCHC RBC-ENTMCNC: 33.2 GM/DL — SIGNIFICANT CHANGE UP (ref 32–36)
MCHC RBC-ENTMCNC: 34.2 GM/DL — SIGNIFICANT CHANGE UP (ref 32–36)
MCV RBC AUTO: 91.8 FL — SIGNIFICANT CHANGE UP (ref 80–100)
MCV RBC AUTO: 92 FL — SIGNIFICANT CHANGE UP (ref 80–100)
PLATELET # BLD AUTO: 131 K/UL — LOW (ref 150–400)
PLATELET # BLD AUTO: 133 K/UL — LOW (ref 150–400)
POTASSIUM SERPL-MCNC: 4.7 MMOL/L — SIGNIFICANT CHANGE UP (ref 3.5–5.3)
POTASSIUM SERPL-SCNC: 4.7 MMOL/L — SIGNIFICANT CHANGE UP (ref 3.5–5.3)
RBC # BLD: 2.69 M/UL — LOW (ref 4.2–5.8)
RBC # BLD: 3.05 M/UL — LOW (ref 4.2–5.8)
RBC # FLD: 12 % — SIGNIFICANT CHANGE UP (ref 10.3–14.5)
RBC # FLD: 14.4 % — SIGNIFICANT CHANGE UP (ref 10.3–14.5)
SODIUM SERPL-SCNC: 136 MMOL/L — SIGNIFICANT CHANGE UP (ref 135–145)
WBC # BLD: 11.9 K/UL — HIGH (ref 3.8–10.5)
WBC # BLD: 13.13 K/UL — HIGH (ref 3.8–10.5)
WBC # FLD AUTO: 11.9 K/UL — HIGH (ref 3.8–10.5)
WBC # FLD AUTO: 13.13 K/UL — HIGH (ref 3.8–10.5)

## 2019-01-31 RX ADMIN — Medication 650 MILLIGRAM(S): at 06:49

## 2019-01-31 RX ADMIN — Medication 100 MILLIGRAM(S): at 06:08

## 2019-01-31 RX ADMIN — Medication 650 MILLIGRAM(S): at 06:08

## 2019-01-31 RX ADMIN — Medication 1 MILLIGRAM(S): at 12:11

## 2019-01-31 RX ADMIN — TERAZOSIN HYDROCHLORIDE 5 MILLIGRAM(S): 10 CAPSULE ORAL at 06:09

## 2019-01-31 RX ADMIN — Medication 100 MILLIGRAM(S): at 13:38

## 2019-01-31 RX ADMIN — OXYCODONE HYDROCHLORIDE 5 MILLIGRAM(S): 5 TABLET ORAL at 06:50

## 2019-01-31 RX ADMIN — ENOXAPARIN SODIUM 40 MILLIGRAM(S): 100 INJECTION SUBCUTANEOUS at 09:28

## 2019-01-31 RX ADMIN — Medication 500 MILLIGRAM(S): at 06:08

## 2019-01-31 RX ADMIN — Medication 100 MILLIGRAM(S): at 21:40

## 2019-01-31 RX ADMIN — OXYCODONE HYDROCHLORIDE 5 MILLIGRAM(S): 5 TABLET ORAL at 06:07

## 2019-01-31 RX ADMIN — ATORVASTATIN CALCIUM 10 MILLIGRAM(S): 80 TABLET, FILM COATED ORAL at 21:40

## 2019-01-31 RX ADMIN — Medication 500 MILLIGRAM(S): at 17:29

## 2019-01-31 RX ADMIN — Medication 1 TABLET(S): at 12:11

## 2019-01-31 NOTE — PROGRESS NOTE ADULT - ASSESSMENT
72 y/o M s/p right hip IM nail POD#2, physical therapy, rehab  Marilee Simmons PA-C  Orthopaedic Surgery  Team pager [phone]  [facility] [phone]  [phone]

## 2019-01-31 NOTE — PROGRESS NOTE ADULT - SUBJECTIVE AND OBJECTIVE BOX
73M w/ pmh HTN, HLD, BPH, prostate cancer s/p prostatectomy p/w R hip pain - started around 10pm, pt was at home and tried to kick some trash but lost balance, spun around and fell down onto his R hip. Unable to ambulate since then, no pain elsewhere. No other complaints of fever, n/v/d/c, sob, cough, chest / abd pain. No recent travel, medication change, illness, or hospitalization. No preceding symptoms prior to fall. Patient was found to have a right hip fx and is S/P ORIF of the right hip. Patient states he is very active, denies any CP or shortness of breath.       MEDICATIONS  (STANDING):  ascorbic acid 500 milliGRAM(s) Oral two times a day  atorvastatin 10 milliGRAM(s) Oral at bedtime  docusate sodium 100 milliGRAM(s) Oral three times a day  enoxaparin Injectable 40 milliGRAM(s) SubCutaneous every 24 hours  folic acid 1 milliGRAM(s) Oral daily  gabapentin 800 milliGRAM(s) Oral Once  lactated ringers. 1000 milliLiter(s) (125 mL/Hr) IV Continuous <Continuous>  lactated ringers. 1000 milliLiter(s) (75 mL/Hr) IV Continuous <Continuous>  multivitamin 1 Tablet(s) Oral daily  terazosin 5 milliGRAM(s) Oral daily    MEDICATIONS  (PRN):  acetaminophen   Tablet .. 650 milliGRAM(s) Oral every 6 hours PRN Mild Pain (1 - 3)  bisacodyl Suppository 10 milliGRAM(s) Rectal daily PRN If no bowel movement by POD#2  diphenhydrAMINE 25 milliGRAM(s) Oral every 4 hours PRN Rash and/or Itching  HYDROmorphone  Injectable 0.5 milliGRAM(s) IV Push every 6 hours PRN Severe Pain (7 - 10)  HYDROmorphone  Injectable 1 milliGRAM(s) IV Push every 3 hours PRN breakthrough pain  melatonin 3 milliGRAM(s) Oral at bedtime PRN Insomnia  ondansetron Injectable 4 milliGRAM(s) IV Push every 6 hours PRN Nausea and/or Vomiting  oxyCODONE    IR 10 milliGRAM(s) Oral every 4 hours PRN Severe Pain (7 - 10)  oxyCODONE    IR 5 milliGRAM(s) Oral every 4 hours PRN Moderate Pain (4 - 6)  traMADol 50 milliGRAM(s) Oral every 4 hours PRN Mild Pain (1 - 3)          VITALS:   T(C): 37.2 (01-31-19 @ 12:05), Max: 37.5 (01-31-19 @ 11:35)  HR: 89 (01-31-19 @ 12:05) (77 - 98)  BP: 131/61 (01-31-19 @ 12:05) (129/63 - 142/65)  RR: 18 (01-31-19 @ 12:05) (18 - 18)  SpO2: 94% (01-31-19 @ 12:05) (94% - 95%)  Wt(kg): --    YSICAL EXAM:  GENERAL: NAD, well nourished and conversant  HEAD:  Atraumatic  EYES: EOM, PERRLA, conjunctiva pink and sclera white  ENT: No tonsillar erythema, exudates, or enlargement, moist mucous membranes, good dentition, no lesions  NECK: Supple, No JVD, normal thyroid, carotids with normal upstrokes and no bruits  CHEST/LUNG: Clear to auscultation bilaterally, No rales, rhonchi, wheezing, or rubs  HEART: Regular rate and rhythm, No murmurs, rubs, or gallops  ABDOMEN: Soft, nondistended, no masses, guarding, tenderness or rebound, bowel sounds present  EXTREMITIES:  2+ Peripheral Pulses, No clubbing, cyanosis, or edema. No arthritis of shoulders, elbows, hands, hips, knees, ankles, or feet. No DJD C spine, T spine, or L/S spine  LYMPH: No lymphadenopathy noted  SKIN: No rashes or lesions  NERVOUS SYSTEM:  Alert & Oriented X3, normal cognitive function. Motor Strength 5/5 right upper and right lower.  5/5 left upper and left lower extremities, DTRs 2+ intact and symmetric    LABS:        CBC Full  -  ( 31 Jan 2019 08:45 )  WBC Count : 13.13 K/uL  Hemoglobin : 8.2 g/dL  Hematocrit : 24.7 %  Platelet Count - Automated : 133 K/uL  Mean Cell Volume : 91.8 fl  Mean Cell Hemoglobin : 30.5 pg  Mean Cell Hemoglobin Concentration : 33.2 gm/dL  Auto Neutrophil # : x  Auto Lymphocyte # : x  Auto Monocyte # : x  Auto Eosinophil # : x  Auto Basophil # : x  Auto Neutrophil % : x  Auto Lymphocyte % : x  Auto Monocyte % : x  Auto Eosinophil % : x  Auto Basophil % : x    01-31    136  |  101  |  22  ----------------------------<  107<H>  4.7   |  25  |  1.02    Ca    8.4      31 Jan 2019 07:14            CAPILLARY BLOOD GLUCOSE          RADIOLOGY & ADDITIONAL TESTS:

## 2019-01-31 NOTE — OCCUPATIONAL THERAPY INITIAL EVALUATION ADULT - ADDITIONAL COMMENTS
1/28 xray right hip- Comminuted fracture of the right proximal femur involving the subtrochanteric femoral diaphysis and lesser trochanter.

## 2019-01-31 NOTE — OCCUPATIONAL THERAPY INITIAL EVALUATION ADULT - PERTINENT HX OF CURRENT PROBLEM, REHAB EVAL
73 y M to the ED with Right sided hip pain following a MF that occurred yesterday evening. Patient denies HH or LOC. He reports he was kick in and compress some trash in a garbage can , when he lost his balance and landed on his right hip. He has pain immediately after the fall and was unable to ambulate.

## 2019-01-31 NOTE — PROGRESS NOTE ADULT - SUBJECTIVE AND OBJECTIVE BOX
Patient is a 73y old  Male who presents with a chief complaint of Right Hip Fracture   Patient s/p right hip IM nail POD#2  Patient comfortable  No complaints    T(C): 37.2 (01-30-19 @ 23:40), Max: 37.4 (01-30-19 @ 12:15)  HR: 98 (01-30-19 @ 23:40) (93 - 114)  BP: 142/65 (01-30-19 @ 23:40) (135/57 - 156/64)  RR: 18 (01-30-19 @ 23:40) (16 - 18)  SpO2: 95% (01-30-19 @ 23:40) (94% - 96%)    PHYSICAL EXAM:  NAD, Alert  [right ] Hip: Dressings C/D/I; sensation grossly intact to light touch; (+) DF/PF; (+) Distal Pulses; No Calf tenderness B/L, PAS     LABS:                     8.5    9.05  )-----------( 131      ( 30 Jan 2019 08:02 )             24.8   01-30  134<L>  |  101  |  17  ----------------------------<  153<H>  4.7   |  23  |  1.08  Ca    8.5      30 Jan 2019 07:20  PT/INR - ( 29 Jan 2019 07:58 )   PT: 12.4 sec;   INR: 1.08 ratio    PTT - ( 29 Jan 2019 07:58 )  PTT:26.7 sec

## 2019-02-01 LAB
HCT VFR BLD CALC: 27.7 % — LOW (ref 39–50)
HGB BLD-MCNC: 10 G/DL — LOW (ref 13–17)
MCHC RBC-ENTMCNC: 33.3 PG — SIGNIFICANT CHANGE UP (ref 27–34)
MCHC RBC-ENTMCNC: 36.2 GM/DL — HIGH (ref 32–36)
MCV RBC AUTO: 92.1 FL — SIGNIFICANT CHANGE UP (ref 80–100)
PLATELET # BLD AUTO: 145 K/UL — LOW (ref 150–400)
RBC # BLD: 3.01 M/UL — LOW (ref 4.2–5.8)
RBC # FLD: 12.3 % — SIGNIFICANT CHANGE UP (ref 10.3–14.5)
WBC # BLD: 10.7 K/UL — HIGH (ref 3.8–10.5)
WBC # FLD AUTO: 10.7 K/UL — HIGH (ref 3.8–10.5)

## 2019-02-01 RX ADMIN — Medication 650 MILLIGRAM(S): at 06:06

## 2019-02-01 RX ADMIN — Medication 1 TABLET(S): at 12:03

## 2019-02-01 RX ADMIN — Medication 650 MILLIGRAM(S): at 06:36

## 2019-02-01 RX ADMIN — OXYCODONE HYDROCHLORIDE 5 MILLIGRAM(S): 5 TABLET ORAL at 12:06

## 2019-02-01 RX ADMIN — OXYCODONE HYDROCHLORIDE 5 MILLIGRAM(S): 5 TABLET ORAL at 06:36

## 2019-02-01 RX ADMIN — Medication 650 MILLIGRAM(S): at 12:36

## 2019-02-01 RX ADMIN — TRAMADOL HYDROCHLORIDE 50 MILLIGRAM(S): 50 TABLET ORAL at 22:21

## 2019-02-01 RX ADMIN — ATORVASTATIN CALCIUM 10 MILLIGRAM(S): 80 TABLET, FILM COATED ORAL at 22:22

## 2019-02-01 RX ADMIN — Medication 500 MILLIGRAM(S): at 17:56

## 2019-02-01 RX ADMIN — Medication 100 MILLIGRAM(S): at 06:06

## 2019-02-01 RX ADMIN — Medication 1 MILLIGRAM(S): at 12:03

## 2019-02-01 RX ADMIN — OXYCODONE HYDROCHLORIDE 5 MILLIGRAM(S): 5 TABLET ORAL at 12:36

## 2019-02-01 RX ADMIN — Medication 500 MILLIGRAM(S): at 06:06

## 2019-02-01 RX ADMIN — Medication 650 MILLIGRAM(S): at 12:06

## 2019-02-01 RX ADMIN — TRAMADOL HYDROCHLORIDE 50 MILLIGRAM(S): 50 TABLET ORAL at 22:50

## 2019-02-01 RX ADMIN — Medication 100 MILLIGRAM(S): at 12:03

## 2019-02-01 RX ADMIN — ENOXAPARIN SODIUM 40 MILLIGRAM(S): 100 INJECTION SUBCUTANEOUS at 09:01

## 2019-02-01 RX ADMIN — TERAZOSIN HYDROCHLORIDE 5 MILLIGRAM(S): 10 CAPSULE ORAL at 06:06

## 2019-02-01 RX ADMIN — Medication 100 MILLIGRAM(S): at 22:22

## 2019-02-01 RX ADMIN — OXYCODONE HYDROCHLORIDE 5 MILLIGRAM(S): 5 TABLET ORAL at 06:07

## 2019-02-01 NOTE — PROGRESS NOTE ADULT - ASSESSMENT
72 y/o M s/p right hip IM nail POD#3, d/c to rehab once insurance auth  Marilee Simmons PA-C  Orthopaedic Surgery  Team pager [phone]  [facility] [phone]  [phone]

## 2019-02-01 NOTE — PROGRESS NOTE ADULT - SUBJECTIVE AND OBJECTIVE BOX
Patient is a 73y old  Male who presents with a chief complaint of Right Hip Fracture   Patient s/p right hip IM nail   POST OPERATIVE DAY #:  [3 ]   Patient comfortable  No complaints    T(C): 37.2 (02-01-19 @ 05:51), Max: 37.6 (02-01-19 @ 01:28)  HR: 92 (02-01-19 @ 05:51) (77 - 100)  BP: 142/68 (02-01-19 @ 05:51) (126/63 - 147/65)  RR: 18 (02-01-19 @ 05:51) (18 - 18)  SpO2: 95% (02-01-19 @ 05:51) (94% - 95%)    PHYSICAL EXAM:  NAD, Alert  [right ] Hip: Dressings C/D/I; sensation grossly intact to light touch; (+) DF/PF; (+) Distal Pulses; No Calf tenderness B/L, PAS     LABS:                     9.6    11.9  )-----------( 131      ( 31 Jan 2019 20:28 )             28.0   01-31  136  |  101  |  22  ----------------------------<  107<H>  4.7   |  25  |  1.02  Ca    8.4      31 Jan 2019 07:14

## 2019-02-02 DIAGNOSIS — S72.001S FRACTURE OF UNSPECIFIED PART OF NECK OF RIGHT FEMUR, SEQUELA: ICD-10-CM

## 2019-02-02 RX ORDER — GABAPENTIN 400 MG/1
800 CAPSULE ORAL THREE TIMES A DAY
Qty: 0 | Refills: 0 | Status: DISCONTINUED | OUTPATIENT
Start: 2019-02-02 | End: 2019-02-20

## 2019-02-02 RX ADMIN — Medication 100 MILLIGRAM(S): at 06:05

## 2019-02-02 RX ADMIN — TRAMADOL HYDROCHLORIDE 50 MILLIGRAM(S): 50 TABLET ORAL at 06:05

## 2019-02-02 RX ADMIN — OXYCODONE HYDROCHLORIDE 5 MILLIGRAM(S): 5 TABLET ORAL at 17:46

## 2019-02-02 RX ADMIN — OXYCODONE HYDROCHLORIDE 5 MILLIGRAM(S): 5 TABLET ORAL at 13:16

## 2019-02-02 RX ADMIN — ATORVASTATIN CALCIUM 10 MILLIGRAM(S): 80 TABLET, FILM COATED ORAL at 22:56

## 2019-02-02 RX ADMIN — TRAMADOL HYDROCHLORIDE 50 MILLIGRAM(S): 50 TABLET ORAL at 12:35

## 2019-02-02 RX ADMIN — GABAPENTIN 800 MILLIGRAM(S): 400 CAPSULE ORAL at 22:56

## 2019-02-02 RX ADMIN — TRAMADOL HYDROCHLORIDE 50 MILLIGRAM(S): 50 TABLET ORAL at 12:04

## 2019-02-02 RX ADMIN — Medication 1 TABLET(S): at 12:04

## 2019-02-02 RX ADMIN — Medication 100 MILLIGRAM(S): at 22:56

## 2019-02-02 RX ADMIN — TERAZOSIN HYDROCHLORIDE 5 MILLIGRAM(S): 10 CAPSULE ORAL at 06:05

## 2019-02-02 RX ADMIN — Medication 500 MILLIGRAM(S): at 17:42

## 2019-02-02 RX ADMIN — Medication 100 MILLIGRAM(S): at 13:18

## 2019-02-02 RX ADMIN — Medication 500 MILLIGRAM(S): at 06:05

## 2019-02-02 RX ADMIN — ENOXAPARIN SODIUM 40 MILLIGRAM(S): 100 INJECTION SUBCUTANEOUS at 09:25

## 2019-02-02 RX ADMIN — TRAMADOL HYDROCHLORIDE 50 MILLIGRAM(S): 50 TABLET ORAL at 06:35

## 2019-02-02 RX ADMIN — OXYCODONE HYDROCHLORIDE 5 MILLIGRAM(S): 5 TABLET ORAL at 18:15

## 2019-02-02 RX ADMIN — GABAPENTIN 800 MILLIGRAM(S): 400 CAPSULE ORAL at 15:10

## 2019-02-02 RX ADMIN — Medication 1 MILLIGRAM(S): at 12:04

## 2019-02-02 RX ADMIN — OXYCODONE HYDROCHLORIDE 5 MILLIGRAM(S): 5 TABLET ORAL at 13:45

## 2019-02-02 NOTE — PROGRESS NOTE ADULT - SUBJECTIVE AND OBJECTIVE BOX
73M w/ pmh HTN, HLD, BPH, prostate cancer s/p prostatectomy p/w R hip pain - started around 10pm, pt was at home and tried to kick some trash but lost balance, spun around and fell down onto his R hip. Unable to ambulate since then, no pain elsewhere. No other complaints of fever, n/v/d/c, sob, cough, chest / abd pain. No recent travel, medication change, illness, or hospitalization. No preceding symptoms prior to fall. Patient was found to have a right hip fx and is S/P ORIF of the right hip. Patient states he is very active, denies any CP or shortness of breath.       MEDICATIONS  (STANDING):  ascorbic acid 500 milliGRAM(s) Oral two times a day  atorvastatin 10 milliGRAM(s) Oral at bedtime  docusate sodium 100 milliGRAM(s) Oral three times a day  enoxaparin Injectable 40 milliGRAM(s) SubCutaneous every 24 hours  folic acid 1 milliGRAM(s) Oral daily  gabapentin 800 milliGRAM(s) Oral Once  lactated ringers. 1000 milliLiter(s) (125 mL/Hr) IV Continuous <Continuous>  lactated ringers. 1000 milliLiter(s) (75 mL/Hr) IV Continuous <Continuous>  multivitamin 1 Tablet(s) Oral daily  terazosin 5 milliGRAM(s) Oral daily    MEDICATIONS  (PRN):  acetaminophen   Tablet .. 650 milliGRAM(s) Oral every 6 hours PRN Mild Pain (1 - 3)  bisacodyl Suppository 10 milliGRAM(s) Rectal daily PRN If no bowel movement by POD#2  diphenhydrAMINE 25 milliGRAM(s) Oral every 4 hours PRN Rash and/or Itching  HYDROmorphone  Injectable 0.5 milliGRAM(s) IV Push every 6 hours PRN Severe Pain (7 - 10)  HYDROmorphone  Injectable 1 milliGRAM(s) IV Push every 3 hours PRN breakthrough pain  melatonin 3 milliGRAM(s) Oral at bedtime PRN Insomnia  ondansetron Injectable 4 milliGRAM(s) IV Push every 6 hours PRN Nausea and/or Vomiting  oxyCODONE    IR 10 milliGRAM(s) Oral every 4 hours PRN Severe Pain (7 - 10)  oxyCODONE    IR 5 milliGRAM(s) Oral every 4 hours PRN Moderate Pain (4 - 6)  traMADol 50 milliGRAM(s) Oral every 4 hours PRN Mild Pain (1 - 3)          VITALS:   T(C): 37.2 (02-02-19 @ 05:23), Max: 37.3 (02-01-19 @ 08:40)  T(F): 99 (02-02-19 @ 05:23), Max: 99.2 (02-01-19 @ 20:55)  HR: 89 (02-02-19 @ 05:23) (81 - 99)  BP: 136/64 (02-02-19 @ 05:23) (127/61 - 155/61)  RR: 18 (02-02-19 @ 05:23) (17 - 18)  SpO2: 95% (02-02-19 @ 05:23) (94% - 96%)    YSICAL EXAM:  GENERAL: NAD, well nourished and conversant  HEAD:  Atraumatic  EYES: EOM, PERRLA, conjunctiva pink and sclera white  ENT: No tonsillar erythema, exudates, or enlargement, moist mucous membranes, good dentition, no lesions  NECK: Supple, No JVD, normal thyroid, carotids with normal upstrokes and no bruits  CHEST/LUNG: Clear to auscultation bilaterally, No rales, rhonchi, wheezing, or rubs  HEART: Regular rate and rhythm, No murmurs, rubs, or gallops  ABDOMEN: Soft, nondistended, no masses, guarding, tenderness or rebound, bowel sounds present  EXTREMITIES:  2+ Peripheral Pulses, No clubbing, cyanosis, or edema. No arthritis of shoulders, elbows, hands, hips, knees, ankles, or feet. No DJD C spine, T spine, or L/S spine  LYMPH: No lymphadenopathy noted  SKIN: No rashes or lesions  NERVOUS SYSTEM:  Alert & Oriented X3, normal cognitive function. Motor Strength 5/5 right upper and right lower.  5/5 left upper and left lower extremities, DTRs 2+ intact and symmetric    LABS:                    CAPILLARY BLOOD GLUCOSE          RADIOLOGY & ADDITIONAL TESTS:

## 2019-02-02 NOTE — PROGRESS NOTE ADULT - SUBJECTIVE AND OBJECTIVE BOX
Patient is a 73y old  Male who presents with a chief complaint of Right Hip Fracture (01 Feb 2019 10:03)      POST OPERATIVE DAY #:  [4]   Patient comfortable  No complaints, Denies CP/SOB    VS:  T(C): 37.2 (02-02-19 @ 05:23), Max: 37.3 (02-01-19 @ 08:40)  T(F): 99 (02-02-19 @ 05:23), Max: 99.2 (02-01-19 @ 20:55)  HR: 89 (02-02-19 @ 05:23) (81 - 99)  BP: 136/64 (02-02-19 @ 05:23) (127/61 - 155/61)  RR: 18 (02-02-19 @ 05:23) (17 - 18)  SpO2: 95% (02-02-19 @ 05:23) (94% - 96%)  Wt(kg): --      PHYSICAL EXAM:  NAD, Alert  EXT: R Hip: Dressing C/D/I;   No Calf Tenderness  (+) DF/PF; (+) Distal Pulses;  Sensation: No gross deficits noted   B/L, PAS     LABS:                        10.0<L>  10.7<H> )-----------( 145<L>    ( 01 Feb 2019 07:04 )             27.7<L>

## 2019-02-03 RX ADMIN — GABAPENTIN 800 MILLIGRAM(S): 400 CAPSULE ORAL at 05:29

## 2019-02-03 RX ADMIN — Medication 100 MILLIGRAM(S): at 21:51

## 2019-02-03 RX ADMIN — GABAPENTIN 800 MILLIGRAM(S): 400 CAPSULE ORAL at 14:26

## 2019-02-03 RX ADMIN — OXYCODONE HYDROCHLORIDE 10 MILLIGRAM(S): 5 TABLET ORAL at 05:30

## 2019-02-03 RX ADMIN — OXYCODONE HYDROCHLORIDE 10 MILLIGRAM(S): 5 TABLET ORAL at 06:00

## 2019-02-03 RX ADMIN — ATORVASTATIN CALCIUM 10 MILLIGRAM(S): 80 TABLET, FILM COATED ORAL at 21:51

## 2019-02-03 RX ADMIN — Medication 500 MILLIGRAM(S): at 17:38

## 2019-02-03 RX ADMIN — TRAMADOL HYDROCHLORIDE 50 MILLIGRAM(S): 50 TABLET ORAL at 15:22

## 2019-02-03 RX ADMIN — OXYCODONE HYDROCHLORIDE 10 MILLIGRAM(S): 5 TABLET ORAL at 11:21

## 2019-02-03 RX ADMIN — Medication 500 MILLIGRAM(S): at 05:29

## 2019-02-03 RX ADMIN — ENOXAPARIN SODIUM 40 MILLIGRAM(S): 100 INJECTION SUBCUTANEOUS at 09:13

## 2019-02-03 RX ADMIN — Medication 650 MILLIGRAM(S): at 15:22

## 2019-02-03 RX ADMIN — OXYCODONE HYDROCHLORIDE 5 MILLIGRAM(S): 5 TABLET ORAL at 01:40

## 2019-02-03 RX ADMIN — Medication 1 TABLET(S): at 11:21

## 2019-02-03 RX ADMIN — Medication 650 MILLIGRAM(S): at 15:50

## 2019-02-03 RX ADMIN — Medication 1 MILLIGRAM(S): at 11:21

## 2019-02-03 RX ADMIN — OXYCODONE HYDROCHLORIDE 10 MILLIGRAM(S): 5 TABLET ORAL at 11:50

## 2019-02-03 RX ADMIN — GABAPENTIN 800 MILLIGRAM(S): 400 CAPSULE ORAL at 21:51

## 2019-02-03 RX ADMIN — TERAZOSIN HYDROCHLORIDE 5 MILLIGRAM(S): 10 CAPSULE ORAL at 05:29

## 2019-02-03 RX ADMIN — Medication 100 MILLIGRAM(S): at 14:26

## 2019-02-03 RX ADMIN — Medication 100 MILLIGRAM(S): at 05:29

## 2019-02-03 RX ADMIN — TRAMADOL HYDROCHLORIDE 50 MILLIGRAM(S): 50 TABLET ORAL at 15:50

## 2019-02-03 RX ADMIN — OXYCODONE HYDROCHLORIDE 5 MILLIGRAM(S): 5 TABLET ORAL at 01:10

## 2019-02-03 NOTE — PROGRESS NOTE ADULT - SUBJECTIVE AND OBJECTIVE BOX
Patient is a 73y old  Male who presents with a chief complaint of Right Hip Fracture (02 Feb 2019 07:25)      POST OPERATIVE DAY #:  [5 ]   Patient comfortable  No complaints    VS:  T(C): 37.6 (02-03-19 @ 04:30), Max: 37.8 (02-03-19 @ 00:39)  T(F): 99.6 (02-03-19 @ 04:30), Max: 100.1 (02-03-19 @ 00:39)  HR: 99 (02-03-19 @ 07:42) (80 - 99)  BP: 135/64 (02-03-19 @ 07:42) (130/60 - 146/58)  RR: 18 (02-03-19 @ 07:42) (18 - 18)  SpO2: 93% (02-03-19 @ 07:42) (93% - 97%)  Wt(kg): --      PHYSICAL EXAM:  NAD, Alert  EXT:   [x ] Proximal Hip: Dressing CHANGED  [x] distal dsgs dc'd  mild ecchymosis noted  No Calf Tenderness  (+) DF/PF; (+) EL / FHL 5/5  Sensation: No gross deficits noted  Pulses [  ]   B/L, PAS     LABS: NNL

## 2019-02-03 NOTE — PROGRESS NOTE ADULT - SUBJECTIVE AND OBJECTIVE BOX
73M w/ pmh HTN, HLD, BPH, prostate cancer s/p prostatectomy p/w R hip pain - started around 10pm, pt was at home and tried to kick some trash but lost balance, spun around and fell down onto his R hip. Unable to ambulate since then, no pain elsewhere. No other complaints of fever, n/v/d/c, sob, cough, chest / abd pain. No recent travel, medication change, illness, or hospitalization. No preceding symptoms prior to fall. Patient was found to have a right hip fx and is S/P ORIF of the right hip. Patient states he is very active, denies any CP or shortness of breath.       MEDICATIONS  (STANDING):  ascorbic acid 500 milliGRAM(s) Oral two times a day  atorvastatin 10 milliGRAM(s) Oral at bedtime  docusate sodium 100 milliGRAM(s) Oral three times a day  enoxaparin Injectable 40 milliGRAM(s) SubCutaneous every 24 hours  folic acid 1 milliGRAM(s) Oral daily  gabapentin 800 milliGRAM(s) Oral Once  lactated ringers. 1000 milliLiter(s) (125 mL/Hr) IV Continuous <Continuous>  lactated ringers. 1000 milliLiter(s) (75 mL/Hr) IV Continuous <Continuous>  multivitamin 1 Tablet(s) Oral daily  terazosin 5 milliGRAM(s) Oral daily    MEDICATIONS  (PRN):  acetaminophen   Tablet .. 650 milliGRAM(s) Oral every 6 hours PRN Mild Pain (1 - 3)  bisacodyl Suppository 10 milliGRAM(s) Rectal daily PRN If no bowel movement by POD#2  diphenhydrAMINE 25 milliGRAM(s) Oral every 4 hours PRN Rash and/or Itching  HYDROmorphone  Injectable 0.5 milliGRAM(s) IV Push every 6 hours PRN Severe Pain (7 - 10)  HYDROmorphone  Injectable 1 milliGRAM(s) IV Push every 3 hours PRN breakthrough pain  melatonin 3 milliGRAM(s) Oral at bedtime PRN Insomnia  ondansetron Injectable 4 milliGRAM(s) IV Push every 6 hours PRN Nausea and/or Vomiting  oxyCODONE    IR 10 milliGRAM(s) Oral every 4 hours PRN Severe Pain (7 - 10)  oxyCODONE    IR 5 milliGRAM(s) Oral every 4 hours PRN Moderate Pain (4 - 6)  traMADol 50 milliGRAM(s) Oral every 4 hours PRN Mild Pain (1 - 3)      T(C): 37.6 (02-03-19 @ 04:30), Max: 37.8 (02-03-19 @ 00:39)  T(F): 99.6 (02-03-19 @ 04:30), Max: 100.1 (02-03-19 @ 00:39)  HR: 99 (02-03-19 @ 07:42) (80 - 99)  BP: 135/64 (02-03-19 @ 07:42) (130/60 - 146/58)  RR: 18 (02-03-19 @ 07:42) (18 - 18)  SpO2: 93% (02-03-19 @ 07:42) (93% - 97%)          YSICAL EXAM:  GENERAL: NAD, well nourished and conversant  HEAD:  Atraumatic  EYES: EOM, PERRLA, conjunctiva pink and sclera white  ENT: No tonsillar erythema, exudates, or enlargement, moist mucous membranes, good dentition, no lesions  NECK: Supple, No JVD, normal thyroid, carotids with normal upstrokes and no bruits  CHEST/LUNG: Clear to auscultation bilaterally, No rales, rhonchi, wheezing, or rubs  HEART: Regular rate and rhythm, No murmurs, rubs, or gallops  ABDOMEN: Soft, nondistended, no masses, guarding, tenderness or rebound, bowel sounds present  EXTREMITIES:  2+ Peripheral Pulses, No clubbing, cyanosis, or edema. No arthritis of shoulders, elbows, hands, hips, knees, ankles, or feet. No DJD C spine, T spine, or L/S spine  LYMPH: No lymphadenopathy noted  SKIN: No rashes or lesions  NERVOUS SYSTEM:  Alert & Oriented X3, normal cognitive function. Motor Strength 5/5 right upper and right lower.  5/5 left upper and left lower extremities, DTRs 2+ intact and symmetric    LABS:      No labs              CAPILLARY BLOOD GLUCOSE          RADIOLOGY & ADDITIONAL TESTS:

## 2019-02-04 DIAGNOSIS — I95.1 ORTHOSTATIC HYPOTENSION: ICD-10-CM

## 2019-02-04 LAB
HCT VFR BLD CALC: 26.4 % — LOW (ref 39–50)
HGB BLD-MCNC: 9.2 G/DL — LOW (ref 13–17)
MCHC RBC-ENTMCNC: 32 PG — SIGNIFICANT CHANGE UP (ref 27–34)
MCHC RBC-ENTMCNC: 34.7 GM/DL — SIGNIFICANT CHANGE UP (ref 32–36)
MCV RBC AUTO: 92 FL — SIGNIFICANT CHANGE UP (ref 80–100)
PLATELET # BLD AUTO: 200 K/UL — SIGNIFICANT CHANGE UP (ref 150–400)
RBC # BLD: 2.86 M/UL — LOW (ref 4.2–5.8)
RBC # FLD: 11.7 % — SIGNIFICANT CHANGE UP (ref 10.3–14.5)
WBC # BLD: 9 K/UL — SIGNIFICANT CHANGE UP (ref 3.8–10.5)
WBC # FLD AUTO: 9 K/UL — SIGNIFICANT CHANGE UP (ref 3.8–10.5)

## 2019-02-04 RX ORDER — POLYETHYLENE GLYCOL 3350 17 G/17G
17 POWDER, FOR SOLUTION ORAL DAILY
Qty: 0 | Refills: 0 | Status: DISCONTINUED | OUTPATIENT
Start: 2019-02-04 | End: 2019-02-18

## 2019-02-04 RX ORDER — OXYCODONE HYDROCHLORIDE 5 MG/1
5 TABLET ORAL EVERY 4 HOURS
Qty: 0 | Refills: 0 | Status: DISCONTINUED | OUTPATIENT
Start: 2019-02-05 | End: 2019-02-11

## 2019-02-04 RX ORDER — SODIUM CHLORIDE 9 MG/ML
1000 INJECTION INTRAMUSCULAR; INTRAVENOUS; SUBCUTANEOUS ONCE
Qty: 0 | Refills: 0 | Status: COMPLETED | OUTPATIENT
Start: 2019-02-04 | End: 2019-02-04

## 2019-02-04 RX ORDER — OXYCODONE HYDROCHLORIDE 5 MG/1
10 TABLET ORAL EVERY 4 HOURS
Qty: 0 | Refills: 0 | Status: DISCONTINUED | OUTPATIENT
Start: 2019-02-05 | End: 2019-02-11

## 2019-02-04 RX ORDER — SODIUM CHLORIDE 9 MG/ML
1000 INJECTION INTRAMUSCULAR; INTRAVENOUS; SUBCUTANEOUS
Qty: 0 | Refills: 0 | Status: DISCONTINUED | OUTPATIENT
Start: 2019-02-04 | End: 2019-02-08

## 2019-02-04 RX ORDER — TRAMADOL HYDROCHLORIDE 50 MG/1
50 TABLET ORAL EVERY 4 HOURS
Qty: 0 | Refills: 0 | Status: DISCONTINUED | OUTPATIENT
Start: 2019-02-05 | End: 2019-02-12

## 2019-02-04 RX ADMIN — ATORVASTATIN CALCIUM 10 MILLIGRAM(S): 80 TABLET, FILM COATED ORAL at 21:37

## 2019-02-04 RX ADMIN — SODIUM CHLORIDE 100 MILLILITER(S): 9 INJECTION INTRAMUSCULAR; INTRAVENOUS; SUBCUTANEOUS at 10:58

## 2019-02-04 RX ADMIN — Medication 500 MILLIGRAM(S): at 18:04

## 2019-02-04 RX ADMIN — Medication 100 MILLIGRAM(S): at 05:23

## 2019-02-04 RX ADMIN — GABAPENTIN 800 MILLIGRAM(S): 400 CAPSULE ORAL at 14:13

## 2019-02-04 RX ADMIN — Medication 650 MILLIGRAM(S): at 18:04

## 2019-02-04 RX ADMIN — Medication 650 MILLIGRAM(S): at 05:54

## 2019-02-04 RX ADMIN — Medication 100 MILLIGRAM(S): at 14:13

## 2019-02-04 RX ADMIN — GABAPENTIN 800 MILLIGRAM(S): 400 CAPSULE ORAL at 05:23

## 2019-02-04 RX ADMIN — OXYCODONE HYDROCHLORIDE 5 MILLIGRAM(S): 5 TABLET ORAL at 18:35

## 2019-02-04 RX ADMIN — Medication 1 TABLET(S): at 14:13

## 2019-02-04 RX ADMIN — OXYCODONE HYDROCHLORIDE 5 MILLIGRAM(S): 5 TABLET ORAL at 10:15

## 2019-02-04 RX ADMIN — OXYCODONE HYDROCHLORIDE 5 MILLIGRAM(S): 5 TABLET ORAL at 05:24

## 2019-02-04 RX ADMIN — OXYCODONE HYDROCHLORIDE 5 MILLIGRAM(S): 5 TABLET ORAL at 09:46

## 2019-02-04 RX ADMIN — OXYCODONE HYDROCHLORIDE 5 MILLIGRAM(S): 5 TABLET ORAL at 05:54

## 2019-02-04 RX ADMIN — OXYCODONE HYDROCHLORIDE 5 MILLIGRAM(S): 5 TABLET ORAL at 18:05

## 2019-02-04 RX ADMIN — ENOXAPARIN SODIUM 40 MILLIGRAM(S): 100 INJECTION SUBCUTANEOUS at 08:29

## 2019-02-04 RX ADMIN — Medication 650 MILLIGRAM(S): at 18:34

## 2019-02-04 RX ADMIN — Medication 500 MILLIGRAM(S): at 05:23

## 2019-02-04 RX ADMIN — GABAPENTIN 800 MILLIGRAM(S): 400 CAPSULE ORAL at 21:37

## 2019-02-04 RX ADMIN — Medication 1 MILLIGRAM(S): at 14:13

## 2019-02-04 RX ADMIN — Medication 100 MILLIGRAM(S): at 21:37

## 2019-02-04 RX ADMIN — Medication 650 MILLIGRAM(S): at 05:25

## 2019-02-04 RX ADMIN — POLYETHYLENE GLYCOL 3350 17 GRAM(S): 17 POWDER, FOR SOLUTION ORAL at 05:23

## 2019-02-04 RX ADMIN — SODIUM CHLORIDE 1000 MILLILITER(S): 9 INJECTION INTRAMUSCULAR; INTRAVENOUS; SUBCUTANEOUS at 10:59

## 2019-02-04 NOTE — PROGRESS NOTE ADULT - SUBJECTIVE AND OBJECTIVE BOX
73M w/ pmh HTN, HLD, BPH, prostate cancer s/p prostatectomy p/w R hip pain - started around 10pm, pt was at home and tried to kick some trash but lost balance, spun around and fell down onto his R hip. Unable to ambulate since then, no pain elsewhere. No other complaints of fever, n/v/d/c, sob, cough, chest / abd pain. No recent travel, medication change, illness, or hospitalization. No preceding symptoms prior to fall. Patient was found to have a right hip fx and is S/P ORIF of the right hip. Patient states he is very active, denies any CP or shortness of breath. Patient with an episode of Orthostasis this am      MEDICATIONS  (STANDING):  ascorbic acid 500 milliGRAM(s) Oral two times a day  atorvastatin 10 milliGRAM(s) Oral at bedtime  docusate sodium 100 milliGRAM(s) Oral three times a day  enoxaparin Injectable 40 milliGRAM(s) SubCutaneous every 24 hours  folic acid 1 milliGRAM(s) Oral daily  gabapentin 800 milliGRAM(s) Oral Once  gabapentin 800 milliGRAM(s) Oral three times a day  multivitamin 1 Tablet(s) Oral daily  sodium chloride 0.9%. 1000 milliLiter(s) (100 mL/Hr) IV Continuous <Continuous>  terazosin 5 milliGRAM(s) Oral daily    MEDICATIONS  (PRN):  acetaminophen   Tablet .. 650 milliGRAM(s) Oral every 6 hours PRN Mild Pain (1 - 3)  bisacodyl Suppository 10 milliGRAM(s) Rectal daily PRN If no bowel movement by POD#2  diphenhydrAMINE 25 milliGRAM(s) Oral every 4 hours PRN Rash and/or Itching  HYDROmorphone  Injectable 0.5 milliGRAM(s) IV Push every 6 hours PRN Severe Pain (7 - 10)  HYDROmorphone  Injectable 1 milliGRAM(s) IV Push every 3 hours PRN breakthrough pain  melatonin 3 milliGRAM(s) Oral at bedtime PRN Insomnia  ondansetron Injectable 4 milliGRAM(s) IV Push every 6 hours PRN Nausea and/or Vomiting  oxyCODONE    IR 10 milliGRAM(s) Oral every 4 hours PRN Severe Pain (7 - 10)  oxyCODONE    IR 5 milliGRAM(s) Oral every 4 hours PRN Moderate Pain (4 - 6)  polyethylene glycol 3350 17 Gram(s) Oral daily PRN Constipation  traMADol 50 milliGRAM(s) Oral every 4 hours PRN Mild Pain (1 - 3)          VITALS:   T(C): 37.3 (02-04-19 @ 07:56), Max: 37.6 (02-03-19 @ 14:00)  HR: 92 (02-04-19 @ 07:56) (91 - 96)  BP: 138/57 (02-04-19 @ 07:56) (104/58 - 138/57)  RR: 18 (02-04-19 @ 07:56) (18 - 18)  SpO2: 95% (02-04-19 @ 07:56) (93% - 95%)  Wt(kg): --        LABS:        CBC Full  -  ( 04 Feb 2019 11:25 )  WBC Count : 9.0 K/uL  Hemoglobin : 9.2 g/dL  Hematocrit : 26.4 %  Platelet Count - Automated : 200 K/uL  Mean Cell Volume : 92.0 fl  Mean Cell Hemoglobin : 32.0 pg  Mean Cell Hemoglobin Concentration : 34.7 gm/dL  Auto Neutrophil # : x  Auto Lymphocyte # : x  Auto Monocyte # : x  Auto Eosinophil # : x  Auto Basophil # : x  Auto Neutrophil % : x  Auto Lymphocyte % : x  Auto Monocyte % : x  Auto Eosinophil % : x  Auto Basophil % : x                CAPILLARY BLOOD GLUCOSE          RADIOLOGY & ADDITIONAL TESTS: 73M w/ pmh HTN, HLD, BPH, prostate cancer s/p prostatectomy p/w R hip pain - started around 10pm, pt was at home and tried to kick some trash but lost balance, spun around and fell down onto his R hip. Unable to ambulate since then, no pain elsewhere. No other complaints of fever, n/v/d/c, sob, cough, chest / abd pain. No recent travel, medication change, illness, or hospitalization. No preceding symptoms prior to fall. Patient was found to have a right hip fx and is S/P ORIF of the right hip. Patient states he is very active, denies any CP or shortness of breath. Patient with an episode of Orthostasis this am      MEDICATIONS  (STANDING):  ascorbic acid 500 milliGRAM(s) Oral two times a day  atorvastatin 10 milliGRAM(s) Oral at bedtime  docusate sodium 100 milliGRAM(s) Oral three times a day  enoxaparin Injectable 40 milliGRAM(s) SubCutaneous every 24 hours  folic acid 1 milliGRAM(s) Oral daily  gabapentin 800 milliGRAM(s) Oral Once  gabapentin 800 milliGRAM(s) Oral three times a day  multivitamin 1 Tablet(s) Oral daily  sodium chloride 0.9%. 1000 milliLiter(s) (100 mL/Hr) IV Continuous <Continuous>  terazosin 5 milliGRAM(s) Oral daily    MEDICATIONS  (PRN):  acetaminophen   Tablet .. 650 milliGRAM(s) Oral every 6 hours PRN Mild Pain (1 - 3)  bisacodyl Suppository 10 milliGRAM(s) Rectal daily PRN If no bowel movement by POD#2  diphenhydrAMINE 25 milliGRAM(s) Oral every 4 hours PRN Rash and/or Itching  HYDROmorphone  Injectable 0.5 milliGRAM(s) IV Push every 6 hours PRN Severe Pain (7 - 10)  HYDROmorphone  Injectable 1 milliGRAM(s) IV Push every 3 hours PRN breakthrough pain  melatonin 3 milliGRAM(s) Oral at bedtime PRN Insomnia  ondansetron Injectable 4 milliGRAM(s) IV Push every 6 hours PRN Nausea and/or Vomiting  oxyCODONE    IR 10 milliGRAM(s) Oral every 4 hours PRN Severe Pain (7 - 10)  oxyCODONE    IR 5 milliGRAM(s) Oral every 4 hours PRN Moderate Pain (4 - 6)  polyethylene glycol 3350 17 Gram(s) Oral daily PRN Constipation  traMADol 50 milliGRAM(s) Oral every 4 hours PRN Mild Pain (1 - 3)          VITALS:   T(C): 37.3 (02-04-19 @ 07:56), Max: 37.6 (02-03-19 @ 14:00)  HR: 92 (02-04-19 @ 07:56) (91 - 96)  BP: 138/57 (02-04-19 @ 07:56) (104/58 - 138/57)  RR: 18 (02-04-19 @ 07:56) (18 - 18)  SpO2: 95% (02-04-19 @ 07:56) (93% - 95%)  Wt(kg): --    PHYSICAL EXAM:  GENERAL: NAD, well nourished and conversant  HEAD:  Atraumatic  EYES: EOM, PERRLA, conjunctiva pink and sclera white  ENT: No tonsillar erythema, exudates, or enlargement, moist mucous membranes, good dentition, no lesions  NECK: Supple, No JVD, normal thyroid, carotids with normal upstrokes and no bruits  CHEST/LUNG: Clear to auscultation bilaterally, No rales, rhonchi, wheezing, or rubs  HEART: Regular rate and rhythm, No murmurs, rubs, or gallops  ABDOMEN: Soft, nondistended, no masses, guarding, tenderness or rebound, bowel sounds present  EXTREMITIES:  4+ hip swelling and 3+ tenderness to touch. No erythema or incisional drainage.  LYMPH: No lymphadenopathy noted  SKIN: No rashes or lesions  NERVOUS SYSTEM:  Alert & Oriented X3, normal cognitive function. Motor Strength 5/5 right upper and right lower.  5/5 left upper and left lower extremities, DTRs 2+ intact and symmetric      LABS:        CBC Full  -  ( 04 Feb 2019 11:25 )  WBC Count : 9.0 K/uL  Hemoglobin : 9.2 g/dL  Hematocrit : 26.4 %  Platelet Count - Automated : 200 K/uL  Mean Cell Volume : 92.0 fl  Mean Cell Hemoglobin : 32.0 pg  Mean Cell Hemoglobin Concentration : 34.7 gm/dL  Auto Neutrophil # : x  Auto Lymphocyte # : x  Auto Monocyte # : x  Auto Eosinophil # : x  Auto Basophil # : x  Auto Neutrophil % : x  Auto Lymphocyte % : x  Auto Monocyte % : x  Auto Eosinophil % : x  Auto Basophil % : x                CAPILLARY BLOOD GLUCOSE          RADIOLOGY & ADDITIONAL TESTS:

## 2019-02-04 NOTE — PROGRESS NOTE ADULT - SUBJECTIVE AND OBJECTIVE BOX
Orthopaedic Surgery Progress Note    Subjective:   Patient seen and examined  No acute events overnight  Pain well controlled  OOBTC with PT    Objective:  T(C): 37.6 (02-04-19 @ 05:07), Max: 37.6 (02-03-19 @ 14:00)  HR: 96 (02-04-19 @ 05:07) (91 - 99)  BP: 104/58 (02-04-19 @ 05:07) (104/58 - 135/64)  RR: 18 (02-04-19 @ 05:07) (18 - 18)  SpO2: 93% (02-04-19 @ 05:07) (93% - 95%)  Wt(kg): --    02-02 @ 07:01  -  02-03 @ 07:00  --------------------------------------------------------  IN: 1030 mL / OUT: 900 mL / NET: 130 mL    02-03 @ 07:01  -  02-04 @ 06:24  --------------------------------------------------------  IN: 1270 mL / OUT: 1250 mL / NET: 20 mL        PE    NAD  RLE:   staple line C/D/I  motor intact GS/TA/EHL  SILT S/S/SP/DP  WWP, BCR    73y Male s/p R femur IMN  - Pain control  - WBAT  - PT/OT/OOB  - DVT ppx  - Dispo planning

## 2019-02-05 RX ADMIN — OXYCODONE HYDROCHLORIDE 10 MILLIGRAM(S): 5 TABLET ORAL at 06:58

## 2019-02-05 RX ADMIN — GABAPENTIN 800 MILLIGRAM(S): 400 CAPSULE ORAL at 13:38

## 2019-02-05 RX ADMIN — Medication 650 MILLIGRAM(S): at 07:26

## 2019-02-05 RX ADMIN — ATORVASTATIN CALCIUM 10 MILLIGRAM(S): 80 TABLET, FILM COATED ORAL at 22:39

## 2019-02-05 RX ADMIN — GABAPENTIN 800 MILLIGRAM(S): 400 CAPSULE ORAL at 22:39

## 2019-02-05 RX ADMIN — Medication 100 MILLIGRAM(S): at 06:56

## 2019-02-05 RX ADMIN — Medication 500 MILLIGRAM(S): at 18:11

## 2019-02-05 RX ADMIN — ENOXAPARIN SODIUM 40 MILLIGRAM(S): 100 INJECTION SUBCUTANEOUS at 08:01

## 2019-02-05 RX ADMIN — Medication 650 MILLIGRAM(S): at 12:32

## 2019-02-05 RX ADMIN — OXYCODONE HYDROCHLORIDE 10 MILLIGRAM(S): 5 TABLET ORAL at 07:26

## 2019-02-05 RX ADMIN — TERAZOSIN HYDROCHLORIDE 5 MILLIGRAM(S): 10 CAPSULE ORAL at 06:58

## 2019-02-05 RX ADMIN — Medication 100 MILLIGRAM(S): at 22:39

## 2019-02-05 RX ADMIN — OXYCODONE HYDROCHLORIDE 5 MILLIGRAM(S): 5 TABLET ORAL at 23:08

## 2019-02-05 RX ADMIN — Medication 650 MILLIGRAM(S): at 18:13

## 2019-02-05 RX ADMIN — OXYCODONE HYDROCHLORIDE 5 MILLIGRAM(S): 5 TABLET ORAL at 18:12

## 2019-02-05 RX ADMIN — Medication 500 MILLIGRAM(S): at 06:56

## 2019-02-05 RX ADMIN — Medication 650 MILLIGRAM(S): at 18:43

## 2019-02-05 RX ADMIN — Medication 650 MILLIGRAM(S): at 06:56

## 2019-02-05 RX ADMIN — Medication 650 MILLIGRAM(S): at 01:34

## 2019-02-05 RX ADMIN — GABAPENTIN 800 MILLIGRAM(S): 400 CAPSULE ORAL at 06:58

## 2019-02-05 RX ADMIN — Medication 1 TABLET(S): at 12:31

## 2019-02-05 RX ADMIN — Medication 100 MILLIGRAM(S): at 13:38

## 2019-02-05 RX ADMIN — OXYCODONE HYDROCHLORIDE 5 MILLIGRAM(S): 5 TABLET ORAL at 22:38

## 2019-02-05 RX ADMIN — Medication 1 MILLIGRAM(S): at 12:31

## 2019-02-05 RX ADMIN — OXYCODONE HYDROCHLORIDE 5 MILLIGRAM(S): 5 TABLET ORAL at 18:42

## 2019-02-05 NOTE — CHART NOTE - NSCHARTNOTEFT_GEN_A_CORE
Called by Medicine team Dr. Farmer to assess patient,  found to have maculopapular rash to right thigh and right side of lower abdomen. He states it appears to be a drug-allergy associated rash.     Exam:   General: A&O, NAD  Pulm: CTAB, no wheezing, denies difficulty breathing, SOB, or difficulty swallowing  Lower extremities: RLE: + maculopapular rash from right knee traveling upwards towards right lower abdomen. Patient denies itching or discomfort to leg and abdomen.     A/P:  -  + Maculopapular rash - may be due to betadine paint during OR procedure as it is localized to operative side.   -  Will continue to monitor site for any further spreading or for any signs of anaphylaxis.   -  If itching or discomfort, will order hydrocortisone cream or benadryl PRN.    Brooke Oliva PA-C Orthopaedic Surgery  Team pager [phone]  [phone]

## 2019-02-05 NOTE — DIETITIAN INITIAL EVALUATION ADULT. - NS AS NUTRI INTERV MEALS SNACK
Continue to provide current diet order, no therapeutic diet restrictions indicated at this time. Pt to self monitor dietary intake. Continue micronutrient supplementation as indicated./General/healthful diet

## 2019-02-05 NOTE — PROGRESS NOTE ADULT - ATTENDING COMMENTS
The patient is medically stable, medically optimized and has no medical contraindication to surgery today as required. Exam time 30  minutes including > than 50 % for bedside discussion and counseling. Beginning to ambulate and doing well. No medical complications post-op to date and to proceed with physical therapy, as tolerated. Continues pulmonary toilet to lessen atelectasis, leg exercises as taught to lessen the risk of DVT and supervised pain medications for post-op pain control. I anticipate transfer to rehabilitation to follow when cleared by orthopedics.

## 2019-02-05 NOTE — PROGRESS NOTE ADULT - SUBJECTIVE AND OBJECTIVE BOX
Patient is a 73y old  Male who presents with a chief complaint of Right Hip Fracture   S/P right hip IM nail POD#7  Patient comfortable  No complaints    T(C): 37.4 (02-05-19 @ 04:32), Max: 37.7 (02-04-19 @ 16:47)  HR: 88 (02-05-19 @ 04:32) (81 - 98)  BP: 140/68 (02-05-19 @ 04:32) (128/68 - 150/66)  RR: 18 (02-05-19 @ 04:32) (18 - 18)  SpO2: 94% (02-05-19 @ 04:32) (94% - 97%)    PHYSICAL EXAM:  NAD, Alert  [Right ] Hip: Dressings, incisions  C/D/I; sensation grossly intact to light touch; (+) DF/PF; (+) Distal Pulses; No Calf tenderness B/L, PAS     LABS:                        9.2    9.0   )-----------( 200      ( 04 Feb 2019 11:25 )             26.4

## 2019-02-05 NOTE — DIETITIAN INITIAL EVALUATION ADULT. - ENERGY NEEDS
Height: 74 inches, Weight: 218 pounds (dosing)  BMI: 28.0 kg/m2 IBW: 178 pounds (+/-10%), %IBW: 122%  Pertinent Info: No edema noted, no pressure injuries noted at this time in nursing flow sheet.  Other pertinent info: Pt is 72yo M with PMH significant for HTN. HLD, prostate CA s/p prostatectomy, presenting with R hip fracture. s/p R hip IM nail, POD #7.

## 2019-02-05 NOTE — PROGRESS NOTE ADULT - SUBJECTIVE AND OBJECTIVE BOX
73M w/ pmh HTN, HLD, BPH, prostate cancer s/p prostatectomy p/w R hip pain - started around 10pm, pt was at home and tried to kick some trash but lost balance, spun around and fell down onto his R hip. Unable to ambulate since then, no pain elsewhere. No other complaints of fever, n/v/d/c, sob, cough, chest / abd pain. No recent travel, medication change, illness, or hospitalization. No preceding symptoms prior to fall. Patient was found to have a right hip fx and is S/P ORIF of the right hip. Patient states he is very active, denies any CP or shortness of breath. Patient with an episode of Orthostasis this am      MEDICATIONS  (STANDING):  ascorbic acid 500 milliGRAM(s) Oral two times a day  atorvastatin 10 milliGRAM(s) Oral at bedtime  docusate sodium 100 milliGRAM(s) Oral three times a day  enoxaparin Injectable 40 milliGRAM(s) SubCutaneous every 24 hours  folic acid 1 milliGRAM(s) Oral daily  gabapentin 800 milliGRAM(s) Oral Once  gabapentin 800 milliGRAM(s) Oral three times a day  multivitamin 1 Tablet(s) Oral daily  sodium chloride 0.9%. 1000 milliLiter(s) (100 mL/Hr) IV Continuous <Continuous>  terazosin 5 milliGRAM(s) Oral daily    MEDICATIONS  (PRN):  acetaminophen   Tablet .. 650 milliGRAM(s) Oral every 6 hours PRN Mild Pain (1 - 3)  bisacodyl Suppository 10 milliGRAM(s) Rectal daily PRN If no bowel movement by POD#2  diphenhydrAMINE 25 milliGRAM(s) Oral every 4 hours PRN Rash and/or Itching  HYDROmorphone  Injectable 0.5 milliGRAM(s) IV Push every 6 hours PRN Severe Pain (7 - 10)  HYDROmorphone  Injectable 1 milliGRAM(s) IV Push every 3 hours PRN breakthrough pain  melatonin 3 milliGRAM(s) Oral at bedtime PRN Insomnia  ondansetron Injectable 4 milliGRAM(s) IV Push every 6 hours PRN Nausea and/or Vomiting  oxyCODONE    IR 10 milliGRAM(s) Oral every 4 hours PRN Severe Pain (7 - 10)  oxyCODONE    IR 5 milliGRAM(s) Oral every 4 hours PRN Moderate Pain (4 - 6)  polyethylene glycol 3350 17 Gram(s) Oral daily PRN Constipation  traMADol 50 milliGRAM(s) Oral every 4 hours PRN Mild Pain (1 - 3)          VITALS:   T(C): 37.3 (02-04-19 @ 07:56), Max: 37.6 (02-03-19 @ 14:00)  HR: 92 (02-04-19 @ 07:56) (91 - 96)  BP: 138/57 (02-04-19 @ 07:56) (104/58 - 138/57)  RR: 18 (02-04-19 @ 07:56) (18 - 18)  SpO2: 95% (02-04-19 @ 07:56) (93% - 95%)  Wt(kg): --        LABS:        CBC Full  -  ( 04 Feb 2019 11:25 )  WBC Count : 9.0 K/uL  Hemoglobin : 9.2 g/dL  Hematocrit : 26.4 %  Platelet Count - Automated : 200 K/uL  Mean Cell Volume : 92.0 fl  Mean Cell Hemoglobin : 32.0 pg  Mean Cell Hemoglobin Concentration : 34.7 gm/dL  Auto Neutrophil # : x  Auto Lymphocyte # : x  Auto Monocyte # : x  Auto Eosinophil # : x  Auto Basophil # : x  Auto Neutrophil % : x  Auto Lymphocyte % : x  Auto Monocyte % : x  Auto Eosinophil % : x  Auto Basophil % : x                CAPILLARY BLOOD GLUCOSE          RADIOLOGY & ADDITIONAL TESTS: 73M w/ pmh HTN, HLD, BPH, prostate cancer s/p prostatectomy p/w R hip pain - started around 10pm, pt was at home and tried to kick some trash but lost balance, spun around and fell down onto his R hip. Unable to ambulate since then, no pain elsewhere. No other complaints of fever, n/v/d/c, sob, cough, chest / abd pain. No recent travel, medication change, illness, or hospitalization. No preceding symptoms prior to fall. Patient was found to have a right hip fx and is S/P ORIF of the right hip. Patient states he is very active, denies any CP or shortness of breath. Patient with an episode of Orthostasis this am      MEDICATIONS  (STANDING):  ascorbic acid 500 milliGRAM(s) Oral two times a day  atorvastatin 10 milliGRAM(s) Oral at bedtime  docusate sodium 100 milliGRAM(s) Oral three times a day  enoxaparin Injectable 40 milliGRAM(s) SubCutaneous every 24 hours  folic acid 1 milliGRAM(s) Oral daily  gabapentin 800 milliGRAM(s) Oral Once  gabapentin 800 milliGRAM(s) Oral three times a day  multivitamin 1 Tablet(s) Oral daily  sodium chloride 0.9%. 1000 milliLiter(s) (100 mL/Hr) IV Continuous <Continuous>  terazosin 5 milliGRAM(s) Oral daily    MEDICATIONS  (PRN):  acetaminophen   Tablet .. 650 milliGRAM(s) Oral every 6 hours PRN Mild Pain (1 - 3)  bisacodyl Suppository 10 milliGRAM(s) Rectal daily PRN If no bowel movement by POD#2  diphenhydrAMINE 25 milliGRAM(s) Oral every 4 hours PRN Rash and/or Itching  melatonin 3 milliGRAM(s) Oral at bedtime PRN Insomnia  ondansetron Injectable 4 milliGRAM(s) IV Push every 6 hours PRN Nausea and/or Vomiting  oxyCODONE    IR 10 milliGRAM(s) Oral every 4 hours PRN Severe Pain (7 - 10)  oxyCODONE    IR 5 milliGRAM(s) Oral every 4 hours PRN Moderate Pain (4 - 6)  polyethylene glycol 3350 17 Gram(s) Oral daily PRN Constipation  traMADol 50 milliGRAM(s) Oral every 4 hours PRN Mild Pain (1 - 3)    Vital Signs Last 24 Hrs  T(C): 36.8 (05 Feb 2019 23:33), Max: 37.4 (05 Feb 2019 04:32)  T(F): 98.2 (05 Feb 2019 23:33), Max: 99.3 (05 Feb 2019 04:32)  HR: 83 (05 Feb 2019 23:33) (83 - 96)  BP: 122/62 (05 Feb 2019 23:33) (107/57 - 145/64)  BP(mean): --  RR: 19 (05 Feb 2019 23:33) (18 - 19)  SpO2: 96% (05 Feb 2019 23:33) (94% - 97%)                LABS:      CBC Full  -  ( 04 Feb 2019 11:25 )  WBC Count : 9.0 K/uL  Hemoglobin : 9.2 g/dL  Hematocrit : 26.4 %  Platelet Count - Automated : 200 K/uL  Mean Cell Volume : 92.0 fl  Mean Cell Hemoglobin : 32.0 pg  Mean Cell Hemoglobin Concentration : 34.7 gm/dL  Auto Neutrophil # : x  Auto Lymphocyte # : x  Auto Monocyte # : x  Auto Eosinophil # : x  Auto Basophil # : x  Auto Neutrophil % : x  Auto Lymphocyte % : x  Auto Monocyte % : x  Auto Eosinophil % : x  Auto Basophil % : x 73M w/ pmh HTN, HLD, BPH, prostate cancer s/p prostatectomy p/w R hip pain - started around 10pm, pt was at home and tried to kick some trash but lost balance, spun around and fell down onto his R hip. Unable to ambulate since then, no pain elsewhere. No other complaints of fever, n/v/d/c, sob, cough, chest / abd pain. No recent travel, medication change, illness, or hospitalization. No preceding symptoms prior to fall. Patient was found to have a right hip fx and is S/P ORIF of the right hip. Patient states he is very active, denies any CP or shortness of breath. Patient with an episode of Orthostasis this am      MEDICATIONS  (STANDING):  ascorbic acid 500 milliGRAM(s) Oral two times a day  atorvastatin 10 milliGRAM(s) Oral at bedtime  docusate sodium 100 milliGRAM(s) Oral three times a day  enoxaparin Injectable 40 milliGRAM(s) SubCutaneous every 24 hours  folic acid 1 milliGRAM(s) Oral daily  gabapentin 800 milliGRAM(s) Oral Once  gabapentin 800 milliGRAM(s) Oral three times a day  multivitamin 1 Tablet(s) Oral daily  sodium chloride 0.9%. 1000 milliLiter(s) (100 mL/Hr) IV Continuous <Continuous>  terazosin 5 milliGRAM(s) Oral daily    MEDICATIONS  (PRN):  acetaminophen   Tablet .. 650 milliGRAM(s) Oral every 6 hours PRN Mild Pain (1 - 3)  bisacodyl Suppository 10 milliGRAM(s) Rectal daily PRN If no bowel movement by POD#2  diphenhydrAMINE 25 milliGRAM(s) Oral every 4 hours PRN Rash and/or Itching  melatonin 3 milliGRAM(s) Oral at bedtime PRN Insomnia  ondansetron Injectable 4 milliGRAM(s) IV Push every 6 hours PRN Nausea and/or Vomiting  oxyCODONE    IR 10 milliGRAM(s) Oral every 4 hours PRN Severe Pain (7 - 10)  oxyCODONE    IR 5 milliGRAM(s) Oral every 4 hours PRN Moderate Pain (4 - 6)  polyethylene glycol 3350 17 Gram(s) Oral daily PRN Constipation  traMADol 50 milliGRAM(s) Oral every 4 hours PRN Mild Pain (1 - 3)    Vital Signs Last 24 Hrs  T(C): 36.8 (05 Feb 2019 23:33), Max: 37.4 (05 Feb 2019 04:32)  T(F): 98.2 (05 Feb 2019 23:33), Max: 99.3 (05 Feb 2019 04:32)  HR: 83 (05 Feb 2019 23:33) (83 - 96)  BP: 122/62 (05 Feb 2019 23:33) (107/57 - 145/64)  BP(mean): --  RR: 19 (05 Feb 2019 23:33) (18 - 19)  SpO2: 96% (05 Feb 2019 23:33) (94% - 97%)    PHYSICAL EXAM:  GENERAL: NAD, well nourished and conversant  HEAD:  Atraumatic  EYES: EOM, PERRLA, conjunctiva pink and sclera white  ENT: No tonsillar erythema, exudates, or enlargement, moist mucous membranes, good dentition, no lesions  NECK: Supple, No JVD, normal thyroid, carotids with normal upstrokes and no bruits  CHEST/LUNG: Clear to auscultation bilaterally, No rales, rhonchi, wheezing, or rubs  HEART: Regular rate and rhythm, No murmurs, rubs, or gallops  ABDOMEN: Soft, nondistended, no masses, guarding, tenderness or rebound, bowel sounds present  EXTREMITIES:  4+ hip swelling and 3+ tenderness to touch. No erythema or incisional drainage.  LYMPH: No lymphadenopathy noted  SKIN: + right leg with a diffuse maculopapular rash.  NERVOUS SYSTEM:  Alert & Oriented X3, normal cognitive function. Motor Strength 5/5 right upper and right lower.  5/5 left upper and left lower extremities, DTRs 2+ intact and symmetric              LABS:      CBC Full  -  ( 04 Feb 2019 11:25 )  WBC Count : 9.0 K/uL  Hemoglobin : 9.2 g/dL  Hematocrit : 26.4 %  Platelet Count - Automated : 200 K/uL  Mean Cell Volume : 92.0 fl  Mean Cell Hemoglobin : 32.0 pg  Mean Cell Hemoglobin Concentration : 34.7 gm/dL  Auto Neutrophil # : x  Auto Lymphocyte # : x  Auto Monocyte # : x  Auto Eosinophil # : x  Auto Basophil # : x  Auto Neutrophil % : x  Auto Lymphocyte % : x  Auto Monocyte % : x  Auto Eosinophil % : x  Auto Basophil % : x 73M w/ pmh HTN, HLD, BPH, prostate cancer s/p prostatectomy p/w R hip pain - started around 10pm, pt was at home and tried to kick some trash but lost balance, spun around and fell down onto his R hip. Unable to ambulate since then, no pain elsewhere. No other complaints of fever, n/v/d/c, sob, cough, chest / abd pain. No recent travel, medication change, illness, or hospitalization. No preceding symptoms prior to fall. Patient was found to have a right hip fx and is S/P ORIF of the right hip with IM nail placement. Patient states he is very active, denies any CP or shortness of breath. Patient with an episode of Orthostasis this am secondary to right hip bleeding.         MEDICATIONS  (STANDING):  ascorbic acid 500 milliGRAM(s) Oral two times a day  atorvastatin 10 milliGRAM(s) Oral at bedtime  docusate sodium 100 milliGRAM(s) Oral three times a day  enoxaparin Injectable 40 milliGRAM(s) SubCutaneous every 24 hours  folic acid 1 milliGRAM(s) Oral daily  gabapentin 800 milliGRAM(s) Oral Once  gabapentin 800 milliGRAM(s) Oral three times a day  multivitamin 1 Tablet(s) Oral daily  sodium chloride 0.9%. 1000 milliLiter(s) (100 mL/Hr) IV Continuous <Continuous>  terazosin 5 milliGRAM(s) Oral daily    MEDICATIONS  (PRN):  acetaminophen   Tablet .. 650 milliGRAM(s) Oral every 6 hours PRN Mild Pain (1 - 3)  bisacodyl Suppository 10 milliGRAM(s) Rectal daily PRN If no bowel movement by POD#2  diphenhydrAMINE 25 milliGRAM(s) Oral every 4 hours PRN Rash and/or Itching  melatonin 3 milliGRAM(s) Oral at bedtime PRN Insomnia  ondansetron Injectable 4 milliGRAM(s) IV Push every 6 hours PRN Nausea and/or Vomiting  oxyCODONE    IR 10 milliGRAM(s) Oral every 4 hours PRN Severe Pain (7 - 10)  oxyCODONE    IR 5 milliGRAM(s) Oral every 4 hours PRN Moderate Pain (4 - 6)  polyethylene glycol 3350 17 Gram(s) Oral daily PRN Constipation  traMADol 50 milliGRAM(s) Oral every 4 hours PRN Mild Pain (1 - 3)    Vital Signs Last 24 Hrs  T(C): 36.8 (05 Feb 2019 23:33), Max: 37.4 (05 Feb 2019 04:32)  T(F): 98.2 (05 Feb 2019 23:33), Max: 99.3 (05 Feb 2019 04:32)  HR: 83 (05 Feb 2019 23:33) (83 - 96)  BP: 122/62 (05 Feb 2019 23:33) (107/57 - 145/64)  BP(mean): --  RR: 19 (05 Feb 2019 23:33) (18 - 19)  SpO2: 96% (05 Feb 2019 23:33) (94% - 97%)    PHYSICAL EXAM:  GENERAL: NAD, well nourished and conversant  HEAD:  Atraumatic  EYES: EOM, PERRLA, conjunctiva pink and sclera white  ENT: No tonsillar erythema, exudates, or enlargement, moist mucous membranes, good dentition, no lesions  NECK: Supple, No JVD, normal thyroid, carotids with normal upstrokes and no bruits  CHEST/LUNG: Clear to auscultation bilaterally, No rales, rhonchi, wheezing, or rubs  HEART: Regular rate and rhythm, No murmurs, rubs, or gallops  ABDOMEN: Soft, nondistended, no masses, guarding, tenderness or rebound, bowel sounds present  EXTREMITIES:  4+ hip swelling and 3+ tenderness to touch. No erythema or incisional drainage.  LYMPH: No lymphadenopathy noted  SKIN: + right leg with a diffuse maculopapular rash.  NERVOUS SYSTEM:  Alert & Oriented X3, normal cognitive function. Motor Strength 5/5 right upper and right lower.  5/5 left upper and left lower extremities, DTRs 2+ intact and symmetric              LABS:      CBC Full  -  ( 04 Feb 2019 11:25 )  WBC Count : 9.0 K/uL  Hemoglobin : 9.2 g/dL  Hematocrit : 26.4 %  Platelet Count - Automated : 200 K/uL  Mean Cell Volume : 92.0 fl  Mean Cell Hemoglobin : 32.0 pg  Mean Cell Hemoglobin Concentration : 34.7 gm/dL  Auto Neutrophil # : x  Auto Lymphocyte # : x  Auto Monocyte # : x  Auto Eosinophil # : x  Auto Basophil # : x  Auto Neutrophil % : x  Auto Lymphocyte % : x  Auto Monocyte % : x  Auto Eosinophil % : x  Auto Basophil % : x

## 2019-02-05 NOTE — DIETITIAN INITIAL EVALUATION ADULT. - OTHER INFO
Pt seen for length of stay on [facility]. Pt reports good po intake > 7 days in-patient. No c/o nausea, vomiting, diarrhea, or constipation, denies chewing/swallowing difficulties. Pt reports UBw 218 lbs, wt stable PTA and is same wt noted as dosing wt. Pt has no nutrition related questions at this time. Declines need for therapeutic diet in-patient as he self monitors his dietary intake and doesn't use the salt packets on the trays.

## 2019-02-05 NOTE — PROGRESS NOTE ADULT - ASSESSMENT
Right hip IM nail POD#7, d/c to rehab once no-fault ins. approved  Marilee Simmons PA-C  Orthopaedic Surgery  Team pager [phone]  [facility] [phone]  [phone]

## 2019-02-05 NOTE — DIETITIAN INITIAL EVALUATION ADULT. - PHYSICAL APPEARANCE
BMI 28.0, Pt visually appears well nourished with no signs of muscle wasting or fat depletion./well nourished/overweight

## 2019-02-06 RX ADMIN — Medication 650 MILLIGRAM(S): at 22:52

## 2019-02-06 RX ADMIN — OXYCODONE HYDROCHLORIDE 10 MILLIGRAM(S): 5 TABLET ORAL at 10:11

## 2019-02-06 RX ADMIN — Medication 500 MILLIGRAM(S): at 18:41

## 2019-02-06 RX ADMIN — Medication 500 MILLIGRAM(S): at 06:08

## 2019-02-06 RX ADMIN — Medication 100 MILLIGRAM(S): at 14:16

## 2019-02-06 RX ADMIN — Medication 650 MILLIGRAM(S): at 06:09

## 2019-02-06 RX ADMIN — Medication 100 MILLIGRAM(S): at 21:35

## 2019-02-06 RX ADMIN — OXYCODONE HYDROCHLORIDE 10 MILLIGRAM(S): 5 TABLET ORAL at 11:11

## 2019-02-06 RX ADMIN — GABAPENTIN 800 MILLIGRAM(S): 400 CAPSULE ORAL at 06:08

## 2019-02-06 RX ADMIN — Medication 100 MILLIGRAM(S): at 06:08

## 2019-02-06 RX ADMIN — POLYETHYLENE GLYCOL 3350 17 GRAM(S): 17 POWDER, FOR SOLUTION ORAL at 06:10

## 2019-02-06 RX ADMIN — ENOXAPARIN SODIUM 40 MILLIGRAM(S): 100 INJECTION SUBCUTANEOUS at 09:58

## 2019-02-06 RX ADMIN — Medication 1 MILLIGRAM(S): at 12:10

## 2019-02-06 RX ADMIN — TERAZOSIN HYDROCHLORIDE 5 MILLIGRAM(S): 10 CAPSULE ORAL at 06:08

## 2019-02-06 RX ADMIN — ATORVASTATIN CALCIUM 10 MILLIGRAM(S): 80 TABLET, FILM COATED ORAL at 21:34

## 2019-02-06 RX ADMIN — GABAPENTIN 800 MILLIGRAM(S): 400 CAPSULE ORAL at 21:35

## 2019-02-06 RX ADMIN — Medication 1 TABLET(S): at 12:10

## 2019-02-06 RX ADMIN — GABAPENTIN 800 MILLIGRAM(S): 400 CAPSULE ORAL at 14:16

## 2019-02-06 RX ADMIN — OXYCODONE HYDROCHLORIDE 10 MILLIGRAM(S): 5 TABLET ORAL at 06:29

## 2019-02-06 RX ADMIN — Medication 650 MILLIGRAM(S): at 22:22

## 2019-02-06 RX ADMIN — OXYCODONE HYDROCHLORIDE 10 MILLIGRAM(S): 5 TABLET ORAL at 06:10

## 2019-02-06 NOTE — PROGRESS NOTE ADULT - ATTENDING COMMENTS
Beginning to ambulate and doing well. No medical complications post-op to date and to proceed with physical therapy, as tolerated. Continues pulmonary toilet to lessen atelectasis, leg exercises as taught to lessen the risk of DVT and supervised pain medications for post-op pain control. I anticipate transfer to rehabilitation when approved by workman's comp

## 2019-02-06 NOTE — PROGRESS NOTE ADULT - ASSESSMENT
Right hip IM nail POD#8, d/c to rehab once no-fault ins. approved    pain control  wbat  pt/ot  rash likely 2/2 preop surgical prep  incentive spirometry  dvt pxx  dispo planning rehab when approved

## 2019-02-06 NOTE — PROGRESS NOTE ADULT - SUBJECTIVE AND OBJECTIVE BOX
Patient is a 73y old  Male who presents with a chief complaint of Right Hip Fracture   S/P right hip IM nail POD#8  Patient comfortable  No complaints  participating in PT daily  anterior thigh rash    Vital Signs Last 24 Hrs  T(C): 37.2 (02-06-19 @ 04:53), Max: 37.2 (02-05-19 @ 13:26)  T(F): 99 (02-06-19 @ 04:53), Max: 99 (02-05-19 @ 13:26)  HR: 90 (02-06-19 @ 04:53) (83 - 96)  BP: 147/75 (02-06-19 @ 04:53) (107/57 - 147/75)  BP(mean): --  RR: 19 (02-06-19 @ 04:53) (18 - 19)  SpO2: 93% (02-06-19 @ 04:53) (93% - 97%)                          9.2    9.0   )-----------( 200      ( 04 Feb 2019 11:25 )             26.4         PHYSICAL EXAM:  NAD, Alert  [Right ] Hip: Incisions  C/D/I, no erythema or drainages, anterior thigh rash, staples intact; sensation grossly intact to light touch; (+)ehl/fhl/ta/gsc, (+) Distal Pulses; No Calf tenderness, silt l3-s1

## 2019-02-06 NOTE — PROGRESS NOTE ADULT - SUBJECTIVE AND OBJECTIVE BOX
73M w/ pmh HTN, HLD, BPH, prostate cancer s/p prostatectomy p/w R hip pain - started around 10pm, pt was at home and tried to kick some trash but lost balance, spun around and fell down onto his R hip. Unable to ambulate since then, no pain elsewhere. No other complaints of fever, n/v/d/c, sob, cough, chest / abd pain. No recent travel, medication change, illness, or hospitalization. No preceding symptoms prior to fall. Patient was found to have a right hip fx and is S/P ORIF of the right hip with IM nail placement. Patient states he is very active, denies any CP or shortness of breath. Patient with an episode of Orthostasis this am secondary to right hip bleeding. today Patient has been normotensive       MEDICATIONS  (STANDING):  ascorbic acid 500 milliGRAM(s) Oral two times a day  atorvastatin 10 milliGRAM(s) Oral at bedtime  docusate sodium 100 milliGRAM(s) Oral three times a day  enoxaparin Injectable 40 milliGRAM(s) SubCutaneous every 24 hours  folic acid 1 milliGRAM(s) Oral daily  gabapentin 800 milliGRAM(s) Oral Once  gabapentin 800 milliGRAM(s) Oral three times a day  multivitamin 1 Tablet(s) Oral daily  sodium chloride 0.9%. 1000 milliLiter(s) (100 mL/Hr) IV Continuous <Continuous>  terazosin 5 milliGRAM(s) Oral daily    MEDICATIONS  (PRN):  acetaminophen   Tablet .. 650 milliGRAM(s) Oral every 6 hours PRN Mild Pain (1 - 3)  bisacodyl Suppository 10 milliGRAM(s) Rectal daily PRN If no bowel movement by POD#2  diphenhydrAMINE 25 milliGRAM(s) Oral every 4 hours PRN Rash and/or Itching  melatonin 3 milliGRAM(s) Oral at bedtime PRN Insomnia  ondansetron Injectable 4 milliGRAM(s) IV Push every 6 hours PRN Nausea and/or Vomiting  oxyCODONE    IR 10 milliGRAM(s) Oral every 4 hours PRN Severe Pain (7 - 10)  oxyCODONE    IR 5 milliGRAM(s) Oral every 4 hours PRN Moderate Pain (4 - 6)  polyethylene glycol 3350 17 Gram(s) Oral daily PRN Constipation  traMADol 50 milliGRAM(s) Oral every 4 hours PRN Mild Pain (1 - 3)          VITALS:   T(C): 37.2 (02-06-19 @ 21:05), Max: 37.2 (02-06-19 @ 04:53)  HR: 61 (02-06-19 @ 21:05) (61 - 90)  BP: 134/61 (02-06-19 @ 21:05) (122/62 - 147/75)  RR: 16 (02-06-19 @ 21:05) (16 - 19)  SpO2: 94% (02-06-19 @ 21:05) (93% - 98%)  Wt(kg): --      PHYSICAL EXAM:  GENERAL: NAD, well nourished and conversant  HEAD:  Atraumatic  EYES: EOM, PERRLA, conjunctiva pink and sclera white  ENT: No tonsillar erythema, exudates, or enlargement, moist mucous membranes, good dentition, no lesions  NECK: Supple, No JVD, normal thyroid, carotids with normal upstrokes and no bruits  CHEST/LUNG: Clear to auscultation bilaterally, No rales, rhonchi, wheezing, or rubs  HEART: Regular rate and rhythm, No murmurs, rubs, or gallops  ABDOMEN: Soft, nondistended, no masses, guarding, tenderness or rebound, bowel sounds present  EXTREMITIES:  4+ hip swelling and 3+ tenderness to touch. No erythema or incisional drainage.  LYMPH: No lymphadenopathy noted  SKIN: + right leg with a diffuse maculopapular rash.  NERVOUS SYSTEM:  Alert & Oriented X3, normal cognitive function. Motor Strength 5/5 right upper and right lower.  5/5 left upper and left lower extremities, DTRs 2+ intact and symmetric    LABS:                      CAPILLARY BLOOD GLUCOSE          RADIOLOGY & ADDITIONAL TESTS:

## 2019-02-07 DIAGNOSIS — K59.00 CONSTIPATION, UNSPECIFIED: ICD-10-CM

## 2019-02-07 RX ORDER — LACTULOSE 10 G/15ML
10 SOLUTION ORAL ONCE
Qty: 0 | Refills: 0 | Status: DISCONTINUED | OUTPATIENT
Start: 2019-02-07 | End: 2019-02-09

## 2019-02-07 RX ORDER — SODIUM CHLORIDE 9 MG/ML
1000 INJECTION INTRAMUSCULAR; INTRAVENOUS; SUBCUTANEOUS ONCE
Qty: 0 | Refills: 0 | Status: COMPLETED | OUTPATIENT
Start: 2019-02-07 | End: 2019-02-07

## 2019-02-07 RX ORDER — LACTULOSE 10 G/15ML
10 SOLUTION ORAL ONCE
Qty: 0 | Refills: 0 | Status: COMPLETED | OUTPATIENT
Start: 2019-02-07 | End: 2019-02-07

## 2019-02-07 RX ADMIN — LACTULOSE 10 GRAM(S): 10 SOLUTION ORAL at 15:58

## 2019-02-07 RX ADMIN — Medication 1 MILLIGRAM(S): at 13:14

## 2019-02-07 RX ADMIN — GABAPENTIN 800 MILLIGRAM(S): 400 CAPSULE ORAL at 21:47

## 2019-02-07 RX ADMIN — GABAPENTIN 800 MILLIGRAM(S): 400 CAPSULE ORAL at 05:42

## 2019-02-07 RX ADMIN — Medication 1 TABLET(S): at 13:14

## 2019-02-07 RX ADMIN — SODIUM CHLORIDE 100 MILLILITER(S): 9 INJECTION INTRAMUSCULAR; INTRAVENOUS; SUBCUTANEOUS at 10:18

## 2019-02-07 RX ADMIN — Medication 500 MILLIGRAM(S): at 05:42

## 2019-02-07 RX ADMIN — POLYETHYLENE GLYCOL 3350 17 GRAM(S): 17 POWDER, FOR SOLUTION ORAL at 05:44

## 2019-02-07 RX ADMIN — Medication 100 MILLIGRAM(S): at 13:15

## 2019-02-07 RX ADMIN — ENOXAPARIN SODIUM 40 MILLIGRAM(S): 100 INJECTION SUBCUTANEOUS at 10:20

## 2019-02-07 RX ADMIN — Medication 10 MILLIGRAM(S): at 13:15

## 2019-02-07 RX ADMIN — Medication 650 MILLIGRAM(S): at 05:43

## 2019-02-07 RX ADMIN — ATORVASTATIN CALCIUM 10 MILLIGRAM(S): 80 TABLET, FILM COATED ORAL at 21:47

## 2019-02-07 RX ADMIN — SODIUM CHLORIDE 1000 MILLILITER(S): 9 INJECTION INTRAMUSCULAR; INTRAVENOUS; SUBCUTANEOUS at 10:18

## 2019-02-07 RX ADMIN — GABAPENTIN 800 MILLIGRAM(S): 400 CAPSULE ORAL at 13:14

## 2019-02-07 RX ADMIN — TERAZOSIN HYDROCHLORIDE 5 MILLIGRAM(S): 10 CAPSULE ORAL at 05:43

## 2019-02-07 RX ADMIN — Medication 650 MILLIGRAM(S): at 06:13

## 2019-02-07 RX ADMIN — Medication 100 MILLIGRAM(S): at 05:42

## 2019-02-07 NOTE — PROGRESS NOTE ADULT - SUBJECTIVE AND OBJECTIVE BOX
Patient is a 73y old  Male who presents with a chief complaint of Right Hip Fracture (06 Feb 2019 23:00)      POST OPERATIVE DAY #:  9  Patient comfortable  No complaints    VS:  T(C): 36.8 (02-07-19 @ 04:25), Max: 37.2 (02-06-19 @ 21:05)  T(F): 98.3 (02-07-19 @ 04:25), Max: 99 (02-06-19 @ 21:05)  HR: 78 (02-07-19 @ 04:25) (61 - 89)  BP: 144/79 (02-07-19 @ 04:25) (109/62 - 146/69)  RR: 16 (02-07-19 @ 04:25) (16 - 18)  SpO2: 95% (02-07-19 @ 04:25) (94% - 98%)  Wt(kg): --      PHYSICAL EXAM:  NAD, Alert  EXT:   Rt Hip: incisions clean and dry  staples intact  No Calf Tenderness  (+) DF/PF; (+) Distal Pulses;  Sensation: No gross deficits noted  Pulses 2+   B/L, PAS

## 2019-02-07 NOTE — PROGRESS NOTE ADULT - SUBJECTIVE AND OBJECTIVE BOX
73M w/ pmh HTN, HLD, BPH, prostate cancer s/p prostatectomy p/w R hip pain - started around 10pm, pt was at home and tried to kick some trash but lost balance, spun around and fell down onto his R hip. Unable to ambulate since then, no pain elsewhere. No other complaints of fever, n/v/d/c, sob, cough, chest / abd pain. No recent travel, medication change, illness, or hospitalization. No preceding symptoms prior to fall. Patient was found to have a right hip fx and is S/P ORIF of the right hip with IM nail placement. Patient states he is very active, denies any CP or shortness of breath. Patient with an episode of Orthostasis this am secondary to right hip bleeding. today Patient has been normotensive    MEDICATIONS  (STANDING):  ascorbic acid 500 milliGRAM(s) Oral two times a day  atorvastatin 10 milliGRAM(s) Oral at bedtime  docusate sodium 100 milliGRAM(s) Oral three times a day  enoxaparin Injectable 40 milliGRAM(s) SubCutaneous every 24 hours  folic acid 1 milliGRAM(s) Oral daily  gabapentin 800 milliGRAM(s) Oral Once  gabapentin 800 milliGRAM(s) Oral three times a day  lactulose Syrup 10 Gram(s) Oral once  lactulose Syrup 10 Gram(s) Oral once  multivitamin 1 Tablet(s) Oral daily  sodium chloride 0.9%. 1000 milliLiter(s) (100 mL/Hr) IV Continuous <Continuous>  terazosin 5 milliGRAM(s) Oral daily    MEDICATIONS  (PRN):  acetaminophen   Tablet .. 650 milliGRAM(s) Oral every 6 hours PRN Mild Pain (1 - 3)  bisacodyl Suppository 10 milliGRAM(s) Rectal daily PRN If no bowel movement by POD#2  diphenhydrAMINE 25 milliGRAM(s) Oral every 4 hours PRN Rash and/or Itching  melatonin 3 milliGRAM(s) Oral at bedtime PRN Insomnia  ondansetron Injectable 4 milliGRAM(s) IV Push every 6 hours PRN Nausea and/or Vomiting  oxyCODONE    IR 10 milliGRAM(s) Oral every 4 hours PRN Severe Pain (7 - 10)  oxyCODONE    IR 5 milliGRAM(s) Oral every 4 hours PRN Moderate Pain (4 - 6)  polyethylene glycol 3350 17 Gram(s) Oral daily PRN Constipation  traMADol 50 milliGRAM(s) Oral every 4 hours PRN Mild Pain (1 - 3)          VITALS:   T(C): 36.8 (02-07-19 @ 16:14), Max: 37.2 (02-06-19 @ 21:05)  HR: 89 (02-07-19 @ 16:14) (61 - 97)  BP: 163/75 (02-07-19 @ 16:14) (109/62 - 163/75)  RR: 18 (02-07-19 @ 16:14) (16 - 18)  SpO2: 95% (02-07-19 @ 16:14) (94% - 97%)  Wt(kg): --    PHYSICAL EXAM:  GENERAL: NAD, well nourished and conversant  HEAD:  Atraumatic  EYES: EOM, PERRLA, conjunctiva pink and sclera white  ENT: No tonsillar erythema, exudates, or enlargement, moist mucous membranes, good dentition, no lesions  NECK: Supple, No JVD, normal thyroid, carotids with normal upstrokes and no bruits  CHEST/LUNG: Clear to auscultation bilaterally, No rales, rhonchi, wheezing, or rubs  HEART: Regular rate and rhythm, No murmurs, rubs, or gallops  ABDOMEN: Soft, nondistended, no masses, guarding, tenderness or rebound, bowel sounds present  EXTREMITIES:  4+ hip swelling and 3+ tenderness to touch. No erythema or incisional drainage.  LYMPH: No lymphadenopathy noted  SKIN: + right leg with a diffuse maculopapular rash.  NERVOUS SYSTEM:  Alert & Oriented X3, normal cognitive function. Motor Strength 5/5 right upper and right lower.  5/5 left upper and left lower extremities, DTRs 2+ intact and symmetric      LABS:                      CAPILLARY BLOOD GLUCOSE          RADIOLOGY & ADDITIONAL TESTS:

## 2019-02-08 LAB
ANION GAP SERPL CALC-SCNC: 12 MMOL/L — SIGNIFICANT CHANGE UP (ref 5–17)
BUN SERPL-MCNC: 15 MG/DL — SIGNIFICANT CHANGE UP (ref 7–23)
CALCIUM SERPL-MCNC: 9 MG/DL — SIGNIFICANT CHANGE UP (ref 8.4–10.5)
CHLORIDE SERPL-SCNC: 99 MMOL/L — SIGNIFICANT CHANGE UP (ref 96–108)
CO2 SERPL-SCNC: 22 MMOL/L — SIGNIFICANT CHANGE UP (ref 22–31)
CREAT SERPL-MCNC: 0.87 MG/DL — SIGNIFICANT CHANGE UP (ref 0.5–1.3)
GLUCOSE SERPL-MCNC: 127 MG/DL — HIGH (ref 70–99)
HCT VFR BLD CALC: 26.6 % — LOW (ref 39–50)
HGB BLD-MCNC: 9.4 G/DL — LOW (ref 13–17)
MCHC RBC-ENTMCNC: 32.5 PG — SIGNIFICANT CHANGE UP (ref 27–34)
MCHC RBC-ENTMCNC: 35.5 GM/DL — SIGNIFICANT CHANGE UP (ref 32–36)
MCV RBC AUTO: 91.6 FL — SIGNIFICANT CHANGE UP (ref 80–100)
PLATELET # BLD AUTO: 317 K/UL — SIGNIFICANT CHANGE UP (ref 150–400)
POTASSIUM SERPL-MCNC: 4.1 MMOL/L — SIGNIFICANT CHANGE UP (ref 3.5–5.3)
POTASSIUM SERPL-SCNC: 4.1 MMOL/L — SIGNIFICANT CHANGE UP (ref 3.5–5.3)
RBC # BLD: 2.9 M/UL — LOW (ref 4.2–5.8)
RBC # FLD: 11.9 % — SIGNIFICANT CHANGE UP (ref 10.3–14.5)
SODIUM SERPL-SCNC: 133 MMOL/L — LOW (ref 135–145)
WBC # BLD: 8.6 K/UL — SIGNIFICANT CHANGE UP (ref 3.8–10.5)
WBC # FLD AUTO: 8.6 K/UL — SIGNIFICANT CHANGE UP (ref 3.8–10.5)

## 2019-02-08 RX ORDER — SODIUM CHLORIDE 9 MG/ML
3 INJECTION INTRAMUSCULAR; INTRAVENOUS; SUBCUTANEOUS EVERY 8 HOURS
Qty: 0 | Refills: 0 | Status: DISCONTINUED | OUTPATIENT
Start: 2019-02-08 | End: 2019-02-20

## 2019-02-08 RX ORDER — SODIUM CHLORIDE 9 MG/ML
1000 INJECTION INTRAMUSCULAR; INTRAVENOUS; SUBCUTANEOUS ONCE
Qty: 0 | Refills: 0 | Status: COMPLETED | OUTPATIENT
Start: 2019-02-08 | End: 2019-02-08

## 2019-02-08 RX ADMIN — Medication 100 MILLIGRAM(S): at 13:04

## 2019-02-08 RX ADMIN — GABAPENTIN 800 MILLIGRAM(S): 400 CAPSULE ORAL at 06:33

## 2019-02-08 RX ADMIN — GABAPENTIN 800 MILLIGRAM(S): 400 CAPSULE ORAL at 21:36

## 2019-02-08 RX ADMIN — Medication 650 MILLIGRAM(S): at 06:34

## 2019-02-08 RX ADMIN — Medication 650 MILLIGRAM(S): at 22:05

## 2019-02-08 RX ADMIN — Medication 100 MILLIGRAM(S): at 06:33

## 2019-02-08 RX ADMIN — Medication 100 MILLIGRAM(S): at 21:36

## 2019-02-08 RX ADMIN — TERAZOSIN HYDROCHLORIDE 5 MILLIGRAM(S): 10 CAPSULE ORAL at 06:33

## 2019-02-08 RX ADMIN — SODIUM CHLORIDE 3 MILLILITER(S): 9 INJECTION INTRAMUSCULAR; INTRAVENOUS; SUBCUTANEOUS at 21:36

## 2019-02-08 RX ADMIN — SODIUM CHLORIDE 3 MILLILITER(S): 9 INJECTION INTRAMUSCULAR; INTRAVENOUS; SUBCUTANEOUS at 13:04

## 2019-02-08 RX ADMIN — Medication 1 MILLIGRAM(S): at 11:41

## 2019-02-08 RX ADMIN — Medication 500 MILLIGRAM(S): at 06:33

## 2019-02-08 RX ADMIN — Medication 500 MILLIGRAM(S): at 18:22

## 2019-02-08 RX ADMIN — Medication 3 MILLIGRAM(S): at 21:37

## 2019-02-08 RX ADMIN — ENOXAPARIN SODIUM 40 MILLIGRAM(S): 100 INJECTION SUBCUTANEOUS at 08:18

## 2019-02-08 RX ADMIN — Medication 1 TABLET(S): at 11:41

## 2019-02-08 RX ADMIN — GABAPENTIN 800 MILLIGRAM(S): 400 CAPSULE ORAL at 13:04

## 2019-02-08 RX ADMIN — ATORVASTATIN CALCIUM 10 MILLIGRAM(S): 80 TABLET, FILM COATED ORAL at 21:36

## 2019-02-08 RX ADMIN — SODIUM CHLORIDE 1000 MILLILITER(S): 9 INJECTION INTRAMUSCULAR; INTRAVENOUS; SUBCUTANEOUS at 14:17

## 2019-02-08 RX ADMIN — Medication 650 MILLIGRAM(S): at 21:35

## 2019-02-08 NOTE — PROGRESS NOTE ADULT - ASSESSMENT
Right hip IM nail POD10, d/c to rehab once no-fault ins. approved    pain control  wbat  pt/ot  rash likely 2/2 preop surgical prep, improving  incentive spirometry  dvt pxx  dispo planning rehab when approved  Will discuss with attending and advise is plan changes.

## 2019-02-08 NOTE — CHART NOTE - NSCHARTNOTEFT_GEN_A_CORE
Spoke with [employer] , received  paperwork.  She spoke with Vishla's PA, to speak with Dr. Rodgers about filling out  paperwork and to send back to  @ [facility] so that we may proceed with Rehab authorization.    SANDRA Lowery-C  #[id number]

## 2019-02-08 NOTE — PROGRESS NOTE ADULT - SUBJECTIVE AND OBJECTIVE BOX
Patient is a 73y old  Male who presents with a chief complaint of Right Hip Fracture   S/P right hip IM nail POD 10    Patient seen and examined at bedside. Pain is well controlled. No acute overnight events.   participating in PT daily      Vital Signs Last 24 Hrs  T(C): 37.2 (08 Feb 2019 04:35), Max: 37.2 (08 Feb 2019 04:35)  T(F): 99 (08 Feb 2019 04:35), Max: 99 (08 Feb 2019 04:35)  HR: 88 (08 Feb 2019 06:35) (88 - 100)  BP: 128/72 (08 Feb 2019 06:35) (121/60 - 163/75)  BP(mean): --  RR: 18 (08 Feb 2019 04:35) (18 - 18)  SpO2: 96% (08 Feb 2019 04:35) (94% - 97%)  LABS:            PHYSICAL EXAM:  NAD, Alert  [Right ] Hip: Incisions  C/D/I, no erythema or drainages, anterior thigh rash, staples intact; sensation grossly intact to light touch; (+)ehl/fhl/ta/gsc, (+) Distal Pulses; No Calf tenderness, silt l3-s1

## 2019-02-08 NOTE — PROGRESS NOTE ADULT - SUBJECTIVE AND OBJECTIVE BOX
73M w/ pmh HTN, HLD, BPH, prostate cancer s/p prostatectomy p/w R hip pain - started around 10pm, pt was at home and tried to kick some trash but lost balance, spun around and fell down onto his R hip. Unable to ambulate since then, no pain elsewhere. No other complaints of fever, n/v/d/c, sob, cough, chest / abd pain. No recent travel, medication change, illness, or hospitalization. No preceding symptoms prior to fall. Patient was found to have a right hip fx and is S/P ORIF of the right hip with IM nail placement. Patient states he is very active, denies any CP or shortness of breath. Patient with an episode of Orthostasis this am secondary to right hip bleeding. today Patient has been normotensive    MEDICATIONS  (STANDING):  ascorbic acid 500 milliGRAM(s) Oral two times a day  atorvastatin 10 milliGRAM(s) Oral at bedtime  docusate sodium 100 milliGRAM(s) Oral three times a day  enoxaparin Injectable 40 milliGRAM(s) SubCutaneous every 24 hours  folic acid 1 milliGRAM(s) Oral daily  gabapentin 800 milliGRAM(s) Oral Once  gabapentin 800 milliGRAM(s) Oral three times a day  lactulose Syrup 10 Gram(s) Oral once  lactulose Syrup 10 Gram(s) Oral once  multivitamin 1 Tablet(s) Oral daily  sodium chloride 0.9%. 1000 milliLiter(s) (100 mL/Hr) IV Continuous <Continuous>  terazosin 5 milliGRAM(s) Oral daily    MEDICATIONS  (PRN):  acetaminophen   Tablet .. 650 milliGRAM(s) Oral every 6 hours PRN Mild Pain (1 - 3)  bisacodyl Suppository 10 milliGRAM(s) Rectal daily PRN If no bowel movement by POD#2  diphenhydrAMINE 25 milliGRAM(s) Oral every 4 hours PRN Rash and/or Itching  melatonin 3 milliGRAM(s) Oral at bedtime PRN Insomnia  ondansetron Injectable 4 milliGRAM(s) IV Push every 6 hours PRN Nausea and/or Vomiting  oxyCODONE    IR 10 milliGRAM(s) Oral every 4 hours PRN Severe Pain (7 - 10)  oxyCODONE    IR 5 milliGRAM(s) Oral every 4 hours PRN Moderate Pain (4 - 6)  polyethylene glycol 3350 17 Gram(s) Oral daily PRN Constipation  traMADol 50 milliGRAM(s) Oral every 4 hours PRN Mild Pain (1 - 3)    Vital Signs Last 24 Hrs  T(C): 36.6 (08 Feb 2019 16:03), Max: 37.2 (08 Feb 2019 04:35)  T(F): 97.9 (08 Feb 2019 16:03), Max: 99 (08 Feb 2019 04:35)  HR: 80 (08 Feb 2019 16:03) (80 - 100)  BP: 142/64 (08 Feb 2019 16:03) (125/67 - 148/72)  BP(mean): --  RR: 18 (08 Feb 2019 16:03) (18 - 18)  SpO2: 97% (08 Feb 2019 16:03) (96% - 97%)    I&O's Summary    07 Feb 2019 07:01  -  08 Feb 2019 07:00  --------------------------------------------------------  IN: 3090 mL / OUT: 1000 mL / NET: 2090 mL    08 Feb 2019 07:01  -  08 Feb 2019 21:13  --------------------------------------------------------  IN: 0 mL / OUT: 200 mL / NET: -200 mL              PHYSICAL EXAM:  GENERAL: NAD, well nourished and conversant  HEAD:  Atraumatic  EYES: EOM, PERRLA, conjunctiva pink and sclera white  ENT: No tonsillar erythema, exudates, or enlargement, moist mucous membranes, good dentition, no lesions  NECK: Supple, No JVD, normal thyroid, carotids with normal upstrokes and no bruits  CHEST/LUNG: Clear to auscultation bilaterally, No rales, rhonchi, wheezing, or rubs  HEART: Regular rate and rhythm, No murmurs, rubs, or gallops  ABDOMEN: Soft, nondistended, no masses, guarding, tenderness or rebound, bowel sounds present  EXTREMITIES:  4+ hip swelling and 3+ tenderness to touch. No erythema or incisional drainage.  LYMPH: No lymphadenopathy noted  SKIN: + right leg with a diffuse maculopapular rash.  NERVOUS SYSTEM:  Alert & Oriented X3, normal cognitive function. Motor Strength 5/5 right upper and right lower.  5/5 left upper and left lower extremities, DTRs 2+ intact and symmetric      LABS:          CBC Full  -  ( 08 Feb 2019 14:42 )  WBC Count : 8.6 K/uL  Hemoglobin : 9.4 g/dL  Hematocrit : 26.6 %  Platelet Count - Automated : 317 K/uL  Mean Cell Volume : 91.6 fl  Mean Cell Hemoglobin : 32.5 pg  Mean Cell Hemoglobin Concentration : 35.5 gm/dL  Auto Neutrophil # : x  Auto Lymphocyte # : x  Auto Monocyte # : x  Auto Eosinophil # : x  Auto Basophil # : x  Auto Neutrophil % : x  Auto Lymphocyte % : x  Auto Monocyte % : x  Auto Eosinophil % : x  Auto Basophil % : x    02-08    133<L>  |  99  |  15  ----------------------------<  127<H>  4.1   |  22  |  0.87    Ca    9.0      08 Feb 2019 14:42                            CAPILLARY BLOOD GLUCOSE          RADIOLOGY & ADDITIONAL TESTS:

## 2019-02-09 RX ADMIN — SODIUM CHLORIDE 3 MILLILITER(S): 9 INJECTION INTRAMUSCULAR; INTRAVENOUS; SUBCUTANEOUS at 22:30

## 2019-02-09 RX ADMIN — Medication 3 MILLIGRAM(S): at 22:57

## 2019-02-09 RX ADMIN — GABAPENTIN 800 MILLIGRAM(S): 400 CAPSULE ORAL at 22:29

## 2019-02-09 RX ADMIN — Medication 650 MILLIGRAM(S): at 18:43

## 2019-02-09 RX ADMIN — Medication 500 MILLIGRAM(S): at 18:48

## 2019-02-09 RX ADMIN — GABAPENTIN 800 MILLIGRAM(S): 400 CAPSULE ORAL at 13:51

## 2019-02-09 RX ADMIN — ENOXAPARIN SODIUM 40 MILLIGRAM(S): 100 INJECTION SUBCUTANEOUS at 09:13

## 2019-02-09 RX ADMIN — Medication 1 TABLET(S): at 12:23

## 2019-02-09 RX ADMIN — GABAPENTIN 800 MILLIGRAM(S): 400 CAPSULE ORAL at 08:09

## 2019-02-09 RX ADMIN — Medication 650 MILLIGRAM(S): at 23:15

## 2019-02-09 RX ADMIN — Medication 650 MILLIGRAM(S): at 13:52

## 2019-02-09 RX ADMIN — Medication 500 MILLIGRAM(S): at 08:09

## 2019-02-09 RX ADMIN — Medication 650 MILLIGRAM(S): at 22:30

## 2019-02-09 RX ADMIN — TERAZOSIN HYDROCHLORIDE 5 MILLIGRAM(S): 10 CAPSULE ORAL at 08:08

## 2019-02-09 RX ADMIN — ATORVASTATIN CALCIUM 10 MILLIGRAM(S): 80 TABLET, FILM COATED ORAL at 22:29

## 2019-02-09 RX ADMIN — Medication 650 MILLIGRAM(S): at 08:09

## 2019-02-09 RX ADMIN — SODIUM CHLORIDE 3 MILLILITER(S): 9 INJECTION INTRAMUSCULAR; INTRAVENOUS; SUBCUTANEOUS at 13:50

## 2019-02-09 RX ADMIN — Medication 1 MILLIGRAM(S): at 12:23

## 2019-02-09 RX ADMIN — Medication 650 MILLIGRAM(S): at 08:42

## 2019-02-09 RX ADMIN — SODIUM CHLORIDE 3 MILLILITER(S): 9 INJECTION INTRAMUSCULAR; INTRAVENOUS; SUBCUTANEOUS at 08:06

## 2019-02-09 NOTE — PROGRESS NOTE ADULT - ASSESSMENT
73M w/ pmh HTN, HLD, BPH, prostate cancer s/p prostatectomy p/w R hip pain - started around 10pm, pt was at home and tried to kick some trash but lost balance, spun around and fell down onto his R hip. Unable to ambulate since then, no pain elsewhere. No other complaints of fever, n/v/d/c, sob, cough, chest / abd pain. No recent travel, medication change, illness, or hospitalization. No preceding symptoms prior to fall. . Patient states he is very active, denies any CP or shortness of breath. Patient was found to have a right hip fx and is S/P ORIF of the right hip 73M w/ pmh HTN, HLD, BPH, prostate cancer s/p prostatectomy p/w R hip pain - started around 10pm, pt was at home and tried to kick some trash but lost balance, spun around and fell down onto his R hip. Unable to ambulate since then, no pain elsewhere. No other complaints of fever, n/v/d/c, sob, cough, chest / abd pain. No recent travel, medication change, illness, or hospitalization. No preceding symptoms prior to fall. . Patient states he is very active, denies any CP or shortness of breath. Patient was found to have a right hip fx and is S/P ORIF of the right hip on 01/29/19. Patient with two episodes of post-op orthostatic hypotension when attempting to ambulate and returned to bed and given saline IV on both occasions. Will stop hytrin and add midodrine if episodes continue. Right leg rash has resolved and the patient is otherwise well;  awaiting rehabilitation bed availability.

## 2019-02-09 NOTE — PROGRESS NOTE ADULT - SUBJECTIVE AND OBJECTIVE BOX
73M w/ pmh HTN, HLD, BPH, prostate cancer s/p prostatectomy p/w R hip pain - started around 10pm, pt was at home and tried to kick some trash but lost balance, spun around and fell down onto his R hip. Unable to ambulate since then, no pain elsewhere. No other complaints of fever, n/v/d/c, sob, cough, chest / abd pain. No recent travel, medication change, illness, or hospitalization. No preceding symptoms prior to fall. Patient was found to have a right hip fx and is S/P ORIF of the right hip with IM nail placement. Patient states he is very active, denies any CP or shortness of breath. Patient with an episode of Orthostasis this am secondary to right hip bleeding. today Patient has been normotensive    MEDICATIONS  (STANDING):  ascorbic acid 500 milliGRAM(s) Oral two times a day  atorvastatin 10 milliGRAM(s) Oral at bedtime  docusate sodium 100 milliGRAM(s) Oral three times a day  enoxaparin Injectable 40 milliGRAM(s) SubCutaneous every 24 hours  folic acid 1 milliGRAM(s) Oral daily  gabapentin 800 milliGRAM(s) Oral Once  gabapentin 800 milliGRAM(s) Oral three times a day  lactulose Syrup 10 Gram(s) Oral once  lactulose Syrup 10 Gram(s) Oral once  multivitamin 1 Tablet(s) Oral daily  sodium chloride 0.9%. 1000 milliLiter(s) (100 mL/Hr) IV Continuous <Continuous>  terazosin 5 milliGRAM(s) Oral daily    MEDICATIONS  (PRN):  acetaminophen   Tablet .. 650 milliGRAM(s) Oral every 6 hours PRN Mild Pain (1 - 3)  bisacodyl Suppository 10 milliGRAM(s) Rectal daily PRN If no bowel movement by POD#2  diphenhydrAMINE 25 milliGRAM(s) Oral every 4 hours PRN Rash and/or Itching  melatonin 3 milliGRAM(s) Oral at bedtime PRN Insomnia  ondansetron Injectable 4 milliGRAM(s) IV Push every 6 hours PRN Nausea and/or Vomiting  oxyCODONE    IR 10 milliGRAM(s) Oral every 4 hours PRN Severe Pain (7 - 10)  oxyCODONE    IR 5 milliGRAM(s) Oral every 4 hours PRN Moderate Pain (4 - 6)  polyethylene glycol 3350 17 Gram(s) Oral daily PRN Constipation  traMADol 50 milliGRAM(s) Oral every 4 hours PRN Mild Pain (1 - 3)    Vital Signs Last 24 Hrs  T(C): 36.6 (08 Feb 2019 16:03), Max: 37.2 (08 Feb 2019 04:35)  T(F): 97.9 (08 Feb 2019 16:03), Max: 99 (08 Feb 2019 04:35)  HR: 80 (08 Feb 2019 16:03) (80 - 100)  BP: 142/64 (08 Feb 2019 16:03) (125/67 - 148/72)  BP(mean): --  RR: 18 (08 Feb 2019 16:03) (18 - 18)  SpO2: 97% (08 Feb 2019 16:03) (96% - 97%)    I&O's Summary    07 Feb 2019 07:01  -  08 Feb 2019 07:00  --------------------------------------------------------  IN: 3090 mL / OUT: 1000 mL / NET: 2090 mL    08 Feb 2019 07:01  -  08 Feb 2019 21:13  --------------------------------------------------------  IN: 0 mL / OUT: 200 mL / NET: -200 mL              PHYSICAL EXAM:  GENERAL: NAD, well nourished and conversant  HEAD:  Atraumatic  EYES: EOM, PERRLA, conjunctiva pink and sclera white  ENT: No tonsillar erythema, exudates, or enlargement, moist mucous membranes, good dentition, no lesions  NECK: Supple, No JVD, normal thyroid, carotids with normal upstrokes and no bruits  CHEST/LUNG: Clear to auscultation bilaterally, No rales, rhonchi, wheezing, or rubs  HEART: Regular rate and rhythm, No murmurs, rubs, or gallops  ABDOMEN: Soft, nondistended, no masses, guarding, tenderness or rebound, bowel sounds present  EXTREMITIES:  4+ hip swelling and 3+ tenderness to touch. No erythema or incisional drainage.  LYMPH: No lymphadenopathy noted  SKIN: + right leg with a diffuse maculopapular rash.  NERVOUS SYSTEM:  Alert & Oriented X3, normal cognitive function. Motor Strength 5/5 right upper and right lower.  5/5 left upper and left lower extremities, DTRs 2+ intact and symmetric      LABS:          CBC Full  -  ( 08 Feb 2019 14:42 )  WBC Count : 8.6 K/uL  Hemoglobin : 9.4 g/dL  Hematocrit : 26.6 %  Platelet Count - Automated : 317 K/uL  Mean Cell Volume : 91.6 fl  Mean Cell Hemoglobin : 32.5 pg  Mean Cell Hemoglobin Concentration : 35.5 gm/dL  Auto Neutrophil # : x  Auto Lymphocyte # : x  Auto Monocyte # : x  Auto Eosinophil # : x  Auto Basophil # : x  Auto Neutrophil % : x  Auto Lymphocyte % : x  Auto Monocyte % : x  Auto Eosinophil % : x  Auto Basophil % : x    02-08    133<L>  |  99  |  15  ----------------------------<  127<H>  4.1   |  22  |  0.87    Ca    9.0      08 Feb 2019 14:42                            CAPILLARY BLOOD GLUCOSE          RADIOLOGY & ADDITIONAL TESTS: 73M w/ pmh HTN, HLD, BPH, prostate cancer s/p prostatectomy p/w R hip pain - started around 10pm, pt was at home and tried to kick some trash but lost balance, spun around and fell down onto his R hip. Unable to ambulate since then, no pain elsewhere. No other complaints of fever, n/v/d/c, sob, cough, chest / abd pain. No recent travel, medication change, illness, or hospitalization. No preceding symptoms prior to fall. Patient was found to have a right hip fx and is S/P ORIF of the right hip with IM nail placement on 01/29/19.  Patient states he was very active, denies any CP or shortness of breath. Patient with two episodes of orthostatic hypotension when attempting to ambulate and returned to bed and given saline IV on both occasions. Will stop hytrin and add midodrine if episodes continue. Right leg rash has resolved and the patient is otherwise well;  awaiting rehabilitation bed availability.    MEDICATIONS  (STANDING):  ascorbic acid 500 milliGRAM(s) Oral two times a day  atorvastatin 10 milliGRAM(s) Oral at bedtime  docusate sodium 100 milliGRAM(s) Oral three times a day  enoxaparin Injectable 40 milliGRAM(s) SubCutaneous every 24 hours  folic acid 1 milliGRAM(s) Oral daily  gabapentin 800 milliGRAM(s) Oral Once  gabapentin 800 milliGRAM(s) Oral three times a day  lactulose Syrup 10 Gram(s) Oral once  lactulose Syrup 10 Gram(s) Oral once  multivitamin 1 Tablet(s) Oral daily  sodium chloride 0.9%. 1000 milliLiter(s) (100 mL/Hr) IV Continuous <Continuous>  terazosin 5 milliGRAM(s) Oral daily    MEDICATIONS  (PRN):  acetaminophen   Tablet .. 650 milliGRAM(s) Oral every 6 hours PRN Mild Pain (1 - 3)  bisacodyl Suppository 10 milliGRAM(s) Rectal daily PRN If no bowel movement by POD#2  diphenhydrAMINE 25 milliGRAM(s) Oral every 4 hours PRN Rash and/or Itching  melatonin 3 milliGRAM(s) Oral at bedtime PRN Insomnia  ondansetron Injectable 4 milliGRAM(s) IV Push every 6 hours PRN Nausea and/or Vomiting  oxyCODONE    IR 10 milliGRAM(s) Oral every 4 hours PRN Severe Pain (7 - 10)  oxyCODONE    IR 5 milliGRAM(s) Oral every 4 hours PRN Moderate Pain (4 - 6)  polyethylene glycol 3350 17 Gram(s) Oral daily PRN Constipation  traMADol 50 milliGRAM(s) Oral every 4 hours PRN Mild Pain (1 - 3)    Vital Signs Last 24 Hrs  T(C): 36.6 (08 Feb 2019 16:03), Max: 37.2 (08 Feb 2019 04:35)  T(F): 97.9 (08 Feb 2019 16:03), Max: 99 (08 Feb 2019 04:35)  HR: 80 (08 Feb 2019 16:03) (80 - 100)  BP: 142/64 (08 Feb 2019 16:03) (125/67 - 148/72)  BP(mean): --  RR: 18 (08 Feb 2019 16:03) (18 - 18)  SpO2: 97% (08 Feb 2019 16:03) (96% - 97%)    I&O's Summary    07 Feb 2019 07:01  -  08 Feb 2019 07:00  --------------------------------------------------------  IN: 3090 mL / OUT: 1000 mL / NET: 2090 mL    08 Feb 2019 07:01  -  08 Feb 2019 21:13  --------------------------------------------------------  IN: 0 mL / OUT: 200 mL / NET: -200 mL              PHYSICAL EXAM:  GENERAL: NAD, well nourished and conversant  HEAD:  Atraumatic  EYES: EOM, PERRLA, conjunctiva pink and sclera white  ENT: No tonsillar erythema, exudates, or enlargement, moist mucous membranes, good dentition, no lesions  NECK: Supple, No JVD, normal thyroid, carotids with normal upstrokes and no bruits  CHEST/LUNG: Clear to auscultation bilaterally, No rales, rhonchi, wheezing, or rubs  HEART: Regular rate and rhythm, No murmurs, rubs, or gallops  ABDOMEN: Soft, nondistended, no masses, guarding, tenderness or rebound, bowel sounds present  EXTREMITIES:  4+ hip swelling and 3+ tenderness to touch. No erythema or incisional drainage.  LYMPH: No lymphadenopathy noted  SKIN: + right leg with a diffuse maculopapular rash.  NERVOUS SYSTEM:  Alert & Oriented X3, normal cognitive function. Motor Strength 5/5 right upper and right lower.  5/5 left upper and left lower extremities, DTRs 2+ intact and symmetric      LABS:          CBC Full  -  ( 08 Feb 2019 14:42 )  WBC Count : 8.6 K/uL  Hemoglobin : 9.4 g/dL  Hematocrit : 26.6 %  Platelet Count - Automated : 317 K/uL  Mean Cell Volume : 91.6 fl  Mean Cell Hemoglobin : 32.5 pg  Mean Cell Hemoglobin Concentration : 35.5 gm/dL  Auto Neutrophil # : x  Auto Lymphocyte # : x  Auto Monocyte # : x  Auto Eosinophil # : x  Auto Basophil # : x  Auto Neutrophil % : x  Auto Lymphocyte % : x  Auto Monocyte % : x  Auto Eosinophil % : x  Auto Basophil % : x    02-08    133<L>  |  99  |  15  ----------------------------<  127<H>  4.1   |  22  |  0.87    Ca    9.0      08 Feb 2019 14:42                            CAPILLARY BLOOD GLUCOSE          RADIOLOGY & ADDITIONAL TESTS:

## 2019-02-09 NOTE — PROGRESS NOTE ADULT - SUBJECTIVE AND OBJECTIVE BOX
Post op Day [11]    Patient resting without complaints. States pain is well controlled.  Denies chest pain, SOB, N/V. Currently awaiting no-fault paperwork to be completed for rehab dispo.    T(C): 37.1 (02-08-19 @ 21:00), Max: 37.1 (02-08-19 @ 21:00)  HR: 84 (02-08-19 @ 21:00) (80 - 84)  BP: 132/64 (02-08-19 @ 21:00) (132/64 - 142/64)  RR: 18 (02-08-19 @ 21:00) (18 - 18)  SpO2: 97% (02-08-19 @ 21:00) (97% - 97%)  Wt(kg): --    Exam:  Alert and Oriented, No Acute Distress  Lower Extremities: Right hip incisions c/d/i, right thigh rash improving  Calves Soft, Non-tender bilaterally  +PF/DF/EHL/FHL  SILT  +DP Pulse                            9.4    8.6   )-----------( 317      ( 08 Feb 2019 14:42 )             26.6    02-08    133<L>  |  99  |  15  ----------------------------<  127<H>  4.1   |  22  |  0.87    Ca    9.0      08 Feb 2019 14:42

## 2019-02-10 RX ADMIN — Medication 1 MILLIGRAM(S): at 12:38

## 2019-02-10 RX ADMIN — Medication 650 MILLIGRAM(S): at 12:38

## 2019-02-10 RX ADMIN — ENOXAPARIN SODIUM 40 MILLIGRAM(S): 100 INJECTION SUBCUTANEOUS at 09:56

## 2019-02-10 RX ADMIN — SODIUM CHLORIDE 3 MILLILITER(S): 9 INJECTION INTRAMUSCULAR; INTRAVENOUS; SUBCUTANEOUS at 22:09

## 2019-02-10 RX ADMIN — SODIUM CHLORIDE 3 MILLILITER(S): 9 INJECTION INTRAMUSCULAR; INTRAVENOUS; SUBCUTANEOUS at 14:31

## 2019-02-10 RX ADMIN — Medication 500 MILLIGRAM(S): at 19:04

## 2019-02-10 RX ADMIN — Medication 100 MILLIGRAM(S): at 05:10

## 2019-02-10 RX ADMIN — Medication 650 MILLIGRAM(S): at 09:56

## 2019-02-10 RX ADMIN — SODIUM CHLORIDE 3 MILLILITER(S): 9 INJECTION INTRAMUSCULAR; INTRAVENOUS; SUBCUTANEOUS at 05:12

## 2019-02-10 RX ADMIN — Medication 3 MILLIGRAM(S): at 22:27

## 2019-02-10 RX ADMIN — GABAPENTIN 800 MILLIGRAM(S): 400 CAPSULE ORAL at 14:35

## 2019-02-10 RX ADMIN — Medication 1 TABLET(S): at 12:38

## 2019-02-10 RX ADMIN — ATORVASTATIN CALCIUM 10 MILLIGRAM(S): 80 TABLET, FILM COATED ORAL at 22:27

## 2019-02-10 RX ADMIN — GABAPENTIN 800 MILLIGRAM(S): 400 CAPSULE ORAL at 05:12

## 2019-02-10 RX ADMIN — Medication 650 MILLIGRAM(S): at 19:03

## 2019-02-10 RX ADMIN — GABAPENTIN 800 MILLIGRAM(S): 400 CAPSULE ORAL at 22:27

## 2019-02-10 RX ADMIN — Medication 500 MILLIGRAM(S): at 05:10

## 2019-02-10 NOTE — PROGRESS NOTE ADULT - SUBJECTIVE AND OBJECTIVE BOX
73M w/ pmh HTN, HLD, BPH, prostate cancer s/p prostatectomy p/w R hip pain - started around 10pm, pt was at home and tried to kick some trash but lost balance, spun around and fell down onto his R hip. Unable to ambulate since then, no pain elsewhere. No other complaints of fever, n/v/d/c, sob, cough, chest / abd pain. No recent travel, medication change, illness, or hospitalization. No preceding symptoms prior to fall. Patient was found to have a right hip fx and is S/P ORIF of the right hip with IM nail placement on 01/29/19.  Patient states he was very active, denies any CP or shortness of breath. Patient with two episodes of orthostatic hypotension when attempting to ambulate and returned to bed and given saline IV on both occasions. Will stop hytrin and add midodrine if episodes continue. Right leg rash has resolved and the patient is otherwise well;  awaiting rehabilitation bed availability.    MEDICATIONS  (STANDING):  ascorbic acid 500 milliGRAM(s) Oral two times a day  atorvastatin 10 milliGRAM(s) Oral at bedtime  docusate sodium 100 milliGRAM(s) Oral three times a day  enoxaparin Injectable 40 milliGRAM(s) SubCutaneous every 24 hours  folic acid 1 milliGRAM(s) Oral daily  gabapentin 800 milliGRAM(s) Oral Once  gabapentin 800 milliGRAM(s) Oral three times a day  lactulose Syrup 10 Gram(s) Oral once  lactulose Syrup 10 Gram(s) Oral once  multivitamin 1 Tablet(s) Oral daily  sodium chloride 0.9%. 1000 milliLiter(s) (100 mL/Hr) IV Continuous <Continuous>  terazosin 5 milliGRAM(s) Oral daily    MEDICATIONS  (PRN):  acetaminophen   Tablet .. 650 milliGRAM(s) Oral every 6 hours PRN Mild Pain (1 - 3)  bisacodyl Suppository 10 milliGRAM(s) Rectal daily PRN If no bowel movement by POD#2  diphenhydrAMINE 25 milliGRAM(s) Oral every 4 hours PRN Rash and/or Itching  melatonin 3 milliGRAM(s) Oral at bedtime PRN Insomnia  ondansetron Injectable 4 milliGRAM(s) IV Push every 6 hours PRN Nausea and/or Vomiting  oxyCODONE    IR 10 milliGRAM(s) Oral every 4 hours PRN Severe Pain (7 - 10)  oxyCODONE    IR 5 milliGRAM(s) Oral every 4 hours PRN Moderate Pain (4 - 6)  polyethylene glycol 3350 17 Gram(s) Oral daily PRN Constipation  traMADol 50 milliGRAM(s) Oral every 4 hours PRN Mild Pain (1 - 3)    Vital Signs Last 24 Hrs  T(C): 36.6 (08 Feb 2019 16:03), Max: 37.2 (08 Feb 2019 04:35)  T(F): 97.9 (08 Feb 2019 16:03), Max: 99 (08 Feb 2019 04:35)  HR: 80 (08 Feb 2019 16:03) (80 - 100)  BP: 142/64 (08 Feb 2019 16:03) (125/67 - 148/72)  BP(mean): --  RR: 18 (08 Feb 2019 16:03) (18 - 18)  SpO2: 97% (08 Feb 2019 16:03) (96% - 97%)    I&O's Summary    07 Feb 2019 07:01  -  08 Feb 2019 07:00  --------------------------------------------------------  IN: 3090 mL / OUT: 1000 mL / NET: 2090 mL    08 Feb 2019 07:01  -  08 Feb 2019 21:13  --------------------------------------------------------  IN: 0 mL / OUT: 200 mL / NET: -200 mL              PHYSICAL EXAM:  GENERAL: NAD, well nourished and conversant  HEAD:  Atraumatic  EYES: EOM, PERRLA, conjunctiva pink and sclera white  ENT: No tonsillar erythema, exudates, or enlargement, moist mucous membranes, good dentition, no lesions  NECK: Supple, No JVD, normal thyroid, carotids with normal upstrokes and no bruits  CHEST/LUNG: Clear to auscultation bilaterally, No rales, rhonchi, wheezing, or rubs  HEART: Regular rate and rhythm, No murmurs, rubs, or gallops  ABDOMEN: Soft, nondistended, no masses, guarding, tenderness or rebound, bowel sounds present  EXTREMITIES:  4+ hip swelling and 3+ tenderness to touch. No erythema or incisional drainage.  LYMPH: No lymphadenopathy noted  SKIN: + right leg with a diffuse maculopapular rash.  NERVOUS SYSTEM:  Alert & Oriented X3, normal cognitive function. Motor Strength 5/5 right upper and right lower.  5/5 left upper and left lower extremities, DTRs 2+ intact and symmetric      LABS:          CBC Full  -  ( 08 Feb 2019 14:42 )  WBC Count : 8.6 K/uL  Hemoglobin : 9.4 g/dL  Hematocrit : 26.6 %  Platelet Count - Automated : 317 K/uL  Mean Cell Volume : 91.6 fl  Mean Cell Hemoglobin : 32.5 pg  Mean Cell Hemoglobin Concentration : 35.5 gm/dL  Auto Neutrophil # : x  Auto Lymphocyte # : x  Auto Monocyte # : x  Auto Eosinophil # : x  Auto Basophil # : x  Auto Neutrophil % : x  Auto Lymphocyte % : x  Auto Monocyte % : x  Auto Eosinophil % : x  Auto Basophil % : x    02-08    133<L>  |  99  |  15  ----------------------------<  127<H>  4.1   |  22  |  0.87    Ca    9.0      08 Feb 2019 14:42                            CAPILLARY BLOOD GLUCOSE          RADIOLOGY & ADDITIONAL TESTS: 73M w/ pmh HTN, HLD, BPH, prostate cancer s/p prostatectomy p/w R hip pain - started around 10pm, pt was at home and tried to kick some trash but lost balance, spun around and fell down onto his R hip. Unable to ambulate since then, no pain elsewhere. No other complaints of fever, n/v/d/c, sob, cough, chest / abd pain. No recent travel, medication change, illness, or hospitalization. No preceding symptoms prior to fall. Patient was found to have a right hip fx and is S/P ORIF of the right hip with IM nail placement on 01/29/19.  Patient states he was very active, denies any CP or shortness of breath. Patient with two episodes of orthostatic hypotension when attempting to ambulate and returned to bed and given saline IV on both occasions. Will stop hytrin and add midodrine if episodes continue. Right leg rash has resolved and the patient is otherwise well;  awaiting rehabilitation bed availability.    MEDICATIONS  (STANDING):  ascorbic acid 500 milliGRAM(s) Oral two times a day  atorvastatin 10 milliGRAM(s) Oral at bedtime  docusate sodium 100 milliGRAM(s) Oral three times a day  enoxaparin Injectable 40 milliGRAM(s) SubCutaneous every 24 hours  folic acid 1 milliGRAM(s) Oral daily  gabapentin 800 milliGRAM(s) Oral Once  gabapentin 800 milliGRAM(s) Oral three times a day  multivitamin 1 Tablet(s) Oral daily  sodium chloride 0.9% lock flush 3 milliLiter(s) IV Push every 8 hours    MEDICATIONS  (PRN):  acetaminophen   Tablet .. 650 milliGRAM(s) Oral every 6 hours PRN Mild Pain (1 - 3)  bisacodyl Suppository 10 milliGRAM(s) Rectal daily PRN If no bowel movement by POD#2  diphenhydrAMINE 25 milliGRAM(s) Oral every 4 hours PRN Rash and/or Itching  melatonin 3 milliGRAM(s) Oral at bedtime PRN Insomnia  ondansetron Injectable 4 milliGRAM(s) IV Push every 6 hours PRN Nausea and/or Vomiting  oxyCODONE    IR 10 milliGRAM(s) Oral every 4 hours PRN Severe Pain (7 - 10)  oxyCODONE    IR 5 milliGRAM(s) Oral every 4 hours PRN Moderate Pain (4 - 6)  polyethylene glycol 3350 17 Gram(s) Oral daily PRN Constipation  traMADol 50 milliGRAM(s) Oral every 4 hours PRN Mild Pain (1 - 3)    Vital Signs Last 24 Hrs  T(C): 37.1 (10 Feb 2019 21:44), Max: 37.2 (10 Feb 2019 00:47)  T(F): 98.8 (10 Feb 2019 21:44), Max: 98.9 (10 Feb 2019 00:47)  HR: 84 (10 Feb 2019 21:44) (73 - 94)  BP: 109/61 (10 Feb 2019 21:44) (104/62 - 137/68)  BP(mean): --  RR: 18 (10 Feb 2019 21:44) (16 - 18)  SpO2: 94% (10 Feb 2019 21:44) (94% - 97%)            PHYSICAL EXAM:  GENERAL: NAD, well nourished and conversant  HEAD:  Atraumatic  EYES: EOM, PERRLA, conjunctiva pink and sclera white  ENT: No tonsillar erythema, exudates, or enlargement, moist mucous membranes, good dentition, no lesions  NECK: Supple, No JVD, normal thyroid, carotids with normal upstrokes and no bruits  CHEST/LUNG: Clear to auscultation bilaterally, No rales, rhonchi, wheezing, or rubs  HEART: Regular rate and rhythm, No murmurs, rubs, or gallops  ABDOMEN: Soft, nondistended, no masses, guarding, tenderness or rebound, bowel sounds present  EXTREMITIES:  4+ hip swelling and 3+ tenderness to touch. No erythema or incisional drainage.  LYMPH: No lymphadenopathy noted  SKIN: + right leg with a diffuse maculopapular rash.  NERVOUS SYSTEM:  Alert & Oriented X3, normal cognitive function. Motor Strength 5/5 right upper and right lower.  5/5 left upper and left lower extremities, DTRs 2+ intact and symmetric      LABS:    No labs obtained today

## 2019-02-10 NOTE — PROGRESS NOTE ADULT - SUBJECTIVE AND OBJECTIVE BOX
Patient comfortable. Pain controlled. States was not OOB yesterday secondary to hypotension.  Had BM last night.  Denies cp, sob, palpitations, N/V.    T(C): 36.9 (02-10-19 @ 05:04), Max: 37.2 (02-10-19 @ 00:47)  HR: 73 (02-10-19 @ 05:04) (73 - 90)  BP: 126/73 (02-10-19 @ 05:04) (117/66 - 138/69)  RR: 16 (02-10-19 @ 05:04) (16 - 16)  SpO2: 96% (02-10-19 @ 05:04) (94% - 98%)      PHYSICAL EXAM:  NAD, Alert and oriented X3  RLE: Incisions well healed with staples in place; compartments soft, dull sensation grossly intact to light touch;   (+) DF/PF; (+) Distal Pulses; No Calf tenderness B/L, PAS     LABS:                        9.4    8.6   )-----------( 317      ( 08 Feb 2019 14:42 )             26.6     02-08    133<L>  |  99  |  15  ----------------------------<  127<H>  4.1   |  22  |  0.87    Ca    9.0      08 Feb 2019 14:42

## 2019-02-10 NOTE — PROGRESS NOTE ADULT - ATTENDING COMMENTS
Beginning to ambulate and doing well. No medical complications post-op to date and to proceed with physical therapy, as tolerated. Continues pulmonary toilet to lessen atelectasis, leg exercises as taught to lessen the risk of DVT and supervised pain medications for post-op pain control. I anticipate transfer to rehabilitation when approved by workman's comp Beginning to ambulate and doing well. No medical complications post-op to date and to proceed with physical therapy, as tolerated. Continues pulmonary toilet to lessen atelectasis, leg exercises as taught to lessen the risk of DVT and supervised pain medications for post-op pain control. I anticipate transfer to rehabilitation when approved by workman's comp. Off hytrin and if orthostatic hypotension continues, to start Midadrin.

## 2019-02-10 NOTE — PROGRESS NOTE ADULT - ASSESSMENT
A/P: 74 y/o M POD# 12 S/P  R IM Nail    Appreciate Medicine Note  DVT ppx- Lovenox 40mg SQ Daily  RLE WBAT  Pain management prn  Hold Hytrin per Medicine  Regular Diet  Monitor BP  Discharge planning      SANDRA Ag  Orthopedic Surgery  [phone]

## 2019-02-10 NOTE — PROGRESS NOTE ADULT - ASSESSMENT
73M w/ pmh HTN, HLD, BPH, prostate cancer s/p prostatectomy p/w R hip pain - started around 10pm, pt was at home and tried to kick some trash but lost balance, spun around and fell down onto his R hip. Unable to ambulate since then, no pain elsewhere. No other complaints of fever, n/v/d/c, sob, cough, chest / abd pain. No recent travel, medication change, illness, or hospitalization. No preceding symptoms prior to fall. . Patient states he is very active, denies any CP or shortness of breath. Patient was found to have a right hip fx and is S/P ORIF of the right hip on 01/29/19. Patient with two episodes of post-op orthostatic hypotension when attempting to ambulate and returned to bed and given saline IV on both occasions. Will stop hytrin and add midodrine if episodes continue. Right leg rash has resolved and the patient is otherwise well;  awaiting rehabilitation bed availability.

## 2019-02-11 RX ORDER — MIDODRINE HYDROCHLORIDE 2.5 MG/1
10 TABLET ORAL THREE TIMES A DAY
Qty: 0 | Refills: 0 | Status: DISCONTINUED | OUTPATIENT
Start: 2019-02-11 | End: 2019-02-20

## 2019-02-11 RX ORDER — MIDODRINE HYDROCHLORIDE 2.5 MG/1
2.5 TABLET ORAL THREE TIMES A DAY
Qty: 0 | Refills: 0 | Status: DISCONTINUED | OUTPATIENT
Start: 2019-02-11 | End: 2019-02-11

## 2019-02-11 RX ADMIN — MIDODRINE HYDROCHLORIDE 10 MILLIGRAM(S): 2.5 TABLET ORAL at 22:49

## 2019-02-11 RX ADMIN — SODIUM CHLORIDE 3 MILLILITER(S): 9 INJECTION INTRAMUSCULAR; INTRAVENOUS; SUBCUTANEOUS at 06:09

## 2019-02-11 RX ADMIN — Medication 1 TABLET(S): at 12:04

## 2019-02-11 RX ADMIN — Medication 650 MILLIGRAM(S): at 06:11

## 2019-02-11 RX ADMIN — Medication 1 MILLIGRAM(S): at 12:04

## 2019-02-11 RX ADMIN — GABAPENTIN 800 MILLIGRAM(S): 400 CAPSULE ORAL at 06:11

## 2019-02-11 RX ADMIN — Medication 650 MILLIGRAM(S): at 07:41

## 2019-02-11 RX ADMIN — ENOXAPARIN SODIUM 40 MILLIGRAM(S): 100 INJECTION SUBCUTANEOUS at 09:50

## 2019-02-11 RX ADMIN — ATORVASTATIN CALCIUM 10 MILLIGRAM(S): 80 TABLET, FILM COATED ORAL at 21:25

## 2019-02-11 RX ADMIN — SODIUM CHLORIDE 3 MILLILITER(S): 9 INJECTION INTRAMUSCULAR; INTRAVENOUS; SUBCUTANEOUS at 21:21

## 2019-02-11 RX ADMIN — SODIUM CHLORIDE 3 MILLILITER(S): 9 INJECTION INTRAMUSCULAR; INTRAVENOUS; SUBCUTANEOUS at 14:13

## 2019-02-11 RX ADMIN — Medication 650 MILLIGRAM(S): at 14:15

## 2019-02-11 RX ADMIN — Medication 650 MILLIGRAM(S): at 14:45

## 2019-02-11 RX ADMIN — Medication 500 MILLIGRAM(S): at 06:11

## 2019-02-11 RX ADMIN — GABAPENTIN 800 MILLIGRAM(S): 400 CAPSULE ORAL at 21:25

## 2019-02-11 RX ADMIN — Medication 100 MILLIGRAM(S): at 21:25

## 2019-02-11 RX ADMIN — Medication 3 MILLIGRAM(S): at 21:25

## 2019-02-11 RX ADMIN — GABAPENTIN 800 MILLIGRAM(S): 400 CAPSULE ORAL at 14:14

## 2019-02-11 NOTE — PROGRESS NOTE ADULT - SUBJECTIVE AND OBJECTIVE BOX
73M w/ pmh HTN, HLD, BPH, prostate cancer s/p prostatectomy p/w R hip pain - started around 10pm, pt was at home and tried to kick some trash but lost balance, spun around and fell down onto his R hip. Unable to ambulate since then, no pain elsewhere. No other complaints of fever, n/v/d/c, sob, cough, chest / abd pain. No recent travel, medication change, illness, or hospitalization. No preceding symptoms prior to fall. Patient was found to have a right hip fx and is S/P ORIF of the right hip with IM nail placement on 01/29/19.  Patient states he was very active, denies any CP or shortness of breath. Patient with two episodes of orthostatic hypotension when attempting to ambulate and returned to bed and given saline IV on both occasions. midodrine added for orthostasis      MEDICATIONS  (STANDING):  ascorbic acid 500 milliGRAM(s) Oral two times a day  atorvastatin 10 milliGRAM(s) Oral at bedtime  docusate sodium 100 milliGRAM(s) Oral three times a day  enoxaparin Injectable 40 milliGRAM(s) SubCutaneous every 24 hours  folic acid 1 milliGRAM(s) Oral daily  gabapentin 800 milliGRAM(s) Oral Once  gabapentin 800 milliGRAM(s) Oral three times a day  midodrine 10 milliGRAM(s) Oral three times a day  multivitamin 1 Tablet(s) Oral daily  sodium chloride 0.9% lock flush 3 milliLiter(s) IV Push every 8 hours    MEDICATIONS  (PRN):  acetaminophen   Tablet .. 650 milliGRAM(s) Oral every 6 hours PRN Mild Pain (1 - 3)  bisacodyl Suppository 10 milliGRAM(s) Rectal daily PRN If no bowel movement by POD#2  diphenhydrAMINE 25 milliGRAM(s) Oral every 4 hours PRN Rash and/or Itching  melatonin 3 milliGRAM(s) Oral at bedtime PRN Insomnia  ondansetron Injectable 4 milliGRAM(s) IV Push every 6 hours PRN Nausea and/or Vomiting  polyethylene glycol 3350 17 Gram(s) Oral daily PRN Constipation  traMADol 50 milliGRAM(s) Oral every 4 hours PRN Mild Pain (1 - 3)          VITALS:   T(C): 36.8 (02-11-19 @ 23:57), Max: 37.2 (02-11-19 @ 00:20)  HR: 72 (02-11-19 @ 23:57) (72 - 85)  BP: 126/65 (02-11-19 @ 23:57) (114/70 - 149/69)  RR: 18 (02-11-19 @ 23:57) (18 - 20)  SpO2: 95% (02-11-19 @ 23:57) (95% - 97%)  Wt(kg): --    PHYSICAL EXAM:  GENERAL: NAD, well nourished and conversant  HEAD:  Atraumatic  EYES: EOM, PERRLA, conjunctiva pink and sclera white  ENT: No tonsillar erythema, exudates, or enlargement, moist mucous membranes, good dentition, no lesions  NECK: Supple, No JVD, normal thyroid, carotids with normal upstrokes and no bruits  CHEST/LUNG: Clear to auscultation bilaterally, No rales, rhonchi, wheezing, or rubs  HEART: Regular rate and rhythm, No murmurs, rubs, or gallops  ABDOMEN: Soft, nondistended, no masses, guarding, tenderness or rebound, bowel sounds present  EXTREMITIES:  4+ hip swelling and 3+ tenderness to touch. No erythema or incisional drainage.  LYMPH: No lymphadenopathy noted  SKIN: + right leg with a diffuse maculopapular rash.  NERVOUS SYSTEM:  Alert & Oriented X3, normal cognitive function. Motor Strength 5/5 right upper and right lower.  5/5 left upper and left lower extremities, DTRs 2+ intact and symmetric    LABS:                      CAPILLARY BLOOD GLUCOSE          RADIOLOGY & ADDITIONAL TESTS:

## 2019-02-11 NOTE — PROGRESS NOTE ADULT - ATTENDING COMMENTS
Beginning to ambulate and doing well. No medical complications post-op to date and to proceed with physical therapy, as tolerated. Continues pulmonary toilet to lessen atelectasis, leg exercises as taught to lessen the risk of DVT and supervised pain medications for post-op pain control. I anticipate transfer to rehabilitation when approved by workman's comp. DC planning when stable

## 2019-02-11 NOTE — PROGRESS NOTE ADULT - SUBJECTIVE AND OBJECTIVE BOX
Patient comfortable. Pain controlled. States was not OOB yesterday secondary to hypotension.  Had BM last night.  Denies cp, sob, palpitations, N/V.    Vital Signs Last 24 Hrs  T(C): 37.2 (11 Feb 2019 00:20), Max: 37.2 (11 Feb 2019 00:20)  T(F): 98.9 (11 Feb 2019 00:20), Max: 98.9 (11 Feb 2019 00:20)  HR: 80 (11 Feb 2019 00:20) (76 - 94)  BP: 118/68 (11 Feb 2019 00:20) (104/62 - 137/68)  BP(mean): --  RR: 18 (11 Feb 2019 00:20) (17 - 18)  SpO2: 95% (11 Feb 2019 00:20) (94% - 97%)    PHYSICAL EXAM:  NAD, Alert and oriented X3  RLE: Incisions well healed with staples in place; compartments soft, dull sensation grossly intact to light touch;   (+) DF/PF; (+) Distal Pulses; No Calf tenderness B/L, PAS     LABS:

## 2019-02-11 NOTE — PROGRESS NOTE ADULT - ASSESSMENT
A/P: 74 y/o M POD# 13 S/P  R IM Nail    Appreciate Medicine Note  DVT ppx- Lovenox 40mg SQ Daily  RLE WBAT  Pain management prn  Hold Hytrin per Medicine  Regular Diet  Monitor BP  Discharge planning

## 2019-02-12 LAB
ANION GAP SERPL CALC-SCNC: 11 MMOL/L — SIGNIFICANT CHANGE UP (ref 5–17)
BUN SERPL-MCNC: 14 MG/DL — SIGNIFICANT CHANGE UP (ref 7–23)
CALCIUM SERPL-MCNC: 9 MG/DL — SIGNIFICANT CHANGE UP (ref 8.4–10.5)
CHLORIDE SERPL-SCNC: 99 MMOL/L — SIGNIFICANT CHANGE UP (ref 96–108)
CO2 SERPL-SCNC: 24 MMOL/L — SIGNIFICANT CHANGE UP (ref 22–31)
CREAT SERPL-MCNC: 0.91 MG/DL — SIGNIFICANT CHANGE UP (ref 0.5–1.3)
GLUCOSE SERPL-MCNC: 100 MG/DL — HIGH (ref 70–99)
HCT VFR BLD CALC: 26.4 % — LOW (ref 39–50)
HGB BLD-MCNC: 8.5 G/DL — LOW (ref 13–17)
MCHC RBC-ENTMCNC: 29.9 PG — SIGNIFICANT CHANGE UP (ref 27–34)
MCHC RBC-ENTMCNC: 32.2 GM/DL — SIGNIFICANT CHANGE UP (ref 32–36)
MCV RBC AUTO: 93 FL — SIGNIFICANT CHANGE UP (ref 80–100)
PLATELET # BLD AUTO: 384 K/UL — SIGNIFICANT CHANGE UP (ref 150–400)
POTASSIUM SERPL-MCNC: 4.3 MMOL/L — SIGNIFICANT CHANGE UP (ref 3.5–5.3)
POTASSIUM SERPL-SCNC: 4.3 MMOL/L — SIGNIFICANT CHANGE UP (ref 3.5–5.3)
RBC # BLD: 2.84 M/UL — LOW (ref 4.2–5.8)
RBC # FLD: 13.2 % — SIGNIFICANT CHANGE UP (ref 10.3–14.5)
SODIUM SERPL-SCNC: 134 MMOL/L — LOW (ref 135–145)
WBC # BLD: 8.67 K/UL — SIGNIFICANT CHANGE UP (ref 3.8–10.5)
WBC # FLD AUTO: 8.67 K/UL — SIGNIFICANT CHANGE UP (ref 3.8–10.5)

## 2019-02-12 RX ADMIN — Medication 650 MILLIGRAM(S): at 05:15

## 2019-02-12 RX ADMIN — GABAPENTIN 800 MILLIGRAM(S): 400 CAPSULE ORAL at 05:15

## 2019-02-12 RX ADMIN — SODIUM CHLORIDE 3 MILLILITER(S): 9 INJECTION INTRAMUSCULAR; INTRAVENOUS; SUBCUTANEOUS at 05:16

## 2019-02-12 RX ADMIN — Medication 500 MILLIGRAM(S): at 18:34

## 2019-02-12 RX ADMIN — MIDODRINE HYDROCHLORIDE 10 MILLIGRAM(S): 2.5 TABLET ORAL at 05:15

## 2019-02-12 RX ADMIN — Medication 650 MILLIGRAM(S): at 22:34

## 2019-02-12 RX ADMIN — MIDODRINE HYDROCHLORIDE 10 MILLIGRAM(S): 2.5 TABLET ORAL at 13:55

## 2019-02-12 RX ADMIN — ATORVASTATIN CALCIUM 10 MILLIGRAM(S): 80 TABLET, FILM COATED ORAL at 22:04

## 2019-02-12 RX ADMIN — Medication 650 MILLIGRAM(S): at 22:04

## 2019-02-12 RX ADMIN — Medication 650 MILLIGRAM(S): at 13:55

## 2019-02-12 RX ADMIN — MIDODRINE HYDROCHLORIDE 10 MILLIGRAM(S): 2.5 TABLET ORAL at 22:04

## 2019-02-12 RX ADMIN — SODIUM CHLORIDE 3 MILLILITER(S): 9 INJECTION INTRAMUSCULAR; INTRAVENOUS; SUBCUTANEOUS at 22:03

## 2019-02-12 RX ADMIN — Medication 1 MILLIGRAM(S): at 12:37

## 2019-02-12 RX ADMIN — Medication 3 MILLIGRAM(S): at 22:04

## 2019-02-12 RX ADMIN — ENOXAPARIN SODIUM 40 MILLIGRAM(S): 100 INJECTION SUBCUTANEOUS at 10:58

## 2019-02-12 RX ADMIN — Medication 650 MILLIGRAM(S): at 05:45

## 2019-02-12 RX ADMIN — Medication 500 MILLIGRAM(S): at 05:15

## 2019-02-12 RX ADMIN — Medication 100 MILLIGRAM(S): at 05:15

## 2019-02-12 RX ADMIN — Medication 1 TABLET(S): at 12:37

## 2019-02-12 RX ADMIN — SODIUM CHLORIDE 3 MILLILITER(S): 9 INJECTION INTRAMUSCULAR; INTRAVENOUS; SUBCUTANEOUS at 13:52

## 2019-02-12 RX ADMIN — GABAPENTIN 800 MILLIGRAM(S): 400 CAPSULE ORAL at 22:04

## 2019-02-12 RX ADMIN — Medication 650 MILLIGRAM(S): at 16:36

## 2019-02-12 RX ADMIN — GABAPENTIN 800 MILLIGRAM(S): 400 CAPSULE ORAL at 13:55

## 2019-02-12 NOTE — PROGRESS NOTE ADULT - SUBJECTIVE AND OBJECTIVE BOX
ORTHO PROGRESS NOTE     Pt seen and examined at bedside. No significant overnight events. Pain well controlled.     Vital Signs Last 24 Hrs  T(C): 36.8 (12 Feb 2019 04:32), Max: 37.1 (11 Feb 2019 17:42)  T(F): 98.3 (12 Feb 2019 04:32), Max: 98.8 (11 Feb 2019 17:42)  HR: 78 (12 Feb 2019 04:32) (72 - 85)  BP: 134/70 (12 Feb 2019 04:32) (114/70 - 149/69)  BP(mean): --  RR: 18 (12 Feb 2019 04:32) (18 - 20)  SpO2: 94% (12 Feb 2019 04:32) (94% - 97%)    Gen: No apparent distress, alert  LE:  Dressing C/D/I;          +DF/PF. moving toes          SILT, wwp at foot          compartments soft    A/P  Pt is a 73y old Male s/p R hip IMN POD 14    - Pain control/ Analgesia  - DVT ppx  - PT/OT - wbat/oob    - WBAT  -rehab pending authorization

## 2019-02-12 NOTE — CONSULT NOTE ADULT - SUBJECTIVE AND OBJECTIVE BOX
CHIEF COMPLAINT:Patient is a 73y old  Male who presents with a chief complaint of Right Hip Fracture (12 Feb 2019 06:27)      HISTORY OF PRESENT ILLNESS:    This is a pleasant gentleman with history as below presented with right hip fx s/p ORIF now with orthostatic hypotension   he denies any syncope , sob , palpitation , chest pain   he had remote history of syncope in the past after working out and had complete cardiac work up in the past     PAST MEDICAL & SURGICAL HISTORY:  Prostate cancer: 2008, no chemo or RT  Anemia: mild, cause unknown  Osteoarthritis  Hypertension  Hyperlipidemia  Neuropathy: bilateral feet, cause unknown  Thoracic spine fracture: T12, June 21, 2017  Nocturia: x2-3  BPH (benign prostatic hypertrophy)  History of kyphoplasty  S/P colonoscopy: 2017, negative  S/P cystoscopy: June 2017, &quot;removal of scar tissue at the base of the bladder&quot;  S/P prostatectomy: 2008          MEDICATIONS:  enoxaparin Injectable 40 milliGRAM(s) SubCutaneous every 24 hours  midodrine 10 milliGRAM(s) Oral three times a day        acetaminophen   Tablet .. 650 milliGRAM(s) Oral every 6 hours PRN  diphenhydrAMINE 25 milliGRAM(s) Oral every 4 hours PRN  gabapentin 800 milliGRAM(s) Oral Once  gabapentin 800 milliGRAM(s) Oral three times a day  melatonin 3 milliGRAM(s) Oral at bedtime PRN  ondansetron Injectable 4 milliGRAM(s) IV Push every 6 hours PRN    bisacodyl Suppository 10 milliGRAM(s) Rectal daily PRN  docusate sodium 100 milliGRAM(s) Oral three times a day  polyethylene glycol 3350 17 Gram(s) Oral daily PRN    atorvastatin 10 milliGRAM(s) Oral at bedtime    ascorbic acid 500 milliGRAM(s) Oral two times a day  folic acid 1 milliGRAM(s) Oral daily  multivitamin 1 Tablet(s) Oral daily  sodium chloride 0.9% lock flush 3 milliLiter(s) IV Push every 8 hours      FAMILY HISTORY:  Family history of Alzheimer's disease  Family history of myocardial infarction      Non-contributory    SOCIAL HISTORY:    No tobacco, drugs or etoh    Allergies    No Known Allergies    Intolerances    	    REVIEW OF SYSTEMS:  as above  The rest of the 14 points ROS reviewed and except above they are unremarkable.        PHYSICAL EXAM:  T(C): 36.8 (02-12-19 @ 04:32), Max: 37.1 (02-11-19 @ 17:42)  HR: 78 (02-12-19 @ 04:32) (72 - 85)  BP: 134/70 (02-12-19 @ 04:32) (114/70 - 149/69)  RR: 18 (02-12-19 @ 04:32) (18 - 20)  SpO2: 94% (02-12-19 @ 04:32) (94% - 97%)  Wt(kg): --  I&O's Summary    11 Feb 2019 07:01  -  12 Feb 2019 07:00  --------------------------------------------------------  IN: 1730 mL / OUT: 2300 mL / NET: -570 mL        JVP: Normal  Neck: supple  Lung: clear   CV: S1 S2 , Murmur:  Abd: soft  Ext: No edema  neuro: Awake / alert  Psych: flat affect  Skin: normal      LABS/DATA:    TELEMETRY: 	    ECG:  	   	  CARDIAC MARKERS:                  02-12    134<L>  |  99  |  14  ----------------------------<  100<H>  4.3   |  24  |  0.91    Ca    9.0      12 Feb 2019 06:01      proBNP:   Lipid Profile:   HgA1c:   TSH:

## 2019-02-12 NOTE — PROGRESS NOTE ADULT - SUBJECTIVE AND OBJECTIVE BOX
73M w/ pmh HTN, HLD, BPH, prostate cancer s/p prostatectomy p/w R hip pain - started around 10pm, pt was at home and tried to kick some trash but lost balance, spun around and fell down onto his R hip. Unable to ambulate since then, no pain elsewhere. No other complaints of fever, n/v/d/c, sob, cough, chest / abd pain. No recent travel, medication change, illness, or hospitalization. No preceding symptoms prior to fall. Patient was found to have a right hip fx and is S/P ORIF of the right hip with IM nail placement on 01/29/19.  Patient states he was very active, denies any CP or shortness of breath. Patient with two episodes of orthostatic hypotension when attempting to ambulate and returned to bed and given saline IV on both occasions. midodrine added for orthostasis      MEDICATIONS  (STANDING):  ascorbic acid 500 milliGRAM(s) Oral two times a day  atorvastatin 10 milliGRAM(s) Oral at bedtime  docusate sodium 100 milliGRAM(s) Oral three times a day  enoxaparin Injectable 40 milliGRAM(s) SubCutaneous every 24 hours  folic acid 1 milliGRAM(s) Oral daily  gabapentin 800 milliGRAM(s) Oral Once  gabapentin 800 milliGRAM(s) Oral three times a day  midodrine 10 milliGRAM(s) Oral three times a day  multivitamin 1 Tablet(s) Oral daily  sodium chloride 0.9% lock flush 3 milliLiter(s) IV Push every 8 hours    MEDICATIONS  (PRN):  acetaminophen   Tablet .. 650 milliGRAM(s) Oral every 6 hours PRN Mild Pain (1 - 3)  bisacodyl Suppository 10 milliGRAM(s) Rectal daily PRN If no bowel movement by POD#2  diphenhydrAMINE 25 milliGRAM(s) Oral every 4 hours PRN Rash and/or Itching  melatonin 3 milliGRAM(s) Oral at bedtime PRN Insomnia  ondansetron Injectable 4 milliGRAM(s) IV Push every 6 hours PRN Nausea and/or Vomiting  polyethylene glycol 3350 17 Gram(s) Oral daily PRN Constipation  traMADol 50 milliGRAM(s) Oral every 4 hours PRN Mild Pain (1 - 3)          VITALS:   T(C): 36.8 (02-11-19 @ 23:57), Max: 37.2 (02-11-19 @ 00:20)  HR: 72 (02-11-19 @ 23:57) (72 - 85)  BP: 126/65 (02-11-19 @ 23:57) (114/70 - 149/69)  RR: 18 (02-11-19 @ 23:57) (18 - 20)  SpO2: 95% (02-11-19 @ 23:57) (95% - 97%)  Wt(kg): --    PHYSICAL EXAM:  GENERAL: NAD, well nourished and conversant  HEAD:  Atraumatic  EYES: EOM, PERRLA, conjunctiva pink and sclera white  ENT: No tonsillar erythema, exudates, or enlargement, moist mucous membranes, good dentition, no lesions  NECK: Supple, No JVD, normal thyroid, carotids with normal upstrokes and no bruits  CHEST/LUNG: Clear to auscultation bilaterally, No rales, rhonchi, wheezing, or rubs  HEART: Regular rate and rhythm, No murmurs, rubs, or gallops  ABDOMEN: Soft, nondistended, no masses, guarding, tenderness or rebound, bowel sounds present  EXTREMITIES:  4+ hip swelling and 3+ tenderness to touch. No erythema or incisional drainage.  LYMPH: No lymphadenopathy noted  SKIN: + right leg with a diffuse maculopapular rash.  NERVOUS SYSTEM:  Alert & Oriented X3, normal cognitive function. Motor Strength 5/5 right upper and right lower.  5/5 left upper and left lower extremities, DTRs 2+ intact and symmetric    LABS:                      CAPILLARY BLOOD GLUCOSE          RADIOLOGY & ADDITIONAL TESTS: 73M w/ pmh HTN, HLD, BPH, prostate cancer s/p prostatectomy p/w R hip pain - started around 10pm, pt was at home and tried to kick some trash but lost balance, spun around and fell down onto his R hip. Unable to ambulate since then, no pain elsewhere. No other complaints of fever, n/v/d/c, sob, cough, chest / abd pain. No recent travel, medication change, illness, or hospitalization. No preceding symptoms prior to fall. Patient was found to have a right hip fx and is S/P ORIF of the right hip with IM nail placement on 01/29/19.  Patient states he was very active, denies any CP or shortness of breath. Patient with two episodes of orthostatic hypotension when attempting to ambulate and returned to bed and given saline IV on both occasions. midodrine added for orthostasis    MEDICATIONS  (STANDING):  ascorbic acid 500 milliGRAM(s) Oral two times a day  atorvastatin 10 milliGRAM(s) Oral at bedtime  docusate sodium 100 milliGRAM(s) Oral three times a day  enoxaparin Injectable 40 milliGRAM(s) SubCutaneous every 24 hours  folic acid 1 milliGRAM(s) Oral daily  gabapentin 800 milliGRAM(s) Oral Once  gabapentin 800 milliGRAM(s) Oral three times a day  midodrine 10 milliGRAM(s) Oral three times a day  multivitamin 1 Tablet(s) Oral daily  sodium chloride 0.9% lock flush 3 milliLiter(s) IV Push every 8 hours    MEDICATIONS  (PRN):  acetaminophen   Tablet .. 650 milliGRAM(s) Oral every 6 hours PRN Mild Pain (1 - 3)  bisacodyl Suppository 10 milliGRAM(s) Rectal daily PRN If no bowel movement by POD#2  diphenhydrAMINE 25 milliGRAM(s) Oral every 4 hours PRN Rash and/or Itching  melatonin 3 milliGRAM(s) Oral at bedtime PRN Insomnia  ondansetron Injectable 4 milliGRAM(s) IV Push every 6 hours PRN Nausea and/or Vomiting  polyethylene glycol 3350 17 Gram(s) Oral daily PRN Constipation    Vital Signs Last 24 Hrs  T(C): 36.9 (12 Feb 2019 22:13), Max: 36.9 (12 Feb 2019 09:47)  T(F): 98.5 (12 Feb 2019 22:13), Max: 98.5 (12 Feb 2019 09:47)  HR: 73 (12 Feb 2019 22:13) (72 - 82)  BP: 143/71 (12 Feb 2019 22:13) (124/60 - 143/71)  BP(mean): --  RR: 16 (12 Feb 2019 22:13) (16 - 18)  SpO2: 96% (12 Feb 2019 22:13) (94% - 97%)    PHYSICAL EXAM:  GENERAL: NAD, well nourished and conversant  HEAD:  Atraumatic  EYES: EOM, PERRLA, conjunctiva pink and sclera white  ENT: No tonsillar erythema, exudates, or enlargement, moist mucous membranes, good dentition, no lesions  NECK: Supple, No JVD, normal thyroid, carotids with normal upstrokes and no bruits  CHEST/LUNG: Clear to auscultation bilaterally, No rales, rhonchi, wheezing, or rubs  HEART: Regular rate and rhythm, No murmurs, rubs, or gallops  ABDOMEN: Soft, nondistended, no masses, guarding, tenderness or rebound, bowel sounds present  EXTREMITIES:  4+ hip swelling and 3+ tenderness to touch. No erythema or incisional drainage.  LYMPH: No lymphadenopathy noted  SKIN: + right leg with a diffuse maculopapular rash.  NERVOUS SYSTEM:  Alert & Oriented X3, normal cognitive function. Motor Strength 5/5 right upper and right lower.  5/5 left upper and left lower extremities, DTRs 2+ intact and symmetric    LABS:          CBC Full  -  ( 12 Feb 2019 07:27 )  WBC Count : 8.67 K/uL  Hemoglobin : 8.5 g/dL  Hematocrit : 26.4 %  Platelet Count - Automated : 384 K/uL  Mean Cell Volume : 93.0 fl  Mean Cell Hemoglobin : 29.9 pg  Mean Cell Hemoglobin Concentration : 32.2 gm/dL  Auto Neutrophil # : x  Auto Lymphocyte # : x  Auto Monocyte # : x  Auto Eosinophil # : x  Auto Basophil # : x  Auto Neutrophil % : x  Auto Lymphocyte % : x  Auto Monocyte % : x  Auto Eosinophil % : x  Auto Basophil % : x    02-12    134<L>  |  99  |  14  ----------------------------<  100<H>  4.3   |  24  |  0.91    Ca    9.0      12 Feb 2019 06:01                            CAPILLARY BLOOD GLUCOSE          RADIOLOGY & ADDITIONAL TESTS: 73M w/ pmh HTN, HLD, BPH, prostate cancer s/p prostatectomy p/w R hip pain - started around 10pm, pt was at home and tried to kick some trash but lost balance, spun around and fell down onto his R hip. Unable to ambulate since then, no pain elsewhere. No other complaints of fever, n/v/d/c, sob, cough, chest / abd pain. No recent travel, medication change, illness, or hospitalization. No preceding symptoms prior to fall. Patient was found to have a right hip fx and is S/P ORIF of the right hip with IM nail placement on 01/29/19.  Patient states he was very active, denies any CP or shortness of breath. Patient with two episodes of orthostatic hypotension when attempting to ambulate and returned to bed and given saline IV on both occasions. Midodrine  added for orthostasis and able to walk today with no dizziness or orthostatic hypotension.    MEDICATIONS  (STANDING):  ascorbic acid 500 milliGRAM(s) Oral two times a day  atorvastatin 10 milliGRAM(s) Oral at bedtime  docusate sodium 100 milliGRAM(s) Oral three times a day  enoxaparin Injectable 40 milliGRAM(s) SubCutaneous every 24 hours  folic acid 1 milliGRAM(s) Oral daily  gabapentin 800 milliGRAM(s) Oral Once  gabapentin 800 milliGRAM(s) Oral three times a day  midodrine 10 milliGRAM(s) Oral three times a day  multivitamin 1 Tablet(s) Oral daily  sodium chloride 0.9% lock flush 3 milliLiter(s) IV Push every 8 hours    MEDICATIONS  (PRN):  acetaminophen   Tablet .. 650 milliGRAM(s) Oral every 6 hours PRN Mild Pain (1 - 3)  bisacodyl Suppository 10 milliGRAM(s) Rectal daily PRN If no bowel movement by POD#2  diphenhydrAMINE 25 milliGRAM(s) Oral every 4 hours PRN Rash and/or Itching  melatonin 3 milliGRAM(s) Oral at bedtime PRN Insomnia  ondansetron Injectable 4 milliGRAM(s) IV Push every 6 hours PRN Nausea and/or Vomiting  polyethylene glycol 3350 17 Gram(s) Oral daily PRN Constipation    Vital Signs Last 24 Hrs  T(C): 36.9 (12 Feb 2019 22:13), Max: 36.9 (12 Feb 2019 09:47)  T(F): 98.5 (12 Feb 2019 22:13), Max: 98.5 (12 Feb 2019 09:47)  HR: 73 (12 Feb 2019 22:13) (72 - 82)  BP: 143/71 (12 Feb 2019 22:13) (124/60 - 143/71)  BP(mean): --  RR: 16 (12 Feb 2019 22:13) (16 - 18)  SpO2: 96% (12 Feb 2019 22:13) (94% - 97%)    PHYSICAL EXAM:  GENERAL: NAD, well nourished and conversant  HEAD:  Atraumatic  EYES: EOM, PERRLA, conjunctiva pink and sclera white  ENT: No tonsillar erythema, exudates, or enlargement, moist mucous membranes, good dentition, no lesions  NECK: Supple, No JVD, normal thyroid, carotids with normal upstrokes and no bruits  CHEST/LUNG: Clear to auscultation bilaterally, No rales, rhonchi, wheezing, or rubs  HEART: Regular rate and rhythm, No murmurs, rubs, or gallops  ABDOMEN: Soft, nondistended, no masses, guarding, tenderness or rebound, bowel sounds present  EXTREMITIES:  4+ hip swelling and 3+ tenderness to touch. No erythema or incisional drainage.  LYMPH: No lymphadenopathy noted  SKIN: + right leg with a diffuse maculopapular rash.  NERVOUS SYSTEM:  Alert & Oriented X3, normal cognitive function. Motor Strength 5/5 right upper and right lower.  5/5 left upper and left lower extremities, DTRs 2+ intact and symmetric    LABS:          CBC Full  -  ( 12 Feb 2019 07:27 )  WBC Count : 8.67 K/uL  Hemoglobin : 8.5 g/dL  Hematocrit : 26.4 %  Platelet Count - Automated : 384 K/uL  Mean Cell Volume : 93.0 fl  Mean Cell Hemoglobin : 29.9 pg  Mean Cell Hemoglobin Concentration : 32.2 gm/dL  Auto Neutrophil # : x  Auto Lymphocyte # : x  Auto Monocyte # : x  Auto Eosinophil # : x  Auto Basophil # : x  Auto Neutrophil % : x  Auto Lymphocyte % : x  Auto Monocyte % : x  Auto Eosinophil % : x  Auto Basophil % : x    02-12    134<L>  |  99  |  14  ----------------------------<  100<H>  4.3   |  24  |  0.91    Ca    9.0      12 Feb 2019 06:01                            CAPILLARY BLOOD GLUCOSE          RADIOLOGY & ADDITIONAL TESTS:

## 2019-02-12 NOTE — PROGRESS NOTE ADULT - ATTENDING COMMENTS
Beginning to ambulate and doing well. No medical complications post-op to date and to proceed with physical therapy, as tolerated. Continues pulmonary toilet to lessen atelectasis, leg exercises as taught to lessen the risk of DVT and supervised pain medications for post-op pain control. I anticipate transfer to rehabilitation when approved by workman's comp. DC planning when stable Midodrine  added for orthostasis and able to walk today with no dizziness or orthostatic hypotension.    Beginning to ambulate and doing well. No medical complications post-op to date and to proceed with physical therapy, as tolerated. Continues pulmonary toilet to lessen atelectasis, leg exercises as taught to lessen the risk of DVT and supervised pain medications for post-op pain control. I anticipate transfer to rehabilitation when approved by workman's comp. DC planning when stable

## 2019-02-12 NOTE — CONSULT NOTE ADULT - ASSESSMENT
Orthostatic hypotension  likely due to decondition state from prolonged hospitalization   agree with midodrine for now, eventually will taper off  compression stockings  aggressive PT  sitting pedal exercises   2d echo  avoid narcotics as tolerated

## 2019-02-13 PROCEDURE — 73552 X-RAY EXAM OF FEMUR 2/>: CPT | Mod: 26,RT

## 2019-02-13 PROCEDURE — 93306 TTE W/DOPPLER COMPLETE: CPT | Mod: 26

## 2019-02-13 RX ADMIN — Medication 1 TABLET(S): at 11:37

## 2019-02-13 RX ADMIN — Medication 650 MILLIGRAM(S): at 06:39

## 2019-02-13 RX ADMIN — Medication 650 MILLIGRAM(S): at 22:06

## 2019-02-13 RX ADMIN — ENOXAPARIN SODIUM 40 MILLIGRAM(S): 100 INJECTION SUBCUTANEOUS at 08:00

## 2019-02-13 RX ADMIN — Medication 650 MILLIGRAM(S): at 14:30

## 2019-02-13 RX ADMIN — Medication 100 MILLIGRAM(S): at 13:59

## 2019-02-13 RX ADMIN — Medication 100 MILLIGRAM(S): at 22:05

## 2019-02-13 RX ADMIN — Medication 1 MILLIGRAM(S): at 11:37

## 2019-02-13 RX ADMIN — Medication 650 MILLIGRAM(S): at 22:36

## 2019-02-13 RX ADMIN — Medication 500 MILLIGRAM(S): at 06:08

## 2019-02-13 RX ADMIN — GABAPENTIN 800 MILLIGRAM(S): 400 CAPSULE ORAL at 06:09

## 2019-02-13 RX ADMIN — Medication 650 MILLIGRAM(S): at 13:58

## 2019-02-13 RX ADMIN — MIDODRINE HYDROCHLORIDE 10 MILLIGRAM(S): 2.5 TABLET ORAL at 13:58

## 2019-02-13 RX ADMIN — MIDODRINE HYDROCHLORIDE 10 MILLIGRAM(S): 2.5 TABLET ORAL at 22:06

## 2019-02-13 RX ADMIN — Medication 650 MILLIGRAM(S): at 06:09

## 2019-02-13 RX ADMIN — Medication 3 MILLIGRAM(S): at 22:05

## 2019-02-13 RX ADMIN — MIDODRINE HYDROCHLORIDE 10 MILLIGRAM(S): 2.5 TABLET ORAL at 06:08

## 2019-02-13 RX ADMIN — SODIUM CHLORIDE 3 MILLILITER(S): 9 INJECTION INTRAMUSCULAR; INTRAVENOUS; SUBCUTANEOUS at 13:57

## 2019-02-13 RX ADMIN — ATORVASTATIN CALCIUM 10 MILLIGRAM(S): 80 TABLET, FILM COATED ORAL at 22:06

## 2019-02-13 RX ADMIN — GABAPENTIN 800 MILLIGRAM(S): 400 CAPSULE ORAL at 22:06

## 2019-02-13 RX ADMIN — SODIUM CHLORIDE 3 MILLILITER(S): 9 INJECTION INTRAMUSCULAR; INTRAVENOUS; SUBCUTANEOUS at 22:05

## 2019-02-13 RX ADMIN — GABAPENTIN 800 MILLIGRAM(S): 400 CAPSULE ORAL at 13:58

## 2019-02-13 RX ADMIN — SODIUM CHLORIDE 3 MILLILITER(S): 9 INJECTION INTRAMUSCULAR; INTRAVENOUS; SUBCUTANEOUS at 06:07

## 2019-02-13 NOTE — PROGRESS NOTE ADULT - ASSESSMENT
Orthostatic hypotension  likely due to decondition state from prolonged hospitalization   agree with midodrine for now, eventually will taper off  compression stockings  aggressive PT  sitting pedal exercises   2d echo  avoid narcotics as tolerated Orthostatic hypotension  likely due to decondition state from prolonged hospitalization   agree with midodrine for now, eventually will taper off  compression stockings  aggressive PT  sitting pedal exercises   oob to chair   2d echo fu read   avoid narcotics as tolerated

## 2019-02-13 NOTE — PROVIDER CONTACT NOTE (OTHER) - BACKGROUND
Pt s/p R hip IM nail on 1/29, pt continually orthostatic since surgery, pt receiving midodrine as ordered, however BP has remained stable for last 24 hours and pt wanted to walk to the bathroom

## 2019-02-13 NOTE — PROVIDER CONTACT NOTE (OTHER) - ASSESSMENT
Pt was on the way back from the bathroom with PCA when pt stopped talking and was not responding to commands, pt was diaphoretic and pale and was pivoted to the chair. Once seated pt returned to baseline answering questions BP was 133/50 and HR was 88. Pt denied dizziness once seated

## 2019-02-13 NOTE — PROGRESS NOTE ADULT - SUBJECTIVE AND OBJECTIVE BOX
ORTHO PROGRESS NOTE     Pt seen and examined at bedside. No significant overnight events. Pain well controlled.     Vital Signs Last 24 Hrs  T(C): 36.7 (13 Feb 2019 04:37), Max: 37.3 (12 Feb 2019 23:48)  T(F): 98.1 (13 Feb 2019 04:37), Max: 99.2 (12 Feb 2019 23:48)  HR: 72 (13 Feb 2019 04:37) (72 - 82)  BP: 127/71 (13 Feb 2019 04:37) (120/67 - 143/71)  BP(mean): --  RR: 18 (13 Feb 2019 04:37) (16 - 18)  SpO2: 95% (13 Feb 2019 04:37) (94% - 97%)    Gen: No apparent distress, alert  LE:  Dressing C/D/I;          +DF/PF. moving toes          SILT, wwp at foot          compartments soft    A/P  Pt is a 73y old Male s/p R hip IMN POD 15    - Pain control/ Analgesia  - DVT ppx  - PT/OT - wbat/oob    - WBAT  -patient scheduled for TTE today  -rehab pending authorization

## 2019-02-13 NOTE — PROGRESS NOTE ADULT - ATTENDING COMMENTS
Midodrine  added for orthostasis and able to walk today with no dizziness or orthostatic hypotension.    Beginning to ambulate and doing well. No medical complications post-op to date and to proceed with physical therapy, as tolerated. Continues pulmonary toilet to lessen atelectasis, leg exercises as taught to lessen the risk of DVT and supervised pain medications for post-op pain control. I anticipate transfer to rehabilitation when approved by workman's comp. DC planning when stable

## 2019-02-13 NOTE — PROGRESS NOTE ADULT - SUBJECTIVE AND OBJECTIVE BOX
73M w/ pmh HTN, HLD, BPH, prostate cancer s/p prostatectomy p/w R hip pain - started around 10pm, pt was at home and tried to kick some trash but lost balance, spun around and fell down onto his R hip. Unable to ambulate since then, no pain elsewhere. No other complaints of fever, n/v/d/c, sob, cough, chest / abd pain. No recent travel, medication change, illness, or hospitalization. No preceding symptoms prior to fall. Patient was found to have a right hip fx and is S/P ORIF of the right hip with IM nail placement on 01/29/19.  Patient states he was very active, denies any CP or shortness of breath. Patient with two episodes of orthostatic hypotension when attempting to ambulate and returned to bed and given saline IV on both occasions. Midodrine  added for orthostasis Patient with one episode of near syncopy after a BM    MEDICATIONS  (STANDING):  ascorbic acid 500 milliGRAM(s) Oral two times a day  atorvastatin 10 milliGRAM(s) Oral at bedtime  docusate sodium 100 milliGRAM(s) Oral three times a day  enoxaparin Injectable 40 milliGRAM(s) SubCutaneous every 24 hours  folic acid 1 milliGRAM(s) Oral daily  gabapentin 800 milliGRAM(s) Oral Once  gabapentin 800 milliGRAM(s) Oral three times a day  midodrine 10 milliGRAM(s) Oral three times a day  multivitamin 1 Tablet(s) Oral daily  sodium chloride 0.9% lock flush 3 milliLiter(s) IV Push every 8 hours    MEDICATIONS  (PRN):  acetaminophen   Tablet .. 650 milliGRAM(s) Oral every 6 hours PRN Mild Pain (1 - 3)  bisacodyl Suppository 10 milliGRAM(s) Rectal daily PRN If no bowel movement by POD#2  diphenhydrAMINE 25 milliGRAM(s) Oral every 4 hours PRN Rash and/or Itching  melatonin 3 milliGRAM(s) Oral at bedtime PRN Insomnia  ondansetron Injectable 4 milliGRAM(s) IV Push every 6 hours PRN Nausea and/or Vomiting  polyethylene glycol 3350 17 Gram(s) Oral daily PRN Constipation          VITALS:   T(C): 36.3 (02-14-19 @ 00:21), Max: 37.5 (02-13-19 @ 17:32)  HR: 82 (02-14-19 @ 00:21) (70 - 88)  BP: 132/75 (02-14-19 @ 00:21) (112/69 - 147/75)  RR: 18 (02-14-19 @ 00:21) (16 - 18)  SpO2: 96% (02-14-19 @ 00:21) (94% - 96%)  Wt(kg): --    PHYSICAL EXAM:  GENERAL: NAD, well nourished and conversant  HEAD:  Atraumatic  EYES: EOM, PERRLA, conjunctiva pink and sclera white  ENT: No tonsillar erythema, exudates, or enlargement, moist mucous membranes, good dentition, no lesions  NECK: Supple, No JVD, normal thyroid, carotids with normal upstrokes and no bruits  CHEST/LUNG: Clear to auscultation bilaterally, No rales, rhonchi, wheezing, or rubs  HEART: Regular rate and rhythm, No murmurs, rubs, or gallops  ABDOMEN: Soft, nondistended, no masses, guarding, tenderness or rebound, bowel sounds present  EXTREMITIES:  4+ hip swelling and 3+ tenderness to touch. No erythema or incisional drainage.  LYMPH: No lymphadenopathy noted  SKIN: + right leg with a diffuse maculopapular rash.  NERVOUS SYSTEM:  Alert & Oriented X3, normal cognitive function. Motor Strength 5/5 right upper and right lower.  5/5 left upper and left lower extremities, DTRs 2+ intact and symmetric    LABS:        CBC Full  -  ( 12 Feb 2019 07:27 )  WBC Count : 8.67 K/uL  Hemoglobin : 8.5 g/dL  Hematocrit : 26.4 %  Platelet Count - Automated : 384 K/uL  Mean Cell Volume : 93.0 fl  Mean Cell Hemoglobin : 29.9 pg  Mean Cell Hemoglobin Concentration : 32.2 gm/dL  Auto Neutrophil # : x  Auto Lymphocyte # : x  Auto Monocyte # : x  Auto Eosinophil # : x  Auto Basophil # : x  Auto Neutrophil % : x  Auto Lymphocyte % : x  Auto Monocyte % : x  Auto Eosinophil % : x  Auto Basophil % : x    02-12    134<L>  |  99  |  14  ----------------------------<  100<H>  4.3   |  24  |  0.91    Ca    9.0      12 Feb 2019 06:01            CAPILLARY BLOOD GLUCOSE          RADIOLOGY & ADDITIONAL TESTS:

## 2019-02-13 NOTE — PROGRESS NOTE ADULT - SUBJECTIVE AND OBJECTIVE BOX
Subjective: Patient seen and examined. No new events except as noted.     SUBJECTIVE/ROS:        MEDICATIONS:  MEDICATIONS  (STANDING):  ascorbic acid 500 milliGRAM(s) Oral two times a day  atorvastatin 10 milliGRAM(s) Oral at bedtime  docusate sodium 100 milliGRAM(s) Oral three times a day  enoxaparin Injectable 40 milliGRAM(s) SubCutaneous every 24 hours  folic acid 1 milliGRAM(s) Oral daily  gabapentin 800 milliGRAM(s) Oral Once  gabapentin 800 milliGRAM(s) Oral three times a day  midodrine 10 milliGRAM(s) Oral three times a day  multivitamin 1 Tablet(s) Oral daily  sodium chloride 0.9% lock flush 3 milliLiter(s) IV Push every 8 hours      PHYSICAL EXAM:  T(C): 36.8 (02-13-19 @ 14:17), Max: 37.3 (02-12-19 @ 23:48)  HR: 80 (02-13-19 @ 14:17) (70 - 88)  BP: 137/69 (02-13-19 @ 14:17) (112/69 - 143/71)  RR: 18 (02-13-19 @ 14:17) (16 - 18)  SpO2: 95% (02-13-19 @ 14:17) (94% - 97%)  Wt(kg): --  I&O's Summary    12 Feb 2019 07:01  -  13 Feb 2019 07:00  --------------------------------------------------------  IN: 1130 mL / OUT: 2950 mL / NET: -1820 mL    13 Feb 2019 07:01  -  13 Feb 2019 15:11  --------------------------------------------------------  IN: 480 mL / OUT: 650 mL / NET: -170 mL          Appearance: Normal	  HEENT:   no gross abnormality   Cardiovascular: Normal S1 S2,    Murmur:   Neck: JVP normal  Respiratory: Lungs clear   Gastrointestinal:  Soft, Non-tender  Skin: normal   Neuro: No gross deficits.   Psychiatry:  Mood & affect flat  Ext: No edema      LABS/DATA:    CARDIAC MARKERS:                                8.5    8.67  )-----------( 384      ( 12 Feb 2019 07:27 )             26.4     02-12    134<L>  |  99  |  14  ----------------------------<  100<H>  4.3   |  24  |  0.91    Ca    9.0      12 Feb 2019 06:01      proBNP:   Lipid Profile:   HgA1c:   TSH:     TELE:  EKG: Subjective: Patient seen and examined. No new events except as noted.     SUBJECTIVE/ROS:  pt in echo lab       MEDICATIONS:  MEDICATIONS  (STANDING):  ascorbic acid 500 milliGRAM(s) Oral two times a day  atorvastatin 10 milliGRAM(s) Oral at bedtime  docusate sodium 100 milliGRAM(s) Oral three times a day  enoxaparin Injectable 40 milliGRAM(s) SubCutaneous every 24 hours  folic acid 1 milliGRAM(s) Oral daily  gabapentin 800 milliGRAM(s) Oral Once  gabapentin 800 milliGRAM(s) Oral three times a day  midodrine 10 milliGRAM(s) Oral three times a day  multivitamin 1 Tablet(s) Oral daily  sodium chloride 0.9% lock flush 3 milliLiter(s) IV Push every 8 hours      PHYSICAL EXAM:  T(C): 36.8 (02-13-19 @ 14:17), Max: 37.3 (02-12-19 @ 23:48)  HR: 80 (02-13-19 @ 14:17) (70 - 88)  BP: 137/69 (02-13-19 @ 14:17) (112/69 - 143/71)  RR: 18 (02-13-19 @ 14:17) (16 - 18)  SpO2: 95% (02-13-19 @ 14:17) (94% - 97%)  Wt(kg): --  I&O's Summary    12 Feb 2019 07:01  -  13 Feb 2019 07:00  --------------------------------------------------------  IN: 1130 mL / OUT: 2950 mL / NET: -1820 mL    13 Feb 2019 07:01  -  13 Feb 2019 15:11  --------------------------------------------------------  IN: 480 mL / OUT: 650 mL / NET: -170 mL          Appearance: Normal	  HEENT:   no gross abnormality   Cardiovascular: Normal S1 S2,    Murmur:   Neck: JVP normal  Respiratory: Lungs clear   Gastrointestinal:  Soft, Non-tender  Skin: normal   Neuro: No gross deficits.   Psychiatry:  Mood & affect flat  Ext: No edema      LABS/DATA:    CARDIAC MARKERS:                                8.5    8.67  )-----------( 384      ( 12 Feb 2019 07:27 )             26.4     02-12    134<L>  |  99  |  14  ----------------------------<  100<H>  4.3   |  24  |  0.91    Ca    9.0      12 Feb 2019 06:01      proBNP:   Lipid Profile:   HgA1c:   TSH:     TELE:  EKG: Subjective: Patient seen and examined. No new events except as noted.     SUBJECTIVE/ROS:  had mild dizziness when went to bathroom       MEDICATIONS:  MEDICATIONS  (STANDING):  ascorbic acid 500 milliGRAM(s) Oral two times a day  atorvastatin 10 milliGRAM(s) Oral at bedtime  docusate sodium 100 milliGRAM(s) Oral three times a day  enoxaparin Injectable 40 milliGRAM(s) SubCutaneous every 24 hours  folic acid 1 milliGRAM(s) Oral daily  gabapentin 800 milliGRAM(s) Oral Once  gabapentin 800 milliGRAM(s) Oral three times a day  midodrine 10 milliGRAM(s) Oral three times a day  multivitamin 1 Tablet(s) Oral daily  sodium chloride 0.9% lock flush 3 milliLiter(s) IV Push every 8 hours      PHYSICAL EXAM:  T(C): 36.8 (02-13-19 @ 14:17), Max: 37.3 (02-12-19 @ 23:48)  HR: 80 (02-13-19 @ 14:17) (70 - 88)  BP: 137/69 (02-13-19 @ 14:17) (112/69 - 143/71)  RR: 18 (02-13-19 @ 14:17) (16 - 18)  SpO2: 95% (02-13-19 @ 14:17) (94% - 97%)  Wt(kg): --  I&O's Summary    12 Feb 2019 07:01  -  13 Feb 2019 07:00  --------------------------------------------------------  IN: 1130 mL / OUT: 2950 mL / NET: -1820 mL    13 Feb 2019 07:01  -  13 Feb 2019 15:11  --------------------------------------------------------  IN: 480 mL / OUT: 650 mL / NET: -170 mL          Appearance: Normal	  HEENT:   no gross abnormality   Cardiovascular: Normal S1 S2,    Murmur:   Neck: JVP normal  Respiratory: Lungs clear   Gastrointestinal:  Soft, Non-tender  Skin: normal   Neuro: No gross deficits.   Psychiatry:  Mood & affect flat  Ext: No edema      LABS/DATA:    CARDIAC MARKERS:                                8.5    8.67  )-----------( 384      ( 12 Feb 2019 07:27 )             26.4     02-12    134<L>  |  99  |  14  ----------------------------<  100<H>  4.3   |  24  |  0.91    Ca    9.0      12 Feb 2019 06:01      proBNP:   Lipid Profile:   HgA1c:   TSH:     TELE:  EKG:

## 2019-02-14 RX ORDER — MIDODRINE HYDROCHLORIDE 2.5 MG/1
1 TABLET ORAL
Qty: 0 | Refills: 0 | COMMUNITY
Start: 2019-02-14

## 2019-02-14 RX ORDER — ENOXAPARIN SODIUM 100 MG/ML
40 INJECTION SUBCUTANEOUS
Qty: 0 | Refills: 0 | DISCHARGE
Start: 2019-02-14

## 2019-02-14 RX ORDER — DOCUSATE SODIUM 100 MG
1 CAPSULE ORAL
Qty: 0 | Refills: 0 | COMMUNITY
Start: 2019-02-14

## 2019-02-14 RX ORDER — MIDODRINE HYDROCHLORIDE 2.5 MG/1
1 TABLET ORAL
Qty: 0 | Refills: 0 | DISCHARGE
Start: 2019-02-14

## 2019-02-14 RX ORDER — GABAPENTIN 400 MG/1
1 CAPSULE ORAL
Qty: 0 | Refills: 0 | COMMUNITY
Start: 2019-02-14

## 2019-02-14 RX ORDER — ENOXAPARIN SODIUM 100 MG/ML
40 INJECTION SUBCUTANEOUS
Qty: 0 | Refills: 0 | COMMUNITY
Start: 2019-02-14

## 2019-02-14 RX ORDER — DOCUSATE SODIUM 100 MG
1 CAPSULE ORAL
Qty: 0 | Refills: 0 | DISCHARGE
Start: 2019-02-14

## 2019-02-14 RX ORDER — NIFEDIPINE 30 MG
1 TABLET, EXTENDED RELEASE 24 HR ORAL
Qty: 0 | Refills: 0 | COMMUNITY

## 2019-02-14 RX ORDER — GABAPENTIN 400 MG/1
1 CAPSULE ORAL
Qty: 0 | Refills: 0 | COMMUNITY

## 2019-02-14 RX ORDER — OMEGA-3 ACID ETHYL ESTERS 1 G
1 CAPSULE ORAL
Qty: 0 | Refills: 0 | COMMUNITY

## 2019-02-14 RX ADMIN — GABAPENTIN 800 MILLIGRAM(S): 400 CAPSULE ORAL at 05:55

## 2019-02-14 RX ADMIN — Medication 3 MILLIGRAM(S): at 21:46

## 2019-02-14 RX ADMIN — ENOXAPARIN SODIUM 40 MILLIGRAM(S): 100 INJECTION SUBCUTANEOUS at 11:12

## 2019-02-14 RX ADMIN — Medication 1 MILLIGRAM(S): at 11:13

## 2019-02-14 RX ADMIN — Medication 650 MILLIGRAM(S): at 06:26

## 2019-02-14 RX ADMIN — ATORVASTATIN CALCIUM 10 MILLIGRAM(S): 80 TABLET, FILM COATED ORAL at 21:47

## 2019-02-14 RX ADMIN — Medication 500 MILLIGRAM(S): at 18:09

## 2019-02-14 RX ADMIN — MIDODRINE HYDROCHLORIDE 10 MILLIGRAM(S): 2.5 TABLET ORAL at 13:41

## 2019-02-14 RX ADMIN — GABAPENTIN 800 MILLIGRAM(S): 400 CAPSULE ORAL at 13:41

## 2019-02-14 RX ADMIN — Medication 650 MILLIGRAM(S): at 18:09

## 2019-02-14 RX ADMIN — SODIUM CHLORIDE 3 MILLILITER(S): 9 INJECTION INTRAMUSCULAR; INTRAVENOUS; SUBCUTANEOUS at 05:43

## 2019-02-14 RX ADMIN — GABAPENTIN 800 MILLIGRAM(S): 400 CAPSULE ORAL at 21:46

## 2019-02-14 RX ADMIN — Medication 650 MILLIGRAM(S): at 18:38

## 2019-02-14 RX ADMIN — SODIUM CHLORIDE 3 MILLILITER(S): 9 INJECTION INTRAMUSCULAR; INTRAVENOUS; SUBCUTANEOUS at 21:43

## 2019-02-14 RX ADMIN — MIDODRINE HYDROCHLORIDE 10 MILLIGRAM(S): 2.5 TABLET ORAL at 05:55

## 2019-02-14 RX ADMIN — Medication 500 MILLIGRAM(S): at 05:55

## 2019-02-14 RX ADMIN — MIDODRINE HYDROCHLORIDE 10 MILLIGRAM(S): 2.5 TABLET ORAL at 21:46

## 2019-02-14 RX ADMIN — SODIUM CHLORIDE 3 MILLILITER(S): 9 INJECTION INTRAMUSCULAR; INTRAVENOUS; SUBCUTANEOUS at 13:08

## 2019-02-14 RX ADMIN — Medication 1 TABLET(S): at 11:13

## 2019-02-14 RX ADMIN — Medication 650 MILLIGRAM(S): at 05:56

## 2019-02-14 NOTE — PROGRESS NOTE ADULT - SUBJECTIVE AND OBJECTIVE BOX
73M w/ pmh HTN, HLD, BPH, prostate cancer s/p prostatectomy p/w R hip pain - started around 10pm, pt was at home and tried to kick some trash but lost balance, spun around and fell down onto his R hip. Unable to ambulate since then, no pain elsewhere. No other complaints of fever, n/v/d/c, sob, cough, chest / abd pain. No recent travel, medication change, illness, or hospitalization. No preceding symptoms prior to fall. Patient was found to have a right hip fx and is S/P ORIF of the right hip with IM nail placement on 01/29/19.  Patient states he was very active, denies any CP or shortness of breath. Patient with two episodes of orthostatic hypotension when attempting to ambulate and returned to bed and given saline IV on both occasions. No new episodes of syncopy      MEDICATIONS  (STANDING):  ascorbic acid 500 milliGRAM(s) Oral two times a day  atorvastatin 10 milliGRAM(s) Oral at bedtime  docusate sodium 100 milliGRAM(s) Oral three times a day  enoxaparin Injectable 40 milliGRAM(s) SubCutaneous every 24 hours  folic acid 1 milliGRAM(s) Oral daily  gabapentin 800 milliGRAM(s) Oral Once  gabapentin 800 milliGRAM(s) Oral three times a day  midodrine 10 milliGRAM(s) Oral three times a day  multivitamin 1 Tablet(s) Oral daily  sodium chloride 0.9% lock flush 3 milliLiter(s) IV Push every 8 hours    MEDICATIONS  (PRN):  acetaminophen   Tablet .. 650 milliGRAM(s) Oral every 6 hours PRN Mild Pain (1 - 3)  bisacodyl Suppository 10 milliGRAM(s) Rectal daily PRN If no bowel movement by POD#2  diphenhydrAMINE 25 milliGRAM(s) Oral every 4 hours PRN Rash and/or Itching  melatonin 3 milliGRAM(s) Oral at bedtime PRN Insomnia  ondansetron Injectable 4 milliGRAM(s) IV Push every 6 hours PRN Nausea and/or Vomiting  polyethylene glycol 3350 17 Gram(s) Oral daily PRN Constipation          VITALS:   T(C): 37.3 (02-14-19 @ 21:10), Max: 37.3 (02-14-19 @ 21:10)  HR: 78 (02-14-19 @ 21:10) (70 - 82)  BP: 129/70 (02-14-19 @ 21:10) (114/69 - 145/70)  RR: 18 (02-14-19 @ 21:10) (16 - 18)  SpO2: 94% (02-14-19 @ 21:10) (94% - 98%)  Wt(kg): --    PHYSICAL EXAM:  GENERAL: NAD, well nourished and conversant  HEAD:  Atraumatic  EYES: EOM, PERRLA, conjunctiva pink and sclera white  ENT: No tonsillar erythema, exudates, or enlargement, moist mucous membranes, good dentition, no lesions  NECK: Supple, No JVD, normal thyroid, carotids with normal upstrokes and no bruits  CHEST/LUNG: Clear to auscultation bilaterally, No rales, rhonchi, wheezing, or rubs  HEART: Regular rate and rhythm, No murmurs, rubs, or gallops  ABDOMEN: Soft, nondistended, no masses, guarding, tenderness or rebound, bowel sounds present  EXTREMITIES:  4+ hip swelling and 3+ tenderness to touch. No erythema or incisional drainage.  LYMPH: No lymphadenopathy noted  SKIN: + right leg with a diffuse maculopapular rash.  NERVOUS SYSTEM:  Alert & Oriented X3, normal cognitive function. Motor Strength 5/5 right upper and right lower.  5/5 left upper and left lower extremities, DTRs 2+ intact and symmetric    LABS:                      CAPILLARY BLOOD GLUCOSE          RADIOLOGY & ADDITIONAL TESTS:

## 2019-02-14 NOTE — PROGRESS NOTE ADULT - SUBJECTIVE AND OBJECTIVE BOX
Pt seen and examined at bedside. No significant overnight events. had an episode of dizziness with a bowel movement yesterday, denies any current dizziness today. Pain well controlled.       Vital Signs Last 24 Hrs  T(C): 37 (02-14-19 @ 05:22), Max: 37.5 (02-13-19 @ 17:32)  T(F): 98.6 (02-14-19 @ 05:22), Max: 99.5 (02-13-19 @ 17:32)  HR: 73 (02-14-19 @ 05:22) (70 - 88)  BP: 131/74 (02-14-19 @ 05:22) (112/69 - 147/75)  BP(mean): --  RR: 18 (02-14-19 @ 05:22) (16 - 18)  SpO2: 94% (02-14-19 @ 05:22) (94% - 96%)      Gen: No apparent distress, alert  LE:  Dressing C/D/I;          +DF/PF. moving toes          SILT, wwp at foot          compartments soft

## 2019-02-14 NOTE — PROGRESS NOTE ADULT - ASSESSMENT
A/P  Pt is a 73y old Male s/p R hip IMN POD 16    - Pain control/ Analgesia  - DVT ppx  - PT/OT - wbat/oob    - WBAT  -TTE  -rehab pending authorization

## 2019-02-14 NOTE — PROGRESS NOTE ADULT - SUBJECTIVE AND OBJECTIVE BOX
Subjective: Patient seen and examined. No new events except as noted.     SUBJECTIVE/ROS:  No chest pain, dyspnea, palpitation, or dizziness.       MEDICATIONS:  MEDICATIONS  (STANDING):  ascorbic acid 500 milliGRAM(s) Oral two times a day  atorvastatin 10 milliGRAM(s) Oral at bedtime  docusate sodium 100 milliGRAM(s) Oral three times a day  enoxaparin Injectable 40 milliGRAM(s) SubCutaneous every 24 hours  folic acid 1 milliGRAM(s) Oral daily  gabapentin 800 milliGRAM(s) Oral Once  gabapentin 800 milliGRAM(s) Oral three times a day  midodrine 10 milliGRAM(s) Oral three times a day  multivitamin 1 Tablet(s) Oral daily  sodium chloride 0.9% lock flush 3 milliLiter(s) IV Push every 8 hours      PHYSICAL EXAM:  T(C): 36.8 (02-14-19 @ 14:46), Max: 37.5 (02-13-19 @ 17:32)  HR: 70 (02-14-19 @ 14:46) (70 - 82)  BP: 118/72 (02-14-19 @ 14:46) (114/69 - 147/75)  RR: 18 (02-14-19 @ 14:46) (16 - 18)  SpO2: 95% (02-14-19 @ 14:46) (94% - 96%)  Wt(kg): --  I&O's Summary    13 Feb 2019 07:01  -  14 Feb 2019 07:00  --------------------------------------------------------  IN: 720 mL / OUT: 1750 mL / NET: -1030 mL    14 Feb 2019 07:01  -  14 Feb 2019 14:55  --------------------------------------------------------  IN: 840 mL / OUT: 0 mL / NET: 840 mL          Appearance: Normal	  HEENT:   no gross abnormality   Cardiovascular: Normal S1 S2,    Murmur:   Neck: JVP normal  Respiratory: Lungs clear   Gastrointestinal:  Soft, Non-tender  Skin: normal   Neuro: No gross deficits.   Psychiatry:  Mood & affect flat  Ext: No edema      LABS/DATA:    CARDIAC MARKERS:                  proBNP:   Lipid Profile:   HgA1c:   TSH:     TELE:  EKG:  < from: Transthoracic Echocardiogram (02.13.19 @ 14:48) >  ------------------------------------------------------------------------  Conclusions:  1. Mitral annular calcification, otherwise normal mitral  valve. Mild mitral regurgitation.  2. Aortic valve not well visualized.  3. Normal left ventricular systolic function. No segmental  wall motion abnormalities.  4. The right ventricle is not well visualized; grossly  normal right ventricular systolic function.  5. Estimated pulmonary artery systolic pressure equals 46  mm Hg, assuming right atrial pressure equals 8 mm Hg,  consistent with mild pulmonary pressures.    < end of copied text >

## 2019-02-15 RX ORDER — SALIVA SUBSTITUTE COMB NO.11 351 MG
5 POWDER IN PACKET (EA) MUCOUS MEMBRANE DAILY
Qty: 0 | Refills: 0 | Status: DISCONTINUED | OUTPATIENT
Start: 2019-02-15 | End: 2019-02-20

## 2019-02-15 RX ADMIN — Medication 650 MILLIGRAM(S): at 06:12

## 2019-02-15 RX ADMIN — Medication 3 MILLIGRAM(S): at 21:06

## 2019-02-15 RX ADMIN — GABAPENTIN 800 MILLIGRAM(S): 400 CAPSULE ORAL at 21:05

## 2019-02-15 RX ADMIN — SODIUM CHLORIDE 3 MILLILITER(S): 9 INJECTION INTRAMUSCULAR; INTRAVENOUS; SUBCUTANEOUS at 13:23

## 2019-02-15 RX ADMIN — SODIUM CHLORIDE 3 MILLILITER(S): 9 INJECTION INTRAMUSCULAR; INTRAVENOUS; SUBCUTANEOUS at 06:11

## 2019-02-15 RX ADMIN — Medication 1 MILLIGRAM(S): at 13:24

## 2019-02-15 RX ADMIN — MIDODRINE HYDROCHLORIDE 10 MILLIGRAM(S): 2.5 TABLET ORAL at 13:23

## 2019-02-15 RX ADMIN — Medication 1 TABLET(S): at 13:24

## 2019-02-15 RX ADMIN — Medication 100 MILLIGRAM(S): at 13:24

## 2019-02-15 RX ADMIN — Medication 500 MILLIGRAM(S): at 06:13

## 2019-02-15 RX ADMIN — MIDODRINE HYDROCHLORIDE 10 MILLIGRAM(S): 2.5 TABLET ORAL at 06:13

## 2019-02-15 RX ADMIN — GABAPENTIN 800 MILLIGRAM(S): 400 CAPSULE ORAL at 13:24

## 2019-02-15 RX ADMIN — Medication 650 MILLIGRAM(S): at 21:05

## 2019-02-15 RX ADMIN — SODIUM CHLORIDE 3 MILLILITER(S): 9 INJECTION INTRAMUSCULAR; INTRAVENOUS; SUBCUTANEOUS at 21:04

## 2019-02-15 RX ADMIN — MIDODRINE HYDROCHLORIDE 10 MILLIGRAM(S): 2.5 TABLET ORAL at 21:05

## 2019-02-15 RX ADMIN — Medication 650 MILLIGRAM(S): at 21:45

## 2019-02-15 RX ADMIN — Medication 650 MILLIGRAM(S): at 06:42

## 2019-02-15 RX ADMIN — ATORVASTATIN CALCIUM 10 MILLIGRAM(S): 80 TABLET, FILM COATED ORAL at 21:05

## 2019-02-15 RX ADMIN — GABAPENTIN 800 MILLIGRAM(S): 400 CAPSULE ORAL at 06:13

## 2019-02-15 RX ADMIN — ENOXAPARIN SODIUM 40 MILLIGRAM(S): 100 INJECTION SUBCUTANEOUS at 07:54

## 2019-02-15 NOTE — PROGRESS NOTE ADULT - ATTENDING COMMENTS
Midodrine  added for orthostasis and able to walk today with no dizziness or orthostatic hypotension.    Beginning to ambulate and doing well. No medical complications post-op to date and to proceed with physical therapy, as tolerated. Continues pulmonary toilet to lessen atelectasis, leg exercises as taught to lessen the risk of DVT and supervised pain medications for post-op pain control. I anticipate transfer to rehabilitation when approved by workman's comp. DC planning when stable Midodrine  added for orthostasis and able to walk today with no dizziness or orthostatic hypotension.    Beginning to ambulate and doing well. No medical complications post-op to date and to proceed with physical therapy, as tolerated. Continues pulmonary toilet to lessen atelectasis, leg exercises as taught to lessen the risk of DVT and supervised pain medications for post-op pain control. No new episodes of syncope since onset of midodrine. Medically stable and awaiting rehabilitation bed availability. I anticipate transfer to rehabilitation when approved by workman's comp. [place] planning in progress.

## 2019-02-15 NOTE — PROGRESS NOTE ADULT - SUBJECTIVE AND OBJECTIVE BOX
Patient is a 73y old  Male who presents with a chief complaint of Right Hip Fracture (14 Feb 2019 22:05)      POST OPERATIVE DAY #:  17  Patient comfortable  No complaints    VS:  T(C): 36.6 (02-15-19 @ 04:58), Max: 37.3 (02-14-19 @ 21:10)  T(F): 97.8 (02-15-19 @ 04:58), Max: 99.1 (02-14-19 @ 21:10)  HR: 70 (02-15-19 @ 04:58) (70 - 78)  BP: 137/77 (02-15-19 @ 04:58) (114/69 - 145/70)  RR: 18 (02-15-19 @ 04:58) (16 - 18)  SpO2: 95% (02-15-19 @ 04:58) (94% - 98%)  Wt(kg): --      PHYSICAL EXAM:  NAD, Alert  EXT:   Rt Hip: incisions healing well  No Calf Tenderness  (+) DF/PF; (+) Distal Pulses;  Sensation: No gross deficits noted  Pulses 2+   B/L, PAS

## 2019-02-15 NOTE — PROGRESS NOTE ADULT - ASSESSMENT
Orthostatic hypotension  cont midodrine  needs aggressive PT  will check am cortisol to make sure he does not have adrenal suppression

## 2019-02-15 NOTE — PROGRESS NOTE ADULT - SUBJECTIVE AND OBJECTIVE BOX
73M w/ pmh HTN, HLD, BPH, prostate cancer s/p prostatectomy p/w R hip pain - started around 10pm, pt was at home and tried to kick some trash but lost balance, spun around and fell down onto his R hip. Unable to ambulate since then, no pain elsewhere. No other complaints of fever, n/v/d/c, sob, cough, chest / abd pain. No recent travel, medication change, illness, or hospitalization. No preceding symptoms prior to fall. Patient was found to have a right hip fx and is S/P ORIF of the right hip with IM nail placement on 01/29/19.  Patient states he was very active, denies any CP or shortness of breath. Patient with two episodes of orthostatic hypotension when attempting to ambulate and returned to bed and given saline IV on both occasions. No new episodes of syncopy      MEDICATIONS  (STANDING):  ascorbic acid 500 milliGRAM(s) Oral two times a day  atorvastatin 10 milliGRAM(s) Oral at bedtime  docusate sodium 100 milliGRAM(s) Oral three times a day  enoxaparin Injectable 40 milliGRAM(s) SubCutaneous every 24 hours  folic acid 1 milliGRAM(s) Oral daily  gabapentin 800 milliGRAM(s) Oral Once  gabapentin 800 milliGRAM(s) Oral three times a day  midodrine 10 milliGRAM(s) Oral three times a day  multivitamin 1 Tablet(s) Oral daily  sodium chloride 0.9% lock flush 3 milliLiter(s) IV Push every 8 hours    MEDICATIONS  (PRN):  acetaminophen   Tablet .. 650 milliGRAM(s) Oral every 6 hours PRN Mild Pain (1 - 3)  bisacodyl Suppository 10 milliGRAM(s) Rectal daily PRN If no bowel movement by POD#2  diphenhydrAMINE 25 milliGRAM(s) Oral every 4 hours PRN Rash and/or Itching  melatonin 3 milliGRAM(s) Oral at bedtime PRN Insomnia  ondansetron Injectable 4 milliGRAM(s) IV Push every 6 hours PRN Nausea and/or Vomiting  polyethylene glycol 3350 17 Gram(s) Oral daily PRN Constipation          VITALS:   T(C): 37.3 (02-14-19 @ 21:10), Max: 37.3 (02-14-19 @ 21:10)  HR: 78 (02-14-19 @ 21:10) (70 - 82)  BP: 129/70 (02-14-19 @ 21:10) (114/69 - 145/70)  RR: 18 (02-14-19 @ 21:10) (16 - 18)  SpO2: 94% (02-14-19 @ 21:10) (94% - 98%)  Wt(kg): --    PHYSICAL EXAM:  GENERAL: NAD, well nourished and conversant  HEAD:  Atraumatic  EYES: EOM, PERRLA, conjunctiva pink and sclera white  ENT: No tonsillar erythema, exudates, or enlargement, moist mucous membranes, good dentition, no lesions  NECK: Supple, No JVD, normal thyroid, carotids with normal upstrokes and no bruits  CHEST/LUNG: Clear to auscultation bilaterally, No rales, rhonchi, wheezing, or rubs  HEART: Regular rate and rhythm, No murmurs, rubs, or gallops  ABDOMEN: Soft, nondistended, no masses, guarding, tenderness or rebound, bowel sounds present  EXTREMITIES:  4+ hip swelling and 3+ tenderness to touch. No erythema or incisional drainage.  LYMPH: No lymphadenopathy noted  SKIN: + right leg with a diffuse maculopapular rash.  NERVOUS SYSTEM:  Alert & Oriented X3, normal cognitive function. Motor Strength 5/5 right upper and right lower.  5/5 left upper and left lower extremities, DTRs 2+ intact and symmetric    LABS:                      CAPILLARY BLOOD GLUCOSE          RADIOLOGY & ADDITIONAL TESTS: 73M w/ pmh HTN, HLD, BPH, prostate cancer s/p prostatectomy p/w R hip pain - started around 10pm, pt was at home and tried to kick some trash but lost balance, spun around and fell down onto his R hip. Unable to ambulate since then, no pain elsewhere. No other complaints of fever, n/v/d/c, sob, cough, chest / abd pain. No recent travel, medication change, illness, or hospitalization. No preceding symptoms prior to fall. Patient was found to have a right hip fx and is S/P ORIF of the right hip with IM nail placement on 01/29/19.  Patient states he was very active, denies any CP or shortness of breath. Patient with two episodes of orthostatic hypotension when attempting to ambulate and returned to bed and given saline IV on both occasions. No new episodes of syncopy    MEDICATIONS  (STANDING):  ascorbic acid 500 milliGRAM(s) Oral two times a day  atorvastatin 10 milliGRAM(s) Oral at bedtime  Biotene Dry Mouth Oral Rinse 5 milliLiter(s) Swish and Spit daily  docusate sodium 100 milliGRAM(s) Oral three times a day  enoxaparin Injectable 40 milliGRAM(s) SubCutaneous every 24 hours  folic acid 1 milliGRAM(s) Oral daily  gabapentin 800 milliGRAM(s) Oral Once  gabapentin 800 milliGRAM(s) Oral three times a day  midodrine 10 milliGRAM(s) Oral three times a day  multivitamin 1 Tablet(s) Oral daily  sodium chloride 0.9% lock flush 3 milliLiter(s) IV Push every 8 hours    MEDICATIONS  (PRN):  acetaminophen   Tablet .. 650 milliGRAM(s) Oral every 6 hours PRN Mild Pain (1 - 3)  bisacodyl Suppository 10 milliGRAM(s) Rectal daily PRN If no bowel movement by POD#2  diphenhydrAMINE 25 milliGRAM(s) Oral every 4 hours PRN Rash and/or Itching  melatonin 3 milliGRAM(s) Oral at bedtime PRN Insomnia  ondansetron Injectable 4 milliGRAM(s) IV Push every 6 hours PRN Nausea and/or Vomiting  polyethylene glycol 3350 17 Gram(s) Oral daily PRN Constipation    Vital Signs Last 24 Hrs  T(C): 37.2 (15 Feb 2019 21:10), Max: 37.2 (15 Feb 2019 08:30)  T(F): 99 (15 Feb 2019 21:10), Max: 99 (15 Feb 2019 21:10)  HR: 70 (15 Feb 2019 21:10) (70 - 95)  BP: 131/66 (15 Feb 2019 21:10) (129/57 - 154/72)  BP(mean): --  RR: 18 (15 Feb 2019 21:10) (17 - 18)  SpO2: 95% (15 Feb 2019 21:10) (95% - 97%)        PHYSICAL EXAM:  GENERAL: NAD, well nourished and conversant  HEAD:  Atraumatic  EYES: EOM, PERRLA, conjunctiva pink and sclera white  ENT: No tonsillar erythema, exudates, or enlargement, moist mucous membranes, good dentition, no lesions  NECK: Supple, No JVD, normal thyroid, carotids with normal upstrokes and no bruits  CHEST/LUNG: Clear to auscultation bilaterally, No rales, rhonchi, wheezing, or rubs  HEART: Regular rate and rhythm, No murmurs, rubs, or gallops  ABDOMEN: Soft, nondistended, no masses, guarding, tenderness or rebound, bowel sounds present  EXTREMITIES:  4+ hip swelling and 3+ tenderness to touch. No erythema or incisional drainage.  LYMPH: No lymphadenopathy noted  SKIN: + right leg with a diffuse maculopapular rash.  NERVOUS SYSTEM:  Alert & Oriented X3, normal cognitive function. Motor Strength 5/5 right upper and right lower.  5/5 left upper and left lower extremities, DTRs 2+ intact and symmetric    LABS:    No labs obtained today                  CAPILLARY BLOOD GLUCOSE          RADIOLOGY & ADDITIONAL TESTS: 73M w/ pmh HTN, HLD, BPH, prostate cancer s/p prostatectomy p/w R hip pain - started around 10pm, pt was at home and tried to kick some trash but lost balance, spun around and fell down onto his R hip. Unable to ambulate since then, no pain elsewhere. No other complaints of fever, n/v/d/c, sob, cough, chest / abd pain. No recent travel, medication change, illness, or hospitalization. No preceding symptoms prior to fall. Patient was found to have a right hip fx and is S/P ORIF of the right hip with IM nail placement on 01/29/19.  Patient states he was very active, denies any CP or shortness of breath. Patient with two episodes of orthostatic hypotension when attempting to ambulate and returned to bed and given saline IV on both occasions. No new episodes of syncope since onset of midodrine. Medically stable and awaiting rehabilitation bed availability.    MEDICATIONS  (STANDING):  ascorbic acid 500 milliGRAM(s) Oral two times a day  atorvastatin 10 milliGRAM(s) Oral at bedtime  Biotene Dry Mouth Oral Rinse 5 milliLiter(s) Swish and Spit daily  docusate sodium 100 milliGRAM(s) Oral three times a day  enoxaparin Injectable 40 milliGRAM(s) SubCutaneous every 24 hours  folic acid 1 milliGRAM(s) Oral daily  gabapentin 800 milliGRAM(s) Oral Once  gabapentin 800 milliGRAM(s) Oral three times a day  midodrine 10 milliGRAM(s) Oral three times a day  multivitamin 1 Tablet(s) Oral daily  sodium chloride 0.9% lock flush 3 milliLiter(s) IV Push every 8 hours    MEDICATIONS  (PRN):  acetaminophen   Tablet .. 650 milliGRAM(s) Oral every 6 hours PRN Mild Pain (1 - 3)  bisacodyl Suppository 10 milliGRAM(s) Rectal daily PRN If no bowel movement by POD#2  diphenhydrAMINE 25 milliGRAM(s) Oral every 4 hours PRN Rash and/or Itching  melatonin 3 milliGRAM(s) Oral at bedtime PRN Insomnia  ondansetron Injectable 4 milliGRAM(s) IV Push every 6 hours PRN Nausea and/or Vomiting  polyethylene glycol 3350 17 Gram(s) Oral daily PRN Constipation    Vital Signs Last 24 Hrs  T(C): 37.2 (15 Feb 2019 21:10), Max: 37.2 (15 Feb 2019 08:30)  T(F): 99 (15 Feb 2019 21:10), Max: 99 (15 Feb 2019 21:10)  HR: 70 (15 Feb 2019 21:10) (70 - 95)  BP: 131/66 (15 Feb 2019 21:10) (129/57 - 154/72)  BP(mean): --  RR: 18 (15 Feb 2019 21:10) (17 - 18)  SpO2: 95% (15 Feb 2019 21:10) (95% - 97%)        PHYSICAL EXAM:  GENERAL: NAD, well nourished and conversant  HEAD:  Atraumatic  EYES: EOM, PERRLA, conjunctiva pink and sclera white  ENT: No tonsillar erythema, exudates, or enlargement, moist mucous membranes, good dentition, no lesions  NECK: Supple, No JVD, normal thyroid, carotids with normal upstrokes and no bruits  CHEST/LUNG: Clear to auscultation bilaterally, No rales, rhonchi, wheezing, or rubs  HEART: Regular rate and rhythm, No murmurs, rubs, or gallops  ABDOMEN: Soft, nondistended, no masses, guarding, tenderness or rebound, bowel sounds present  EXTREMITIES:  4+ hip swelling and 3+ tenderness to touch. No erythema or incisional drainage.  LYMPH: No lymphadenopathy noted  SKIN: + right leg with a diffuse maculopapular rash.  NERVOUS SYSTEM:  Alert & Oriented X3, normal cognitive function. Motor Strength 5/5 right upper and right lower.  5/5 left upper and left lower extremities, DTRs 2+ intact and symmetric    LABS:    No labs obtained today                  CAPILLARY BLOOD GLUCOSE          RADIOLOGY & ADDITIONAL TESTS:

## 2019-02-15 NOTE — PROGRESS NOTE ADULT - ASSESSMENT
73M w/ pmh HTN, HLD, BPH, prostate cancer s/p prostatectomy p/w R hip pain - started around 10pm, pt was at home and tried to kick some trash but lost balance, spun around and fell down onto his R hip. Unable to ambulate since then, no pain elsewhere. No other complaints of fever, n/v/d/c, sob, cough, chest / abd pain. No recent travel, medication change, illness, or hospitalization. No preceding symptoms prior to fall. . Patient states he is very active, denies any CP or shortness of breath. Patient was found to have a right hip fx and is S/P ORIF of the right hip on 01/29/19. Patient with two episodes of post-op orthostatic hypotension when attempting to ambulate and returned to bed and given saline IV on both occasions. Will stop hytrin and add midodrine if episodes continue. Right leg rash has resolved and the patient is otherwise well;  awaiting rehabilitation bed availability. 73M w/ pmh HTN, HLD, BPH, prostate cancer s/p prostatectomy p/w R hip pain - started around 10pm, pt was at home and tried to kick some trash but lost balance, spun around and fell down onto his R hip. Unable to ambulate since then, no pain elsewhere. No other complaints of fever, n/v/d/c, sob, cough, chest / abd pain. No recent travel, medication change, illness, or hospitalization. No preceding symptoms prior to fall. . Patient states he is very active, denies any CP or shortness of breath. Patient was found to have a right hip fx and is S/P ORIF of the right hip on 01/29/19. Patient with two episodes of post-op orthostatic hypotension when attempting to ambulate and returned to bed and given saline IV on both occasions. Hytrin stopped and midodrine added for recurrent orthostatic. Right leg rash has resolved and the patient is otherwise well;  awaiting rehabilitation bed availability.

## 2019-02-15 NOTE — PROGRESS NOTE ADULT - SUBJECTIVE AND OBJECTIVE BOX
Subjective: Patient seen and examined. No new events except as noted.     SUBJECTIVE/ROS:  feels ok       MEDICATIONS:  MEDICATIONS  (STANDING):  ascorbic acid 500 milliGRAM(s) Oral two times a day  atorvastatin 10 milliGRAM(s) Oral at bedtime  docusate sodium 100 milliGRAM(s) Oral three times a day  enoxaparin Injectable 40 milliGRAM(s) SubCutaneous every 24 hours  folic acid 1 milliGRAM(s) Oral daily  gabapentin 800 milliGRAM(s) Oral Once  gabapentin 800 milliGRAM(s) Oral three times a day  midodrine 10 milliGRAM(s) Oral three times a day  multivitamin 1 Tablet(s) Oral daily  sodium chloride 0.9% lock flush 3 milliLiter(s) IV Push every 8 hours      PHYSICAL EXAM:  T(C): 37.1 (02-15-19 @ 13:38), Max: 37.3 (02-14-19 @ 21:10)  HR: 95 (02-15-19 @ 13:38) (70 - 95)  BP: 134/62 (02-15-19 @ 13:38) (129/57 - 154/72)  RR: 18 (02-15-19 @ 13:38) (16 - 18)  SpO2: 97% (02-15-19 @ 13:38) (94% - 98%)  Wt(kg): --  I&O's Summary    14 Feb 2019 07:01  -  15 Feb 2019 07:00  --------------------------------------------------------  IN: 1240 mL / OUT: 1150 mL / NET: 90 mL    15 Feb 2019 07:01  -  15 Feb 2019 16:41  --------------------------------------------------------  IN: 640 mL / OUT: 800 mL / NET: -160 mL          Appearance: Normal	  HEENT:   no gross abnormality   Cardiovascular: Normal S1 S2,    Murmur:   Neck: JVP normal  Respiratory: Lungs clear   Gastrointestinal:  Soft, Non-tender  Skin: normal   Neuro: No gross deficits.   Psychiatry:  Mood & affect flat  Ext: No edema      LABS/DATA:    CARDIAC MARKERS:                  proBNP:   Lipid Profile:   HgA1c:   TSH:     TELE:  EKG:

## 2019-02-16 LAB — CORTIS AM PEAK SERPL-MCNC: 9.9 UG/DL — SIGNIFICANT CHANGE UP (ref 6–18.4)

## 2019-02-16 RX ADMIN — GABAPENTIN 800 MILLIGRAM(S): 400 CAPSULE ORAL at 05:06

## 2019-02-16 RX ADMIN — Medication 500 MILLIGRAM(S): at 17:27

## 2019-02-16 RX ADMIN — Medication 1 TABLET(S): at 13:34

## 2019-02-16 RX ADMIN — GABAPENTIN 800 MILLIGRAM(S): 400 CAPSULE ORAL at 22:21

## 2019-02-16 RX ADMIN — SODIUM CHLORIDE 3 MILLILITER(S): 9 INJECTION INTRAMUSCULAR; INTRAVENOUS; SUBCUTANEOUS at 22:19

## 2019-02-16 RX ADMIN — Medication 650 MILLIGRAM(S): at 23:51

## 2019-02-16 RX ADMIN — SODIUM CHLORIDE 3 MILLILITER(S): 9 INJECTION INTRAMUSCULAR; INTRAVENOUS; SUBCUTANEOUS at 05:05

## 2019-02-16 RX ADMIN — SODIUM CHLORIDE 3 MILLILITER(S): 9 INJECTION INTRAMUSCULAR; INTRAVENOUS; SUBCUTANEOUS at 13:33

## 2019-02-16 RX ADMIN — MIDODRINE HYDROCHLORIDE 10 MILLIGRAM(S): 2.5 TABLET ORAL at 13:35

## 2019-02-16 RX ADMIN — MIDODRINE HYDROCHLORIDE 10 MILLIGRAM(S): 2.5 TABLET ORAL at 22:20

## 2019-02-16 RX ADMIN — ENOXAPARIN SODIUM 40 MILLIGRAM(S): 100 INJECTION SUBCUTANEOUS at 09:07

## 2019-02-16 RX ADMIN — MIDODRINE HYDROCHLORIDE 10 MILLIGRAM(S): 2.5 TABLET ORAL at 05:06

## 2019-02-16 RX ADMIN — GABAPENTIN 800 MILLIGRAM(S): 400 CAPSULE ORAL at 13:35

## 2019-02-16 RX ADMIN — Medication 5 MILLILITER(S): at 13:34

## 2019-02-16 RX ADMIN — Medication 500 MILLIGRAM(S): at 05:06

## 2019-02-16 RX ADMIN — ATORVASTATIN CALCIUM 10 MILLIGRAM(S): 80 TABLET, FILM COATED ORAL at 22:21

## 2019-02-16 RX ADMIN — Medication 1 MILLIGRAM(S): at 13:35

## 2019-02-16 RX ADMIN — Medication 3 MILLIGRAM(S): at 22:20

## 2019-02-16 NOTE — PROGRESS NOTE ADULT - SUBJECTIVE AND OBJECTIVE BOX
73M w/ pmh HTN, HLD, BPH, prostate cancer s/p prostatectomy p/w R hip pain - started around 10pm, pt was at home and tried to kick some trash but lost balance, spun around and fell down onto his R hip. Unable to ambulate since then, no pain elsewhere. No other complaints of fever, n/v/d/c, sob, cough, chest / abd pain. No recent travel, medication change, illness, or hospitalization. No preceding symptoms prior to fall. Patient was found to have a right hip fx and is S/P ORIF of the right hip with IM nail placement on 01/29/19.  Patient states he was very active, denies any CP or shortness of breath. Patient with two episodes of orthostatic hypotension when attempting to ambulate and returned to bed and given saline IV on both occasions. No new episodes of syncope since onset of midodrine. Medically stable and awaiting rehabilitation bed availability.      MEDICATIONS  (STANDING):  ascorbic acid 500 milliGRAM(s) Oral two times a day  atorvastatin 10 milliGRAM(s) Oral at bedtime  Biotene Dry Mouth Oral Rinse 5 milliLiter(s) Swish and Spit daily  docusate sodium 100 milliGRAM(s) Oral three times a day  enoxaparin Injectable 40 milliGRAM(s) SubCutaneous every 24 hours  folic acid 1 milliGRAM(s) Oral daily  gabapentin 800 milliGRAM(s) Oral Once  gabapentin 800 milliGRAM(s) Oral three times a day  midodrine 10 milliGRAM(s) Oral three times a day  multivitamin 1 Tablet(s) Oral daily  sodium chloride 0.9% lock flush 3 milliLiter(s) IV Push every 8 hours    MEDICATIONS  (PRN):  acetaminophen   Tablet .. 650 milliGRAM(s) Oral every 6 hours PRN Mild Pain (1 - 3)  bisacodyl Suppository 10 milliGRAM(s) Rectal daily PRN If no bowel movement by POD#2  diphenhydrAMINE 25 milliGRAM(s) Oral every 4 hours PRN Rash and/or Itching  melatonin 3 milliGRAM(s) Oral at bedtime PRN Insomnia  ondansetron Injectable 4 milliGRAM(s) IV Push every 6 hours PRN Nausea and/or Vomiting  polyethylene glycol 3350 17 Gram(s) Oral daily PRN Constipation          VITALS:   T(C): 37.1 (02-16-19 @ 15:46), Max: 37.2 (02-15-19 @ 21:10)  HR: 68 (02-16-19 @ 15:46) (66 - 98)  BP: 138/74 (02-16-19 @ 15:46) (97/61 - 143/78)  RR: 18 (02-16-19 @ 15:46) (16 - 18)  SpO2: 96% (02-16-19 @ 15:46) (95% - 98%)  Wt(kg): --    PHYSICAL EXAM:  GENERAL: NAD, well nourished and conversant  HEAD:  Atraumatic  EYES: EOM, PERRLA, conjunctiva pink and sclera white  ENT: No tonsillar erythema, exudates, or enlargement, moist mucous membranes, good dentition, no lesions  NECK: Supple, No JVD, normal thyroid, carotids with normal upstrokes and no bruits  CHEST/LUNG: Clear to auscultation bilaterally, No rales, rhonchi, wheezing, or rubs  HEART: Regular rate and rhythm, No murmurs, rubs, or gallops  ABDOMEN: Soft, nondistended, no masses, guarding, tenderness or rebound, bowel sounds present  EXTREMITIES:  4+ hip swelling and 3+ tenderness to touch. No erythema or incisional drainage.  LYMPH: No lymphadenopathy noted  SKIN: + right leg with a diffuse maculopapular rash.  NERVOUS SYSTEM:  Alert & Oriented X3, normal cognitive function. Motor Strength 5/5 right upper and right lower.  5/5 left upper and left lower extremities, DTRs 2+ intact and symmetric    LABS:                      CAPILLARY BLOOD GLUCOSE          RADIOLOGY & ADDITIONAL TESTS:

## 2019-02-16 NOTE — PROGRESS NOTE ADULT - ASSESSMENT
73M w/ pmh HTN, HLD, BPH, prostate cancer s/p prostatectomy p/w R hip pain - started around 10pm, pt was at home and tried to kick some trash but lost balance, spun around and fell down onto his R hip. Unable to ambulate since then, no pain elsewhere. No other complaints of fever, n/v/d/c, sob, cough, chest / abd pain. No recent travel, medication change, illness, or hospitalization. No preceding symptoms prior to fall. . Patient states he is very active, denies any CP or shortness of breath. Patient was found to have a right hip fx and is S/P ORIF of the right hip on 01/29/19. Patient with two episodes of post-op orthostatic hypotension when attempting to ambulate and returned to bed and given saline IV on both occasions. Hytrin stopped and midodrine added for recurrent orthostatic. Right leg rash has resolved and the patient is otherwise well;  awaiting rehabilitation bed availability.

## 2019-02-16 NOTE — PROGRESS NOTE ADULT - ATTENDING COMMENTS
Midodrine  added for orthostasis and able to walk today with no dizziness or orthostatic hypotension.    Beginning to ambulate and doing well. No medical complications post-op to date and to proceed with physical therapy, as tolerated. Continues pulmonary toilet to lessen atelectasis, leg exercises as taught to lessen the risk of DVT and supervised pain medications for post-op pain control. No new episodes of syncope since onset of midodrine. Medically stable and awaiting rehabilitation bed availability. I anticipate transfer to rehabilitation when approved by workman's comp. [place] planning in progress.

## 2019-02-16 NOTE — PROGRESS NOTE ADULT - SUBJECTIVE AND OBJECTIVE BOX
Patient is a 73y old  Male who presents with a chief complaint of Right Hip Fracture (15 Feb 2019 16:41)      POST OPERATIVE DAY #: 18  Patient comfortable     VS:  T(C): 36.7 (02-16-19 @ 04:49), Max: 37.2 (02-15-19 @ 08:30)  T(F): 98 (02-16-19 @ 04:49), Max: 99 (02-15-19 @ 21:10)  HR: 66 (02-16-19 @ 04:49) (66 - 95)  BP: 127/71 (02-16-19 @ 04:49) (127/71 - 154/72)  RR: 18 (02-16-19 @ 04:49) (17 - 18)  SpO2: 96% (02-16-19 @ 04:49) (95% - 97%)  Wt(kg): --      PHYSICAL EXAM:  NAD, Alert  EXT:   Rt Hip: incisions healing well  No Calf Tenderness  (+) DF/PF; (+) Distal Pulses;  Sensation: No gross deficits noted      B/L, PAS

## 2019-02-16 NOTE — PROGRESS NOTE ADULT - SUBJECTIVE AND OBJECTIVE BOX
Subjective: Patient seen and examined. No new events except as noted.     SUBJECTIVE/ROS:  feels well       MEDICATIONS:  MEDICATIONS  (STANDING):  ascorbic acid 500 milliGRAM(s) Oral two times a day  atorvastatin 10 milliGRAM(s) Oral at bedtime  Biotene Dry Mouth Oral Rinse 5 milliLiter(s) Swish and Spit daily  docusate sodium 100 milliGRAM(s) Oral three times a day  enoxaparin Injectable 40 milliGRAM(s) SubCutaneous every 24 hours  folic acid 1 milliGRAM(s) Oral daily  gabapentin 800 milliGRAM(s) Oral Once  gabapentin 800 milliGRAM(s) Oral three times a day  midodrine 10 milliGRAM(s) Oral three times a day  multivitamin 1 Tablet(s) Oral daily  sodium chloride 0.9% lock flush 3 milliLiter(s) IV Push every 8 hours      PHYSICAL EXAM:  T(C): 36.9 (02-16-19 @ 07:57), Max: 37.2 (02-15-19 @ 21:10)  HR: 69 (02-16-19 @ 07:57) (66 - 95)  BP: 135/77 (02-16-19 @ 07:57) (127/71 - 143/78)  RR: 16 (02-16-19 @ 07:57) (16 - 18)  SpO2: 96% (02-16-19 @ 07:57) (95% - 97%)  Wt(kg): --  I&O's Summary    15 Feb 2019 07:01  -  16 Feb 2019 07:00  --------------------------------------------------------  IN: 1210 mL / OUT: 2650 mL / NET: -1440 mL    16 Feb 2019 07:01  -  16 Feb 2019 09:05  --------------------------------------------------------  IN: 0 mL / OUT: 450 mL / NET: -450 mL        JVP: Normal  Neck: supple  Lung: clear   CV: S1 S2 , Murmur:  Abd: soft  Ext: No edema  neuro: Awake / alert  Psych: flat affect  Skin: normal        LABS/DATA:    CARDIAC MARKERS:                  proBNP:   Lipid Profile:   HgA1c:   TSH:     TELE:  EKG:

## 2019-02-16 NOTE — PROGRESS NOTE ADULT - ASSESSMENT
S/P IMN R Hip    Plan    con't present care  d/c planning in progress       Sinai Harry PA-C   Beeper    [id number]

## 2019-02-17 RX ADMIN — Medication 1 TABLET(S): at 13:17

## 2019-02-17 RX ADMIN — Medication 1 MILLIGRAM(S): at 13:17

## 2019-02-17 RX ADMIN — SODIUM CHLORIDE 3 MILLILITER(S): 9 INJECTION INTRAMUSCULAR; INTRAVENOUS; SUBCUTANEOUS at 13:15

## 2019-02-17 RX ADMIN — SODIUM CHLORIDE 3 MILLILITER(S): 9 INJECTION INTRAMUSCULAR; INTRAVENOUS; SUBCUTANEOUS at 05:35

## 2019-02-17 RX ADMIN — GABAPENTIN 800 MILLIGRAM(S): 400 CAPSULE ORAL at 13:17

## 2019-02-17 RX ADMIN — Medication 650 MILLIGRAM(S): at 21:45

## 2019-02-17 RX ADMIN — SODIUM CHLORIDE 3 MILLILITER(S): 9 INJECTION INTRAMUSCULAR; INTRAVENOUS; SUBCUTANEOUS at 21:04

## 2019-02-17 RX ADMIN — MIDODRINE HYDROCHLORIDE 10 MILLIGRAM(S): 2.5 TABLET ORAL at 13:17

## 2019-02-17 RX ADMIN — Medication 650 MILLIGRAM(S): at 21:09

## 2019-02-17 RX ADMIN — Medication 500 MILLIGRAM(S): at 05:37

## 2019-02-17 RX ADMIN — GABAPENTIN 800 MILLIGRAM(S): 400 CAPSULE ORAL at 05:37

## 2019-02-17 RX ADMIN — Medication 3 MILLIGRAM(S): at 21:09

## 2019-02-17 RX ADMIN — MIDODRINE HYDROCHLORIDE 10 MILLIGRAM(S): 2.5 TABLET ORAL at 05:37

## 2019-02-17 RX ADMIN — Medication 500 MILLIGRAM(S): at 17:56

## 2019-02-17 RX ADMIN — Medication 650 MILLIGRAM(S): at 00:30

## 2019-02-17 RX ADMIN — ATORVASTATIN CALCIUM 10 MILLIGRAM(S): 80 TABLET, FILM COATED ORAL at 21:09

## 2019-02-17 RX ADMIN — Medication 5 MILLILITER(S): at 13:17

## 2019-02-17 RX ADMIN — GABAPENTIN 800 MILLIGRAM(S): 400 CAPSULE ORAL at 21:09

## 2019-02-17 RX ADMIN — MIDODRINE HYDROCHLORIDE 10 MILLIGRAM(S): 2.5 TABLET ORAL at 21:09

## 2019-02-17 RX ADMIN — ENOXAPARIN SODIUM 40 MILLIGRAM(S): 100 INJECTION SUBCUTANEOUS at 08:49

## 2019-02-17 NOTE — PROGRESS NOTE ADULT - SUBJECTIVE AND OBJECTIVE BOX
73M w/ pmh HTN, HLD, BPH, prostate cancer s/p prostatectomy p/w R hip pain - started around 10pm, pt was at home and tried to kick some trash but lost balance, spun around and fell down onto his R hip. Unable to ambulate since then, no pain elsewhere. No other complaints of fever, n/v/d/c, sob, cough, chest / abd pain. No recent travel, medication change, illness, or hospitalization. No preceding symptoms prior to fall. Patient was found to have a right hip fx and is S/P ORIF of the right hip with IM nail placement on 01/29/19.  Patient states he was very active, denies any CP or shortness of breath. Patient with two episodes of orthostatic hypotension when attempting to ambulate and returned to bed and given saline IV on both occasions. No new episodes of syncope since onset of midodrine. Medically stable and awaiting rehabilitation bed availability.    MEDICATIONS  (STANDING):  ascorbic acid 500 milliGRAM(s) Oral two times a day  atorvastatin 10 milliGRAM(s) Oral at bedtime  Biotene Dry Mouth Oral Rinse 5 milliLiter(s) Swish and Spit daily  docusate sodium 100 milliGRAM(s) Oral three times a day  enoxaparin Injectable 40 milliGRAM(s) SubCutaneous every 24 hours  folic acid 1 milliGRAM(s) Oral daily  gabapentin 800 milliGRAM(s) Oral Once  gabapentin 800 milliGRAM(s) Oral three times a day  midodrine 10 milliGRAM(s) Oral three times a day  multivitamin 1 Tablet(s) Oral daily  sodium chloride 0.9% lock flush 3 milliLiter(s) IV Push every 8 hours    MEDICATIONS  (PRN):  acetaminophen   Tablet .. 650 milliGRAM(s) Oral every 6 hours PRN Mild Pain (1 - 3)  bisacodyl Suppository 10 milliGRAM(s) Rectal daily PRN If no bowel movement by POD#2  diphenhydrAMINE 25 milliGRAM(s) Oral every 4 hours PRN Rash and/or Itching  melatonin 3 milliGRAM(s) Oral at bedtime PRN Insomnia  ondansetron Injectable 4 milliGRAM(s) IV Push every 6 hours PRN Nausea and/or Vomiting  polyethylene glycol 3350 17 Gram(s) Oral daily PRN Constipation          VITALS:   T(C): 37.1 (02-17-19 @ 20:17), Max: 37.1 (02-16-19 @ 23:55)  HR: 76 (02-17-19 @ 20:17) (61 - 77)  BP: 130/76 (02-17-19 @ 20:17) (113/70 - 144/69)  RR: 18 (02-17-19 @ 20:17) (16 - 18)  SpO2: 98% (02-17-19 @ 20:17) (96% - 98%)  Wt(kg): --      PHYSICAL EXAM:  GENERAL: NAD, well nourished and conversant  HEAD:  Atraumatic  EYES: EOM, PERRLA, conjunctiva pink and sclera white  ENT: No tonsillar erythema, exudates, or enlargement, moist mucous membranes, good dentition, no lesions  NECK: Supple, No JVD, normal thyroid, carotids with normal upstrokes and no bruits  CHEST/LUNG: Clear to auscultation bilaterally, No rales, rhonchi, wheezing, or rubs  HEART: Regular rate and rhythm, No murmurs, rubs, or gallops  ABDOMEN: Soft, nondistended, no masses, guarding, tenderness or rebound, bowel sounds present  EXTREMITIES:  4+ hip swelling and 3+ tenderness to touch. No erythema or incisional drainage.  LYMPH: No lymphadenopathy noted  SKIN: + right leg with a diffuse maculopapular rash.  NERVOUS SYSTEM:  Alert & Oriented X3, normal cognitive function. Motor Strength 5/5 right upper and right lower.  5/5 left upper and left lower extremities, DTRs 2+ intact and symmetric  LABS:                      CAPILLARY BLOOD GLUCOSE          RADIOLOGY & ADDITIONAL TESTS:

## 2019-02-17 NOTE — PROGRESS NOTE ADULT - SUBJECTIVE AND OBJECTIVE BOX
Subjective: Patient seen and examined. No new events except as noted.     SUBJECTIVE/ROS:  No chest pain, dyspnea, palpitation, or dizziness.       MEDICATIONS:  MEDICATIONS  (STANDING):  ascorbic acid 500 milliGRAM(s) Oral two times a day  atorvastatin 10 milliGRAM(s) Oral at bedtime  Biotene Dry Mouth Oral Rinse 5 milliLiter(s) Swish and Spit daily  docusate sodium 100 milliGRAM(s) Oral three times a day  enoxaparin Injectable 40 milliGRAM(s) SubCutaneous every 24 hours  folic acid 1 milliGRAM(s) Oral daily  gabapentin 800 milliGRAM(s) Oral Once  gabapentin 800 milliGRAM(s) Oral three times a day  midodrine 10 milliGRAM(s) Oral three times a day  multivitamin 1 Tablet(s) Oral daily  sodium chloride 0.9% lock flush 3 milliLiter(s) IV Push every 8 hours      PHYSICAL EXAM:  T(C): 36.7 (02-17-19 @ 08:09), Max: 37.2 (02-16-19 @ 20:20)  HR: 76 (02-17-19 @ 08:09) (61 - 98)  BP: 113/70 (02-17-19 @ 08:09) (97/61 - 143/65)  RR: 18 (02-17-19 @ 08:09) (16 - 18)  SpO2: 96% (02-17-19 @ 08:09) (96% - 98%)  Wt(kg): --  I&O's Summary    16 Feb 2019 07:01  -  17 Feb 2019 07:00  --------------------------------------------------------  IN: 1540 mL / OUT: 2600 mL / NET: -1060 mL    17 Feb 2019 07:01  -  17 Feb 2019 09:38  --------------------------------------------------------  IN: 480 mL / OUT: 0 mL / NET: 480 mL          Appearance: Normal	  HEENT:   no gross abnormality   Cardiovascular: Normal S1 S2,    Murmur:   Neck: JVP normal  Respiratory: Lungs clear   Gastrointestinal:  Soft, Non-tender  Skin: normal   Neuro: No gross deficits.   Psychiatry:  Mood & affect flat  Ext: No edema      LABS/DATA:    CARDIAC MARKERS:                  proBNP:   Lipid Profile:   HgA1c:   TSH:     TELE:  EKG:

## 2019-02-17 NOTE — PROGRESS NOTE ADULT - ATTENDING COMMENTS
Midodrine  added for orthostasis and able to walk today with no dizziness or orthostatic hypotension.    Beginning to ambulate and doing well. No medical complications post-op to date and to proceed with physical therapy, as tolerated. Continues pulmonary toilet to lessen atelectasis, leg exercises as taught to lessen the risk of DVT and supervised pain medications for post-op pain control. No new episodes of syncope since onset of midodrine. Medically stable and awaiting rehabilitation bed availability. I anticipate transfer to rehabilitation when approved by workman's comp. [place] planning in progress.

## 2019-02-18 RX ADMIN — SODIUM CHLORIDE 3 MILLILITER(S): 9 INJECTION INTRAMUSCULAR; INTRAVENOUS; SUBCUTANEOUS at 21:52

## 2019-02-18 RX ADMIN — MIDODRINE HYDROCHLORIDE 10 MILLIGRAM(S): 2.5 TABLET ORAL at 06:16

## 2019-02-18 RX ADMIN — MIDODRINE HYDROCHLORIDE 10 MILLIGRAM(S): 2.5 TABLET ORAL at 14:02

## 2019-02-18 RX ADMIN — Medication 500 MILLIGRAM(S): at 06:16

## 2019-02-18 RX ADMIN — Medication 3 MILLIGRAM(S): at 21:53

## 2019-02-18 RX ADMIN — Medication 500 MILLIGRAM(S): at 18:28

## 2019-02-18 RX ADMIN — Medication 650 MILLIGRAM(S): at 22:35

## 2019-02-18 RX ADMIN — Medication 1 MILLIGRAM(S): at 11:05

## 2019-02-18 RX ADMIN — SODIUM CHLORIDE 3 MILLILITER(S): 9 INJECTION INTRAMUSCULAR; INTRAVENOUS; SUBCUTANEOUS at 06:15

## 2019-02-18 RX ADMIN — SODIUM CHLORIDE 3 MILLILITER(S): 9 INJECTION INTRAMUSCULAR; INTRAVENOUS; SUBCUTANEOUS at 14:04

## 2019-02-18 RX ADMIN — GABAPENTIN 800 MILLIGRAM(S): 400 CAPSULE ORAL at 21:53

## 2019-02-18 RX ADMIN — GABAPENTIN 800 MILLIGRAM(S): 400 CAPSULE ORAL at 14:02

## 2019-02-18 RX ADMIN — MIDODRINE HYDROCHLORIDE 10 MILLIGRAM(S): 2.5 TABLET ORAL at 21:54

## 2019-02-18 RX ADMIN — Medication 1 TABLET(S): at 11:04

## 2019-02-18 RX ADMIN — Medication 650 MILLIGRAM(S): at 21:54

## 2019-02-18 RX ADMIN — ENOXAPARIN SODIUM 40 MILLIGRAM(S): 100 INJECTION SUBCUTANEOUS at 11:05

## 2019-02-18 RX ADMIN — Medication 5 MILLILITER(S): at 11:59

## 2019-02-18 RX ADMIN — GABAPENTIN 800 MILLIGRAM(S): 400 CAPSULE ORAL at 06:16

## 2019-02-18 RX ADMIN — ATORVASTATIN CALCIUM 10 MILLIGRAM(S): 80 TABLET, FILM COATED ORAL at 21:53

## 2019-02-18 NOTE — PROGRESS NOTE ADULT - ATTENDING COMMENTS
Midodrine  added for orthostasis and able to walk today with no dizziness or orthostatic hypotension.  Beginning to ambulate and doing well. No medical complications post-op to date and to proceed with physical therapy, as tolerated. Continues pulmonary toilet to lessen atelectasis, leg exercises as taught to lessen the risk of DVT and supervised pain medications for post-op pain control. No new episodes of syncope since onset of midodrine. Medically stable and awaiting rehabilitation bed availability. I anticipate transfer to rehabilitation when approved by workman's comp. [place] planning in progress.

## 2019-02-18 NOTE — PROGRESS NOTE ADULT - SUBJECTIVE AND OBJECTIVE BOX
Subjective: Patient seen and examined. No new events except as noted.     SUBJECTIVE/ROS:  feels well       MEDICATIONS:  MEDICATIONS  (STANDING):  ascorbic acid 500 milliGRAM(s) Oral two times a day  atorvastatin 10 milliGRAM(s) Oral at bedtime  Biotene Dry Mouth Oral Rinse 5 milliLiter(s) Swish and Spit daily  enoxaparin Injectable 40 milliGRAM(s) SubCutaneous every 24 hours  folic acid 1 milliGRAM(s) Oral daily  gabapentin 800 milliGRAM(s) Oral Once  gabapentin 800 milliGRAM(s) Oral three times a day  midodrine 10 milliGRAM(s) Oral three times a day  multivitamin 1 Tablet(s) Oral daily  sodium chloride 0.9% lock flush 3 milliLiter(s) IV Push every 8 hours      PHYSICAL EXAM:  T(C): 36.1 (02-18-19 @ 08:57), Max: 37.1 (02-17-19 @ 20:17)  HR: 82 (02-18-19 @ 08:57) (63 - 82)  BP: 127/68 (02-18-19 @ 08:57) (127/68 - 144/69)  RR: 18 (02-18-19 @ 08:57) (18 - 18)  SpO2: 97% (02-18-19 @ 08:57) (96% - 98%)  Wt(kg): --  I&O's Summary    17 Feb 2019 07:01  -  18 Feb 2019 07:00  --------------------------------------------------------  IN: 1320 mL / OUT: 1750 mL / NET: -430 mL        JVP: Normal  Neck: supple  Lung: clear   CV: S1 S2 , Murmur:  Abd: soft  Ext: No edema  neuro: Awake / alert  Psych: flat affect  Skin: normal        LABS/DATA:    CARDIAC MARKERS:                  proBNP:   Lipid Profile:   HgA1c:   TSH:     TELE:  EKG:

## 2019-02-18 NOTE — PROGRESS NOTE ADULT - SUBJECTIVE AND OBJECTIVE BOX
73M w/ pmh HTN, HLD, BPH, prostate cancer s/p prostatectomy p/w R hip pain - started around 10pm, pt was at home and tried to kick some trash but lost balance, spun around and fell down onto his R hip. Unable to ambulate since then, no pain elsewhere. No other complaints of fever, n/v/d/c, sob, cough, chest / abd pain. No recent travel, medication change, illness, or hospitalization. No preceding symptoms prior to fall. Patient was found to have a right hip fx and is S/P ORIF of the right hip with IM nail placement on 01/29/19.  Patient states he was very active, denies any CP or shortness of breath. Patient with two episodes of orthostatic hypotension when attempting to ambulate and returned to bed and given saline IV on both occasions. No new episodes of syncope since onset of midodrine. Medically stable and awaiting rehabilitation bed availability.    MEDICATIONS  (STANDING):  ascorbic acid 500 milliGRAM(s) Oral two times a day  atorvastatin 10 milliGRAM(s) Oral at bedtime  Biotene Dry Mouth Oral Rinse 5 milliLiter(s) Swish and Spit daily  enoxaparin Injectable 40 milliGRAM(s) SubCutaneous every 24 hours  folic acid 1 milliGRAM(s) Oral daily  gabapentin 800 milliGRAM(s) Oral Once  gabapentin 800 milliGRAM(s) Oral three times a day  midodrine 10 milliGRAM(s) Oral three times a day  multivitamin 1 Tablet(s) Oral daily  sodium chloride 0.9% lock flush 3 milliLiter(s) IV Push every 8 hours    MEDICATIONS  (PRN):  acetaminophen   Tablet .. 650 milliGRAM(s) Oral every 6 hours PRN Mild Pain (1 - 3)  bisacodyl Suppository 10 milliGRAM(s) Rectal daily PRN If no bowel movement by POD#2  diphenhydrAMINE 25 milliGRAM(s) Oral every 4 hours PRN Rash and/or Itching  melatonin 3 milliGRAM(s) Oral at bedtime PRN Insomnia  ondansetron Injectable 4 milliGRAM(s) IV Push every 6 hours PRN Nausea and/or Vomiting          VITALS:   T(C): 36.1 (02-18-19 @ 08:57), Max: 37.1 (02-17-19 @ 20:17)  HR: 82 (02-18-19 @ 08:57) (63 - 82)  BP: 127/68 (02-18-19 @ 08:57) (127/68 - 144/69)  RR: 18 (02-18-19 @ 08:57) (18 - 18)  SpO2: 97% (02-18-19 @ 08:57) (96% - 98%)  Wt(kg): --    PHYSICAL EXAM:  GENERAL: NAD, well nourished and conversant  HEAD:  Atraumatic  EYES: EOM, PERRLA, conjunctiva pink and sclera white  ENT: No tonsillar erythema, exudates, or enlargement, moist mucous membranes, good dentition, no lesions  NECK: Supple, No JVD, normal thyroid, carotids with normal upstrokes and no bruits  CHEST/LUNG: Clear to auscultation bilaterally, No rales, rhonchi, wheezing, or rubs  HEART: Regular rate and rhythm, No murmurs, rubs, or gallops  ABDOMEN: Soft, nondistended, no masses, guarding, tenderness or rebound, bowel sounds present  EXTREMITIES:  4+ hip swelling and 3+ tenderness to touch. No erythema or incisional drainage.  LYMPH: No lymphadenopathy noted  SKIN: + right leg with a diffuse maculopapular rash.  NERVOUS SYSTEM:  Alert & Oriented X3, normal cognitive function. Motor Strength 5/5 right upper and right lower.  5/5 left upper and left lower extremities, DTRs 2+ intact and symmetric    LABS:                      CAPILLARY BLOOD GLUCOSE          RADIOLOGY & ADDITIONAL TESTS:

## 2019-02-19 RX ADMIN — ENOXAPARIN SODIUM 40 MILLIGRAM(S): 100 INJECTION SUBCUTANEOUS at 09:25

## 2019-02-19 RX ADMIN — GABAPENTIN 800 MILLIGRAM(S): 400 CAPSULE ORAL at 13:24

## 2019-02-19 RX ADMIN — GABAPENTIN 800 MILLIGRAM(S): 400 CAPSULE ORAL at 06:11

## 2019-02-19 RX ADMIN — Medication 650 MILLIGRAM(S): at 21:29

## 2019-02-19 RX ADMIN — SODIUM CHLORIDE 3 MILLILITER(S): 9 INJECTION INTRAMUSCULAR; INTRAVENOUS; SUBCUTANEOUS at 06:12

## 2019-02-19 RX ADMIN — MIDODRINE HYDROCHLORIDE 10 MILLIGRAM(S): 2.5 TABLET ORAL at 06:10

## 2019-02-19 RX ADMIN — MIDODRINE HYDROCHLORIDE 10 MILLIGRAM(S): 2.5 TABLET ORAL at 21:29

## 2019-02-19 RX ADMIN — Medication 1 TABLET(S): at 12:41

## 2019-02-19 RX ADMIN — GABAPENTIN 800 MILLIGRAM(S): 400 CAPSULE ORAL at 21:28

## 2019-02-19 RX ADMIN — ATORVASTATIN CALCIUM 10 MILLIGRAM(S): 80 TABLET, FILM COATED ORAL at 21:28

## 2019-02-19 RX ADMIN — Medication 500 MILLIGRAM(S): at 06:11

## 2019-02-19 RX ADMIN — SODIUM CHLORIDE 3 MILLILITER(S): 9 INJECTION INTRAMUSCULAR; INTRAVENOUS; SUBCUTANEOUS at 19:39

## 2019-02-19 RX ADMIN — SODIUM CHLORIDE 3 MILLILITER(S): 9 INJECTION INTRAMUSCULAR; INTRAVENOUS; SUBCUTANEOUS at 13:23

## 2019-02-19 RX ADMIN — MIDODRINE HYDROCHLORIDE 10 MILLIGRAM(S): 2.5 TABLET ORAL at 13:24

## 2019-02-19 RX ADMIN — Medication 5 MILLILITER(S): at 12:41

## 2019-02-19 RX ADMIN — Medication 500 MILLIGRAM(S): at 18:06

## 2019-02-19 RX ADMIN — Medication 3 MILLIGRAM(S): at 21:28

## 2019-02-19 RX ADMIN — Medication 1 MILLIGRAM(S): at 12:41

## 2019-02-19 RX ADMIN — Medication 650 MILLIGRAM(S): at 22:00

## 2019-02-19 NOTE — CHART NOTE - NSCHARTNOTEFT_GEN_A_CORE
Nutrition Follow Up Note  Patient seen for: nutrition follow up on [facility]    Chart reviewed, events noted. Pt is 74yo M with PMH significant for HTN. HLD, prostate CA s/p prostatectomy, presenting with R hip fracture. s/p R hip IM nail. Course c/b 2 episodes  post-op orthostatic hypotension. Awaiting rehab placement.    Source: pt, chart    Diet : Regular    Patient reports: No acute GI distress. Last BM 2/17.     PO intake : Good, consuming % of meals. Pt reports he really enjoys our hospital food and has no complaints. No nutrition related questions at this time. Pt made aware RD remains available.     Source for PO intake: pt     Enteral /Parenteral Nutrition: n/a      Daily: No new wt to assess    Pertinent Medications: MEDICATIONS  (STANDING):  ascorbic acid 500 milliGRAM(s) Oral two times a day  atorvastatin 10 milliGRAM(s) Oral at bedtime  Biotene Dry Mouth Oral Rinse 5 milliLiter(s) Swish and Spit daily  enoxaparin Injectable 40 milliGRAM(s) SubCutaneous every 24 hours  folic acid 1 milliGRAM(s) Oral daily  gabapentin 800 milliGRAM(s) Oral Once  gabapentin 800 milliGRAM(s) Oral three times a day  midodrine 10 milliGRAM(s) Oral three times a day  multivitamin 1 Tablet(s) Oral daily  sodium chloride 0.9% lock flush 3 milliLiter(s) IV Push every 8 hours    MEDICATIONS  (PRN):  acetaminophen   Tablet .. 650 milliGRAM(s) Oral every 6 hours PRN Mild Pain (1 - 3)  bisacodyl Suppository 10 milliGRAM(s) Rectal daily PRN If no bowel movement by POD#2  diphenhydrAMINE 25 milliGRAM(s) Oral every 4 hours PRN Rash and/or Itching  melatonin 3 milliGRAM(s) Oral at bedtime PRN Insomnia  ondansetron Injectable 4 milliGRAM(s) IV Push every 6 hours PRN Nausea and/or Vomiting    Pertinent Labs:   Finger Sticks:      Skin per nursing documentation: no pressure injuries noted  Edema: 1+ edema to R hip noted    Estimated Needs:   [x] no change since previous assessment  [ ] recalculated:     Previous Nutrition Diagnosis: no nutrition diagnosis    New Nutrition Diagnosis: n/a  Related to:    As evidenced by:      Interventions:     Recommend  1) Continue to provide current diet order, no therapeutic diet restrictions indicated at this time.   2) Continue micronutrient supplementation as indicated.    Monitoring and Evaluation:     Continue to monitor Nutritional intake, Tolerance to diet prescription, weights, labs, skin integrity    RD remains available upon request and will follow up per protocol. Jyotsna Justin RD, CDN Pager: [phone]

## 2019-02-19 NOTE — PROGRESS NOTE ADULT - ASSESSMENT
Impression: Stable       Plan:   Continue present treatment                 Out of bed, ambulate, weight bearing as tolerated                  Physical therapy follow up                  Continue to monitor    Yosef Figueredo PA-C  Orthopaedic Surgery  Team pager [phone]  [phone]

## 2019-02-19 NOTE — PROGRESS NOTE ADULT - SUBJECTIVE AND OBJECTIVE BOX
Subjective: Patient seen and examined. No new events except as noted.     SUBJECTIVE/ROS:  feels well  No chest pain, dyspnea, palpitation, or dizziness.     MEDICATIONS:  MEDICATIONS  (STANDING):  ascorbic acid 500 milliGRAM(s) Oral two times a day  atorvastatin 10 milliGRAM(s) Oral at bedtime  Biotene Dry Mouth Oral Rinse 5 milliLiter(s) Swish and Spit daily  enoxaparin Injectable 40 milliGRAM(s) SubCutaneous every 24 hours  folic acid 1 milliGRAM(s) Oral daily  gabapentin 800 milliGRAM(s) Oral Once  gabapentin 800 milliGRAM(s) Oral three times a day  midodrine 10 milliGRAM(s) Oral three times a day  multivitamin 1 Tablet(s) Oral daily  sodium chloride 0.9% lock flush 3 milliLiter(s) IV Push every 8 hours      PHYSICAL EXAM:  T(C): 36.9 (02-19-19 @ 04:16), Max: 37.3 (02-18-19 @ 14:30)  HR: 68 (02-19-19 @ 04:16) (66 - 82)  BP: 130/75 (02-19-19 @ 04:16) (122/67 - 149/72)  RR: 18 (02-19-19 @ 04:16) (18 - 18)  SpO2: 94% (02-19-19 @ 04:16) (94% - 97%)  Wt(kg): --  I&O's Summary    18 Feb 2019 07:01  -  19 Feb 2019 07:00  --------------------------------------------------------  IN: 2000 mL / OUT: 930 mL / NET: 1070 mL          Appearance: Normal	  HEENT:   no gross abnormality   Cardiovascular: Normal S1 S2,    Murmur:   Neck: JVP normal  Respiratory: Lungs clear   Gastrointestinal:  Soft, Non-tender  Skin: normal   Neuro: No gross deficits.   Psychiatry:  Mood & affect flat  Ext: No edema      LABS/DATA:    CARDIAC MARKERS:                  proBNP:   Lipid Profile:   HgA1c:   TSH:     TELE:  EKG:

## 2019-02-19 NOTE — PROGRESS NOTE ADULT - SUBJECTIVE AND OBJECTIVE BOX
73M w/ pmh HTN, HLD, BPH, prostate cancer s/p prostatectomy p/w R hip pain - started around 10pm, pt was at home and tried to kick some trash but lost balance, spun around and fell down onto his R hip. Unable to ambulate since then, no pain elsewhere. No other complaints of fever, n/v/d/c, sob, cough, chest / abd pain. No recent travel, medication change, illness, or hospitalization. No preceding symptoms prior to fall. Patient was found to have a right hip fx and is S/P ORIF of the right hip with IM nail placement on 01/29/19.  Patient states he was very active, denies any CP or shortness of breath. Patient with two episodes of orthostatic hypotension when attempting to ambulate and returned to bed and given saline IV on both occasions. No new episodes of syncope since onset of midodrine. Medically stable and awaiting rehabilitation bed availability.    MEDICATIONS  (STANDING):  ascorbic acid 500 milliGRAM(s) Oral two times a day  atorvastatin 10 milliGRAM(s) Oral at bedtime  Biotene Dry Mouth Oral Rinse 5 milliLiter(s) Swish and Spit daily  enoxaparin Injectable 40 milliGRAM(s) SubCutaneous every 24 hours  folic acid 1 milliGRAM(s) Oral daily  gabapentin 800 milliGRAM(s) Oral Once  gabapentin 800 milliGRAM(s) Oral three times a day  midodrine 10 milliGRAM(s) Oral three times a day  multivitamin 1 Tablet(s) Oral daily  sodium chloride 0.9% lock flush 3 milliLiter(s) IV Push every 8 hours    MEDICATIONS  (PRN):  acetaminophen   Tablet .. 650 milliGRAM(s) Oral every 6 hours PRN Mild Pain (1 - 3)  bisacodyl Suppository 10 milliGRAM(s) Rectal daily PRN If no bowel movement by POD#2  diphenhydrAMINE 25 milliGRAM(s) Oral every 4 hours PRN Rash and/or Itching  melatonin 3 milliGRAM(s) Oral at bedtime PRN Insomnia  ondansetron Injectable 4 milliGRAM(s) IV Push every 6 hours PRN Nausea and/or Vomiting          VITALS:   T(C): 36.1 (02-18-19 @ 08:57), Max: 37.1 (02-17-19 @ 20:17)  HR: 82 (02-18-19 @ 08:57) (63 - 82)  BP: 127/68 (02-18-19 @ 08:57) (127/68 - 144/69)  RR: 18 (02-18-19 @ 08:57) (18 - 18)  SpO2: 97% (02-18-19 @ 08:57) (96% - 98%)  Wt(kg): --    PHYSICAL EXAM:  GENERAL: NAD, well nourished and conversant  HEAD:  Atraumatic  EYES: EOM, PERRLA, conjunctiva pink and sclera white  ENT: No tonsillar erythema, exudates, or enlargement, moist mucous membranes, good dentition, no lesions  NECK: Supple, No JVD, normal thyroid, carotids with normal upstrokes and no bruits  CHEST/LUNG: Clear to auscultation bilaterally, No rales, rhonchi, wheezing, or rubs  HEART: Regular rate and rhythm, No murmurs, rubs, or gallops  ABDOMEN: Soft, nondistended, no masses, guarding, tenderness or rebound, bowel sounds present  EXTREMITIES:  4+ hip swelling and 3+ tenderness to touch. No erythema or incisional drainage.  LYMPH: No lymphadenopathy noted  SKIN: + right leg with a diffuse maculopapular rash.  NERVOUS SYSTEM:  Alert & Oriented X3, normal cognitive function. Motor Strength 5/5 right upper and right lower.  5/5 left upper and left lower extremities, DTRs 2+ intact and symmetric    LABS:                      CAPILLARY BLOOD GLUCOSE          RADIOLOGY & ADDITIONAL TESTS: 73M w/ pmh HTN, HLD, BPH, prostate cancer s/p prostatectomy p/w R hip pain - started around 10pm, pt was at home and tried to kick some trash but lost balance, spun around and fell down onto his R hip. Unable to ambulate since then, no pain elsewhere. No other complaints of fever, n/v/d/c, sob, cough, chest / abd pain. No recent travel, medication change, illness, or hospitalization. No preceding symptoms prior to fall. Patient was found to have a right hip fx and is S/P ORIF of the right hip with IM nail placement on 01/29/19.  Patient states he was very active, denies any CP or shortness of breath. Patient with two episodes of orthostatic hypotension when attempting to ambulate and returned to bed and given saline IV on both occasions. No new episodes of syncope since onset of midodrine. Medically stable and awaiting rehabilitation bed availability.    MEDICATIONS  (STANDING):  ascorbic acid 500 milliGRAM(s) Oral two times a day  atorvastatin 10 milliGRAM(s) Oral at bedtime  Biotene Dry Mouth Oral Rinse 5 milliLiter(s) Swish and Spit daily  enoxaparin Injectable 40 milliGRAM(s) SubCutaneous every 24 hours  folic acid 1 milliGRAM(s) Oral daily  gabapentin 800 milliGRAM(s) Oral Once  gabapentin 800 milliGRAM(s) Oral three times a day  midodrine 10 milliGRAM(s) Oral three times a day  multivitamin 1 Tablet(s) Oral daily  sodium chloride 0.9% lock flush 3 milliLiter(s) IV Push every 8 hours    MEDICATIONS  (PRN):  acetaminophen   Tablet .. 650 milliGRAM(s) Oral every 6 hours PRN Mild Pain (1 - 3)  bisacodyl Suppository 10 milliGRAM(s) Rectal daily PRN If no bowel movement by POD#2  diphenhydrAMINE 25 milliGRAM(s) Oral every 4 hours PRN Rash and/or Itching  melatonin 3 milliGRAM(s) Oral at bedtime PRN Insomnia  ondansetron Injectable 4 milliGRAM(s) IV Push every 6 hours PRN Nausea and/or Vomiting    Vital Signs Last 24 Hrs  T(C): 36.5 (20 Feb 2019 04:20), Max: 37.2 (19 Feb 2019 20:00)  T(F): 97.7 (20 Feb 2019 04:20), Max: 98.9 (19 Feb 2019 20:00)  HR: 64 (20 Feb 2019 04:20) (64 - 79)  BP: 119/68 (20 Feb 2019 04:20) (115/66 - 137/73)  BP(mean): --  RR: 18 (20 Feb 2019 04:20) (16 - 18)  SpO2: 97% (20 Feb 2019 04:20) (94% - 98%)    PHYSICAL EXAM:  GENERAL: NAD, well nourished and conversant  HEAD:  Atraumatic  EYES: EOM, PERRLA, conjunctiva pink and sclera white  ENT: No tonsillar erythema, exudates, or enlargement, moist mucous membranes, good dentition, no lesions  NECK: Supple, No JVD, normal thyroid, carotids with normal upstrokes and no bruits  CHEST/LUNG: Clear to auscultation bilaterally, No rales, rhonchi, wheezing, or rubs  HEART: Regular rate and rhythm, No murmurs, rubs, or gallops  ABDOMEN: Soft, nondistended, no masses, guarding, tenderness or rebound, bowel sounds present  EXTREMITIES:  4+ hip swelling and 3+ tenderness to touch. No erythema or incisional drainage.  LYMPH: No lymphadenopathy noted  SKIN: + right leg with a diffuse maculopapular rash.  NERVOUS SYSTEM:  Alert & Oriented X3, normal cognitive function. Motor Strength 5/5 right upper and right lower.  5/5 left upper and left lower extremities, DTRs 2+ intact and symmetric    LABS:                      CAPILLARY BLOOD GLUCOSE          RADIOLOGY & ADDITIONAL TESTS:

## 2019-02-19 NOTE — PROGRESS NOTE ADULT - SUBJECTIVE AND OBJECTIVE BOX
ORTHO  Patient is a 73y old  Male who presents with a chief complaint of Right Hip Fracture (18 Feb 2019 11:55)    Pt. resting without complaint    VS-  T(C): 36.9 (02-19-19 @ 04:16), Max: 37.3 (02-18-19 @ 14:30)  HR: 68 (02-19-19 @ 04:16) (66 - 82)  BP: 130/75 (02-19-19 @ 04:16) (122/67 - 149/72)  RR: 18 (02-19-19 @ 04:16) (18 - 18)  SpO2: 94% (02-19-19 @ 04:16) (94% - 97%)  Wt(kg): --    M.S. A&O  Extremity- Right LE- wounds C/D/I	  Neuro-              Motor- (+) Ankle- DF/PF              Sensation- grossly intact to light touch              Calves- soft, nontender

## 2019-02-19 NOTE — PROGRESS NOTE ADULT - ATTENDING COMMENTS
Midodrine  added for orthostasis and able to walk today with no dizziness or orthostatic hypotension.  Beginning to ambulate and doing well. No medical complications post-op to date and to proceed with physical therapy, as tolerated. Continues pulmonary toilet to lessen atelectasis, leg exercises as taught to lessen the risk of DVT and supervised pain medications for post-op pain control. No new episodes of syncope since onset of midodrine. Medically stable and awaiting rehabilitation bed availability. I anticipate transfer to rehabilitation when approved by workman's comp. [place] planning in progress. Midodrine  added for orthostasis and able to walk today with no dizziness or orthostatic hypotension.  Beginning to ambulate and doing well. No medical complications post-op to date and to proceed with physical therapy, as tolerated. Continues pulmonary toilet to lessen atelectasis, leg exercises as taught to lessen the risk of DVT and supervised pain medications for post-op pain control. No new episodes of syncope since onset of midodrine. No new intervalchanges at present.  Medically stable and awaiting rehabilitation bed availability. I anticipate transfer to rehabilitation when approved by workman's comp. [place] planning in progress. Midodrine  added for orthostasis and able to walk today with no dizziness or orthostatic hypotension.  Beginning to ambulate and doing well. No medical complications post-op to date and to proceed with physical therapy, as tolerated. Continues pulmonary toilet to lessen atelectasis, leg exercises as taught to lessen the risk of DVT and supervised pain medications for post-op pain control. No new episodes of syncope since onset of midodrine. No new interval changes at present.  Medically stable and awaiting rehabilitation bed availability. I anticipate transfer to rehabilitation when approved by workman's comp. [place] planning in progress.

## 2019-02-20 ENCOUNTER — INBOUND DOCUMENT (OUTPATIENT)
Age: 74
End: 2019-02-20

## 2019-02-20 VITALS
SYSTOLIC BLOOD PRESSURE: 113 MMHG | RESPIRATION RATE: 16 BRPM | DIASTOLIC BLOOD PRESSURE: 70 MMHG | TEMPERATURE: 98 F | HEART RATE: 83 BPM | OXYGEN SATURATION: 98 %

## 2019-02-20 RX ORDER — ACETAMINOPHEN 500 MG
2 TABLET ORAL
Qty: 0 | Refills: 0 | DISCHARGE
Start: 2019-02-20

## 2019-02-20 RX ORDER — LANOLIN ALCOHOL/MO/W.PET/CERES
1 CREAM (GRAM) TOPICAL
Qty: 0 | Refills: 0 | DISCHARGE
Start: 2019-02-20

## 2019-02-20 RX ORDER — ACETAMINOPHEN 500 MG
1 TABLET ORAL
Qty: 0 | Refills: 0 | COMMUNITY

## 2019-02-20 RX ORDER — OXYCODONE HYDROCHLORIDE 5 MG/1
1 TABLET ORAL
Qty: 20 | Refills: 0
Start: 2019-02-20

## 2019-02-20 RX ORDER — AMLODIPINE BESYLATE 2.5 MG/1
1 TABLET ORAL
Qty: 0 | Refills: 0 | COMMUNITY

## 2019-02-20 RX ORDER — DIPHENHYDRAMINE HCL 50 MG
1 CAPSULE ORAL
Qty: 0 | Refills: 0 | DISCHARGE
Start: 2019-02-20

## 2019-02-20 RX ORDER — GABAPENTIN 400 MG/1
1 CAPSULE ORAL
Qty: 0 | Refills: 0 | DISCHARGE
Start: 2019-02-20

## 2019-02-20 RX ADMIN — MIDODRINE HYDROCHLORIDE 10 MILLIGRAM(S): 2.5 TABLET ORAL at 13:37

## 2019-02-20 RX ADMIN — ENOXAPARIN SODIUM 40 MILLIGRAM(S): 100 INJECTION SUBCUTANEOUS at 10:08

## 2019-02-20 RX ADMIN — Medication 1 MILLIGRAM(S): at 11:57

## 2019-02-20 RX ADMIN — Medication 500 MILLIGRAM(S): at 05:10

## 2019-02-20 RX ADMIN — GABAPENTIN 800 MILLIGRAM(S): 400 CAPSULE ORAL at 13:37

## 2019-02-20 RX ADMIN — SODIUM CHLORIDE 3 MILLILITER(S): 9 INJECTION INTRAMUSCULAR; INTRAVENOUS; SUBCUTANEOUS at 05:11

## 2019-02-20 RX ADMIN — Medication 1 TABLET(S): at 11:57

## 2019-02-20 RX ADMIN — GABAPENTIN 800 MILLIGRAM(S): 400 CAPSULE ORAL at 05:11

## 2019-02-20 RX ADMIN — MIDODRINE HYDROCHLORIDE 10 MILLIGRAM(S): 2.5 TABLET ORAL at 05:10

## 2019-02-20 RX ADMIN — SODIUM CHLORIDE 3 MILLILITER(S): 9 INJECTION INTRAMUSCULAR; INTRAVENOUS; SUBCUTANEOUS at 13:37

## 2019-02-20 NOTE — PROGRESS NOTE ADULT - ATTENDING COMMENTS
Midodrine  added for orthostasis and able to walk today with no dizziness or orthostatic hypotension.  Beginning to ambulate and doing well. No medical complications post-op to date and to proceed with physical therapy, as tolerated. Continues pulmonary toilet to lessen atelectasis, leg exercises as taught to lessen the risk of DVT and supervised pain medications for post-op pain control. No new episodes of syncope since onset of midodrine. No new interval changes at present.  Medically stable and awaiting rehabilitation bed availability. I anticipate transfer to rehabilitation when approved by workman's comp. [place] planning in progress.

## 2019-02-20 NOTE — PROGRESS NOTE ADULT - SUBJECTIVE AND OBJECTIVE BOX
Patient seen and examined. Pain controlled. Awaiting rehab placement.    Vital Signs Last 24 Hrs  T(C): 36.5 (02-20-19 @ 04:20), Max: 37.2 (02-19-19 @ 20:00)  T(F): 97.7 (02-20-19 @ 04:20), Max: 98.9 (02-19-19 @ 20:00)  HR: 64 (02-20-19 @ 04:20) (64 - 79)  BP: 119/68 (02-20-19 @ 04:20) (115/66 - 137/73)  BP(mean): --  RR: 18 (02-20-19 @ 04:20) (16 - 18)  SpO2: 97% (02-20-19 @ 04:20) (94% - 98%)    Physical Exam  Gen: NAD  RLE:   Steristrips c/d/i  +EHL/FHL/Gsc/TA  SILT L3-S1  DP +  Compartments soft  No calf ttp    A/P: 73y Male sp R hip IMN POD 22  Pain control  DVT ppx  PT/OT, WBAT  FU labs  Dispo planning- rehab when approved  D/w attending

## 2019-02-20 NOTE — PROGRESS NOTE ADULT - PROBLEM SELECTOR PLAN 4
continue midodrine  no new syncopy
continue gentle hydration  liberalize sodium in diet
continue midodrine
continue midodrine  BP remains stable  occasional continue dizziness which should improve with continue physical therapy and ambulation
continue midodrine  continue PO  encourage fluids
continue midodrine  no new syncope  occasional continue dizziness which should improve with continue physical therapy and ambulation
continue midodrine  no new syncopy
continue midodrine  no new syncopy
started on midodrine  continue PO
started on midodrine  continue PO

## 2019-02-20 NOTE — PROGRESS NOTE ADULT - ATTENDING COMMENTS
Midodrine  added for orthostasis and able to walk today with no dizziness or orthostatic hypotension.  Beginning to ambulate and doing well. No medical complications post-op to date and to proceed with physical therapy, as tolerated. Continues pulmonary toilet to lessen atelectasis, leg exercises as taught to lessen the risk of DVT and supervised pain medications for post-op pain control. No new episodes of syncope since onset of midodrine. No new interval changes at present.  Medically stable and awaiting rehabilitation bed availability. I anticipate transfer to rehabilitation when approved by workman's comp. [place] planning in progress. Midodrine  added for orthostasis and able to walk today with no dizziness or orthostatic hypotension.  Beginning to ambulate and doing well. No medical complications post-op to date and to proceed with physical therapy, as tolerated. Continues pulmonary toilet to lessen atelectasis, leg exercises as taught to lessen the risk of DVT and supervised pain medications for post-op pain control. No new episodes of syncope since onset of midodrine. No new interval changes at present. Cleared by orthopedics for discharge to rehabilitation.  Full instructions provided regarding diagnosis, treatment, discharge medications, diet and follow up care on transfer sheet. Medically stable and for discharge to rehabilitation today as planned.

## 2019-02-20 NOTE — PROGRESS NOTE ADULT - PROBLEM SELECTOR PROBLEM 3
Hypertension

## 2019-02-20 NOTE — PROGRESS NOTE ADULT - PROVIDER SPECIALTY LIST ADULT
Cardiology
Internal Medicine
Orthopedics
Cardiology
Orthopedics
Internal Medicine

## 2019-02-20 NOTE — PROGRESS NOTE ADULT - PROBLEM SELECTOR PROBLEM 5
Constipation

## 2019-02-20 NOTE — PROGRESS NOTE ADULT - SUBJECTIVE AND OBJECTIVE BOX
73M w/ pmh HTN, HLD, BPH, prostate cancer s/p prostatectomy p/w R hip pain - started around 10pm, pt was at home and tried to kick some trash but lost balance, spun around and fell down onto his R hip. Unable to ambulate since then, no pain elsewhere. No other complaints of fever, n/v/d/c, sob, cough, chest / abd pain. No recent travel, medication change, illness, or hospitalization. No preceding symptoms prior to fall. Patient was found to have a right hip fx and is S/P ORIF of the right hip with IM nail placement on 01/29/19.  Patient states he was very active, denies any CP or shortness of breath. Patient with two episodes of orthostatic hypotension when attempting to ambulate and returned to bed and given saline IV on both occasions. No new episodes of syncope since onset of midodrine. Medically stable and awaiting rehabilitation bed availability.    MEDICATIONS  (STANDING):  ascorbic acid 500 milliGRAM(s) Oral two times a day  atorvastatin 10 milliGRAM(s) Oral at bedtime  Biotene Dry Mouth Oral Rinse 5 milliLiter(s) Swish and Spit daily  enoxaparin Injectable 40 milliGRAM(s) SubCutaneous every 24 hours  folic acid 1 milliGRAM(s) Oral daily  gabapentin 800 milliGRAM(s) Oral Once  gabapentin 800 milliGRAM(s) Oral three times a day  midodrine 10 milliGRAM(s) Oral three times a day  multivitamin 1 Tablet(s) Oral daily  sodium chloride 0.9% lock flush 3 milliLiter(s) IV Push every 8 hours    MEDICATIONS  (PRN):  acetaminophen   Tablet .. 650 milliGRAM(s) Oral every 6 hours PRN Mild Pain (1 - 3)  bisacodyl Suppository 10 milliGRAM(s) Rectal daily PRN If no bowel movement by POD#2  diphenhydrAMINE 25 milliGRAM(s) Oral every 4 hours PRN Rash and/or Itching  melatonin 3 milliGRAM(s) Oral at bedtime PRN Insomnia  ondansetron Injectable 4 milliGRAM(s) IV Push every 6 hours PRN Nausea and/or Vomiting    Vital Signs Last 24 Hrs  T(C): 36.5 (20 Feb 2019 04:20), Max: 37.2 (19 Feb 2019 20:00)  T(F): 97.7 (20 Feb 2019 04:20), Max: 98.9 (19 Feb 2019 20:00)  HR: 64 (20 Feb 2019 04:20) (64 - 79)  BP: 119/68 (20 Feb 2019 04:20) (115/66 - 137/73)  BP(mean): --  RR: 18 (20 Feb 2019 04:20) (16 - 18)  SpO2: 97% (20 Feb 2019 04:20) (94% - 98%)    PHYSICAL EXAM:  GENERAL: NAD, well nourished and conversant  HEAD:  Atraumatic  EYES: EOM, PERRLA, conjunctiva pink and sclera white  ENT: No tonsillar erythema, exudates, or enlargement, moist mucous membranes, good dentition, no lesions  NECK: Supple, No JVD, normal thyroid, carotids with normal upstrokes and no bruits  CHEST/LUNG: Clear to auscultation bilaterally, No rales, rhonchi, wheezing, or rubs  HEART: Regular rate and rhythm, No murmurs, rubs, or gallops  ABDOMEN: Soft, nondistended, no masses, guarding, tenderness or rebound, bowel sounds present  EXTREMITIES:  4+ hip swelling and 3+ tenderness to touch. No erythema or incisional drainage.  LYMPH: No lymphadenopathy noted  SKIN: + right leg with a diffuse maculopapular rash.  NERVOUS SYSTEM:  Alert & Oriented X3, normal cognitive function. Motor Strength 5/5 right upper and right lower.  5/5 left upper and left lower extremities, DTRs 2+ intact and symmetric    LABS:                      CAPILLARY BLOOD GLUCOSE          RADIOLOGY & ADDITIONAL TESTS:

## 2019-02-20 NOTE — PROGRESS NOTE ADULT - PROBLEM SELECTOR PLAN 1
tolerated procedure well  continue current meds  PO as tolerated  continue physical therapy
PT/OT-WBAT/OOB  DVT PPx- lovenox 40  Pain Control PRN  Right thigh rash - improving  Continue Current Tx.  Dispo planning - rehab once no-fault ppwk completed  Brooke Oliva PA-C  Team Pager: #[phone]
tolerated procedure well  continue current meds  PO as tolerated  continue physical therapy
tolerated procedure well  continue current meds  PO as tolerated  continue physical therapy
***See Above  Rohan FLORES  Orthopedics  B: [phone]  S: [phone]
PT/OT- WBAT RLE  IS encouraged  DVT ppx - Lovenox 40 mg  OOB to chair  Diso planning - rehab  Continue current tx
contine physical therapy  pain meds as needed
tolerated procedure well  continue current meds  PO as tolerated  continue physical therapy

## 2019-02-20 NOTE — PROGRESS NOTE ADULT - PROBLEM SELECTOR PLAN 5
Will start Patient on lactulose q3h until BM
has resolved   continue GI regimen

## 2019-02-20 NOTE — PROGRESS NOTE ADULT - SUBJECTIVE AND OBJECTIVE BOX
73M w/ pmh HTN, HLD, BPH, prostate cancer s/p prostatectomy p/w R hip pain - started around 10pm, pt was at home and tried to kick some trash but lost balance, spun around and fell down onto his R hip. Unable to ambulate since then, no pain elsewhere. No other complaints of fever, n/v/d/c, sob, cough, chest / abd pain. No recent travel, medication change, illness, or hospitalization. No preceding symptoms prior to fall. Patient was found to have a right hip fx and is S/P ORIF of the right hip with IM nail placement on 01/29/19.  Patient states he was very active, denies any CP or shortness of breath. Patient with two episodes of orthostatic hypotension when attempting to ambulate and returned to bed and given saline IV on both occasions. No new episodes of syncope since onset of midodrine. Medically stable and awaiting rehabilitation bed availability.    MEDICATIONS  (STANDING):  ascorbic acid 500 milliGRAM(s) Oral two times a day  atorvastatin 10 milliGRAM(s) Oral at bedtime  Biotene Dry Mouth Oral Rinse 5 milliLiter(s) Swish and Spit daily  enoxaparin Injectable 40 milliGRAM(s) SubCutaneous every 24 hours  folic acid 1 milliGRAM(s) Oral daily  gabapentin 800 milliGRAM(s) Oral Once  gabapentin 800 milliGRAM(s) Oral three times a day  midodrine 10 milliGRAM(s) Oral three times a day  multivitamin 1 Tablet(s) Oral daily  sodium chloride 0.9% lock flush 3 milliLiter(s) IV Push every 8 hours    MEDICATIONS  (PRN):  acetaminophen   Tablet .. 650 milliGRAM(s) Oral every 6 hours PRN Mild Pain (1 - 3)  bisacodyl Suppository 10 milliGRAM(s) Rectal daily PRN If no bowel movement by POD#2  diphenhydrAMINE 25 milliGRAM(s) Oral every 4 hours PRN Rash and/or Itching  melatonin 3 milliGRAM(s) Oral at bedtime PRN Insomnia  ondansetron Injectable 4 milliGRAM(s) IV Push every 6 hours PRN Nausea and/or Vomiting    Vital Signs Last 24 Hrs  T(C): 36.5 (20 Feb 2019 04:20), Max: 37.2 (19 Feb 2019 20:00)  T(F): 97.7 (20 Feb 2019 04:20), Max: 98.9 (19 Feb 2019 20:00)  HR: 64 (20 Feb 2019 04:20) (64 - 79)  BP: 119/68 (20 Feb 2019 04:20) (115/66 - 137/73)  BP(mean): --  RR: 18 (20 Feb 2019 04:20) (16 - 18)  SpO2: 97% (20 Feb 2019 04:20) (94% - 98%)    PHYSICAL EXAM:  GENERAL: NAD, well nourished and conversant  HEAD:  Atraumatic  EYES: EOM, PERRLA, conjunctiva pink and sclera white  ENT: No tonsillar erythema, exudates, or enlargement, moist mucous membranes, good dentition, no lesions  NECK: Supple, No JVD, normal thyroid, carotids with normal upstrokes and no bruits  CHEST/LUNG: Clear to auscultation bilaterally, No rales, rhonchi, wheezing, or rubs  HEART: Regular rate and rhythm, No murmurs, rubs, or gallops  ABDOMEN: Soft, nondistended, no masses, guarding, tenderness or rebound, bowel sounds present  EXTREMITIES:  4+ hip swelling and 3+ tenderness to touch. No erythema or incisional drainage.  LYMPH: No lymphadenopathy noted  SKIN: + right leg with a diffuse maculopapular rash.  NERVOUS SYSTEM:  Alert & Oriented X3, normal cognitive function. Motor Strength 5/5 right upper and right lower.  5/5 left upper and left lower extremities, DTRs 2+ intact and symmetric    LABS:                      CAPILLARY BLOOD GLUCOSE          RADIOLOGY & ADDITIONAL TESTS: 73M w/ pmh HTN, HLD, BPH, prostate cancer s/p prostatectomy p/w R hip pain - started around 10pm, pt was at home and tried to kick some trash but lost balance, spun around and fell down onto his R hip. Unable to ambulate since then, no pain elsewhere. No other complaints of fever, n/v/d/c, sob, cough, chest / abd pain. No recent travel, medication change, illness, or hospitalization. No preceding symptoms prior to fall. Patient was found to have a right hip fx and is S/P ORIF of the right hip with IM nail placement on 01/29/19.  Patient states he was very active, denies any CP or shortness of breath. Patient with two episodes of orthostatic hypotension when attempting to ambulate and returned to bed and given saline IV on both occasions. No new episodes of syncope since onset of midodrine. Medically stable and awaiting rehabilitation bed availability.    MEDICATIONS  (STANDING):  ascorbic acid 500 milliGRAM(s) Oral two times a day  atorvastatin 10 milliGRAM(s) Oral at bedtime  Biotene Dry Mouth Oral Rinse 5 milliLiter(s) Swish and Spit daily  enoxaparin Injectable 40 milliGRAM(s) SubCutaneous every 24 hours  folic acid 1 milliGRAM(s) Oral daily  gabapentin 800 milliGRAM(s) Oral Once  gabapentin 800 milliGRAM(s) Oral three times a day  midodrine 10 milliGRAM(s) Oral three times a day  multivitamin 1 Tablet(s) Oral daily  sodium chloride 0.9% lock flush 3 milliLiter(s) IV Push every 8 hours    MEDICATIONS  (PRN):  acetaminophen   Tablet .. 650 milliGRAM(s) Oral every 6 hours PRN Mild Pain (1 - 3)  bisacodyl Suppository 10 milliGRAM(s) Rectal daily PRN If no bowel movement by POD#2  diphenhydrAMINE 25 milliGRAM(s) Oral every 4 hours PRN Rash and/or Itching  melatonin 3 milliGRAM(s) Oral at bedtime PRN Insomnia  ondansetron Injectable 4 milliGRAM(s) IV Push every 6 hours PRN Nausea and/or Vomiting    Vital Signs Last 24 Hrs  T(C): 36.8 (20 Feb 2019 13:35), Max: 36.8 (20 Feb 2019 08:00)  T(F): 98.3 (20 Feb 2019 13:35), Max: 98.3 (20 Feb 2019 13:35)  HR: 83 (20 Feb 2019 13:35) (64 - 83)  BP: 113/70 (20 Feb 2019 13:35) (113/70 - 119/68)  BP(mean): --  RR: 16 (20 Feb 2019 13:35) (16 - 18)  SpO2: 98% (20 Feb 2019 13:35) (95% - 98%)    PHYSICAL EXAM:  GENERAL: NAD, well nourished and conversant  HEAD:  Atraumatic  EYES: EOM, PERRLA, conjunctiva pink and sclera white  ENT: No tonsillar erythema, exudates, or enlargement, moist mucous membranes, good dentition, no lesions  NECK: Supple, No JVD, normal thyroid, carotids with normal upstrokes and no bruits  CHEST/LUNG: Clear to auscultation bilaterally, No rales, rhonchi, wheezing, or rubs  HEART: Regular rate and rhythm, No murmurs, rubs, or gallops  ABDOMEN: Soft, nondistended, no masses, guarding, tenderness or rebound, bowel sounds present  EXTREMITIES:  4+ hip swelling and 3+ tenderness to touch. No erythema or incisional drainage.  LYMPH: No lymphadenopathy noted  SKIN: + right leg with a diffuse maculopapular rash.  NERVOUS SYSTEM:  Alert & Oriented X3, normal cognitive function. Motor Strength 5/5 right upper and right lower.  5/5 left upper and left lower extremities, DTRs 2+ intact and symmetric    LABS:    No labs today                    CAPILLARY BLOOD GLUCOSE          RADIOLOGY & ADDITIONAL TESTS: 73M w/ pmh HTN, HLD, BPH, prostate cancer s/p prostatectomy p/w R hip pain - started around 10pm, pt was at home and tried to kick some trash but lost balance, spun around and fell down onto his R hip. Unable to ambulate since then, no pain elsewhere. No other complaints of fever, n/v/d/c, sob, cough, chest / abd pain. No recent travel, medication change, illness, or hospitalization. No preceding symptoms prior to fall. Patient was found to have a right hip fx and is S/P ORIF of the right hip with IM nail placement on 01/29/19.  Patient states he was very active, denies any CP or shortness of breath. Patient with two episodes of orthostatic hypotension when attempting to ambulate and returned to bed and given saline IV on both occasions. No new episodes of syncope since onset of midodrine. Medically stable and  rehabilitation bed is now available.    MEDICATIONS  (STANDING):  ascorbic acid 500 milliGRAM(s) Oral two times a day  atorvastatin 10 milliGRAM(s) Oral at bedtime  Biotene Dry Mouth Oral Rinse 5 milliLiter(s) Swish and Spit daily  enoxaparin Injectable 40 milliGRAM(s) SubCutaneous every 24 hours  folic acid 1 milliGRAM(s) Oral daily  gabapentin 800 milliGRAM(s) Oral Once  gabapentin 800 milliGRAM(s) Oral three times a day  midodrine 10 milliGRAM(s) Oral three times a day  multivitamin 1 Tablet(s) Oral daily  sodium chloride 0.9% lock flush 3 milliLiter(s) IV Push every 8 hours    MEDICATIONS  (PRN):  acetaminophen   Tablet .. 650 milliGRAM(s) Oral every 6 hours PRN Mild Pain (1 - 3)  bisacodyl Suppository 10 milliGRAM(s) Rectal daily PRN If no bowel movement by POD#2  diphenhydrAMINE 25 milliGRAM(s) Oral every 4 hours PRN Rash and/or Itching  melatonin 3 milliGRAM(s) Oral at bedtime PRN Insomnia  ondansetron Injectable 4 milliGRAM(s) IV Push every 6 hours PRN Nausea and/or Vomiting    Vital Signs Last 24 Hrs  T(C): 36.8 (20 Feb 2019 13:35), Max: 36.8 (20 Feb 2019 08:00)  T(F): 98.3 (20 Feb 2019 13:35), Max: 98.3 (20 Feb 2019 13:35)  HR: 83 (20 Feb 2019 13:35) (64 - 83)  BP: 113/70 (20 Feb 2019 13:35) (113/70 - 119/68)  BP(mean): --  RR: 16 (20 Feb 2019 13:35) (16 - 18)  SpO2: 98% (20 Feb 2019 13:35) (95% - 98%)    PHYSICAL EXAM:  GENERAL: NAD, well nourished and conversant  HEAD:  Atraumatic  EYES: EOM, PERRLA, conjunctiva pink and sclera white  ENT: No tonsillar erythema, exudates, or enlargement, moist mucous membranes, good dentition, no lesions  NECK: Supple, No JVD, normal thyroid, carotids with normal upstrokes and no bruits  CHEST/LUNG: Clear to auscultation bilaterally, No rales, rhonchi, wheezing, or rubs  HEART: Regular rate and rhythm, No murmurs, rubs, or gallops  ABDOMEN: Soft, nondistended, no masses, guarding, tenderness or rebound, bowel sounds present  EXTREMITIES:  4+ hip swelling and 3+ tenderness to touch. No erythema or incisional drainage.  LYMPH: No lymphadenopathy noted  SKIN: + right leg with a diffuse maculopapular rash.  NERVOUS SYSTEM:  Alert & Oriented X3, normal cognitive function. Motor Strength 5/5 right upper and right lower.  5/5 left upper and left lower extremities, DTRs 2+ intact and symmetric    LABS:    No labs today                    CAPILLARY BLOOD GLUCOSE          RADIOLOGY & ADDITIONAL TESTS:

## 2019-02-20 NOTE — PROGRESS NOTE ADULT - PROBLEM SELECTOR PROBLEM 4
Orthostatic hypotension

## 2019-02-20 NOTE — PROGRESS NOTE ADULT - ASSESSMENT
73M w/ pmh HTN, HLD, BPH, prostate cancer s/p prostatectomy p/w R hip pain - started around 10pm, pt was at home and tried to kick some trash but lost balance, spun around and fell down onto his R hip. Unable to ambulate since then, no pain elsewhere. No other complaints of fever, n/v/d/c, sob, cough, chest / abd pain. No recent travel, medication change, illness, or hospitalization. No preceding symptoms prior to fall. . Patient states he is very active, denies any CP or shortness of breath. Patient was found to have a right hip fx and is S/P ORIF of the right hip on 01/29/19. Patient with two episodes of post-op orthostatic hypotension when attempting to ambulate and returned to bed and given saline IV on both occasions. Hytrin stopped and midodrine added for recurrent orthostatic. Right leg rash has resolved and the patient is otherwise well;  awaiting rehabilitation bed availability. 73M w/ pmh HTN, HLD, BPH, prostate cancer s/p prostatectomy p/w R hip pain - started around 10pm, pt was at home and tried to kick some trash but lost balance, spun around and fell down onto his R hip. Unable to ambulate since then, no pain elsewhere. No other complaints of fever, n/v/d/c, sob, cough, chest / abd pain. No recent travel, medication change, illness, or hospitalization. No preceding symptoms prior to fall. . Patient states he is very active, denies any CP or shortness of breath. Patient was found to have a right hip fx and is S/P ORIF of the right hip on 01/29/19. Patient with two episodes of post-op orthostatic hypotension when attempting to ambulate and returned to bed and given saline IV on both occasions. Hytrin stopped and midodrine added for recurrent orthostatic. Right leg rash has resolved and the patient is otherwise well.

## 2019-02-20 NOTE — PROGRESS NOTE ADULT - PROBLEM SELECTOR PLAN 3
follow BP and adjust meds as needed  currently on terazosin for BP
follow BP and adjust meds as needed  currently on terazosin for BP
follow BP and adjust meds as needed  hold antihypertensive meds  will restart as tolerated
follow BP and adjust meds as needed  currently on terazosin for BP
follow BP and adjust meds as needed  currently on terazosin for BP
follow BP and adjust meds as needed  hold antihypertensive meds  will restart as tolerated
follow BP and adjust meds as needed  hold antihypertensive meds for now with orthostasis
follow BP and adjust meds as needed  hold antihypertensive meds for now with orthostasis
follow BP and adjust meds as needed  currently on terazosin for BP

## 2019-02-20 NOTE — PROGRESS NOTE ADULT - PROBLEM SELECTOR PROBLEM 1
Hip fracture
Closed fracture of right hip, sequela
Hip fracture
Hip fracture
Closed fracture of right hip, sequela
Hip fracture

## 2019-02-20 NOTE — PROGRESS NOTE ADULT - PROBLEM SELECTOR PLAN 2
follow H&H   Patient neede second transfusion follow swelling in LE
follow H&H   Transfuse as needed
follow H&H and transfuse as needed
H&H has been stable
follow H&H   Patient neede second transfusion follow swelling in LE
follow H&H   Patient neede second transfusion follow swelling in LE
follow H&H   Transfuse as needed
follow H&H   Patient neede second transfusion follow swelling in LE

## 2019-02-20 NOTE — PROGRESS NOTE ADULT - REASON FOR ADMISSION
Right Hip Fracture

## 2019-02-20 NOTE — PROGRESS NOTE ADULT - SUBJECTIVE AND OBJECTIVE BOX
Subjective: Patient seen and examined. No new events except as noted.     SUBJECTIVE/ROS:  feels ok       MEDICATIONS:  MEDICATIONS  (STANDING):  ascorbic acid 500 milliGRAM(s) Oral two times a day  atorvastatin 10 milliGRAM(s) Oral at bedtime  Biotene Dry Mouth Oral Rinse 5 milliLiter(s) Swish and Spit daily  enoxaparin Injectable 40 milliGRAM(s) SubCutaneous every 24 hours  folic acid 1 milliGRAM(s) Oral daily  gabapentin 800 milliGRAM(s) Oral Once  gabapentin 800 milliGRAM(s) Oral three times a day  midodrine 10 milliGRAM(s) Oral three times a day  multivitamin 1 Tablet(s) Oral daily  sodium chloride 0.9% lock flush 3 milliLiter(s) IV Push every 8 hours      PHYSICAL EXAM:  T(C): 36.5 (02-20-19 @ 04:20), Max: 37.2 (02-19-19 @ 20:00)  HR: 64 (02-20-19 @ 04:20) (64 - 79)  BP: 119/68 (02-20-19 @ 04:20) (115/66 - 137/73)  RR: 18 (02-20-19 @ 04:20) (16 - 18)  SpO2: 97% (02-20-19 @ 04:20) (94% - 98%)  Wt(kg): --  I&O's Summary    19 Feb 2019 07:01  -  20 Feb 2019 07:00  --------------------------------------------------------  IN: 2780 mL / OUT: 750 mL / NET: 2030 mL          JVP: Normal  Neck: supple  Lung: clear   CV: S1 S2 , Murmur:  Abd: soft  Ext: No edema  neuro: Awake / alert  Psych: flat affect  Skin: normal        LABS/DATA:    CARDIAC MARKERS:                  proBNP:   Lipid Profile:   HgA1c:   TSH:     TELE:  EKG:

## 2019-03-03 ENCOUNTER — TRANSCRIPTION ENCOUNTER (OUTPATIENT)
Age: 74
End: 2019-03-03

## 2019-03-11 ENCOUNTER — APPOINTMENT (OUTPATIENT)
Dept: ORTHOPEDIC SURGERY | Facility: CLINIC | Age: 74
End: 2019-03-11
Payer: OTHER MISCELLANEOUS

## 2019-03-11 VITALS — HEIGHT: 74 IN | BODY MASS INDEX: 27.72 KG/M2 | WEIGHT: 216 LBS

## 2019-03-11 PROCEDURE — 99024 POSTOP FOLLOW-UP VISIT: CPT

## 2019-03-11 PROCEDURE — 73502 X-RAY EXAM HIP UNI 2-3 VIEWS: CPT | Mod: RT

## 2019-03-11 PROCEDURE — 73552 X-RAY EXAM OF FEMUR 2/>: CPT | Mod: RT

## 2019-03-11 NOTE — HISTORY OF PRESENT ILLNESS
[de-identified] : S/P IM fixation Rt IT fracture 1/29/19 [de-identified] : 74 yo S/P IM fixation Rt IT fracture 1/29/19 doing well presents for post op followup  [de-identified] : RT hip \par Incision CDI fully healed no erythema no drainage \par SILT TN SPN DPN SN \par NTTP \par 4/5 quads/abductors \par 5/5 TA PT GS EHL FHL \par 2+ distal pulses BCR  [de-identified] : AP and lateral right hip taken today and reviewed by me show well fixed right IT fracture with IM nail in good position no signs of mechanical failure  [de-identified] : S/P IM fixation Rt IT fracture 1/29/19 doing well  [de-identified] : S/P IM fixation Rt IT fracture 1/29/19\par P: \par - Continue WBAT \par - PT ROM strengthening \par - F/U in 6 weeks

## 2019-03-12 ENCOUNTER — APPOINTMENT (OUTPATIENT)
Dept: FAMILY MEDICINE | Facility: CLINIC | Age: 74
End: 2019-03-12
Payer: OTHER MISCELLANEOUS

## 2019-03-12 VITALS
HEART RATE: 78 BPM | HEIGHT: 74 IN | OXYGEN SATURATION: 99 % | DIASTOLIC BLOOD PRESSURE: 72 MMHG | TEMPERATURE: 98.2 F | WEIGHT: 207 LBS | SYSTOLIC BLOOD PRESSURE: 142 MMHG | BODY MASS INDEX: 26.56 KG/M2

## 2019-03-12 DIAGNOSIS — S72.001A FRACTURE OF UNSPECIFIED PART OF NECK OF RIGHT FEMUR, INITIAL ENCOUNTER FOR CLOSED FRACTURE: ICD-10-CM

## 2019-03-12 PROCEDURE — 99495 TRANSJ CARE MGMT MOD F2F 14D: CPT

## 2019-03-12 RX ORDER — LIDOCAINE 5% 700 MG/1
5 PATCH TOPICAL
Qty: 30 | Refills: 0 | Status: DISCONTINUED | COMMUNITY
Start: 2018-01-26 | End: 2019-03-12

## 2019-03-12 NOTE — HISTORY OF PRESENT ILLNESS
[Post-hospitalization from ___ Hospital] : Post-hospitalization from [unfilled] Hospital [Admitted on: ___] : The patient was admitted on [unfilled] [Discharged on ___] : discharged on [unfilled] [Discharge Summary] : discharge summary [Pertinent Labs] : pertinent labs [Radiology Findings] : radiology findings [Discharge Med List] : discharge medication list [Med Reconciliation] : medication reconciliation has been completed [Patient Contacted By: ____] : and contacted by [unfilled] [FreeTextEntry2] : Patient is s/p right hip fracture after a fall at work, s/p IM sara fixation of IT fracture on 1/29/19. He received 2 pints of blood Postop.  and was started on midodrine by cardiology for orthostatic hypotension.Patient saw dr. Rodgers yesterday for postop wound check. He is doing home PT TIW. He uses cane and walker for ambulation. He is WBAT right leg with normal sensation. No fever, chills, chest pain , sob , cough, constipation, urinary symptoms. He was advised by cardiology to get off terazosin and switch to flomax.

## 2019-03-12 NOTE — HEALTH RISK ASSESSMENT
[Any fall with injury in past year] : Patient reported fall with injury in the past year [0] : 2) Feeling down, depressed, or hopeless: Not at all (0) [HIV test declined] : HIV test declined [Hepatitis C test declined] : Hepatitis C test declined [None] : None [With Family] : lives with family [On disability] : on disability [] :  [Sexually Active] : sexually active [Feels Safe at Home] : Feels safe at home [Fully functional (bathing, dressing, toileting, transferring, walking, feeding)] : Fully functional (bathing, dressing, toileting, transferring, walking, feeding) [Independent] : managing finances [Some assistance needed] : using transportation [Smoke Detector] : smoke detector [Carbon Monoxide Detector] : carbon monoxide detector [Seat Belt] :  uses seat belt [Sunscreen] : uses sunscreen [With Patient/Caregiver] : With Patient/Caregiver [Aggressive treatment] : aggressive treatment [] : No [de-identified] : PT, WBAT right L.E [de-identified] : regular  [de-identified] : tyra hip fracture on 1/28/19 [[id number]] : 0 [Change in mental status noted] : No change in mental status noted [Language] : denies difficulty with language [Reports changes in hearing] : Reports no changes in hearing [Reports changes in vision] : Reports no changes in vision [Reports changes in dental health] : Reports no changes in dental health [AdvancecareDate] : 03/12/19

## 2019-03-12 NOTE — PHYSICAL EXAM
[No Acute Distress] : no acute distress [Well Nourished] : well nourished [Well Developed] : well developed [Well-Appearing] : well-appearing [Normal Sclera/Conjunctiva] : normal sclera/conjunctiva [PERRL] : pupils equal round and reactive to light [EOMI] : extraocular movements intact [Normal Oropharynx] : the oropharynx was normal [No JVD] : no jugular venous distention [Supple] : supple [No Lymphadenopathy] : no lymphadenopathy [Thyroid Normal, No Nodules] : the thyroid was normal and there were no nodules present [No Respiratory Distress] : no respiratory distress  [Clear to Auscultation] : lungs were clear to auscultation bilaterally [No Accessory Muscle Use] : no accessory muscle use [Normal Rate] : normal rate  [Regular Rhythm] : with a regular rhythm [Normal S1, S2] : normal S1 and S2 [No Murmur] : no murmur heard [No Carotid Bruits] : no carotid bruits [No Abdominal Bruit] : a ~M bruit was not heard ~T in the abdomen [No Varicosities] : no varicosities [Pedal Pulses Present] : the pedal pulses are present [No Edema] : there was no peripheral edema [No Extremity Clubbing/Cyanosis] : no extremity clubbing/cyanosis [No Palpable Aorta] : no palpable aorta [Soft] : abdomen soft [Non Tender] : non-tender [Non-distended] : non-distended [No Masses] : no abdominal mass palpated [No HSM] : no HSM [Normal Bowel Sounds] : normal bowel sounds [Normal Posterior Cervical Nodes] : no posterior cervical lymphadenopathy [Normal Anterior Cervical Nodes] : no anterior cervical lymphadenopathy [No CVA Tenderness] : no CVA  tenderness [No Spinal Tenderness] : no spinal tenderness [No Rash] : no rash [Normal Gait] : normal gait [Coordination Grossly Intact] : coordination grossly intact [No Focal Deficits] : no focal deficits [Deep Tendon Reflexes (DTR)] : deep tendon reflexes were 2+ and symmetric [Normal Affect] : the affect was normal [Normal Insight/Judgement] : insight and judgment were intact [Moderate Complexity requires multiple possible diagnoses and/or the management options, moderate complexity of the medical data (tests, etc.) to be reviewed, and moderate risk of significant complications, morbidity, and/or mortality as well as co-morbidi] : Moderate Complexity [de-identified] : s/o right hip fracture , pt. ambulates with cane/walker

## 2019-03-12 NOTE — HEALTH RISK ASSESSMENT
[Fair] :  ~his/her~ mood as fair [No falls in past year] : Patient reported no falls in the past year [0] : 2) Feeling down, depressed, or hopeless: Not at all (0) [Patient reported bone density results were abnormal] : Patient reported bone density results were abnormal [Patient reported colonoscopy was normal] : Patient reported colonoscopy was normal [HIV Test offered] : HIV Test offered [Hepatitis C test offered] : Hepatitis C test offered [None] : None [Retired] : retired [] :  [Sexually Active] : sexually active [Feels Safe at Home] : Feels safe at home [Fully functional (bathing, dressing, toileting, transferring, walking, feeding)] : Fully functional (bathing, dressing, toileting, transferring, walking, feeding) [Fully functional (using the telephone, shopping, preparing meals, housekeeping, doing laundry, using] : Fully functional and needs no help or supervision to perform IADLs (using the telephone, shopping, preparing meals, housekeeping, doing laundry, using transportation, managing medications and managing finances) [Independent] : managing finances [Smoke Detector] : smoke detector [Carbon Monoxide Detector] : carbon monoxide detector [Seat Belt] :  uses seat belt [Sunscreen] : uses sunscreen [Discussed at today's visit] : Advance Directives Discussed at today's visit [No changes since last discussed ___] : No changes since last discussed  [unfilled] [] : No [[id number]] : 0 [Change in mental status noted] : No change in mental status noted [Language] : denies difficulty with language [Reports changes in hearing] : Reports no changes in hearing [Reports changes in vision] : Reports no changes in vision [Reports changes in dental health] : Reports no changes in dental health [BoneDensityDate] : 07/2019 [BoneDensityComments] : osteoporosis [ColonoscopyDate] : 4/2017 [de-identified] : glasses

## 2019-03-12 NOTE — PHYSICAL EXAM
[No Acute Distress] : no acute distress [Well Nourished] : well nourished [Well Developed] : well developed [Well-Appearing] : well-appearing [Normal Sclera/Conjunctiva] : normal sclera/conjunctiva [PERRL] : pupils equal round and reactive to light [EOMI] : extraocular movements intact [Normal Oropharynx] : the oropharynx was normal [No JVD] : no jugular venous distention [Supple] : supple [No Lymphadenopathy] : no lymphadenopathy [Thyroid Normal, No Nodules] : the thyroid was normal and there were no nodules present [No Respiratory Distress] : no respiratory distress  [Clear to Auscultation] : lungs were clear to auscultation bilaterally [No Accessory Muscle Use] : no accessory muscle use [Normal Rate] : normal rate  [Regular Rhythm] : with a regular rhythm [Normal S1, S2] : normal S1 and S2 [No Murmur] : no murmur heard [No Carotid Bruits] : no carotid bruits [No Abdominal Bruit] : a ~M bruit was not heard ~T in the abdomen [No Varicosities] : no varicosities [Pedal Pulses Present] : the pedal pulses are present [No Edema] : there was no peripheral edema [No Extremity Clubbing/Cyanosis] : no extremity clubbing/cyanosis [No Palpable Aorta] : no palpable aorta [Soft] : abdomen soft [Non Tender] : non-tender [Non-distended] : non-distended [No Masses] : no abdominal mass palpated [No HSM] : no HSM [Normal Bowel Sounds] : normal bowel sounds [Normal Posterior Cervical Nodes] : no posterior cervical lymphadenopathy [Normal Anterior Cervical Nodes] : no anterior cervical lymphadenopathy [No CVA Tenderness] : no CVA  tenderness [No Spinal Tenderness] : no spinal tenderness [No Joint Swelling] : no joint swelling [Grossly Normal Strength/Tone] : grossly normal strength/tone [No Rash] : no rash [Normal Gait] : normal gait [Coordination Grossly Intact] : coordination grossly intact [No Focal Deficits] : no focal deficits [Deep Tendon Reflexes (DTR)] : deep tendon reflexes were 2+ and symmetric [Normal Affect] : the affect was normal [Normal Insight/Judgement] : insight and judgment were intact [de-identified] : ear wax both canals [de-identified] : multiple moles on skin

## 2019-03-12 NOTE — PLAN
[FreeTextEntry1] : pt. will get senior dose of flu shot at pharmacy and shingrix. \par pt. sees urology dr. Mathis 3 month ago. \par amlodipine refilled. \par EKG nsr 75 bpm. \par  skin cancer screening. \par avoid sweets. f/u hematology. \par

## 2019-03-12 NOTE — PLAN
[FreeTextEntry1] : fasting labs. \par continue PT.\par  f/u  for results and in 3 months.\par taper off midodrine per cardiology.\par

## 2019-03-22 ENCOUNTER — OUTPATIENT (OUTPATIENT)
Dept: OUTPATIENT SERVICES | Facility: HOSPITAL | Age: 74
LOS: 1 days | End: 2019-03-22
Payer: COMMERCIAL

## 2019-03-22 DIAGNOSIS — Z98.890 OTHER SPECIFIED POSTPROCEDURAL STATES: Chronic | ICD-10-CM

## 2019-03-22 DIAGNOSIS — Z90.79 ACQUIRED ABSENCE OF OTHER GENITAL ORGAN(S): Chronic | ICD-10-CM

## 2019-03-22 DIAGNOSIS — S72.141D DISPLACED INTERTROCHANTERIC FRACTURE OF RIGHT FEMUR, SUBSEQUENT ENCOUNTER FOR CLOSED FRACTURE WITH ROUTINE HEALING: ICD-10-CM

## 2019-04-10 ENCOUNTER — RX RENEWAL (OUTPATIENT)
Age: 74
End: 2019-04-10

## 2019-04-15 ENCOUNTER — APPOINTMENT (OUTPATIENT)
Dept: ORTHOPEDIC SURGERY | Facility: CLINIC | Age: 74
End: 2019-04-15
Payer: OTHER MISCELLANEOUS

## 2019-04-15 DIAGNOSIS — S72.141D DISPLACED INTERTROCHANTERIC FRACTURE OF RIGHT FEMUR, SUBSEQUENT ENCOUNTER FOR CLOSED FRACTURE WITH ROUTINE HEALING: ICD-10-CM

## 2019-04-15 PROCEDURE — 99024 POSTOP FOLLOW-UP VISIT: CPT

## 2019-04-15 PROCEDURE — 73502 X-RAY EXAM HIP UNI 2-3 VIEWS: CPT | Mod: RT

## 2019-04-15 NOTE — HISTORY OF PRESENT ILLNESS
[de-identified] : S/P IM fixation Rt IT fracture 1/29/19 [de-identified] : RT hip \par Incision CDI fully healed no erythema no drainage \par SILT TN SPN DPN SN \par NTTP \par +4/5 quads/abductors \par 5/5 TA PT GS EHL FHL \par 2+ distal pulses BCR  [de-identified] : 74 yo S/P IM fixation Rt IT fracture 1/29/19 doing well presents for post op followup  [de-identified] : S/P IM fixation Rt IT fracture 1/29/19 doing well  [de-identified] : AP and lateral right hip taken today and reviewed by me show well fixed right IT fracture with IM nail in good position no signs of mechanical failure fracture appears healed with bridging callus on 4 of 4 cortices [de-identified] : S/P IM fixation Rt IT fracture 1/29/19\par P: \par - Continue WBAT \par - PT ROM strengthening \par - F/U in 3 months

## 2019-04-17 PROCEDURE — 97110 THERAPEUTIC EXERCISES: CPT

## 2019-04-17 PROCEDURE — 97163 PT EVAL HIGH COMPLEX 45 MIN: CPT

## 2019-04-17 PROCEDURE — 97116 GAIT TRAINING THERAPY: CPT

## 2019-04-17 PROCEDURE — 97140 MANUAL THERAPY 1/> REGIONS: CPT

## 2019-04-17 PROCEDURE — 97112 NEUROMUSCULAR REEDUCATION: CPT

## 2019-04-24 PROCEDURE — 80053 COMPREHEN METABOLIC PANEL: CPT

## 2019-04-24 PROCEDURE — 85610 PROTHROMBIN TIME: CPT

## 2019-04-24 PROCEDURE — C1713: CPT

## 2019-04-24 PROCEDURE — 76000 FLUOROSCOPY <1 HR PHYS/QHP: CPT

## 2019-04-24 PROCEDURE — 96374 THER/PROPH/DIAG INJ IV PUSH: CPT

## 2019-04-24 PROCEDURE — 85027 COMPLETE CBC AUTOMATED: CPT

## 2019-04-24 PROCEDURE — 99285 EMERGENCY DEPT VISIT HI MDM: CPT | Mod: 25

## 2019-04-24 PROCEDURE — C1769: CPT

## 2019-04-24 PROCEDURE — 97162 PT EVAL MOD COMPLEX 30 MIN: CPT

## 2019-04-24 PROCEDURE — 85730 THROMBOPLASTIN TIME PARTIAL: CPT

## 2019-04-24 PROCEDURE — 97530 THERAPEUTIC ACTIVITIES: CPT

## 2019-04-24 PROCEDURE — 86900 BLOOD TYPING SEROLOGIC ABO: CPT

## 2019-04-24 PROCEDURE — 71045 X-RAY EXAM CHEST 1 VIEW: CPT

## 2019-04-24 PROCEDURE — 86901 BLOOD TYPING SEROLOGIC RH(D): CPT

## 2019-04-24 PROCEDURE — C1889: CPT

## 2019-04-24 PROCEDURE — 93005 ELECTROCARDIOGRAM TRACING: CPT

## 2019-04-24 PROCEDURE — 97112 NEUROMUSCULAR REEDUCATION: CPT

## 2019-04-24 PROCEDURE — P9016: CPT

## 2019-04-24 PROCEDURE — 97165 OT EVAL LOW COMPLEX 30 MIN: CPT

## 2019-04-24 PROCEDURE — 97110 THERAPEUTIC EXERCISES: CPT

## 2019-04-24 PROCEDURE — 80048 BASIC METABOLIC PNL TOTAL CA: CPT

## 2019-04-24 PROCEDURE — 96375 TX/PRO/DX INJ NEW DRUG ADDON: CPT

## 2019-04-24 PROCEDURE — 73552 X-RAY EXAM OF FEMUR 2/>: CPT

## 2019-04-24 PROCEDURE — 81003 URINALYSIS AUTO W/O SCOPE: CPT

## 2019-04-24 PROCEDURE — 86850 RBC ANTIBODY SCREEN: CPT

## 2019-04-24 PROCEDURE — 36430 TRANSFUSION BLD/BLD COMPNT: CPT

## 2019-04-24 PROCEDURE — 97116 GAIT TRAINING THERAPY: CPT

## 2019-04-24 PROCEDURE — 97535 SELF CARE MNGMENT TRAINING: CPT

## 2019-04-24 PROCEDURE — 73502 X-RAY EXAM HIP UNI 2-3 VIEWS: CPT

## 2019-04-24 PROCEDURE — 82533 TOTAL CORTISOL: CPT

## 2019-04-24 PROCEDURE — 93306 TTE W/DOPPLER COMPLETE: CPT

## 2019-04-24 PROCEDURE — 86923 COMPATIBILITY TEST ELECTRIC: CPT

## 2019-05-30 LAB
ALBUMIN SERPL ELPH-MCNC: 4.2 G/DL
ALP BLD-CCNC: 69 U/L
ALT SERPL-CCNC: 12 U/L
ANION GAP SERPL CALC-SCNC: 11 MMOL/L
APPEARANCE: CLEAR
AST SERPL-CCNC: 15 U/L
BACTERIA: NEGATIVE
BASOPHILS # BLD AUTO: 0.03 K/UL
BASOPHILS NFR BLD AUTO: 0.4 %
BILIRUB SERPL-MCNC: 0.9 MG/DL
BILIRUBIN URINE: NEGATIVE
BLOOD URINE: NEGATIVE
BUN SERPL-MCNC: 15 MG/DL
CALCIUM SERPL-MCNC: 9.8 MG/DL
CHLORIDE SERPL-SCNC: 101 MMOL/L
CHOLEST SERPL-MCNC: 151 MG/DL
CHOLEST/HDLC SERPL: 2.9 RATIO
CO2 SERPL-SCNC: 24 MMOL/L
COLOR: NORMAL
CREAT SERPL-MCNC: 1.03 MG/DL
EOSINOPHIL # BLD AUTO: 0.13 K/UL
EOSINOPHIL NFR BLD AUTO: 1.6 %
FERRITIN SERPL-MCNC: 549 NG/ML
FOLATE SERPL-MCNC: >20 NG/ML
GLUCOSE QUALITATIVE U: NEGATIVE
GLUCOSE SERPL-MCNC: 88 MG/DL
HBA1C MFR BLD HPLC: 5.6 %
HCT VFR BLD CALC: 35.5 %
HDLC SERPL-MCNC: 52 MG/DL
HGB BLD-MCNC: 11.7 G/DL
HYALINE CASTS: 0 /LPF
IMM GRANULOCYTES NFR BLD AUTO: 0.5 %
IRON SATN MFR SERPL: 37 %
IRON SERPL-MCNC: 91 UG/DL
KETONES URINE: NEGATIVE
LDLC SERPL CALC-MCNC: 80 MG/DL
LEUKOCYTE ESTERASE URINE: NEGATIVE
LYMPHOCYTES # BLD AUTO: 2.33 K/UL
LYMPHOCYTES NFR BLD AUTO: 29.5 %
MAN DIFF?: NORMAL
MCHC RBC-ENTMCNC: 30.9 PG
MCHC RBC-ENTMCNC: 33 GM/DL
MCV RBC AUTO: 93.7 FL
MICROSCOPIC-UA: NORMAL
MONOCYTES # BLD AUTO: 0.86 K/UL
MONOCYTES NFR BLD AUTO: 10.9 %
NEUTROPHILS # BLD AUTO: 4.51 K/UL
NEUTROPHILS NFR BLD AUTO: 57.1 %
NITRITE URINE: NEGATIVE
PH URINE: 6.5
PLATELET # BLD AUTO: 205 K/UL
POTASSIUM SERPL-SCNC: 4.9 MMOL/L
PROT SERPL-MCNC: 6.6 G/DL
PROTEIN URINE: NEGATIVE
PSA FREE FLD-MCNC: NORMAL %
PSA FREE SERPL-MCNC: <0.01 NG/ML
PSA SERPL-MCNC: <0.01 NG/ML
RBC # BLD: 3.79 M/UL
RBC # FLD: 13.7 %
RED BLOOD CELLS URINE: 1 /HPF
SODIUM SERPL-SCNC: 136 MMOL/L
SPECIFIC GRAVITY URINE: 1.02
SQUAMOUS EPITHELIAL CELLS: 0 /HPF
T3FREE SERPL-MCNC: 2.79 PG/ML
T4 FREE SERPL-MCNC: 0.9 NG/DL
TIBC SERPL-MCNC: 246 UG/DL
TRIGL SERPL-MCNC: 96 MG/DL
TSH SERPL-ACNC: 2.41 UIU/ML
UIBC SERPL-MCNC: 155 UG/DL
UROBILINOGEN URINE: NORMAL
VIT B12 SERPL-MCNC: 642 PG/ML
WBC # FLD AUTO: 7.9 K/UL
WHITE BLOOD CELLS URINE: 0 /HPF

## 2019-05-31 LAB — 25(OH)D3 SERPL-MCNC: 43 NG/ML

## 2019-08-19 ENCOUNTER — RX RENEWAL (OUTPATIENT)
Age: 74
End: 2019-08-19

## 2019-10-24 ENCOUNTER — NON-APPOINTMENT (OUTPATIENT)
Age: 74
End: 2019-10-24

## 2019-10-24 ENCOUNTER — APPOINTMENT (OUTPATIENT)
Age: 74
End: 2019-10-24
Payer: MEDICARE

## 2019-10-24 VITALS
TEMPERATURE: 97.5 F | SYSTOLIC BLOOD PRESSURE: 138 MMHG | DIASTOLIC BLOOD PRESSURE: 82 MMHG | HEART RATE: 82 BPM | WEIGHT: 205 LBS | BODY MASS INDEX: 27.17 KG/M2 | HEIGHT: 73 IN | OXYGEN SATURATION: 99 %

## 2019-10-24 DIAGNOSIS — Z83.49 FAMILY HISTORY OF OTHER ENDOCRINE, NUTRITIONAL AND METABOLIC DISEASES: ICD-10-CM

## 2019-10-24 PROCEDURE — G0403: CPT

## 2019-10-24 PROCEDURE — G0439: CPT

## 2019-10-24 PROCEDURE — 96372 THER/PROPH/DIAG INJ SC/IM: CPT

## 2019-10-24 PROCEDURE — G0444 DEPRESSION SCREEN ANNUAL: CPT | Mod: 59

## 2019-10-24 PROCEDURE — 93000 ELECTROCARDIOGRAM COMPLETE: CPT

## 2019-10-24 RX ORDER — ALENDRONATE SODIUM 70 MG/1
70 TABLET ORAL
Qty: 4 | Refills: 5 | Status: DISCONTINUED | COMMUNITY
Start: 2017-10-31 | End: 2019-10-24

## 2019-10-24 RX ORDER — CYANOCOBALAMIN 1000 UG/ML
1000 INJECTION INTRAMUSCULAR; SUBCUTANEOUS
Qty: 0 | Refills: 0 | Status: COMPLETED | OUTPATIENT
Start: 2019-10-24

## 2019-10-24 RX ORDER — MIDODRINE HYDROCHLORIDE 5 MG/1
5 TABLET ORAL 3 TIMES DAILY
Refills: 0 | Status: DISCONTINUED | COMMUNITY
End: 2019-10-24

## 2019-10-24 RX ORDER — TAMSULOSIN HYDROCHLORIDE 0.4 MG/1
0.4 CAPSULE ORAL
Qty: 30 | Refills: 3 | Status: DISCONTINUED | COMMUNITY
Start: 2019-03-12 | End: 2019-10-24

## 2019-10-24 NOTE — PHYSICAL EXAM
[No Acute Distress] : no acute distress [Well Nourished] : well nourished [Well Developed] : well developed [Normal Sclera/Conjunctiva] : normal sclera/conjunctiva [Well-Appearing] : well-appearing [PERRL] : pupils equal round and reactive to light [EOMI] : extraocular movements intact [Normal Outer Ear/Nose] : the outer ears and nose were normal in appearance [No JVD] : no jugular venous distention [Normal Oropharynx] : the oropharynx was normal [No Lymphadenopathy] : no lymphadenopathy [Supple] : supple [Thyroid Normal, No Nodules] : the thyroid was normal and there were no nodules present [No Respiratory Distress] : no respiratory distress  [No Accessory Muscle Use] : no accessory muscle use [Clear to Auscultation] : lungs were clear to auscultation bilaterally [Normal Rate] : normal rate  [Regular Rhythm] : with a regular rhythm [Normal S1, S2] : normal S1 and S2 [No Murmur] : no murmur heard [No Abdominal Bruit] : a ~M bruit was not heard ~T in the abdomen [No Carotid Bruits] : no carotid bruits [No Varicosities] : no varicosities [Pedal Pulses Present] : the pedal pulses are present [No Palpable Aorta] : no palpable aorta [No Edema] : there was no peripheral edema [No Extremity Clubbing/Cyanosis] : no extremity clubbing/cyanosis [Soft] : abdomen soft [Non Tender] : non-tender [Non-distended] : non-distended [No Masses] : no abdominal mass palpated [No HSM] : no HSM [Normal Bowel Sounds] : normal bowel sounds [Normal Posterior Cervical Nodes] : no posterior cervical lymphadenopathy [Normal Anterior Cervical Nodes] : no anterior cervical lymphadenopathy [No CVA Tenderness] : no CVA  tenderness [No Spinal Tenderness] : no spinal tenderness [No Joint Swelling] : no joint swelling [No Rash] : no rash [Grossly Normal Strength/Tone] : grossly normal strength/tone [Coordination Grossly Intact] : coordination grossly intact [No Focal Deficits] : no focal deficits [Normal Gait] : normal gait [Deep Tendon Reflexes (DTR)] : deep tendon reflexes were 2+ and symmetric [Normal Affect] : the affect was normal [Normal Insight/Judgement] : insight and judgment were intact

## 2019-10-24 NOTE — HEALTH RISK ASSESSMENT
[Patient reported bone density results were abnormal] : Patient reported bone density results were abnormal [Patient reported colonoscopy was normal] : Patient reported colonoscopy was normal [HIV test declined] : HIV test declined [Hepatitis C test declined] : Hepatitis C test declined [None] : None [Good] : ~his/her~  mood as  good [No] : In the past 12 months have you used drugs other than those required for medical reasons? No [No falls in past year] : Patient reported no falls in the past year [0] : 2) Feeling down, depressed, or hopeless: Not at all (0) [With Family] : lives with family [] :  [Retired] : retired [Sexually Active] : sexually active [Feels Safe at Home] : Feels safe at home [Fully functional (bathing, dressing, toileting, transferring, walking, feeding)] : Fully functional (bathing, dressing, toileting, transferring, walking, feeding) [Fully functional (using the telephone, shopping, preparing meals, housekeeping, doing laundry, using] : Fully functional and needs no help or supervision to perform IADLs (using the telephone, shopping, preparing meals, housekeeping, doing laundry, using transportation, managing medications and managing finances) [Independent] : managing finances [Reports normal functional visual acuity (ie: able to read med bottle)] : Reports normal functional visual acuity [Smoke Detector] : smoke detector [Carbon Monoxide Detector] : carbon monoxide detector [Sunscreen] : uses sunscreen [Seat Belt] :  uses seat belt [With Patient/Caregiver] : With Patient/Caregiver [Aggressive treatment] : aggressive treatment [No Retinopathy] : No retinopathy [] : No [de-identified] : dr. Tripp Fritz( Hematology) [[id number]] : 0 [EyeExamDate] : MM/2016 [Change in mental status noted] : No change in mental status noted [Language] : denies difficulty with language [Behavior] : denies difficulty with behavior [Learning/Retaining New Information] : denies difficulty learning/retaining new information [Handling Complex Tasks] : denies difficulty handling complex tasks [Reasoning] : denies difficulty with reasoning [Spatial Ability and Orientation] : denies difficulty with spatial ability and orientation [Reports changes in hearing] : Reports no changes in hearing [Reports changes in vision] : Reports no changes in vision [Reports changes in dental health] : Reports no changes in dental health [MammogramComments] : n/a [PapSmearComments] : n/a [BoneDensityDate] : 07/2017 [BoneDensityComments] : osteoporosis [ColonoscopyDate] : 05/2017 [ColonoscopyComments] : poor prep. repeat in 3 years from last [AdvancecareDate] : 10/2019

## 2019-10-24 NOTE — PLAN
[FreeTextEntry1] : Preventive screen discussed . \par healthy diet and exercise. \par  f/u urology . \par f/u cardiology.\par  Continue current meds. \par Patient follows hematology  since Sept. 2019 for phlebotomy s/p 3  therapeutic treatments. records from [facility] reviewed. \par lab testing in 01/2020.

## 2019-11-22 ENCOUNTER — RX RENEWAL (OUTPATIENT)
Age: 74
End: 2019-11-22

## 2019-12-02 ENCOUNTER — FORM ENCOUNTER (OUTPATIENT)
Age: 74
End: 2019-12-02

## 2019-12-03 ENCOUNTER — APPOINTMENT (OUTPATIENT)
Dept: RADIOLOGY | Facility: HOSPITAL | Age: 74
End: 2019-12-03
Payer: MEDICARE

## 2019-12-03 ENCOUNTER — OUTPATIENT (OUTPATIENT)
Dept: OUTPATIENT SERVICES | Facility: HOSPITAL | Age: 74
LOS: 1 days | End: 2019-12-03
Payer: MEDICARE

## 2019-12-03 DIAGNOSIS — Z98.890 OTHER SPECIFIED POSTPROCEDURAL STATES: Chronic | ICD-10-CM

## 2019-12-03 DIAGNOSIS — Z90.79 ACQUIRED ABSENCE OF OTHER GENITAL ORGAN(S): Chronic | ICD-10-CM

## 2019-12-03 DIAGNOSIS — M81.0 AGE-RELATED OSTEOPOROSIS WITHOUT CURRENT PATHOLOGICAL FRACTURE: ICD-10-CM

## 2019-12-03 PROCEDURE — 77080 DXA BONE DENSITY AXIAL: CPT

## 2019-12-03 PROCEDURE — 77080 DXA BONE DENSITY AXIAL: CPT | Mod: 26

## 2020-01-13 ENCOUNTER — APPOINTMENT (OUTPATIENT)
Dept: FAMILY MEDICINE | Facility: CLINIC | Age: 75
End: 2020-01-13
Payer: MEDICARE

## 2020-01-13 VITALS
WEIGHT: 215 LBS | DIASTOLIC BLOOD PRESSURE: 80 MMHG | RESPIRATION RATE: 15 BRPM | HEART RATE: 70 BPM | TEMPERATURE: 98 F | BODY MASS INDEX: 28.49 KG/M2 | HEIGHT: 73 IN | OXYGEN SATURATION: 98 % | SYSTOLIC BLOOD PRESSURE: 160 MMHG

## 2020-01-13 PROCEDURE — 99214 OFFICE O/P EST MOD 30 MIN: CPT | Mod: 25

## 2020-01-13 PROCEDURE — 36415 COLL VENOUS BLD VENIPUNCTURE: CPT

## 2020-01-13 RX ORDER — CALCIUM CITRATE 500 MG
TABLET, EFFERVESCENT ORAL
Refills: 0 | Status: ACTIVE | COMMUNITY

## 2020-01-13 NOTE — HISTORY OF PRESENT ILLNESS
[FreeTextEntry1] : f/u hemochromatosis, anemia, neuropathy [de-identified] : Here for f/u hemochromatosis. He  follows dermatology , urology, hematology(dr. Fritz)  and cardiology. He is scheduled for stress tests this month. He had phlebotomy in 12/2019 and post phlebotomy HB is  10.4. He gets phlebotomy once a month.

## 2020-01-13 NOTE — PHYSICAL EXAM
[No Acute Distress] : no acute distress [Well Nourished] : well nourished [Well Developed] : well developed [Normal Sclera/Conjunctiva] : normal sclera/conjunctiva [Well-Appearing] : well-appearing [PERRL] : pupils equal round and reactive to light [EOMI] : extraocular movements intact [Normal Outer Ear/Nose] : the outer ears and nose were normal in appearance [No JVD] : no jugular venous distention [Normal Oropharynx] : the oropharynx was normal [No Lymphadenopathy] : no lymphadenopathy [Supple] : supple [Thyroid Normal, No Nodules] : the thyroid was normal and there were no nodules present [No Accessory Muscle Use] : no accessory muscle use [No Respiratory Distress] : no respiratory distress  [Clear to Auscultation] : lungs were clear to auscultation bilaterally [Regular Rhythm] : with a regular rhythm [Normal Rate] : normal rate  [Normal S1, S2] : normal S1 and S2 [No Murmur] : no murmur heard [No Varicosities] : no varicosities [No Abdominal Bruit] : a ~M bruit was not heard ~T in the abdomen [No Carotid Bruits] : no carotid bruits [No Edema] : there was no peripheral edema [Pedal Pulses Present] : the pedal pulses are present [No Palpable Aorta] : no palpable aorta [No Extremity Clubbing/Cyanosis] : no extremity clubbing/cyanosis [Soft] : abdomen soft [No Masses] : no abdominal mass palpated [Non Tender] : non-tender [Non-distended] : non-distended [No HSM] : no HSM [Normal Bowel Sounds] : normal bowel sounds [Normal Posterior Cervical Nodes] : no posterior cervical lymphadenopathy [No CVA Tenderness] : no CVA  tenderness [Normal Anterior Cervical Nodes] : no anterior cervical lymphadenopathy [Grossly Normal Strength/Tone] : grossly normal strength/tone [No Joint Swelling] : no joint swelling [No Spinal Tenderness] : no spinal tenderness [No Rash] : no rash [Coordination Grossly Intact] : coordination grossly intact [Deep Tendon Reflexes (DTR)] : deep tendon reflexes were 2+ and symmetric [Normal Gait] : normal gait [No Focal Deficits] : no focal deficits [Normal Insight/Judgement] : insight and judgment were intact [Normal Affect] : the affect was normal

## 2020-01-13 NOTE — HEALTH RISK ASSESSMENT
[No falls in past year] : Patient reported no falls in the past year [No] : In the past 12 months have you used drugs other than those required for medical reasons? No [0] : 2) Feeling down, depressed, or hopeless: Not at all (0) [] : No [de-identified] : goes to gym  3 days a week [[id number]] : 0

## 2020-01-13 NOTE — PLAN
[FreeTextEntry1] : check fasting labs\par anemia: f/u hematology. \par  prediabetes: avoid conc. sweets.\par HTN: low salt diet. monitor BP. BP stable at home.\par Osteoporosis: Vitamin D supplement.

## 2020-01-15 LAB
25(OH)D3 SERPL-MCNC: 48 NG/ML
ALBUMIN SERPL ELPH-MCNC: 4.2 G/DL
ALP BLD-CCNC: 48 U/L
ALT SERPL-CCNC: 11 U/L
ANION GAP SERPL CALC-SCNC: 11 MMOL/L
AST SERPL-CCNC: 15 U/L
BASOPHILS # BLD AUTO: 0.04 K/UL
BASOPHILS NFR BLD AUTO: 0.8 %
BILIRUB SERPL-MCNC: 0.7 MG/DL
BUN SERPL-MCNC: 13 MG/DL
CALCIUM SERPL-MCNC: 9.5 MG/DL
CHLORIDE SERPL-SCNC: 100 MMOL/L
CHOLEST SERPL-MCNC: 144 MG/DL
CHOLEST/HDLC SERPL: 2.6 RATIO
CO2 SERPL-SCNC: 23 MMOL/L
CREAT SERPL-MCNC: 1.05 MG/DL
EOSINOPHIL # BLD AUTO: 0.1 K/UL
EOSINOPHIL NFR BLD AUTO: 2 %
ESTIMATED AVERAGE GLUCOSE: 108 MG/DL
FERRITIN SERPL-MCNC: 196 NG/ML
FOLATE SERPL-MCNC: >20 NG/ML
GLUCOSE SERPL-MCNC: 95 MG/DL
HBA1C MFR BLD HPLC: 5.4 %
HCT VFR BLD CALC: 34.4 %
HDLC SERPL-MCNC: 56 MG/DL
HGB BLD-MCNC: 11.1 G/DL
IMM GRANULOCYTES NFR BLD AUTO: 0.4 %
IRON SATN MFR SERPL: 26 %
IRON SERPL-MCNC: 76 UG/DL
LDLC SERPL CALC-MCNC: 69 MG/DL
LYMPHOCYTES # BLD AUTO: 1.84 K/UL
LYMPHOCYTES NFR BLD AUTO: 36.8 %
MAN DIFF?: NORMAL
MCHC RBC-ENTMCNC: 30.7 PG
MCHC RBC-ENTMCNC: 32.3 GM/DL
MCV RBC AUTO: 95 FL
MONOCYTES # BLD AUTO: 0.64 K/UL
MONOCYTES NFR BLD AUTO: 12.8 %
NEUTROPHILS # BLD AUTO: 2.36 K/UL
NEUTROPHILS NFR BLD AUTO: 47.2 %
PLATELET # BLD AUTO: 251 K/UL
POTASSIUM SERPL-SCNC: 5 MMOL/L
PROT SERPL-MCNC: 6.7 G/DL
RBC # BLD: 3.62 M/UL
RBC # FLD: 13.2 %
SODIUM SERPL-SCNC: 134 MMOL/L
TIBC SERPL-MCNC: 287 UG/DL
TRIGL SERPL-MCNC: 93 MG/DL
TSH SERPL-ACNC: 3.72 UIU/ML
UIBC SERPL-MCNC: 211 UG/DL
VIT B12 SERPL-MCNC: 928 PG/ML
WBC # FLD AUTO: 5 K/UL

## 2020-02-07 ENCOUNTER — OUTPATIENT (OUTPATIENT)
Dept: OUTPATIENT SERVICES | Facility: HOSPITAL | Age: 75
LOS: 1 days | End: 2020-02-07
Payer: MEDICARE

## 2020-02-07 VITALS
HEART RATE: 68 BPM | SYSTOLIC BLOOD PRESSURE: 189 MMHG | TEMPERATURE: 98 F | WEIGHT: 212.08 LBS | OXYGEN SATURATION: 100 % | HEIGHT: 74 IN | RESPIRATION RATE: 16 BRPM | DIASTOLIC BLOOD PRESSURE: 89 MMHG

## 2020-02-07 DIAGNOSIS — Z98.890 OTHER SPECIFIED POSTPROCEDURAL STATES: Chronic | ICD-10-CM

## 2020-02-07 DIAGNOSIS — Z90.79 ACQUIRED ABSENCE OF OTHER GENITAL ORGAN(S): Chronic | ICD-10-CM

## 2020-02-07 DIAGNOSIS — R93.1 ABNORMAL FINDINGS ON DIAGNOSTIC IMAGING OF HEART AND CORONARY CIRCULATION: ICD-10-CM

## 2020-02-07 LAB
ALBUMIN SERPL ELPH-MCNC: 4.4 G/DL — SIGNIFICANT CHANGE UP (ref 3.3–5)
ALP SERPL-CCNC: 46 U/L — SIGNIFICANT CHANGE UP (ref 40–120)
ALT FLD-CCNC: 13 U/L — SIGNIFICANT CHANGE UP (ref 10–45)
ANION GAP SERPL CALC-SCNC: 12 MMOL/L — SIGNIFICANT CHANGE UP (ref 5–17)
AST SERPL-CCNC: 16 U/L — SIGNIFICANT CHANGE UP (ref 10–40)
BILIRUB SERPL-MCNC: 0.6 MG/DL — SIGNIFICANT CHANGE UP (ref 0.2–1.2)
BUN SERPL-MCNC: 17 MG/DL — SIGNIFICANT CHANGE UP (ref 7–23)
CALCIUM SERPL-MCNC: 10.2 MG/DL — SIGNIFICANT CHANGE UP (ref 8.4–10.5)
CHLORIDE SERPL-SCNC: 95 MMOL/L — LOW (ref 96–108)
CO2 SERPL-SCNC: 24 MMOL/L — SIGNIFICANT CHANGE UP (ref 22–31)
CREAT SERPL-MCNC: 1.14 MG/DL — SIGNIFICANT CHANGE UP (ref 0.5–1.3)
GLUCOSE SERPL-MCNC: 92 MG/DL — SIGNIFICANT CHANGE UP (ref 70–99)
HCT VFR BLD CALC: 32.9 % — LOW (ref 39–50)
HGB BLD-MCNC: 10.8 G/DL — LOW (ref 13–17)
MCHC RBC-ENTMCNC: 30 PG — SIGNIFICANT CHANGE UP (ref 27–34)
MCHC RBC-ENTMCNC: 32.8 GM/DL — SIGNIFICANT CHANGE UP (ref 32–36)
MCV RBC AUTO: 91.4 FL — SIGNIFICANT CHANGE UP (ref 80–100)
NRBC # BLD: 0 /100 WBCS — SIGNIFICANT CHANGE UP (ref 0–0)
PLATELET # BLD AUTO: 243 K/UL — SIGNIFICANT CHANGE UP (ref 150–400)
POTASSIUM SERPL-MCNC: 4.2 MMOL/L — SIGNIFICANT CHANGE UP (ref 3.5–5.3)
POTASSIUM SERPL-SCNC: 4.2 MMOL/L — SIGNIFICANT CHANGE UP (ref 3.5–5.3)
PROT SERPL-MCNC: 7.2 G/DL — SIGNIFICANT CHANGE UP (ref 6–8.3)
RBC # BLD: 3.6 M/UL — LOW (ref 4.2–5.8)
RBC # FLD: 12.7 % — SIGNIFICANT CHANGE UP (ref 10.3–14.5)
SODIUM SERPL-SCNC: 131 MMOL/L — LOW (ref 135–145)
WBC # BLD: 6.75 K/UL — SIGNIFICANT CHANGE UP (ref 3.8–10.5)
WBC # FLD AUTO: 6.75 K/UL — SIGNIFICANT CHANGE UP (ref 3.8–10.5)

## 2020-02-07 PROCEDURE — 93454 CORONARY ARTERY ANGIO S&I: CPT | Mod: 26

## 2020-02-07 PROCEDURE — 93010 ELECTROCARDIOGRAM REPORT: CPT

## 2020-02-07 PROCEDURE — 99152 MOD SED SAME PHYS/QHP 5/>YRS: CPT

## 2020-02-07 PROCEDURE — C1887: CPT

## 2020-02-07 PROCEDURE — 93005 ELECTROCARDIOGRAM TRACING: CPT

## 2020-02-07 PROCEDURE — 93454 CORONARY ARTERY ANGIO S&I: CPT

## 2020-02-07 PROCEDURE — 85027 COMPLETE CBC AUTOMATED: CPT

## 2020-02-07 PROCEDURE — 80053 COMPREHEN METABOLIC PANEL: CPT

## 2020-02-07 PROCEDURE — C1769: CPT

## 2020-02-07 PROCEDURE — C1894: CPT

## 2020-02-07 RX ORDER — TERAZOSIN HYDROCHLORIDE 10 MG/1
1 CAPSULE ORAL
Qty: 0 | Refills: 0 | DISCHARGE

## 2020-02-07 NOTE — H&P CARDIOLOGY - HISTORY OF PRESENT ILLNESS
75 y/o male hx of  neuropathy, HLD, prostate cancer s/prostatectomy. presents today for LHC. Patient went to see Dr. Ray for regular check up and found to have an abnormal EKG, sent for NST with abnormal study and referred for LHC. Denies cardiac implanted device.  Denies sob, chest pain, palpitation dizziness, syncope. 73 y/o male hx of  neuropathy, HLD, prostate cancer s/prostatectomy. presents today for [facility]. Patient went to see Dr. Ray for regular check up and found to have an abnormal EKG, sent for NST with abnormal study ( result not available) and referred for [facility]. Denies cardiac implanted device.  Denies sob, chest pain, palpitation dizziness, syncope.  Narrative:     Symptoms: None       Angina (Class):        Ischemic Symptoms:     Heart Failure: No       Systolic/Diastolic/Combined:        NYHA Class (within 2 weeks):     Assessment of LVEF:No       EF:        Assessed by:        Date:     Prior Cardiac Interventions:       PCI's: No       CABG: No    Noninvasive Testing: Result not available  Stress Test: Date:        Protocol:        Duration of Exercise:        Symptoms:        EKG Changes:        DTS:        Myocardial Imaging:        Risk Assessment:     Echo:     Antianginal Therapies: NA       Beta Blockers:         Calcium Channel Blockers:        Long Acting Nitrates:        Ranexa:     Associated Risk Factors:        Cerebrovascular Disease: N/A       Chronic Lung Disease: N/A       Peripheral Arterial Disease: N/A       Chronic Kidney Disease (if yes, what is GFR): N/A       Uncontrolled Diabetes (if yes, what is HgbA1C or FBS): N/A       Poorly Controlled Hypertension (if yes, what is SBP): N/A       Morbid Obesity (if yes, what is BMI): N/A       History of Recent Ventricular Arrhythmia: N/A       Inability to Ambulate Safely: N/A       Need for Therapeutic Anticoagulation: N/A       Antiplatelet or Contrast Allergy: N/A

## 2020-04-11 ENCOUNTER — RX RENEWAL (OUTPATIENT)
Age: 75
End: 2020-04-11

## 2020-07-16 ENCOUNTER — RX RENEWAL (OUTPATIENT)
Age: 75
End: 2020-07-16

## 2020-07-17 ENCOUNTER — RX RENEWAL (OUTPATIENT)
Age: 75
End: 2020-07-17

## 2020-08-18 ENCOUNTER — RX RENEWAL (OUTPATIENT)
Age: 75
End: 2020-08-18

## 2020-10-02 ENCOUNTER — RX RENEWAL (OUTPATIENT)
Age: 75
End: 2020-10-02

## 2020-10-07 ENCOUNTER — RX RENEWAL (OUTPATIENT)
Age: 75
End: 2020-10-07

## 2020-10-27 ENCOUNTER — APPOINTMENT (OUTPATIENT)
Dept: FAMILY MEDICINE | Facility: CLINIC | Age: 75
End: 2020-10-27
Payer: MEDICARE

## 2020-10-27 VITALS
OXYGEN SATURATION: 98 % | HEART RATE: 68 BPM | WEIGHT: 215 LBS | RESPIRATION RATE: 14 BRPM | BODY MASS INDEX: 28.49 KG/M2 | HEIGHT: 73 IN | TEMPERATURE: 98.2 F

## 2020-10-27 PROCEDURE — 36415 COLL VENOUS BLD VENIPUNCTURE: CPT

## 2020-10-27 PROCEDURE — 99214 OFFICE O/P EST MOD 30 MIN: CPT | Mod: 25

## 2020-10-27 RX ORDER — ZOSTER VACCINE RECOMBINANT, ADJUVANTED 50 MCG/0.5
50 KIT INTRAMUSCULAR
Qty: 1 | Refills: 1 | Status: DISCONTINUED | COMMUNITY
Start: 2018-07-10 | End: 2020-10-27

## 2020-10-28 NOTE — HISTORY OF PRESENT ILLNESS
[FreeTextEntry1] : f/u hemochromatosis, anemia, neuropathy [de-identified] : Here for f/u hemochromatosis. He  follows dermatology , urology, hematology(dr. Fritz)  and cardiology. He is scheduled for stress tests this month. He had phlebotomy in 12/2019 and post phlebotomy HB is  10.4. He gets phlebotomy once a month. He has h/o BPH, HTN, prediabetes. He is off BP lowering medicine due to prior h/o orthostatic hypotension after hip surgery.

## 2020-10-28 NOTE — HEALTH RISK ASSESSMENT
[] : No [No] : In the past 12 months have you used drugs other than those required for medical reasons? No [No falls in past year] : Patient reported no falls in the past year [0] : 2) Feeling down, depressed, or hopeless: Not at all (0) [de-identified] : goes to gym  3 days a week [[id number]] : 0

## 2020-10-30 LAB
ALBUMIN SERPL ELPH-MCNC: 4.4 G/DL
ALP BLD-CCNC: 41 U/L
ALT SERPL-CCNC: 13 U/L
ANION GAP SERPL CALC-SCNC: 11 MMOL/L
AST SERPL-CCNC: 16 U/L
BASOPHILS # BLD AUTO: 0.03 K/UL
BASOPHILS NFR BLD AUTO: 0.4 %
BILIRUB SERPL-MCNC: 0.8 MG/DL
BUN SERPL-MCNC: 16 MG/DL
CALCIUM SERPL-MCNC: 10 MG/DL
CHLORIDE SERPL-SCNC: 95 MMOL/L
CO2 SERPL-SCNC: 23 MMOL/L
CREAT SERPL-MCNC: 1.16 MG/DL
EOSINOPHIL # BLD AUTO: 0.06 K/UL
EOSINOPHIL NFR BLD AUTO: 0.9 %
FERRITIN SERPL-MCNC: 142 NG/ML
GLUCOSE SERPL-MCNC: 114 MG/DL
HCT VFR BLD CALC: 35.2 %
HGB BLD-MCNC: 11.5 G/DL
IMM GRANULOCYTES NFR BLD AUTO: 0.4 %
IRON SATN MFR SERPL: 23 %
IRON SERPL-MCNC: 67 UG/DL
LYMPHOCYTES # BLD AUTO: 1.74 K/UL
LYMPHOCYTES NFR BLD AUTO: 24.7 %
MAN DIFF?: NORMAL
MCHC RBC-ENTMCNC: 31.3 PG
MCHC RBC-ENTMCNC: 32.7 GM/DL
MCV RBC AUTO: 95.9 FL
MONOCYTES # BLD AUTO: 0.92 K/UL
MONOCYTES NFR BLD AUTO: 13 %
NEUTROPHILS # BLD AUTO: 4.27 K/UL
NEUTROPHILS NFR BLD AUTO: 60.6 %
PLATELET # BLD AUTO: 231 K/UL
POTASSIUM SERPL-SCNC: 4.7 MMOL/L
PROT SERPL-MCNC: 6.5 G/DL
RBC # BLD: 3.67 M/UL
RBC # FLD: 13.1 %
SODIUM SERPL-SCNC: 130 MMOL/L
TIBC SERPL-MCNC: 287 UG/DL
UIBC SERPL-MCNC: 220 UG/DL
WBC # FLD AUTO: 7.05 K/UL

## 2020-11-10 ENCOUNTER — APPOINTMENT (OUTPATIENT)
Dept: CARDIOLOGY | Facility: CLINIC | Age: 75
End: 2020-11-10
Payer: MEDICARE

## 2020-11-10 ENCOUNTER — NON-APPOINTMENT (OUTPATIENT)
Age: 75
End: 2020-11-10

## 2020-11-10 VITALS
BODY MASS INDEX: 28.49 KG/M2 | DIASTOLIC BLOOD PRESSURE: 79 MMHG | HEIGHT: 73 IN | WEIGHT: 215 LBS | TEMPERATURE: 97.2 F | HEART RATE: 100 BPM | SYSTOLIC BLOOD PRESSURE: 160 MMHG | RESPIRATION RATE: 20 BRPM | OXYGEN SATURATION: 97 %

## 2020-11-10 VITALS — SYSTOLIC BLOOD PRESSURE: 158 MMHG | DIASTOLIC BLOOD PRESSURE: 80 MMHG

## 2020-11-10 DIAGNOSIS — Z63.4 DISAPPEARANCE AND DEATH OF FAMILY MEMBER: ICD-10-CM

## 2020-11-10 DIAGNOSIS — Z86.79 PERSONAL HISTORY OF OTHER DISEASES OF THE CIRCULATORY SYSTEM: ICD-10-CM

## 2020-11-10 DIAGNOSIS — Z78.9 OTHER SPECIFIED HEALTH STATUS: ICD-10-CM

## 2020-11-10 DIAGNOSIS — Z85.46 PERSONAL HISTORY OF MALIGNANT NEOPLASM OF PROSTATE: ICD-10-CM

## 2020-11-10 PROCEDURE — 93000 ELECTROCARDIOGRAM COMPLETE: CPT

## 2020-11-10 PROCEDURE — 99205 OFFICE O/P NEW HI 60 MIN: CPT

## 2020-11-10 SDOH — SOCIAL STABILITY - SOCIAL INSECURITY: DISSAPEARANCE AND DEATH OF FAMILY MEMBER: Z63.4

## 2020-12-04 ENCOUNTER — APPOINTMENT (OUTPATIENT)
Dept: FAMILY MEDICINE | Facility: CLINIC | Age: 75
End: 2020-12-04

## 2020-12-04 ENCOUNTER — APPOINTMENT (OUTPATIENT)
Dept: FAMILY MEDICINE | Facility: CLINIC | Age: 75
End: 2020-12-04
Payer: MEDICARE

## 2020-12-04 VITALS
RESPIRATION RATE: 16 BRPM | HEART RATE: 91 BPM | OXYGEN SATURATION: 99 % | BODY MASS INDEX: 28.52 KG/M2 | DIASTOLIC BLOOD PRESSURE: 84 MMHG | SYSTOLIC BLOOD PRESSURE: 140 MMHG | WEIGHT: 215.19 LBS | TEMPERATURE: 97.5 F | HEIGHT: 73 IN

## 2020-12-04 DIAGNOSIS — Z23 ENCOUNTER FOR IMMUNIZATION: ICD-10-CM

## 2020-12-04 PROCEDURE — G0439: CPT

## 2020-12-04 NOTE — HISTORY OF PRESENT ILLNESS
[FreeTextEntry1] : comprehensive, follow up for hyponatremia, elevated ferritin/hemochromatosis history, osteoporosis [de-identified] : Mr Maurilio Gordillo 73 yo male presents today for comprehensive visit. Today we will addressing hx of hyponatremia, hemochromatosis, osteoporosis\par Hyponatremia: pt reports has had good nutrition, was recommended to see an endocrinologist for work up for hyponatremia. Pt reports has upcoming appointment with one. Pt other wise compliant on medication. Denies headaches, loc, dizziness, weakness.\par Hemochromatosis: reports seen hematologist Dr. Fritz, had sessions of phlebotomy couple of months ago, recent blood work have been stable, will repeat a new blood work today\par Osteoporosis: Pt currently taking vit d supplement, calcium supplement. Recent DEXA November 2019, shows osteoporosis, not on any bisphosphonate therapy. Advised will give referral to rheumatology for evaluation/treatment, advised to also follow up with dentist for prior dental work.

## 2020-12-04 NOTE — PHYSICAL EXAM
[No Acute Distress] : no acute distress [Well Nourished] : well nourished [EOMI] : extraocular movements intact [Supple] : supple [Clear to Auscultation] : lungs were clear to auscultation bilaterally [Normal S1, S2] : normal S1 and S2 [No Edema] : there was no peripheral edema [Non Tender] : non-tender [No Rash] : no rash [Normal Gait] : normal gait [Normal Affect] : the affect was normal

## 2020-12-04 NOTE — ASSESSMENT
[FreeTextEntry1] : complete physical exam, blood work and urinalysis ordered today, will follow up accordingly. will also acquire updated ferritin level.\par advised to follow up with hematologist, urologist accordingly\par referral provided today for rheumatologist, hx of osteoporosis\par Flu vaccine up to date\par shingrix sent to pharmacy\par needs Tdap, states will consider for next time\par RTO in March 2021 with PMD for follow up

## 2020-12-16 LAB
25(OH)D3 SERPL-MCNC: 50.6 NG/ML
ALBUMIN SERPL ELPH-MCNC: 4.3 G/DL
ALP BLD-CCNC: 42 U/L
ALT SERPL-CCNC: 12 U/L
ANION GAP SERPL CALC-SCNC: 9 MMOL/L
APPEARANCE: CLEAR
AST SERPL-CCNC: 14 U/L
BASOPHILS # BLD AUTO: 0.04 K/UL
BASOPHILS NFR BLD AUTO: 0.6 %
BILIRUB SERPL-MCNC: 1 MG/DL
BILIRUBIN URINE: NEGATIVE
BLOOD URINE: NEGATIVE
BUN SERPL-MCNC: 20 MG/DL
CALCIUM SERPL-MCNC: 9.6 MG/DL
CHLORIDE SERPL-SCNC: 104 MMOL/L
CHOLEST SERPL-MCNC: 151 MG/DL
CO2 SERPL-SCNC: 26 MMOL/L
COLOR: YELLOW
CREAT SERPL-MCNC: 1.44 MG/DL
EOSINOPHIL # BLD AUTO: 0.12 K/UL
EOSINOPHIL NFR BLD AUTO: 1.9 %
ESTIMATED AVERAGE GLUCOSE: 114 MG/DL
FERRITIN SERPL-MCNC: 240 NG/ML
FOLATE SERPL-MCNC: 13.8 NG/ML
GLUCOSE QUALITATIVE U: NEGATIVE
GLUCOSE SERPL-MCNC: 105 MG/DL
HBA1C MFR BLD HPLC: 5.6 %
HCT VFR BLD CALC: 35.7 %
HDLC SERPL-MCNC: 59 MG/DL
HGB BLD-MCNC: 11.7 G/DL
IMM GRANULOCYTES NFR BLD AUTO: 0.2 %
IRON SATN MFR SERPL: 39 %
IRON SERPL-MCNC: 109 UG/DL
KETONES URINE: NEGATIVE
LDLC SERPL CALC-MCNC: 75 MG/DL
LDLC SERPL DIRECT ASSAY-MCNC: 82 MG/DL
LEUKOCYTE ESTERASE URINE: NEGATIVE
LYMPHOCYTES # BLD AUTO: 2.42 K/UL
LYMPHOCYTES NFR BLD AUTO: 38.2 %
MAN DIFF?: NORMAL
MCHC RBC-ENTMCNC: 31.6 PG
MCHC RBC-ENTMCNC: 32.8 GM/DL
MCV RBC AUTO: 96.5 FL
MONOCYTES # BLD AUTO: 0.83 K/UL
MONOCYTES NFR BLD AUTO: 13.1 %
NEUTROPHILS # BLD AUTO: 2.92 K/UL
NEUTROPHILS NFR BLD AUTO: 46 %
NITRITE URINE: NEGATIVE
NONHDLC SERPL-MCNC: 92 MG/DL
PH URINE: 6
PLATELET # BLD AUTO: 208 K/UL
POTASSIUM SERPL-SCNC: 4.5 MMOL/L
PROT SERPL-MCNC: 6.6 G/DL
PROTEIN URINE: NORMAL
PSA SERPL-MCNC: <0.01 NG/ML
RBC # BLD: 3.7 M/UL
RBC # FLD: 12.8 %
SODIUM SERPL-SCNC: 139 MMOL/L
SPECIFIC GRAVITY URINE: 1.02
T4 FREE SERPL-MCNC: 0.9 NG/DL
TIBC SERPL-MCNC: 279 UG/DL
TRIGL SERPL-MCNC: 84 MG/DL
TSH SERPL-ACNC: 4.58 UIU/ML
UIBC SERPL-MCNC: 169 UG/DL
UROBILINOGEN URINE: NORMAL
VIT B12 SERPL-MCNC: 840 PG/ML
WBC # FLD AUTO: 6.34 K/UL

## 2020-12-22 ENCOUNTER — NON-APPOINTMENT (OUTPATIENT)
Age: 75
End: 2020-12-22

## 2020-12-22 ENCOUNTER — APPOINTMENT (OUTPATIENT)
Dept: CARDIOLOGY | Facility: CLINIC | Age: 75
End: 2020-12-22
Payer: MEDICARE

## 2020-12-22 VITALS
BODY MASS INDEX: 28.36 KG/M2 | HEART RATE: 96 BPM | RESPIRATION RATE: 20 BRPM | SYSTOLIC BLOOD PRESSURE: 142 MMHG | DIASTOLIC BLOOD PRESSURE: 69 MMHG | HEIGHT: 73 IN | TEMPERATURE: 98.3 F | WEIGHT: 214 LBS | OXYGEN SATURATION: 98 %

## 2020-12-22 DIAGNOSIS — R94.31 ABNORMAL ELECTROCARDIOGRAM [ECG] [EKG]: ICD-10-CM

## 2020-12-22 PROCEDURE — 93000 ELECTROCARDIOGRAM COMPLETE: CPT

## 2020-12-22 PROCEDURE — 99213 OFFICE O/P EST LOW 20 MIN: CPT

## 2020-12-22 PROCEDURE — 93306 TTE W/DOPPLER COMPLETE: CPT

## 2021-01-04 ENCOUNTER — RX RENEWAL (OUTPATIENT)
Age: 76
End: 2021-01-04

## 2021-01-05 ENCOUNTER — APPOINTMENT (OUTPATIENT)
Dept: RHEUMATOLOGY | Facility: CLINIC | Age: 76
End: 2021-01-05
Payer: MEDICARE

## 2021-01-05 VITALS
DIASTOLIC BLOOD PRESSURE: 79 MMHG | OXYGEN SATURATION: 98 % | HEART RATE: 92 BPM | RESPIRATION RATE: 16 BRPM | WEIGHT: 213 LBS | TEMPERATURE: 98.3 F | SYSTOLIC BLOOD PRESSURE: 140 MMHG | HEIGHT: 73 IN | BODY MASS INDEX: 28.23 KG/M2

## 2021-01-05 PROCEDURE — 99204 OFFICE O/P NEW MOD 45 MIN: CPT

## 2021-01-05 NOTE — HISTORY OF PRESENT ILLNESS
[FreeTextEntry1] : SHANE JUNG is a 75 year old man who presents for management of OP seen on DEXA 12/2019, lowest T score -2.6 in forearm, osteopenia in spine and hips but with + T12 fx s/p kyphoplasty in 2017 and + R femur fx s/p sx in 2019 placing him in OP range in all areas. Both fx were traumatic, no spontaneous fx. \par \par Taking supplemental Ca 1200mg /Vit D 1000 IU, not on bisphosphonate therapy. No tobacco/ ETOH/RA/chronic steroids. No parental fx. No nephrolithiasis. No upcoming dental work. Occasional GERD. Weight bearing exercise 3-4 days/week. Stable on his feet, no falls since femur fx. \par \par + hemochromatosis, stable

## 2021-01-05 NOTE — PHYSICAL EXAM
[General Appearance - Alert] : alert [General Appearance - In No Acute Distress] : in no acute distress [General Appearance - Well Nourished] : well nourished [Sclera] : the sclera and conjunctiva were normal [Extraocular Movements] : extraocular movements were intact [Oropharynx] : the oropharynx was normal [Neck Appearance] : the appearance of the neck was normal [Auscultation Breath Sounds / Voice Sounds] : lungs were clear to auscultation bilaterally [Heart Rate And Rhythm] : heart rate was normal and rhythm regular [Heart Sounds] : normal S1 and S2 [Bowel Sounds] : normal bowel sounds [Abdomen Soft] : soft [No CVA Tenderness] : no ~M costovertebral angle tenderness [No Spinal Tenderness] : no spinal tenderness [Abnormal Walk] : normal gait [Nail Clubbing] : no clubbing  or cyanosis of the fingernails [Musculoskeletal - Swelling] : no joint swelling seen [Motor Tone] : muscle strength and tone were normal [] : no rash [No Focal Deficits] : no focal deficits [Oriented To Time, Place, And Person] : oriented to person, place, and time [Impaired Insight] : insight and judgment were intact [Affect] : the affect was normal [FreeTextEntry1] : O

## 2021-01-05 NOTE — ASSESSMENT
[FreeTextEntry1] : SHANE JUNG is a 75 year old man who presents with OP on DEXA with 2 traumatic fractures in the last 3 years. Discussed that recommendation would be to start bisphosphonate therapy at this time. Given prior spinal fx and mild GERD, would recommend Reclast infusion which he is amenable to. \par \par - Risks and benefits of PO/IV bisphosphonates were d/w patient including but not limited to myalgias, flu like symptoms, avascular necrosis of the jaw, gastric intolerance and atypical fractures. \par - UTD with dental eval. No plan for dental surgery, aware to notify us 3 months prior to any planned oral surgery.\par - c/w Ca and Vit D at current doses, currently optimized Ca/Vit D to maintain bone health. \par - Weight bearing exercise for 30min 3-4x/week to maintain BMD encouraged\par - repeat DEXA in 1 year from med initiation \par - will get PA for Reclast and call patient to set up infusion

## 2021-02-14 ENCOUNTER — RX RENEWAL (OUTPATIENT)
Age: 76
End: 2021-02-14

## 2021-03-05 ENCOUNTER — APPOINTMENT (OUTPATIENT)
Dept: ENDOCRINOLOGY | Facility: CLINIC | Age: 76
End: 2021-03-05
Payer: MEDICARE

## 2021-03-05 VITALS
OXYGEN SATURATION: 98 % | BODY MASS INDEX: 28.36 KG/M2 | TEMPERATURE: 98.3 F | SYSTOLIC BLOOD PRESSURE: 132 MMHG | WEIGHT: 214 LBS | HEART RATE: 85 BPM | DIASTOLIC BLOOD PRESSURE: 62 MMHG | HEIGHT: 73 IN

## 2021-03-05 PROCEDURE — 99214 OFFICE O/P EST MOD 30 MIN: CPT

## 2021-03-08 NOTE — CONSULT LETTER
[Dear  ___] : Dear  [unfilled], [Consult Letter:] : I had the pleasure of evaluating your patient, [unfilled]. [Please see my note below.] : Please see my note below. [Consult Closing:] : Thank you very much for allowing me to participate in the care of this patient.  If you have any questions, please do not hesitate to contact me. [Sincerely,] : Sincerely, [FreeTextEntry2] : GISSELLE ROMERO MD [FreeTextEntry3] : Chele Vaughn MD\par

## 2021-03-08 NOTE — ASSESSMENT
[FreeTextEntry1] : 75-year-old male, with history of osteoporosis, worse at the femoral neck, history of prostate cancer, he also has a history of T12 fracture, here for evaluation of osteoporosis.  He was recommended to start on alendronate back in October 2017, unclear patient has been adherent with his medication.  Bone density in 2019 showed a T score of -2.6 in the femoral neck which suggest that patient should be treated with osteoporosis medication.\par \par 1.  Osteoporosis\par Check PTH, check CMP\par We will arrange for Recalst infusion if patient wants to proceed in our office. \par \par 2. Hyponatremia\par Sodium level mostly normal. \par Will repeat CMP today.  \par \par

## 2021-03-08 NOTE — HISTORY OF PRESENT ILLNESS
[FreeTextEntry1] : Patient is a 75-year-old man, here for initial evaluation for management of osteoporosis.\par Patient was seen by Dr. DENISE from rheumatology in January 2021.\par \par Bone density in December 2019 showing an L2-L4 T score of -1.9, total hip T score of -2.0, as well as left femoral neck T score of -2.7, one third radius T score of -2.6\par \par Prior work-up included normal vitamin D level, normal thyroid function level, and normal comprehensive metabolic panel.\par \par Patient was recommended to start RECLAST but he stated that Dr. Denise's office did not get in touch with him to arrange for treatment.  Therefore he is presenting here for treatment planning.\par \par Patient also had a history of T12 fracture from standing height status post kyphoplasty.  Per our chart review, he was seen by Dr. Wyatt in our office in October 2017, he was recommended to start alendronate 70 mg 1 weekly.  However, it appears that patient has not been adherent with his medication.\par \par \par

## 2021-03-10 LAB
ALBUMIN SERPL ELPH-MCNC: 4.4 G/DL
ALP BLD-CCNC: 39 U/L
ALT SERPL-CCNC: 14 U/L
ANION GAP SERPL CALC-SCNC: 8 MMOL/L
AST SERPL-CCNC: 15 U/L
BILIRUB SERPL-MCNC: 0.6 MG/DL
BUN SERPL-MCNC: 24 MG/DL
CALCIUM SERPL-MCNC: 10.1 MG/DL
CALCIUM SERPL-MCNC: 10.1 MG/DL
CHLORIDE SERPL-SCNC: 103 MMOL/L
CO2 SERPL-SCNC: 26 MMOL/L
CREAT SERPL-MCNC: 1.36 MG/DL
GLUCOSE SERPL-MCNC: 97 MG/DL
PARATHYROID HORMONE INTACT: 48 PG/ML
POTASSIUM SERPL-SCNC: 5 MMOL/L
PROT SERPL-MCNC: 7 G/DL
SODIUM SERPL-SCNC: 138 MMOL/L

## 2021-04-27 ENCOUNTER — RX RENEWAL (OUTPATIENT)
Age: 76
End: 2021-04-27

## 2021-05-10 ENCOUNTER — APPOINTMENT (OUTPATIENT)
Dept: FAMILY MEDICINE | Facility: CLINIC | Age: 76
End: 2021-05-10
Payer: MEDICARE

## 2021-05-10 VITALS
DIASTOLIC BLOOD PRESSURE: 76 MMHG | HEIGHT: 73 IN | HEART RATE: 91 BPM | OXYGEN SATURATION: 98 % | RESPIRATION RATE: 15 BRPM | TEMPERATURE: 97.2 F | BODY MASS INDEX: 28.36 KG/M2 | WEIGHT: 214 LBS | SYSTOLIC BLOOD PRESSURE: 150 MMHG

## 2021-05-10 DIAGNOSIS — R68.89 OTHER GENERAL SYMPTOMS AND SIGNS: ICD-10-CM

## 2021-05-10 PROCEDURE — 99214 OFFICE O/P EST MOD 30 MIN: CPT

## 2021-05-10 PROCEDURE — 99072 ADDL SUPL MATRL&STAF TM PHE: CPT

## 2021-05-11 PROBLEM — R68.89 COLD INTOLERANCE: Status: ACTIVE | Noted: 2021-05-10

## 2021-05-11 NOTE — PLAN
[FreeTextEntry1] : check labs re: cold intolerance in feet, dress in layers.\par  meds refilled.\par alpha Lipoid acid for neuropathy, taper down Gabapentin.\par avoid nephrotoxins. \par  f/u neurology.\par  HTN: medication ,goal BP below 130/80.Low salt diet. \par HLD: statin, low chol. diet. \par anemia stable.

## 2021-05-11 NOTE — HEALTH RISK ASSESSMENT
[] : No [No] : In the past 12 months have you used drugs other than those required for medical reasons? No [No falls in past year] : Patient reported no falls in the past year [0] : 2) Feeling down, depressed, or hopeless: Not at all (0) [de-identified] : regular [[id number]] : 0

## 2021-05-11 NOTE — HISTORY OF PRESENT ILLNESS
[de-identified] : Here for f/u HTN, Hyperlipidemia, anemia. c/o cold intolerance in feet, chronic.  No leg cramps. He is AAOX3,NAD. denies leg weakness.\par  He needs medicine refills and is due for blood work. C/o worsening neuropathy in feet and is requesting in increase in Gabapentin which he takes 3200 mg dose. He will make f/u apt. with neurology.

## 2021-06-15 ENCOUNTER — APPOINTMENT (OUTPATIENT)
Dept: CARDIOLOGY | Facility: CLINIC | Age: 76
End: 2021-06-15
Payer: MEDICARE

## 2021-06-15 ENCOUNTER — NON-APPOINTMENT (OUTPATIENT)
Age: 76
End: 2021-06-15

## 2021-06-15 VITALS
WEIGHT: 212 LBS | SYSTOLIC BLOOD PRESSURE: 133 MMHG | DIASTOLIC BLOOD PRESSURE: 74 MMHG | HEIGHT: 73 IN | HEART RATE: 79 BPM | OXYGEN SATURATION: 100 % | BODY MASS INDEX: 28.1 KG/M2 | RESPIRATION RATE: 20 BRPM | TEMPERATURE: 97.2 F

## 2021-06-15 PROCEDURE — 99213 OFFICE O/P EST LOW 20 MIN: CPT

## 2021-06-15 PROCEDURE — 93000 ELECTROCARDIOGRAM COMPLETE: CPT

## 2021-06-17 ENCOUNTER — RX RENEWAL (OUTPATIENT)
Age: 76
End: 2021-06-17

## 2021-07-26 LAB
ALBUMIN SERPL ELPH-MCNC: 4 G/DL
ALP BLD-CCNC: 29 U/L
ALT SERPL-CCNC: 14 U/L
ANION GAP SERPL CALC-SCNC: 9 MMOL/L
AST SERPL-CCNC: 18 U/L
BASOPHILS # BLD AUTO: 0.03 K/UL
BASOPHILS NFR BLD AUTO: 0.5 %
BILIRUB SERPL-MCNC: 0.9 MG/DL
BUN SERPL-MCNC: 14 MG/DL
CALCIUM SERPL-MCNC: 9.1 MG/DL
CHLORIDE SERPL-SCNC: 100 MMOL/L
CO2 SERPL-SCNC: 24 MMOL/L
CREAT SERPL-MCNC: 1.17 MG/DL
EOSINOPHIL # BLD AUTO: 0.17 K/UL
EOSINOPHIL NFR BLD AUTO: 3 %
FERRITIN SERPL-MCNC: 237 NG/ML
GLUCOSE SERPL-MCNC: 92 MG/DL
HCT VFR BLD CALC: 33 %
HGB BLD-MCNC: 11.1 G/DL
IMM GRANULOCYTES NFR BLD AUTO: 0.3 %
IRON SATN MFR SERPL: 33 %
IRON SERPL-MCNC: 87 UG/DL
LYMPHOCYTES # BLD AUTO: 2.34 K/UL
LYMPHOCYTES NFR BLD AUTO: 40.9 %
MAN DIFF?: NORMAL
MCHC RBC-ENTMCNC: 31.1 PG
MCHC RBC-ENTMCNC: 33.6 GM/DL
MCV RBC AUTO: 92.4 FL
MONOCYTES # BLD AUTO: 0.74 K/UL
MONOCYTES NFR BLD AUTO: 12.9 %
NEUTROPHILS # BLD AUTO: 2.42 K/UL
NEUTROPHILS NFR BLD AUTO: 42.4 %
PLATELET # BLD AUTO: 201 K/UL
POTASSIUM SERPL-SCNC: 4.7 MMOL/L
PROT SERPL-MCNC: 6.3 G/DL
RBC # BLD: 3.57 M/UL
RBC # FLD: 12.6 %
SODIUM SERPL-SCNC: 133 MMOL/L
T3FREE SERPL-MCNC: 2.27 PG/ML
T4 FREE SERPL-MCNC: 0.9 NG/DL
THYROGLOB AB SERPL-ACNC: <20 IU/ML
THYROPEROXIDASE AB SERPL IA-ACNC: 12.4 IU/ML
TIBC SERPL-MCNC: 261 UG/DL
TSH SERPL-ACNC: 4.38 UIU/ML
UIBC SERPL-MCNC: 174 UG/DL
WBC # FLD AUTO: 5.72 K/UL

## 2021-08-25 ENCOUNTER — RX RENEWAL (OUTPATIENT)
Age: 76
End: 2021-08-25

## 2021-08-25 ENCOUNTER — APPOINTMENT (OUTPATIENT)
Dept: FAMILY MEDICINE | Facility: CLINIC | Age: 76
End: 2021-08-25
Payer: MEDICARE

## 2021-08-25 VITALS
WEIGHT: 216 LBS | TEMPERATURE: 97.2 F | HEART RATE: 81 BPM | HEIGHT: 73 IN | RESPIRATION RATE: 16 BRPM | OXYGEN SATURATION: 98 % | BODY MASS INDEX: 28.63 KG/M2 | DIASTOLIC BLOOD PRESSURE: 70 MMHG | SYSTOLIC BLOOD PRESSURE: 134 MMHG

## 2021-08-25 DIAGNOSIS — R25.2 CRAMP AND SPASM: ICD-10-CM

## 2021-08-25 PROCEDURE — 99214 OFFICE O/P EST MOD 30 MIN: CPT

## 2021-08-25 NOTE — PHYSICAL EXAM
[Well Nourished] : well nourished [Well Developed] : well developed [Well-Appearing] : well-appearing [Normal Sclera/Conjunctiva] : normal sclera/conjunctiva [PERRL] : pupils equal round and reactive to light [EOMI] : extraocular movements intact [Normal Outer Ear/Nose] : the outer ears and nose were normal in appearance [Normal Oropharynx] : the oropharynx was normal [No JVD] : no jugular venous distention [Supple] : supple [No Lymphadenopathy] : no lymphadenopathy [Thyroid Normal, No Nodules] : the thyroid was normal and there were no nodules present [No Respiratory Distress] : no respiratory distress  [No Accessory Muscle Use] : no accessory muscle use [Clear to Auscultation] : lungs were clear to auscultation bilaterally [Normal Rate] : normal rate  [Regular Rhythm] : with a regular rhythm [No Murmur] : no murmur heard [Normal S1, S2] : normal S1 and S2 [No Carotid Bruits] : no carotid bruits [No Abdominal Bruit] : a ~M bruit was not heard ~T in the abdomen [No Varicosities] : no varicosities [Pedal Pulses Present] : the pedal pulses are present [No Edema] : there was no peripheral edema [No Palpable Aorta] : no palpable aorta [No Extremity Clubbing/Cyanosis] : no extremity clubbing/cyanosis [Soft] : abdomen soft [Non Tender] : non-tender [Non-distended] : non-distended [No Masses] : no abdominal mass palpated [No HSM] : no HSM [Normal Bowel Sounds] : normal bowel sounds [Normal Posterior Cervical Nodes] : no posterior cervical lymphadenopathy [Normal Anterior Cervical Nodes] : no anterior cervical lymphadenopathy [No CVA Tenderness] : no CVA  tenderness [No Spinal Tenderness] : no spinal tenderness [No Joint Swelling] : no joint swelling [Grossly Normal Strength/Tone] : grossly normal strength/tone [No Rash] : no rash [Coordination Grossly Intact] : coordination grossly intact [No Focal Deficits] : no focal deficits [Normal Gait] : normal gait [Deep Tendon Reflexes (DTR)] : deep tendon reflexes were 2+ and symmetric [Normal Affect] : the affect was normal [Normal Insight/Judgement] : insight and judgment were intact

## 2021-08-27 NOTE — HISTORY OF PRESENT ILLNESS
[FreeTextEntry1] : F/U HLD, anemia, HTN  [de-identified] : Here for f/u HTN, Hyperlipidemia, anemia. c/o cold intolerance in feet, chronic.  No leg cramps. He is AAOX3,NAD. denies leg weakness.\par  He needs medicine refills and is due for blood work. C/o  neuropathy in feet and is taking  Gabapentin which he takes 3200 mg dose. He  made apt.  with neurology. No new complaints.

## 2021-08-27 NOTE — HEALTH RISK ASSESSMENT
[No] : In the past 12 months have you used drugs other than those required for medical reasons? No [No falls in past year] : Patient reported no falls in the past year [0] : 2) Feeling down, depressed, or hopeless: Not at all (0) [de-identified] : regular [] : No [[id number]] : 0 [With Patient/Caregiver] : , with patient/caregiver [AdvancecareDate] : 8/2021

## 2021-08-27 NOTE — PLAN
[FreeTextEntry1] :  meds refilled.\par  Gabapentin for neuropathy.\par avoid nephrotoxins. \par  f/u neurology.\par  HTN: medication ,goal BP below 130/80.Low salt diet. \par HLD: statin, low chol. diet. \par anemia stable.

## 2021-09-23 ENCOUNTER — RX RENEWAL (OUTPATIENT)
Age: 76
End: 2021-09-23

## 2021-10-01 ENCOUNTER — RX RENEWAL (OUTPATIENT)
Age: 76
End: 2021-10-01

## 2021-10-11 ENCOUNTER — RX RENEWAL (OUTPATIENT)
Age: 76
End: 2021-10-11

## 2021-10-14 LAB
25(OH)D3 SERPL-MCNC: 47.8 NG/ML
ALBUMIN SERPL ELPH-MCNC: 4.1 G/DL
ALP BLD-CCNC: 30 U/L
ALT SERPL-CCNC: 15 U/L
ANION GAP SERPL CALC-SCNC: 11 MMOL/L
AST SERPL-CCNC: 20 U/L
BASOPHILS # BLD AUTO: 0.04 K/UL
BASOPHILS NFR BLD AUTO: 0.7 %
BILIRUB SERPL-MCNC: 0.9 MG/DL
BUN SERPL-MCNC: 22 MG/DL
CALCIUM SERPL-MCNC: 9.7 MG/DL
CHLORIDE SERPL-SCNC: 105 MMOL/L
CHOLEST SERPL-MCNC: 140 MG/DL
CO2 SERPL-SCNC: 21 MMOL/L
CREAT SERPL-MCNC: 1.34 MG/DL
EOSINOPHIL # BLD AUTO: 0.16 K/UL
EOSINOPHIL NFR BLD AUTO: 2.7 %
GLUCOSE SERPL-MCNC: 97 MG/DL
HCT VFR BLD CALC: 32.7 %
HDLC SERPL-MCNC: 56 MG/DL
HGB BLD-MCNC: 10.8 G/DL
IMM GRANULOCYTES NFR BLD AUTO: 0.3 %
LDLC SERPL CALC-MCNC: 70 MG/DL
LYMPHOCYTES # BLD AUTO: 2.38 K/UL
LYMPHOCYTES NFR BLD AUTO: 39.7 %
MAGNESIUM SERPL-MCNC: 2.1 MG/DL
MAN DIFF?: NORMAL
MCHC RBC-ENTMCNC: 31.4 PG
MCHC RBC-ENTMCNC: 33 GM/DL
MCV RBC AUTO: 95.1 FL
MONOCYTES # BLD AUTO: 0.68 K/UL
MONOCYTES NFR BLD AUTO: 11.4 %
NEUTROPHILS # BLD AUTO: 2.71 K/UL
NEUTROPHILS NFR BLD AUTO: 45.2 %
NONHDLC SERPL-MCNC: 84 MG/DL
PLATELET # BLD AUTO: 205 K/UL
POTASSIUM SERPL-SCNC: 4.9 MMOL/L
PROT SERPL-MCNC: 6.5 G/DL
RBC # BLD: 3.44 M/UL
RBC # FLD: 12.9 %
SODIUM SERPL-SCNC: 137 MMOL/L
T4 FREE SERPL-MCNC: 1 NG/DL
TRIGL SERPL-MCNC: 71 MG/DL
TSH SERPL-ACNC: 2.71 UIU/ML
WBC # FLD AUTO: 5.99 K/UL

## 2021-10-15 LAB
ESTIMATED AVERAGE GLUCOSE: 123 MG/DL
FERRITIN SERPL-MCNC: 249 NG/ML
FOLATE SERPL-MCNC: 9.7 NG/ML
HBA1C MFR BLD HPLC: 5.9 %
IRON SATN MFR SERPL: 40 %
IRON SERPL-MCNC: 101 UG/DL
TIBC SERPL-MCNC: 251 UG/DL
UIBC SERPL-MCNC: 150 UG/DL
VIT B12 SERPL-MCNC: 1068 PG/ML

## 2021-12-06 ENCOUNTER — OUTPATIENT (OUTPATIENT)
Dept: OUTPATIENT SERVICES | Facility: HOSPITAL | Age: 76
LOS: 1 days | End: 2021-12-06
Payer: MEDICARE

## 2021-12-06 ENCOUNTER — APPOINTMENT (OUTPATIENT)
Dept: RADIOLOGY | Facility: HOSPITAL | Age: 76
End: 2021-12-06
Payer: MEDICARE

## 2021-12-06 DIAGNOSIS — Z98.890 OTHER SPECIFIED POSTPROCEDURAL STATES: Chronic | ICD-10-CM

## 2021-12-06 DIAGNOSIS — Z90.79 ACQUIRED ABSENCE OF OTHER GENITAL ORGAN(S): Chronic | ICD-10-CM

## 2021-12-06 DIAGNOSIS — M81.0 AGE-RELATED OSTEOPOROSIS WITHOUT CURRENT PATHOLOGICAL FRACTURE: ICD-10-CM

## 2021-12-06 PROCEDURE — 77080 DXA BONE DENSITY AXIAL: CPT

## 2021-12-06 PROCEDURE — 77080 DXA BONE DENSITY AXIAL: CPT | Mod: 26

## 2021-12-07 ENCOUNTER — RX RENEWAL (OUTPATIENT)
Age: 76
End: 2021-12-07

## 2021-12-08 ENCOUNTER — APPOINTMENT (OUTPATIENT)
Age: 76
End: 2021-12-08
Payer: MEDICARE

## 2021-12-08 ENCOUNTER — RX RENEWAL (OUTPATIENT)
Age: 76
End: 2021-12-08

## 2021-12-08 VITALS
TEMPERATURE: 97.1 F | WEIGHT: 212 LBS | DIASTOLIC BLOOD PRESSURE: 76 MMHG | OXYGEN SATURATION: 98 % | RESPIRATION RATE: 18 BRPM | SYSTOLIC BLOOD PRESSURE: 142 MMHG | HEART RATE: 84 BPM | BODY MASS INDEX: 28.71 KG/M2 | HEIGHT: 72 IN

## 2021-12-08 DIAGNOSIS — R79.89 OTHER SPECIFIED ABNORMAL FINDINGS OF BLOOD CHEMISTRY: ICD-10-CM

## 2021-12-08 PROCEDURE — G0439: CPT

## 2021-12-08 NOTE — HEALTH RISK ASSESSMENT
[Excellent] : ~his/her~  mood as  excellent [Yes] : Yes [No] : In the past 12 months have you used drugs other than those required for medical reasons? No [One fall no injury in past year] : Patient reported one fall in the past year without injury [0] : 2) Feeling down, depressed, or hopeless: Not at all (0) [Patient reported bone density results were abnormal] : Patient reported bone density results were abnormal [Patient reported colonoscopy was normal] : Patient reported colonoscopy was normal [HIV Test offered] : HIV Test offered [Hepatitis C test offered] : Hepatitis C test offered [None] : None [Alone] : lives alone [Retired] : retired [College] : College [] :  [# Of Children ___] : has [unfilled] children [Feels Safe at Home] : Feels safe at home [Fully functional (bathing, dressing, toileting, transferring, walking, feeding)] : Fully functional (bathing, dressing, toileting, transferring, walking, feeding) [Fully functional (using the telephone, shopping, preparing meals, housekeeping, doing laundry, using] : Fully functional and needs no help or supervision to perform IADLs (using the telephone, shopping, preparing meals, housekeeping, doing laundry, using transportation, managing medications and managing finances) [Smoke Detector] : smoke detector [Carbon Monoxide Detector] : carbon monoxide detector [Safety elements used in home] : safety elements used in home [Seat Belt] :  uses seat belt [Sunscreen] : uses sunscreen [With Patient/Caregiver] : , with patient/caregiver [Designated Healthcare Proxy] : Designated healthcare proxy [Name: ___] : Health Care Proxy's Name: [unfilled]  [Relationship: ___] : Relationship: [unfilled] [DNR] : DNR [] : No [de-identified] : right femur fracture [facility] [de-identified] : rarely [de-identified] : fell on cement floor, mechanical fall [[id number]] : 0 [EyeExamDate] : MM/21 [Change in mental status noted] : No change in mental status noted [Language] : denies difficulty with language [Behavior] : denies difficulty with behavior [Learning/Retaining New Information] : denies difficulty learning/retaining new information [Handling Complex Tasks] : denies difficulty handling complex tasks [Reasoning] : denies difficulty with reasoning [Spatial Ability and Orientation] : denies difficulty with spatial ability and orientation [Sexually Active] : not sexually active [High Risk Behavior] : no high risk behavior [Reports changes in hearing] : Reports no changes in hearing [Reports changes in vision] : Reports no changes in vision [Reports changes in dental health] : Reports no changes in dental health [Guns at Home] : no guns at home [BoneDensityDate] : 12/21 [BoneDensityComments] : osteoporosis [ColonoscopyDate] : 05/17 [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

## 2021-12-08 NOTE — HISTORY OF PRESENT ILLNESS
[FreeTextEntry1] : comprehensive, follow up for hyponatremia, elevated ferritin/hemochromatosis history, osteoporosis [de-identified] : Mr Maurilio Gordillo 73 yo male presents today for comprehensive visit. Today we will addressing hx of hyponatremia, hemochromatosis, osteoporosis. DEXA showed Osteoporosis , patient follows endocrinologist. \par  pt reports has had good nutrition. . Pt other wise compliant on medication. Denies headaches,chest pain, SOB,dizziness,  weakness.\par Hemochromatosis: reports seen hematologist Dr. Fritz in past , had sessions of phlebotomy couple years ago, recent blood work have been stable, will repeat a new blood work in a month.His cold intolerance has improved.\par \par  pt. will see cardiologist next week for echo and EKG. \par Osteoporosis: Pt currently taking vit d supplement, calcium supplement. Recent DEXA November 2019, shows osteoporosis, not on any bisphosphonate therapy. Advised will give referral to rheumatology for evaluation/treatment, advised to also follow up with dentist for prior dental work.

## 2021-12-08 NOTE — PLAN
[FreeTextEntry1] : Recommend eye exam. \par  f/u endocrine re: Osteoporosis, continue Fosamax, Calcium with D and K.\par  Labs reviewed. f/u hematology. repeat labs next month.\par cbc, cmp, a1c, tsh free t4 , Immunoglobulin, immunofixation, serum protein , ferritin, retic count.\par  Meds refilled, Continue present management. \par Debrox ear drops right ear. f/u derm. dr. Adkins.\par

## 2021-12-14 ENCOUNTER — APPOINTMENT (OUTPATIENT)
Dept: CARDIOLOGY | Facility: CLINIC | Age: 76
End: 2021-12-14
Payer: MEDICARE

## 2021-12-14 ENCOUNTER — NON-APPOINTMENT (OUTPATIENT)
Age: 76
End: 2021-12-14

## 2021-12-14 VITALS
SYSTOLIC BLOOD PRESSURE: 163 MMHG | OXYGEN SATURATION: 99 % | RESPIRATION RATE: 16 BRPM | DIASTOLIC BLOOD PRESSURE: 75 MMHG | HEIGHT: 72 IN | BODY MASS INDEX: 28.71 KG/M2 | TEMPERATURE: 97.5 F | WEIGHT: 212 LBS | HEART RATE: 82 BPM

## 2021-12-14 PROCEDURE — 99214 OFFICE O/P EST MOD 30 MIN: CPT

## 2021-12-14 PROCEDURE — 93306 TTE W/DOPPLER COMPLETE: CPT

## 2021-12-14 PROCEDURE — 93000 ELECTROCARDIOGRAM COMPLETE: CPT

## 2022-01-05 ENCOUNTER — RX RENEWAL (OUTPATIENT)
Age: 77
End: 2022-01-05

## 2022-02-16 ENCOUNTER — APPOINTMENT (OUTPATIENT)
Dept: NEUROLOGY | Facility: CLINIC | Age: 77
End: 2022-02-16

## 2022-02-17 LAB
ALBUMIN SERPL ELPH-MCNC: 4.3 G/DL
ALP BLD-CCNC: 28 U/L
ALT SERPL-CCNC: 15 U/L
ANION GAP SERPL CALC-SCNC: 11 MMOL/L
AST SERPL-CCNC: 19 U/L
BASOPHILS # BLD AUTO: 0.03 K/UL
BASOPHILS NFR BLD AUTO: 0.5 %
BILIRUB SERPL-MCNC: 1 MG/DL
BUN SERPL-MCNC: 23 MG/DL
CALCIUM SERPL-MCNC: 9.7 MG/DL
CHLORIDE SERPL-SCNC: 103 MMOL/L
CO2 SERPL-SCNC: 24 MMOL/L
CREAT SERPL-MCNC: 1.32 MG/DL
EOSINOPHIL # BLD AUTO: 0.13 K/UL
EOSINOPHIL NFR BLD AUTO: 2.1 %
ESTIMATED AVERAGE GLUCOSE: 120 MG/DL
FERRITIN SERPL-MCNC: 241 NG/ML
GLUCOSE SERPL-MCNC: 97 MG/DL
HBA1C MFR BLD HPLC: 5.8 %
HCT VFR BLD CALC: 35.6 %
HGB BLD-MCNC: 11.5 G/DL
IMM GRANULOCYTES NFR BLD AUTO: 0.5 %
IRON SATN MFR SERPL: 40 %
IRON SERPL-MCNC: 103 UG/DL
LYMPHOCYTES # BLD AUTO: 2.17 K/UL
LYMPHOCYTES NFR BLD AUTO: 34.4 %
MAN DIFF?: NORMAL
MCHC RBC-ENTMCNC: 31.1 PG
MCHC RBC-ENTMCNC: 32.3 GM/DL
MCV RBC AUTO: 96.2 FL
MONOCYTES # BLD AUTO: 0.77 K/UL
MONOCYTES NFR BLD AUTO: 12.2 %
NEUTROPHILS # BLD AUTO: 3.18 K/UL
NEUTROPHILS NFR BLD AUTO: 50.3 %
PLATELET # BLD AUTO: 213 K/UL
POTASSIUM SERPL-SCNC: 4.9 MMOL/L
PROT SERPL-MCNC: 6.6 G/DL
RBC # BLD: 3.7 M/UL
RBC # BLD: 3.7 M/UL
RBC # FLD: 12.3 %
RETICS # AUTO: 1.2 %
RETICS AGGREG/RBC NFR: 43.7 K/UL
SODIUM SERPL-SCNC: 138 MMOL/L
T3FREE SERPL-MCNC: 2.68 PG/ML
T4 FREE SERPL-MCNC: 1.1 NG/DL
TIBC SERPL-MCNC: 259 UG/DL
TSH SERPL-ACNC: 2.23 UIU/ML
UIBC SERPL-MCNC: 156 UG/DL
WBC # FLD AUTO: 6.31 K/UL

## 2022-02-28 LAB
ALBUMIN MFR SERPL ELPH: 61.2 %
ALBUMIN SERPL-MCNC: 4 G/DL
ALBUMIN/GLOB SERPL: 1.5 RATIO
ALPHA1 GLOB MFR SERPL ELPH: 3.3 %
ALPHA1 GLOB SERPL ELPH-MCNC: 0.2 G/DL
ALPHA2 GLOB MFR SERPL ELPH: 8.5 %
ALPHA2 GLOB SERPL ELPH-MCNC: 0.6 G/DL
B-GLOBULIN MFR SERPL ELPH: 10.6 %
B-GLOBULIN SERPL ELPH-MCNC: 0.7 G/DL
DEPRECATED KAPPA LC FREE/LAMBDA SER: 1.81 RATIO
GAMMA GLOB FLD ELPH-MCNC: 1.1 G/DL
GAMMA GLOB MFR SERPL ELPH: 16.4 %
IGA SER QL IEP: 213 MG/DL
IGG SER QL IEP: 1144 MG/DL
IGM SER QL IEP: 55 MG/DL
INTERPRETATION SERPL IEP-IMP: NORMAL
KAPPA LC CSF-MCNC: 1.45 MG/DL
KAPPA LC SERPL-MCNC: 2.63 MG/DL
M PROTEIN SPEC IFE-MCNC: NORMAL
PROT SERPL-MCNC: 6.6 G/DL
PROT SERPL-MCNC: 6.6 G/DL
THYROGLOB AB SERPL-ACNC: <20 IU/ML
THYROPEROXIDASE AB SERPL IA-ACNC: <10 IU/ML

## 2022-03-22 ENCOUNTER — RX RENEWAL (OUTPATIENT)
Age: 77
End: 2022-03-22

## 2022-03-23 ENCOUNTER — RX RENEWAL (OUTPATIENT)
Age: 77
End: 2022-03-23

## 2022-04-12 NOTE — OCCUPATIONAL THERAPY INITIAL EVALUATION ADULT - MD/RN NOTIFIED
Patient completed dialysis and was stable for discharge home with close nephrology follow up. Stricct return precautions given.      Shanti Rai MD yes

## 2022-05-11 ENCOUNTER — NON-APPOINTMENT (OUTPATIENT)
Age: 77
End: 2022-05-11

## 2022-05-11 ENCOUNTER — RX RENEWAL (OUTPATIENT)
Age: 77
End: 2022-05-11

## 2022-05-12 ENCOUNTER — RX RENEWAL (OUTPATIENT)
Age: 77
End: 2022-05-12

## 2022-05-13 ENCOUNTER — NON-APPOINTMENT (OUTPATIENT)
Age: 77
End: 2022-05-13

## 2022-05-13 ENCOUNTER — APPOINTMENT (OUTPATIENT)
Dept: OTOLARYNGOLOGY | Facility: CLINIC | Age: 77
End: 2022-05-13
Payer: MEDICARE

## 2022-05-13 VITALS
HEART RATE: 89 BPM | DIASTOLIC BLOOD PRESSURE: 72 MMHG | WEIGHT: 208 LBS | BODY MASS INDEX: 28.17 KG/M2 | TEMPERATURE: 97.9 F | SYSTOLIC BLOOD PRESSURE: 165 MMHG | HEIGHT: 72 IN

## 2022-05-13 DIAGNOSIS — H93.13 TINNITUS, BILATERAL: ICD-10-CM

## 2022-05-13 DIAGNOSIS — H61.23 IMPACTED CERUMEN, BILATERAL: ICD-10-CM

## 2022-05-13 PROCEDURE — 69210 REMOVE IMPACTED EAR WAX UNI: CPT

## 2022-05-13 PROCEDURE — 99204 OFFICE O/P NEW MOD 45 MIN: CPT | Mod: 25

## 2022-05-13 NOTE — ASSESSMENT
[FreeTextEntry1] : Long history of tinnitus never seen by an otolaryngologist coming in today massive cerumen impaction bilaterally curetted out normal tympanic membranes he refused a hearing test I do recommend he follow-up with us on an annual basis for cerumen management and consider a hearing test sometime in the future.  Understands there is no cure for tinnitus and.

## 2022-05-13 NOTE — CONSULT LETTER
[Dear  ___] : Dear  [unfilled], [Consult Letter:] : I had the pleasure of evaluating your patient, [unfilled]. [Please see my note below.] : Please see my note below. [Consult Closing:] : Thank you very much for allowing me to participate in the care of this patient.  If you have any questions, please do not hesitate to contact me. [Sincerely,] : Sincerely, [FreeTextEntry3] : Osvaldo Lopez MD\par [facility]\par Otolaryngology and Facial Plastics\par Associated Professor, [employer]\par

## 2022-05-13 NOTE — HISTORY OF PRESENT ILLNESS
[de-identified] : Patient has a long history of tinnitus in both ears but this does not keep him awake at night. He wakes up in  the morning with some clogging of both ears. He does not clean his ears at home. He does not have any dizziness or vertigo.

## 2022-05-13 NOTE — END OF VISIT
[FreeTextEntry3] : I saw and examined this patient in person. I have discussed with Pooja Salmon, Physician Assistant, in detail the above note and agree with the above assessment and plan of care.\par

## 2022-05-17 ENCOUNTER — NON-APPOINTMENT (OUTPATIENT)
Age: 77
End: 2022-05-17

## 2022-06-02 ENCOUNTER — RX RENEWAL (OUTPATIENT)
Age: 77
End: 2022-06-02

## 2022-06-14 ENCOUNTER — NON-APPOINTMENT (OUTPATIENT)
Age: 77
End: 2022-06-14

## 2022-06-14 ENCOUNTER — APPOINTMENT (OUTPATIENT)
Dept: CARDIOLOGY | Facility: CLINIC | Age: 77
End: 2022-06-14
Payer: MEDICARE

## 2022-06-14 VITALS
BODY MASS INDEX: 28.17 KG/M2 | DIASTOLIC BLOOD PRESSURE: 72 MMHG | RESPIRATION RATE: 18 BRPM | OXYGEN SATURATION: 100 % | WEIGHT: 208 LBS | HEART RATE: 83 BPM | SYSTOLIC BLOOD PRESSURE: 168 MMHG | TEMPERATURE: 97.2 F | HEIGHT: 72 IN

## 2022-06-14 DIAGNOSIS — I35.0 NONRHEUMATIC AORTIC (VALVE) STENOSIS: ICD-10-CM

## 2022-06-14 PROCEDURE — 93000 ELECTROCARDIOGRAM COMPLETE: CPT

## 2022-06-14 PROCEDURE — 99214 OFFICE O/P EST MOD 30 MIN: CPT

## 2022-06-22 ENCOUNTER — APPOINTMENT (OUTPATIENT)
Dept: NEUROLOGY | Facility: CLINIC | Age: 77
End: 2022-06-22
Payer: MEDICARE

## 2022-06-22 VITALS
DIASTOLIC BLOOD PRESSURE: 82 MMHG | WEIGHT: 208 LBS | HEIGHT: 72 IN | SYSTOLIC BLOOD PRESSURE: 142 MMHG | BODY MASS INDEX: 28.17 KG/M2

## 2022-06-22 PROCEDURE — 99215 OFFICE O/P EST HI 40 MIN: CPT

## 2022-06-22 NOTE — DISCUSSION/SUMMARY
[FreeTextEntry1] : 76-year-old gentleman who is here for follow-up of his neuropathy since 2018.  His neuropathy is likely multifactorial: His hypothyroidism, elevated hemoglobin A1c, hemochromatosis, low back pain, mild aortic stenosis, mild renal insufficiency all contribute to his neuropathy.  Given his bilateral ankle edema we will have him see vascular surgeon for possible vascular insufficiency.  We will also reach out to Dr. Ross regarding his renal insufficiency as a cause of his bilateral ankle edema.  If the above work-up is negative then the other possibility includes gabapentin's side effect.  At that point patient may need to change to another neuropathic medication such as Cymbalta.  Patient will follow up with me after the consultation.\par \par I spent the time noted on the day of this patient encounter preparing for, providing and documenting the above E/M service and counseling and educate patient on differential, workup, disease course, and treatment/management. Education was provided to the patient during this encounter. All questions and concerns were answered and addressed in detail. The patient verbalized understanding and agreed to plan. Patient was advised to continue to monitor for neurologic symptoms and to notify my office or go to the nearest emergency room if there are any changes. Any orders/referrals and communications were provided as well. \par Side effects of the above medications were discussed in detail including but not limited to applicable black box warning and teratogenicity as appropriate. \par Patient was advised to bring previous records to my office. \par \par \par

## 2022-06-22 NOTE — HISTORY OF PRESENT ILLNESS
[FreeTextEntry1] : 76-year-old gentleman who is here for initial consultation of neuropathy that has been going on for the past 10 years. Patient states that he noticed numbness in the balls of his feet and was started on gabapentin with slow titration. Initially helped with the symptoms but when it stopped helping Dr. [place] increased the dose. He is currently taking Neurontin 800 mg t.i.d. he states that he has tried Lyrica and the past however it was not well tolerated. \par \par Severity: 4/10\par freq: constant\par quality: numbness\par worsened by cold.\par \par interval history. Even with the increased gabapentin, the balls of his feet still are painful with weight bearing. He has no side effects from the gabapentin. \par \par interval history: saw podiatry and has insoles that are helping somewhat. The lidoderm patch is helping somewhat. He still complains of a coldness. No side effects from the gabapentin. \par \par interval history: He states he tried effexor and when he stopped it for 2 days, the pain was worse. He states he's back on it and it's better. No side effects from the medications.\par \par interval history: despite taking 150 of effexor, his neuropathy is still there. He's on gabapentin 800 and then 1200 at night. He was asked to wean off of effexor since it didn't help. \par \par no depression or anxiety. bp is normal. \par \par interval history: he's on gabapentin 800 and 1200 at night. He tried cymbalta but he wants to stop b/c it is not helping. He has tried lyrica but ddin't like the side effects, we are not going to try pamelor due to his history of abnl ekg. \par \par interval history: he is s/p back surgery from a recent fall. His neuropathy is the same as before. Through all the neuropathic medications, he still prefers lkdoderm patch for his neuropathy. \par \par Interval history: Patient is currently on gabapentin 800 mg/800 mg / 800 mg for a total of 2400 mg a day.  Patient states that he continues to have numbness and pain in his feet and a cold sensation at night.  Patient is noted to have bilateral ankle edema on exam.  Patient has seen his cardiologist with no pathology.  Patient is noted to have renal insufficiency as per the chart.

## 2022-06-22 NOTE — PHYSICAL EXAM
[General Appearance - Alert] : alert [General Appearance - Well Developed] : well developed [Oriented To Time, Place, And Person] : oriented to person, place, and time [Person] : oriented to person [Place] : oriented to place [Time] : oriented to time [Short Term Intact] : short term memory intact [Span Intact] : the attention span was normal [Naming Objects] : no difficulty naming common objects [Fluency] : fluency intact [Current Events] : adequate knowledge of current events [Cranial Nerves Optic (II)] : visual acuity intact bilaterally,  visual fields full to confrontation, pupils equal round and reactive to light [Cranial Nerves Oculomotor (III)] : extraocular motion intact [Cranial Nerves Vestibulocochlear (VIII)] : hearing was intact bilaterally [Cranial Nerves Accessory (XI - Cranial And Spinal)] : head turning and shoulder shrug symmetric [Motor Tone] : muscle tone was normal in all four extremities [Motor Strength] : muscle strength was normal in all four extremities [Abnormal Walk] : normal gait [Coordination - Dysmetria Impaired Finger-to-Nose Bilateral] : not present [2+] : Patella left 2+ [FreeTextEntry7] : Decreased to light touch and pinprick to the mid shins bilateral [FreeTextEntry8] : no sensitivity to palpation to the balls of his feet.

## 2022-06-30 NOTE — DISCHARGE NOTE ADULT - MEDICATION SUMMARY - MEDICATIONS TO CHANGE
Render Risk Assessment In Note?: no Note Text (......Xxx Chief Complaint.): This diagnosis correlates with the Detail Level: Zone Other (Free Text): Antibiotics take 1 hour prior to procedure I will SWITCH the dose or number of times a day I take the medications listed below when I get home from the hospital:    gabapentin 800 mg oral tablet  -- 1 tab(s) by mouth 3 times a day

## 2022-07-05 ENCOUNTER — APPOINTMENT (OUTPATIENT)
Dept: FAMILY MEDICINE | Facility: CLINIC | Age: 77
End: 2022-07-05

## 2022-07-05 VITALS
WEIGHT: 211 LBS | RESPIRATION RATE: 16 BRPM | SYSTOLIC BLOOD PRESSURE: 141 MMHG | OXYGEN SATURATION: 98 % | HEIGHT: 72 IN | HEART RATE: 85 BPM | DIASTOLIC BLOOD PRESSURE: 67 MMHG | BODY MASS INDEX: 28.58 KG/M2 | TEMPERATURE: 98.2 F

## 2022-07-05 DIAGNOSIS — N40.0 BENIGN PROSTATIC HYPERPLASIA WITHOUT LOWER URINARY TRACT SYMPMS: ICD-10-CM

## 2022-07-05 PROCEDURE — 99214 OFFICE O/P EST MOD 30 MIN: CPT

## 2022-07-05 NOTE — ASSESSMENT
[FreeTextEntry1] : will order labwork and assess\par denies any sob/chest pain\par dull headache, tylenol prn\par if symptoms lingering or recurs, also see neurologist/cardiologist for evaluation\par if symptoms severe go to ER for prompt evaluation\par advised to optimize hydration, nutrition\par avoid strenuous activities in the mean time.

## 2022-07-05 NOTE — REVIEW OF SYSTEMS
[Fatigue] : fatigue [Headache] : headache [Dizziness] : dizziness [Fainting] : no fainting [Confusion] : no confusion [Unsteady Walk] : no ataxia [Memory Loss] : no memory loss [Negative] : Heme/Lymph

## 2022-07-05 NOTE — PHYSICAL EXAM
[Well Nourished] : well nourished [EOMI] : extraocular movements intact [Supple] : supple [Clear to Auscultation] : lungs were clear to auscultation bilaterally [Normal S1, S2] : normal S1 and S2 [No Edema] : there was no peripheral edema [Non Tender] : non-tender [No Rash] : no rash [Normal Gait] : normal gait [Normal Affect] : the affect was normal

## 2022-07-05 NOTE — HISTORY OF PRESENT ILLNESS
[FreeTextEntry8] : Mr Maurilio Gordillo is a 75 yo male presents for sensation of mild headache, fatigue, and slight dizziness with spinning of ground only upon looking down. Pt denies any fever, chills, n/v/d/c. Pt reports he feels off, and states occurred this morning after exercising in the gym, went home ate and relaxed, and took tylenol for the headache. Pt reports at current time all symptoms have disappeared. Advised pt if symptoms recur and keeps recurring also to follow up with cardiology and neurology. Also advised if symptoms recur and severe headaches/dizziness then go to ER for prompt evaluation.

## 2022-07-08 LAB
25(OH)D3 SERPL-MCNC: 41.2 NG/ML
ALBUMIN SERPL ELPH-MCNC: 4.2 G/DL
ALP BLD-CCNC: 28 U/L
ALT SERPL-CCNC: 13 U/L
ANION GAP SERPL CALC-SCNC: 9 MMOL/L
APPEARANCE: CLEAR
AST SERPL-CCNC: 20 U/L
BASOPHILS # BLD AUTO: 0.03 K/UL
BASOPHILS NFR BLD AUTO: 0.5 %
BILIRUB SERPL-MCNC: 0.7 MG/DL
BILIRUBIN URINE: NEGATIVE
BLOOD URINE: NEGATIVE
BUN SERPL-MCNC: 14 MG/DL
CALCIUM SERPL-MCNC: 9.5 MG/DL
CHLORIDE SERPL-SCNC: 97 MMOL/L
CHOLEST SERPL-MCNC: 143 MG/DL
CO2 SERPL-SCNC: 23 MMOL/L
COLOR: NORMAL
CREAT SERPL-MCNC: 1.16 MG/DL
EGFR: 64 ML/MIN/1.73M2
EOSINOPHIL # BLD AUTO: 0.13 K/UL
EOSINOPHIL NFR BLD AUTO: 2.3 %
ESTIMATED AVERAGE GLUCOSE: 120 MG/DL
FERRITIN SERPL-MCNC: 224 NG/ML
FOLATE SERPL-MCNC: 13.2 NG/ML
GLUCOSE QUALITATIVE U: NEGATIVE
GLUCOSE SERPL-MCNC: 99 MG/DL
HBA1C MFR BLD HPLC: 5.8 %
HCT VFR BLD CALC: 33.4 %
HDLC SERPL-MCNC: 58 MG/DL
HGB BLD-MCNC: 11.2 G/DL
IMM GRANULOCYTES NFR BLD AUTO: 0.4 %
IRON SATN MFR SERPL: 26 %
IRON SERPL-MCNC: 76 UG/DL
KETONES URINE: NEGATIVE
LDLC SERPL CALC-MCNC: 74 MG/DL
LDLC SERPL DIRECT ASSAY-MCNC: 74 MG/DL
LEUKOCYTE ESTERASE URINE: NEGATIVE
LYMPHOCYTES # BLD AUTO: 2.35 K/UL
LYMPHOCYTES NFR BLD AUTO: 41.4 %
MAN DIFF?: NORMAL
MCHC RBC-ENTMCNC: 30.6 PG
MCHC RBC-ENTMCNC: 33.5 GM/DL
MCV RBC AUTO: 91.3 FL
MONOCYTES # BLD AUTO: 0.76 K/UL
MONOCYTES NFR BLD AUTO: 13.4 %
NEUTROPHILS # BLD AUTO: 2.39 K/UL
NEUTROPHILS NFR BLD AUTO: 42 %
NITRITE URINE: NEGATIVE
NONHDLC SERPL-MCNC: 85 MG/DL
PH URINE: 6.5
PLATELET # BLD AUTO: 243 K/UL
POTASSIUM SERPL-SCNC: 5 MMOL/L
PROT SERPL-MCNC: 6.5 G/DL
PROTEIN URINE: NEGATIVE
PSA SERPL-MCNC: <0.01 NG/ML
RBC # BLD: 3.66 M/UL
RBC # FLD: 12.1 %
SODIUM SERPL-SCNC: 130 MMOL/L
SPECIFIC GRAVITY URINE: 1.01
T3FREE SERPL-MCNC: 2.2 PG/ML
T4 FREE SERPL-MCNC: 1.1 NG/DL
THYROGLOB AB SERPL-ACNC: <20 IU/ML
THYROPEROXIDASE AB SERPL IA-ACNC: <10 IU/ML
TIBC SERPL-MCNC: 290 UG/DL
TRIGL SERPL-MCNC: 54 MG/DL
TSH SERPL-ACNC: 2.56 UIU/ML
UIBC SERPL-MCNC: 214 UG/DL
UROBILINOGEN URINE: NORMAL
VIT B12 SERPL-MCNC: 1649 PG/ML
WBC # FLD AUTO: 5.68 K/UL

## 2022-07-14 ENCOUNTER — APPOINTMENT (OUTPATIENT)
Dept: FAMILY MEDICINE | Facility: CLINIC | Age: 77
End: 2022-07-14

## 2022-07-14 VITALS
SYSTOLIC BLOOD PRESSURE: 144 MMHG | HEIGHT: 72 IN | RESPIRATION RATE: 17 BRPM | TEMPERATURE: 96.7 F | BODY MASS INDEX: 28.85 KG/M2 | HEART RATE: 92 BPM | WEIGHT: 213 LBS | DIASTOLIC BLOOD PRESSURE: 70 MMHG | OXYGEN SATURATION: 98 %

## 2022-07-14 DIAGNOSIS — N28.9 DISORDER OF KIDNEY AND URETER, UNSPECIFIED: ICD-10-CM

## 2022-07-14 DIAGNOSIS — R42 DIZZINESS AND GIDDINESS: ICD-10-CM

## 2022-07-14 PROCEDURE — 99214 OFFICE O/P EST MOD 30 MIN: CPT

## 2022-07-14 NOTE — HEALTH RISK ASSESSMENT
[Never] : Never [No] : In the past 12 months have you used drugs other than those required for medical reasons? No [No falls in past year] : Patient reported no falls in the past year [0] : 2) Feeling down, depressed, or hopeless: Not at all (0) [PHQ-2 Negative - No further assessment needed] : PHQ-2 Negative - No further assessment needed [de-identified] : gym exercise [de-identified] : regular [[id number]] : 0 [With Patient/Caregiver] : , with patient/caregiver [AdvancecareDate] : 07/22

## 2022-07-14 NOTE — REVIEW OF SYSTEMS
[Fatigue] : fatigue [Headache] : no headache [Dizziness] : no dizziness [Fainting] : no fainting [Confusion] : no confusion [Unsteady Walk] : no ataxia [Memory Loss] : no memory loss [Negative] : Heme/Lymph

## 2022-07-14 NOTE — HISTORY OF PRESENT ILLNESS
[FreeTextEntry1] : see HPI [de-identified] :  Mr Maurilio Gordillo is a 77 yo male presents for follow up  dizziness, sensation of mild headache, fatigue, and slight dizziness with spinning of ground only upon looking down on 07/05/2022 . Pt denies any fever, chills, n/v/d/c. Pt reports he felt off, and states it occurred  after exercising  that morning in the gym, went home ate and relaxed, and took tylenol for the headache. Pt reports at current time all symptoms have disappeared.He was recommended by neurology to see vascular for mild b/l pedal edema. He has mild renal Insufficiency, although recent renal function are within normal range. He was instructed to take B12 every other day.  He feels tired.

## 2022-07-14 NOTE — PHYSICAL EXAM
[Well Nourished] : well nourished [EOMI] : extraocular movements intact [Supple] : supple [Clear to Auscultation] : lungs were clear to auscultation bilaterally [Normal S1, S2] : normal S1 and S2 [Non Tender] : non-tender [No Rash] : no rash [Normal Gait] : normal gait [Normal Affect] : the affect was normal [de-identified] : mild b/l pedal swelling

## 2022-07-25 ENCOUNTER — RX RENEWAL (OUTPATIENT)
Age: 77
End: 2022-07-25

## 2022-07-26 ENCOUNTER — APPOINTMENT (OUTPATIENT)
Dept: VASCULAR SURGERY | Facility: CLINIC | Age: 77
End: 2022-07-26

## 2022-07-26 VITALS
HEIGHT: 74 IN | SYSTOLIC BLOOD PRESSURE: 140 MMHG | BODY MASS INDEX: 27.34 KG/M2 | HEART RATE: 49 BPM | WEIGHT: 213 LBS | DIASTOLIC BLOOD PRESSURE: 67 MMHG

## 2022-07-26 DIAGNOSIS — I87.2 VENOUS INSUFFICIENCY (CHRONIC) (PERIPHERAL): ICD-10-CM

## 2022-07-26 PROCEDURE — 99204 OFFICE O/P NEW MOD 45 MIN: CPT

## 2022-07-26 PROCEDURE — 93970 EXTREMITY STUDY: CPT

## 2022-07-28 NOTE — HISTORY OF PRESENT ILLNESS
[FreeTextEntry1] : 77 yo male with history of neuropathy, HTN, hld, hypothyroidism presents for evaluation of b/l lower extremity pain and swelling.  pt states that the swelling is not improved by elevation.  pt denies any history of dvt, ulcers or wounds. pt states that the pain is mainly over the plantar aspect of the feet, he is on maximum gabapentin.  pt denies any history of claudication and states that he goes to the gym and is walking on the treadmill without any claudication/calf pain.  \par

## 2022-07-28 NOTE — ASSESSMENT
[FreeTextEntry1] : 77 yo male with history of neuropathy, htn, hld, hypothyroidism presents for evaluation of b/l lower extremity pain and swelling\par \par venous duplex shows deep venous insufficiency b/l lower extremities and left gsv insufficiency, no dvt/svt noted \par \par pt with palpable pulses no evidence of arterial disease at this time \par \par pt advised that amlodipine can also result in lower extremity edema \par \par at this time would recommend compression stockings and elevation \par pt to follow up as needed

## 2022-07-28 NOTE — PHYSICAL EXAM
[2+] : left 2+ [Ankle Swelling (On Exam)] : present [Ankle Swelling Bilaterally] : bilaterally  [Ankle Swelling On The Left] : moderate [No Rash or Lesion] : No rash or lesion [Alert] : alert [Calm] : calm [JVD] : no jugular venous distention  [Normal Breath Sounds] : Normal breath sounds [Normal Rate and Rhythm] : normal rate and rhythm [] : not present [Varicose Veins Of Lower Extremities] : not present [Skin Ulcer] : no ulcer [de-identified] : no calf tenderness, no palpable cords [de-identified] : appears well

## 2022-09-08 ENCOUNTER — APPOINTMENT (OUTPATIENT)
Dept: NEUROLOGY | Facility: CLINIC | Age: 77
End: 2022-09-08

## 2022-09-08 VITALS
DIASTOLIC BLOOD PRESSURE: 74 MMHG | BODY MASS INDEX: 27.34 KG/M2 | WEIGHT: 213 LBS | SYSTOLIC BLOOD PRESSURE: 124 MMHG | HEART RATE: 62 BPM | HEIGHT: 74 IN

## 2022-09-08 PROCEDURE — 99215 OFFICE O/P EST HI 40 MIN: CPT

## 2022-09-08 RX ORDER — GABAPENTIN 800 MG/1
800 TABLET, COATED ORAL
Qty: 270 | Refills: 0 | Status: DISCONTINUED | COMMUNITY
Start: 2020-07-17 | End: 2022-09-08

## 2022-09-08 NOTE — DISCUSSION/SUMMARY
[FreeTextEntry1] : 76-year-old gentleman who is here for follow-up of his neuropathy since 2018.  The causes of his neuropathy include hypothyroidism, elevated hemoglobin A1c, hemochromatosis, lumbar radiculopathy, mild renal insufficiency, vascular insufficiency.  We will try Cymbalta in place of gabapentin as gabapentin may be contributing to his ankle edema.  Side effects were discussed with the patient in detail.  He was advised that it may take 4 to 6 weeks for the medication to exert its effects.  Patient will follow up with me in a couple of months.\par \par I spent the time noted on the day of this patient encounter preparing for, providing and documenting the above E/M service and counseling and educate patient on differential, workup, disease course, and treatment/management. Education was provided to the patient during this encounter. All questions and concerns were answered and addressed in detail. The patient verbalized understanding and agreed to plan. Patient was advised to continue to monitor for neurologic symptoms and to notify my office or go to the nearest emergency room if there are any changes. Any orders/referrals and communications were provided as well. \par Side effects of the above medications were discussed in detail including but not limited to applicable black box warning and teratogenicity as appropriate. \par Patient was advised to bring previous records to my office. \par \par \par

## 2022-09-08 NOTE — HISTORY OF PRESENT ILLNESS
[FreeTextEntry1] : 76-year-old gentleman who is here for initial consultation of neuropathy that has been going on for the past 10 years. Patient states that he noticed numbness in the balls of his feet and was started on gabapentin with slow titration. Initially helped with the symptoms but when it stopped helping Dr. [place] increased the dose. He is currently taking Neurontin 800 mg t.i.d. he states that he has tried Lyrica and the past however it was not well tolerated. \par \par Severity: 4/10\par freq: constant\par quality: numbness\par worsened by cold.\par \par interval history. Even with the increased gabapentin, the balls of his feet still are painful with weight bearing. He has no side effects from the gabapentin. \par \par interval history: saw podiatry and has insoles that are helping somewhat. The lidoderm patch is helping somewhat. He still complains of a coldness. No side effects from the gabapentin. \par \par interval history: He states he tried effexor and when he stopped it for 2 days, the pain was worse. He states he's back on it and it's better. No side effects from the medications.\par \par interval history: despite taking 150 of effexor, his neuropathy is still there. He's on gabapentin 800 and then 1200 at night. He was asked to wean off of effexor since it didn't help. \par \par no depression or anxiety. bp is normal. \par \par interval history: he's on gabapentin 800 and 1200 at night. He tried cymbalta but he wants to stop b/c it is not helping. He has tried lyrica but ddin't like the side effects, we are not going to try pamelor due to his history of abnl ekg. \par \par interval history: he is s/p back surgery from a recent fall. His neuropathy is the same as before. Through all the neuropathic medications, he still prefers lkdoderm patch for his neuropathy. \par \par Interval history: Patient is currently on gabapentin 800 mg/800 mg / 800 mg for a total of 2400 mg a day.  Patient states that he continues to have numbness and pain in his feet and a cold sensation at night.  Patient is noted to have bilateral ankle edema on exam.  Patient has seen his cardiologist with no pathology.  Patient is noted to have renal insufficiency as per the chart.\par \par Interval history: Patient states that his neuropathy is about the same.  Patient saw vascular surgery who diagnosed him with vascular insufficiency.

## 2022-09-08 NOTE — PHYSICAL EXAM
[General Appearance - Alert] : alert [General Appearance - Well Developed] : well developed [Oriented To Time, Place, And Person] : oriented to person, place, and time [Person] : oriented to person [Place] : oriented to place [Time] : oriented to time [Short Term Intact] : short term memory intact [Span Intact] : the attention span was normal [Naming Objects] : no difficulty naming common objects [Fluency] : fluency intact [Current Events] : adequate knowledge of current events [Cranial Nerves Optic (II)] : visual acuity intact bilaterally,  visual fields full to confrontation, pupils equal round and reactive to light [Cranial Nerves Oculomotor (III)] : extraocular motion intact [Cranial Nerves Vestibulocochlear (VIII)] : hearing was intact bilaterally [Cranial Nerves Accessory (XI - Cranial And Spinal)] : head turning and shoulder shrug symmetric [Motor Tone] : muscle tone was normal in all four extremities [Motor Strength] : muscle strength was normal in all four extremities [Abnormal Walk] : normal gait [Coordination - Dysmetria Impaired Finger-to-Nose Bilateral] : not present [2+] : Patella left 2+ [FreeTextEntry7] : Decreased to light touch and pinprick to the mid shins bilateral [FreeTextEntry8] : pitting edema in feet

## 2022-09-13 ENCOUNTER — NON-APPOINTMENT (OUTPATIENT)
Age: 77
End: 2022-09-13

## 2022-09-14 ENCOUNTER — EMERGENCY (EMERGENCY)
Facility: HOSPITAL | Age: 77
LOS: 1 days | Discharge: HOME | End: 2022-09-14
Attending: EMERGENCY MEDICINE | Admitting: EMERGENCY MEDICINE
Payer: COMMERCIAL

## 2022-09-14 VITALS
RESPIRATION RATE: 18 BRPM | HEART RATE: 101 BPM | HEIGHT: 74 IN | WEIGHT: 212.08 LBS | OXYGEN SATURATION: 100 % | TEMPERATURE: 98 F | DIASTOLIC BLOOD PRESSURE: 56 MMHG | SYSTOLIC BLOOD PRESSURE: 104 MMHG

## 2022-09-14 VITALS
RESPIRATION RATE: 16 BRPM | DIASTOLIC BLOOD PRESSURE: 76 MMHG | SYSTOLIC BLOOD PRESSURE: 130 MMHG | HEART RATE: 74 BPM | OXYGEN SATURATION: 100 %

## 2022-09-14 DIAGNOSIS — Z90.79 ACQUIRED ABSENCE OF OTHER GENITAL ORGAN(S): Chronic | ICD-10-CM

## 2022-09-14 DIAGNOSIS — Z98.890 OTHER SPECIFIED POSTPROCEDURAL STATES: Chronic | ICD-10-CM

## 2022-09-14 LAB
ALBUMIN SERPL ELPH-MCNC: 3.8 G/DL — SIGNIFICANT CHANGE UP (ref 3.3–5)
ALP SERPL-CCNC: 33 U/L — LOW (ref 40–120)
ALT FLD-CCNC: 22 U/L — SIGNIFICANT CHANGE UP (ref 10–45)
ANION GAP SERPL CALC-SCNC: 5 MMOL/L — SIGNIFICANT CHANGE UP (ref 5–17)
AST SERPL-CCNC: 14 U/L — SIGNIFICANT CHANGE UP (ref 10–40)
BASOPHILS # BLD AUTO: 0.03 K/UL — SIGNIFICANT CHANGE UP (ref 0–0.2)
BASOPHILS NFR BLD AUTO: 0.4 % — SIGNIFICANT CHANGE UP (ref 0–2)
BILIRUB SERPL-MCNC: 1.2 MG/DL — SIGNIFICANT CHANGE UP (ref 0.2–1.2)
BLD GP AB SCN SERPL QL: SIGNIFICANT CHANGE UP
BUN SERPL-MCNC: 21 MG/DL — SIGNIFICANT CHANGE UP (ref 7–23)
CALCIUM SERPL-MCNC: 9.3 MG/DL — SIGNIFICANT CHANGE UP (ref 8.4–10.5)
CHLORIDE SERPL-SCNC: 92 MMOL/L — LOW (ref 96–108)
CO2 SERPL-SCNC: 27 MMOL/L — SIGNIFICANT CHANGE UP (ref 22–31)
CREAT SERPL-MCNC: 1.39 MG/DL — HIGH (ref 0.5–1.3)
EGFR: 52 ML/MIN/1.73M2 — LOW
EOSINOPHIL # BLD AUTO: 0.05 K/UL — SIGNIFICANT CHANGE UP (ref 0–0.5)
EOSINOPHIL NFR BLD AUTO: 0.7 % — SIGNIFICANT CHANGE UP (ref 0–6)
GLUCOSE SERPL-MCNC: 105 MG/DL — HIGH (ref 70–99)
HCT VFR BLD CALC: 33.7 % — LOW (ref 39–50)
HGB BLD-MCNC: 11.9 G/DL — LOW (ref 13–17)
IMM GRANULOCYTES NFR BLD AUTO: 0.6 % — SIGNIFICANT CHANGE UP (ref 0–1.5)
LYMPHOCYTES # BLD AUTO: 1.62 K/UL — SIGNIFICANT CHANGE UP (ref 1–3.3)
LYMPHOCYTES # BLD AUTO: 22.6 % — SIGNIFICANT CHANGE UP (ref 13–44)
MAGNESIUM SERPL-MCNC: 2.2 MG/DL — SIGNIFICANT CHANGE UP (ref 1.6–2.6)
MCHC RBC-ENTMCNC: 31.5 PG — SIGNIFICANT CHANGE UP (ref 27–34)
MCHC RBC-ENTMCNC: 35.3 GM/DL — SIGNIFICANT CHANGE UP (ref 32–36)
MCV RBC AUTO: 89.2 FL — SIGNIFICANT CHANGE UP (ref 80–100)
MONOCYTES # BLD AUTO: 1.19 K/UL — HIGH (ref 0–0.9)
MONOCYTES NFR BLD AUTO: 16.6 % — HIGH (ref 2–14)
NEUTROPHILS # BLD AUTO: 4.25 K/UL — SIGNIFICANT CHANGE UP (ref 1.8–7.4)
NEUTROPHILS NFR BLD AUTO: 59.1 % — SIGNIFICANT CHANGE UP (ref 43–77)
NRBC # BLD: 0 /100 WBCS — SIGNIFICANT CHANGE UP (ref 0–0)
PLATELET # BLD AUTO: 193 K/UL — SIGNIFICANT CHANGE UP (ref 150–400)
POTASSIUM SERPL-MCNC: 4.9 MMOL/L — SIGNIFICANT CHANGE UP (ref 3.5–5.3)
POTASSIUM SERPL-SCNC: 4.9 MMOL/L — SIGNIFICANT CHANGE UP (ref 3.5–5.3)
PROT SERPL-MCNC: 7.2 G/DL — SIGNIFICANT CHANGE UP (ref 6–8.3)
RBC # BLD: 3.78 M/UL — LOW (ref 4.2–5.8)
RBC # FLD: 12.3 % — SIGNIFICANT CHANGE UP (ref 10.3–14.5)
SARS-COV-2 RNA SPEC QL NAA+PROBE: SIGNIFICANT CHANGE UP
SODIUM SERPL-SCNC: 124 MMOL/L — LOW (ref 135–145)
WBC # BLD: 7.18 K/UL — SIGNIFICANT CHANGE UP (ref 3.8–10.5)
WBC # FLD AUTO: 7.18 K/UL — SIGNIFICANT CHANGE UP (ref 3.8–10.5)

## 2022-09-14 PROCEDURE — 86850 RBC ANTIBODY SCREEN: CPT

## 2022-09-14 PROCEDURE — 80053 COMPREHEN METABOLIC PANEL: CPT

## 2022-09-14 PROCEDURE — 87635 SARS-COV-2 COVID-19 AMP PRB: CPT

## 2022-09-14 PROCEDURE — 96374 THER/PROPH/DIAG INJ IV PUSH: CPT

## 2022-09-14 PROCEDURE — 83735 ASSAY OF MAGNESIUM: CPT

## 2022-09-14 PROCEDURE — 36415 COLL VENOUS BLD VENIPUNCTURE: CPT

## 2022-09-14 PROCEDURE — 99284 EMERGENCY DEPT VISIT MOD MDM: CPT

## 2022-09-14 PROCEDURE — 86901 BLOOD TYPING SEROLOGIC RH(D): CPT

## 2022-09-14 PROCEDURE — 96361 HYDRATE IV INFUSION ADD-ON: CPT

## 2022-09-14 PROCEDURE — 85025 COMPLETE CBC W/AUTO DIFF WBC: CPT

## 2022-09-14 PROCEDURE — 99284 EMERGENCY DEPT VISIT MOD MDM: CPT | Mod: 25

## 2022-09-14 PROCEDURE — 86900 BLOOD TYPING SEROLOGIC ABO: CPT

## 2022-09-14 RX ORDER — ONDANSETRON 8 MG/1
4 TABLET, FILM COATED ORAL ONCE
Refills: 0 | Status: COMPLETED | OUTPATIENT
Start: 2022-09-14 | End: 2022-09-14

## 2022-09-14 RX ORDER — SODIUM CHLORIDE 9 MG/ML
500 INJECTION INTRAMUSCULAR; INTRAVENOUS; SUBCUTANEOUS ONCE
Refills: 0 | Status: COMPLETED | OUTPATIENT
Start: 2022-09-14 | End: 2022-09-14

## 2022-09-14 RX ADMIN — SODIUM CHLORIDE 500 MILLILITER(S): 9 INJECTION INTRAMUSCULAR; INTRAVENOUS; SUBCUTANEOUS at 17:03

## 2022-09-14 RX ADMIN — SODIUM CHLORIDE 1000 MILLILITER(S): 9 INJECTION INTRAMUSCULAR; INTRAVENOUS; SUBCUTANEOUS at 15:51

## 2022-09-14 RX ADMIN — ONDANSETRON 4 MILLIGRAM(S): 8 TABLET, FILM COATED ORAL at 16:07

## 2022-09-14 NOTE — ED PROVIDER NOTE - OBJECTIVE STATEMENT
pt 75 yo m sent from urgent care for nausea with 1 episode of vomiting 3 days ago, weakness. 5 days ago pt received his flu vaccines and 4 days ago pt started cymbalta pt 75 yo m sent from urgent care for nausea with 1 episode of vomiting 3 days ago, weakness. 5 days ago pt received his flu vaccines and 4 days ago pt started cymbalta and is weaning off of gabapentin. decreased appetite and po intake and is feeling weak  denies fever, chills, cp, sob, abd pain, cough

## 2022-09-14 NOTE — CHART NOTE - NSCHARTNOTEFT_GEN_A_CORE
SW met with patient at bedside to assess for social work needs and to complete home assessment of safety.  Patient is a 76 year old male presenting to ED due to nausea.  SW introduced self and role of SW. ISAR score of 1 - negative.  Patient denies any safety concerns or recent falls.  Patient denied the need for SW assistance or resources at this time.  Patients family member at bedside in agreement with no need for SW assistance at this time.  SW spoke with patient regarding follow up.  Patient reports his PMD is Dr Ross.  SW offered to schedule, patient declined stating he will do so. SW advised patient that SW will call to follow up and assist if needed at that time.  Patient verbalized understanding.

## 2022-09-14 NOTE — ED PROVIDER NOTE - PATIENT PORTAL LINK FT
You can access the FollowMyHealth Patient Portal offered by [facility] by registering at the following website: [email or web address]. By joining [facility]’s FollowMyHealth portal, you will also be able to view your health information using other applications (apps) compatible with our system.

## 2022-09-14 NOTE — ED PROVIDER NOTE - CLINICAL SUMMARY MEDICAL DECISION MAKING FREE TEXT BOX
pt 75 yo m sent from urgent care for nausea with 1 episode of vomiting 3 days ago, weakness. 5 days ago pt received his flu vaccines and 4 days ago pt started cymbalta and is weaning off of gabapentin. decreased appetite and po intake and is feeling weak  denies fever, chills, cp, sob, abd pain, cough

## 2022-09-14 NOTE — ED PROVIDER NOTE - PHYSICAL EXAMINATION
General:     NAD, well-nourished, well-appearing  Eyes: PERRL  Head:     NC/AT, EOMI, dry oral mucosa   Neck:     trachea midline  Lungs:     CTA b/l  CVS:     RRR  Abd:     +BS, s/nt/nd  Ext:   no deformities   Neuro: AAOx3, no sensory/motor deficits

## 2022-09-14 NOTE — ED ADULT NURSE NOTE - NSICDXPASTMEDICALHX_GEN_ALL_CORE_FT
PAST MEDICAL HISTORY:  Anemia mild, cause unknown    BPH (benign prostatic hypertrophy)     Hyperlipidemia     Hypertension     Neuropathy bilateral feet, cause unknown    Nocturia x2-3    Osteoarthritis     Prostate cancer 2008, no chemo or RT    Thoracic spine fracture T12, June 21, 2017

## 2022-09-14 NOTE — ED PROVIDER NOTE - ATTENDING APP SHARED VISIT CONTRIBUTION OF CARE
Az with SANDRA Duong. pt 75 yo m sent from urgent care for nausea with 1 episode of vomiting 3 days ago, weakness. 5 days ago pt received his flu vaccines and 4 days ago pt started cymbalta and is weaning off of gabapentin. decreased appetite and po intake and is feeling weak  denies fever, chills, cp, sob, abd pain, cough.  +dehydrated ,Discussed results of Na and Cl. Pt feeling well after hydration. Stable for d/c. Son is at bedside. Worsening, continued or ANY new concerning symptoms return to the emergency department.   I performed a face to face bedside interview with patient regarding history of present illness, review of symptoms and past medical history. I completed an independent physical exam.  I have discussed the patient's plan of care with Physician Assistant (PA). I agree with note as stated above, having amended the EMR as needed to reflect my findings.   This includes History of Present Illness, HIV, Past Medical/Surgical/Family/Social History, Allergies and Home Medications, Review of Systems, Physical Exam, and any Progress Notes during the time I functioned as the attending physician for this patient.

## 2022-09-14 NOTE — ED ADULT NURSE NOTE - NSICDXPASTSURGICALHX_GEN_ALL_CORE_FT
PAST SURGICAL HISTORY:  History of kyphoplasty     S/P colonoscopy 2017, negative    S/P cystoscopy June 2017, "removal of scar tissue at the base of the bladder"    S/P prostatectomy 2008

## 2022-09-17 NOTE — CHART NOTE - NSCHARTNOTEFT_GEN_A_CORE
SW called pt discuss and assist with follow up care.  Pt is a 75 y/o male presented to ED for nausea.  As per ASHIA pt has scheduled  primary care appt with Dr. Lizeth Ross on 9/28/22.

## 2022-09-19 ENCOUNTER — APPOINTMENT (OUTPATIENT)
Age: 77
End: 2022-09-19

## 2022-09-19 VITALS
OXYGEN SATURATION: 99 % | SYSTOLIC BLOOD PRESSURE: 97 MMHG | HEIGHT: 74 IN | DIASTOLIC BLOOD PRESSURE: 60 MMHG | TEMPERATURE: 97.9 F | RESPIRATION RATE: 16 BRPM | WEIGHT: 212 LBS | HEART RATE: 101 BPM | BODY MASS INDEX: 27.21 KG/M2

## 2022-09-19 DIAGNOSIS — R63.0 ANOREXIA: ICD-10-CM

## 2022-09-19 DIAGNOSIS — D64.9 ANEMIA, UNSPECIFIED: ICD-10-CM

## 2022-09-19 DIAGNOSIS — R53.83 OTHER FATIGUE: ICD-10-CM

## 2022-09-19 DIAGNOSIS — Z12.11 ENCOUNTER FOR SCREENING FOR MALIGNANT NEOPLASM OF COLON: ICD-10-CM

## 2022-09-19 PROCEDURE — 99215 OFFICE O/P EST HI 40 MIN: CPT | Mod: 25

## 2022-09-19 PROCEDURE — 36415 COLL VENOUS BLD VENIPUNCTURE: CPT

## 2022-09-20 LAB
AFP-TM SERPL-MCNC: 2.6 NG/ML
ALBUMIN SERPL ELPH-MCNC: 4.5 G/DL
ALP BLD-CCNC: 33 U/L
ALT SERPL-CCNC: 13 U/L
ANION GAP SERPL CALC-SCNC: 14 MMOL/L
AST SERPL-CCNC: 13 U/L
B2 MICROGLOB SERPL-MCNC: 2.1 MG/L
BASOPHILS # BLD AUTO: 0.04 K/UL
BASOPHILS NFR BLD AUTO: 0.6 %
BILIRUB SERPL-MCNC: 1.2 MG/DL
BUN SERPL-MCNC: 21 MG/DL
CALCIUM SERPL-MCNC: 10.1 MG/DL
CANCER AG125 SERPL-ACNC: 14 U/ML
CANCER AG19-9 SERPL-ACNC: 7 U/ML
CEA SERPL-MCNC: 3.9 NG/ML
CHLORIDE SERPL-SCNC: 90 MMOL/L
CO2 SERPL-SCNC: 22 MMOL/L
CREAT SERPL-MCNC: 1.23 MG/DL
CRP SERPL-MCNC: <3 MG/L
EGFR: 60 ML/MIN/1.73M2
EOSINOPHIL # BLD AUTO: 0.1 K/UL
EOSINOPHIL NFR BLD AUTO: 1.5 %
ERYTHROCYTE [SEDIMENTATION RATE] IN BLOOD BY WESTERGREN METHOD: 11 MM/HR
GLUCOSE SERPL-MCNC: 94 MG/DL
HCG SERPL-MCNC: <1 MIU/ML
HCT VFR BLD CALC: 35.4 %
HGB BLD-MCNC: 12 G/DL
IMM GRANULOCYTES NFR BLD AUTO: 0.4 %
LDH SERPL-CCNC: 160 U/L
LYMPHOCYTES # BLD AUTO: 1.84 K/UL
LYMPHOCYTES NFR BLD AUTO: 27.3 %
MAN DIFF?: NORMAL
MCHC RBC-ENTMCNC: 30.7 PG
MCHC RBC-ENTMCNC: 33.9 GM/DL
MCV RBC AUTO: 90.5 FL
MONOCYTES # BLD AUTO: 0.87 K/UL
MONOCYTES NFR BLD AUTO: 12.9 %
NEUTROPHILS # BLD AUTO: 3.85 K/UL
NEUTROPHILS NFR BLD AUTO: 57.3 %
OSMOLALITY SERPL: 266 MOSMOL/KG
PLATELET # BLD AUTO: 250 K/UL
POTASSIUM SERPL-SCNC: 5.1 MMOL/L
PROT SERPL-MCNC: 7 G/DL
RBC # BLD: 3.91 M/UL
RBC # FLD: 12.4 %
SODIUM SERPL-SCNC: 126 MMOL/L
WBC # FLD AUTO: 6.73 K/UL

## 2022-09-23 ENCOUNTER — LABORATORY RESULT (OUTPATIENT)
Age: 77
End: 2022-09-23

## 2022-09-23 NOTE — HISTORY OF PRESENT ILLNESS
[Friend] : friend [FreeTextEntry8] : Here for   evaluation for fatigue, decreased PO intake. Recent ER visit 9/17/22 for N/V, weakness, blood work revealed low sodium. He was given Saline and Ondansteron and discharged home. Last colonoscopy 5 years ago but was poor prep and pt. was asked to return back for repeat procedure which he has not. He denies BPR, constipation, chest pain, sob, fever ,chills, abdominal pain. He is AAOX3.

## 2022-09-23 NOTE — REVIEW OF SYSTEMS
[Fatigue] : fatigue [Negative] : Heme/Lymph [Headache] : no headache [Dizziness] : no dizziness [Fainting] : no fainting [Confusion] : no confusion [Unsteady Walk] : no ataxia [Memory Loss] : no memory loss [FreeTextEntry7] : decreased appetite

## 2022-09-23 NOTE — PHYSICAL EXAM
[Well Nourished] : well nourished [Ill-Appearing] : ill-appearing [PERRL] : pupils equal round and reactive to light [EOMI] : extraocular movements intact [Normal Oropharynx] : the oropharynx was normal [No JVD] : no jugular venous distention [Supple] : supple [No Respiratory Distress] : no respiratory distress  [No Accessory Muscle Use] : no accessory muscle use [Clear to Auscultation] : lungs were clear to auscultation bilaterally [Normal Rate] : normal rate  [Regular Rhythm] : with a regular rhythm [Normal S1, S2] : normal S1 and S2 [No Varicosities] : no varicosities [Non Tender] : non-tender [Normal Posterior Cervical Nodes] : no posterior cervical lymphadenopathy [Normal Anterior Cervical Nodes] : no anterior cervical lymphadenopathy [No Spinal Tenderness] : no spinal tenderness [No Joint Swelling] : no joint swelling [No Rash] : no rash [Normal Gait] : normal gait [Normal Affect] : the affect was normal [Normal Insight/Judgement] : insight and judgment were intact [de-identified] : scant pedal edema b/l

## 2022-09-23 NOTE — HEALTH RISK ASSESSMENT
[Never] : Never [No] : In the past 12 months have you used drugs other than those required for medical reasons? No [No falls in past year] : Patient reported no falls in the past year [0] : 2) Feeling down, depressed, or hopeless: Not at all (0) [PHQ-2 Negative - No further assessment needed] : PHQ-2 Negative - No further assessment needed [de-identified] : sedentary [de-identified] : regular [[id number]] : 0

## 2022-09-23 NOTE — PLAN
[FreeTextEntry1] : Blood work.\par  stay hydrated but avoid excessive fluids, add salt in diet. \par avoid nephrotoxins. \par stop Losartan. hold AMLODIPINE IF sbp BELOW 100. \par  GO TO ER FOR WORSENING SYMPTOMS. GI referral.\par \par \par \par \par \par

## 2022-09-27 ENCOUNTER — APPOINTMENT (OUTPATIENT)
Dept: FAMILY MEDICINE | Facility: CLINIC | Age: 77
End: 2022-09-27

## 2022-09-27 ENCOUNTER — NON-APPOINTMENT (OUTPATIENT)
Age: 77
End: 2022-09-27

## 2022-09-27 ENCOUNTER — INPATIENT (INPATIENT)
Facility: HOSPITAL | Age: 77
LOS: 2 days | Discharge: HOME | DRG: 644 | End: 2022-09-30
Attending: HOSPITALIST | Admitting: STUDENT IN AN ORGANIZED HEALTH CARE EDUCATION/TRAINING PROGRAM
Payer: COMMERCIAL

## 2022-09-27 VITALS
WEIGHT: 199.96 LBS | SYSTOLIC BLOOD PRESSURE: 132 MMHG | DIASTOLIC BLOOD PRESSURE: 78 MMHG | OXYGEN SATURATION: 99 % | RESPIRATION RATE: 17 BRPM | TEMPERATURE: 98 F | HEART RATE: 81 BPM | HEIGHT: 74 IN

## 2022-09-27 VITALS
SYSTOLIC BLOOD PRESSURE: 118 MMHG | RESPIRATION RATE: 17 BRPM | TEMPERATURE: 97.6 F | BODY MASS INDEX: 25.67 KG/M2 | HEART RATE: 106 BPM | WEIGHT: 200 LBS | OXYGEN SATURATION: 98 % | DIASTOLIC BLOOD PRESSURE: 60 MMHG | HEIGHT: 74 IN

## 2022-09-27 DIAGNOSIS — E87.1 HYPO-OSMOLALITY AND HYPONATREMIA: ICD-10-CM

## 2022-09-27 DIAGNOSIS — Z98.890 OTHER SPECIFIED POSTPROCEDURAL STATES: Chronic | ICD-10-CM

## 2022-09-27 DIAGNOSIS — R59.9 ENLARGED LYMPH NODES, UNSPECIFIED: ICD-10-CM

## 2022-09-27 DIAGNOSIS — Z90.79 ACQUIRED ABSENCE OF OTHER GENITAL ORGAN(S): Chronic | ICD-10-CM

## 2022-09-27 DIAGNOSIS — R63.4 ABNORMAL WEIGHT LOSS: ICD-10-CM

## 2022-09-27 DIAGNOSIS — R97.0 ELEVATED CARCINOEMBRYONIC ANTIGEN [CEA]: ICD-10-CM

## 2022-09-27 LAB
ALBUMIN MFR SERPL ELPH: 60 %
ALBUMIN SERPL ELPH-MCNC: 4 G/DL — SIGNIFICANT CHANGE UP (ref 3.3–5)
ALBUMIN SERPL-MCNC: 4.2 G/DL
ALBUMIN/GLOB SERPL: 1.5 RATIO
ALP SERPL-CCNC: 37 U/L — LOW (ref 40–120)
ALPHA1 GLOB MFR SERPL ELPH: 3.7 %
ALPHA1 GLOB SERPL ELPH-MCNC: 0.3 G/DL
ALPHA2 GLOB MFR SERPL ELPH: 8.6 %
ALPHA2 GLOB SERPL ELPH-MCNC: 0.6 G/DL
ALT FLD-CCNC: 33 U/L — SIGNIFICANT CHANGE UP (ref 10–45)
ANA SER IF-ACNC: NEGATIVE
ANION GAP SERPL CALC-SCNC: 6 MMOL/L — SIGNIFICANT CHANGE UP (ref 5–17)
APPEARANCE UR: CLEAR — SIGNIFICANT CHANGE UP
AST SERPL-CCNC: 20 U/L — SIGNIFICANT CHANGE UP (ref 10–40)
B-GLOBULIN MFR SERPL ELPH: 11.2 %
B-GLOBULIN SERPL ELPH-MCNC: 0.8 G/DL
BACTERIA # UR AUTO: ABNORMAL /HPF
BASOPHILS # BLD AUTO: 0.04 K/UL — SIGNIFICANT CHANGE UP (ref 0–0.2)
BASOPHILS NFR BLD AUTO: 0.5 % — SIGNIFICANT CHANGE UP (ref 0–2)
BILIRUB SERPL-MCNC: 1.2 MG/DL — SIGNIFICANT CHANGE UP (ref 0.2–1.2)
BILIRUB UR-MCNC: NEGATIVE — SIGNIFICANT CHANGE UP
BUN SERPL-MCNC: 26 MG/DL — HIGH (ref 7–23)
CALCIUM SERPL-MCNC: 9.9 MG/DL — SIGNIFICANT CHANGE UP (ref 8.4–10.5)
CHLORIDE SERPL-SCNC: 94 MMOL/L — LOW (ref 96–108)
CO2 SERPL-SCNC: 26 MMOL/L — SIGNIFICANT CHANGE UP (ref 22–31)
COLOR SPEC: YELLOW — SIGNIFICANT CHANGE UP
COMMENT - URINE: SIGNIFICANT CHANGE UP
CREAT SERPL-MCNC: 1.45 MG/DL — HIGH (ref 0.5–1.3)
DEPRECATED KAPPA LC FREE/LAMBDA SER: 1.66 RATIO
DIFF PNL FLD: ABNORMAL
EGFR: 49 ML/MIN/1.73M2 — LOW
EOSINOPHIL # BLD AUTO: 0.08 K/UL — SIGNIFICANT CHANGE UP (ref 0–0.5)
EOSINOPHIL NFR BLD AUTO: 0.9 % — SIGNIFICANT CHANGE UP (ref 0–6)
EPI CELLS # UR: SIGNIFICANT CHANGE UP
GAMMA GLOB FLD ELPH-MCNC: 1.2 G/DL
GAMMA GLOB MFR SERPL ELPH: 16.5 %
GLUCOSE SERPL-MCNC: 113 MG/DL — HIGH (ref 70–99)
GLUCOSE UR QL: NEGATIVE — SIGNIFICANT CHANGE UP
HCT VFR BLD CALC: 34.2 % — LOW (ref 39–50)
HGB BLD-MCNC: 11.9 G/DL — LOW (ref 13–17)
HYALINE CASTS # UR AUTO: ABNORMAL
IGA SER QL IEP: 209 MG/DL
IGG SER QL IEP: 1156 MG/DL
IGM SER QL IEP: 59 MG/DL
IMM GRANULOCYTES NFR BLD AUTO: 0.5 % — SIGNIFICANT CHANGE UP (ref 0–0.9)
INTERPRETATION SERPL IEP-IMP: NORMAL
KAPPA LC CSF-MCNC: 1.54 MG/DL
KAPPA LC SERPL-MCNC: 2.56 MG/DL
KETONES UR-MCNC: ABNORMAL
LEUKOCYTE ESTERASE UR-ACNC: NEGATIVE — SIGNIFICANT CHANGE UP
LYMPHOCYTES # BLD AUTO: 1.96 K/UL — SIGNIFICANT CHANGE UP (ref 1–3.3)
LYMPHOCYTES # BLD AUTO: 22.4 % — SIGNIFICANT CHANGE UP (ref 13–44)
M PROTEIN SPEC IFE-MCNC: NORMAL
MAGNESIUM SERPL-MCNC: 2.1 MG/DL — SIGNIFICANT CHANGE UP (ref 1.6–2.6)
MCHC RBC-ENTMCNC: 31.4 PG — SIGNIFICANT CHANGE UP (ref 27–34)
MCHC RBC-ENTMCNC: 34.8 GM/DL — SIGNIFICANT CHANGE UP (ref 32–36)
MCV RBC AUTO: 90.2 FL — SIGNIFICANT CHANGE UP (ref 80–100)
MONOCYTES # BLD AUTO: 1.17 K/UL — HIGH (ref 0–0.9)
MONOCYTES NFR BLD AUTO: 13.4 % — SIGNIFICANT CHANGE UP (ref 2–14)
NEUTROPHILS # BLD AUTO: 5.45 K/UL — SIGNIFICANT CHANGE UP (ref 1.8–7.4)
NEUTROPHILS NFR BLD AUTO: 62.3 % — SIGNIFICANT CHANGE UP (ref 43–77)
NITRITE UR-MCNC: NEGATIVE — SIGNIFICANT CHANGE UP
NRBC # BLD: 0 /100 WBCS — SIGNIFICANT CHANGE UP (ref 0–0)
PH UR: 5 — SIGNIFICANT CHANGE UP (ref 5–8)
PLATELET # BLD AUTO: 231 K/UL — SIGNIFICANT CHANGE UP (ref 150–400)
POTASSIUM SERPL-MCNC: 5.4 MMOL/L — HIGH (ref 3.5–5.3)
POTASSIUM SERPL-SCNC: 5.4 MMOL/L — HIGH (ref 3.5–5.3)
PROT SERPL-MCNC: 7 G/DL
PROT SERPL-MCNC: 7 G/DL
PROT SERPL-MCNC: 7.4 G/DL — SIGNIFICANT CHANGE UP (ref 6–8.3)
PROT UR-MCNC: 15
RBC # BLD: 3.79 M/UL — LOW (ref 4.2–5.8)
RBC # FLD: 12.2 % — SIGNIFICANT CHANGE UP (ref 10.3–14.5)
RBC CASTS # UR COMP ASSIST: SIGNIFICANT CHANGE UP /HPF (ref 0–4)
SARS-COV-2 RNA SPEC QL NAA+PROBE: SIGNIFICANT CHANGE UP
SODIUM SERPL-SCNC: 126 MMOL/L — LOW (ref 135–145)
SP GR SPEC: 1.02 — SIGNIFICANT CHANGE UP (ref 1.01–1.02)
UROBILINOGEN FLD QL: NEGATIVE — SIGNIFICANT CHANGE UP
WBC # BLD: 8.74 K/UL — SIGNIFICANT CHANGE UP (ref 3.8–10.5)
WBC # FLD AUTO: 8.74 K/UL — SIGNIFICANT CHANGE UP (ref 3.8–10.5)
WBC UR QL: SIGNIFICANT CHANGE UP /HPF (ref 0–5)

## 2022-09-27 PROCEDURE — 71045 X-RAY EXAM CHEST 1 VIEW: CPT | Mod: 26

## 2022-09-27 PROCEDURE — 99223 1ST HOSP IP/OBS HIGH 75: CPT

## 2022-09-27 PROCEDURE — 99285 EMERGENCY DEPT VISIT HI MDM: CPT

## 2022-09-27 PROCEDURE — 76775 US EXAM ABDO BACK WALL LIM: CPT | Mod: 26

## 2022-09-27 PROCEDURE — 99214 OFFICE O/P EST MOD 30 MIN: CPT | Mod: 25

## 2022-09-27 PROCEDURE — 93000 ELECTROCARDIOGRAM COMPLETE: CPT | Mod: 59

## 2022-09-27 PROCEDURE — 36415 COLL VENOUS BLD VENIPUNCTURE: CPT

## 2022-09-27 PROCEDURE — 93010 ELECTROCARDIOGRAM REPORT: CPT

## 2022-09-27 RX ORDER — DULOXETINE HYDROCHLORIDE 30 MG/1
60 CAPSULE, DELAYED RELEASE ORAL DAILY
Refills: 0 | Status: DISCONTINUED | OUTPATIENT
Start: 2022-09-27 | End: 2022-09-30

## 2022-09-27 RX ORDER — AMLODIPINE BESYLATE 2.5 MG/1
10 TABLET ORAL DAILY
Refills: 0 | Status: DISCONTINUED | OUTPATIENT
Start: 2022-09-27 | End: 2022-09-30

## 2022-09-27 RX ORDER — GABAPENTIN 800 MG/1
800 TABLET, COATED ORAL
Refills: 0 | Status: COMPLETED | COMMUNITY
Start: 1900-01-01 | End: 2022-09-27

## 2022-09-27 RX ORDER — LANOLIN ALCOHOL/MO/W.PET/CERES
3 CREAM (GRAM) TOPICAL AT BEDTIME
Refills: 0 | Status: DISCONTINUED | OUTPATIENT
Start: 2022-09-27 | End: 2022-09-30

## 2022-09-27 RX ORDER — ASPIRIN/CALCIUM CARB/MAGNESIUM 324 MG
81 TABLET ORAL DAILY
Refills: 0 | Status: DISCONTINUED | OUTPATIENT
Start: 2022-09-27 | End: 2022-09-30

## 2022-09-27 RX ORDER — ACETAMINOPHEN 500 MG
650 TABLET ORAL EVERY 6 HOURS
Refills: 0 | Status: DISCONTINUED | OUTPATIENT
Start: 2022-09-27 | End: 2022-09-30

## 2022-09-27 RX ORDER — SODIUM CHLORIDE 9 MG/ML
1000 INJECTION INTRAMUSCULAR; INTRAVENOUS; SUBCUTANEOUS
Refills: 0 | Status: DISCONTINUED | OUTPATIENT
Start: 2022-09-27 | End: 2022-09-28

## 2022-09-27 RX ORDER — ONDANSETRON 8 MG/1
4 TABLET, FILM COATED ORAL EVERY 8 HOURS
Refills: 0 | Status: DISCONTINUED | OUTPATIENT
Start: 2022-09-27 | End: 2022-09-30

## 2022-09-27 RX ORDER — ASPIRIN/CALCIUM CARB/MAGNESIUM 324 MG
1 TABLET ORAL
Qty: 0 | Refills: 0 | DISCHARGE

## 2022-09-27 RX ORDER — LEVOTHYROXINE SODIUM 125 MCG
25 TABLET ORAL DAILY
Refills: 0 | Status: DISCONTINUED | OUTPATIENT
Start: 2022-09-27 | End: 2022-09-30

## 2022-09-27 RX ORDER — INFLUENZA VIRUS VACCINE 15; 15; 15; 15 UG/.5ML; UG/.5ML; UG/.5ML; UG/.5ML
0.7 SUSPENSION INTRAMUSCULAR ONCE
Refills: 0 | Status: DISCONTINUED | OUTPATIENT
Start: 2022-09-27 | End: 2022-09-30

## 2022-09-27 RX ORDER — ATORVASTATIN CALCIUM 80 MG/1
10 TABLET, FILM COATED ORAL AT BEDTIME
Refills: 0 | Status: DISCONTINUED | OUTPATIENT
Start: 2022-09-27 | End: 2022-09-30

## 2022-09-27 RX ORDER — ATORVASTATIN CALCIUM 80 MG/1
1 TABLET, FILM COATED ORAL
Qty: 0 | Refills: 0 | DISCHARGE

## 2022-09-27 RX ORDER — SODIUM ZIRCONIUM CYCLOSILICATE 10 G/10G
10 POWDER, FOR SUSPENSION ORAL ONCE
Refills: 0 | Status: DISCONTINUED | OUTPATIENT
Start: 2022-09-27 | End: 2022-09-28

## 2022-09-27 RX ORDER — PREGABALIN 225 MG/1
1000 CAPSULE ORAL DAILY
Refills: 0 | Status: DISCONTINUED | OUTPATIENT
Start: 2022-09-27 | End: 2022-09-30

## 2022-09-27 RX ORDER — HEPARIN SODIUM 5000 [USP'U]/ML
5000 INJECTION INTRAVENOUS; SUBCUTANEOUS EVERY 8 HOURS
Refills: 0 | Status: DISCONTINUED | OUTPATIENT
Start: 2022-09-27 | End: 2022-09-30

## 2022-09-27 RX ADMIN — ATORVASTATIN CALCIUM 10 MILLIGRAM(S): 80 TABLET, FILM COATED ORAL at 21:20

## 2022-09-27 RX ADMIN — SODIUM CHLORIDE 75 MILLILITER(S): 9 INJECTION INTRAMUSCULAR; INTRAVENOUS; SUBCUTANEOUS at 17:56

## 2022-09-27 RX ADMIN — HEPARIN SODIUM 5000 UNIT(S): 5000 INJECTION INTRAVENOUS; SUBCUTANEOUS at 21:22

## 2022-09-27 NOTE — ED PROVIDER NOTE - ATTENDING APP SHARED VISIT CONTRIBUTION OF CARE
Az with SANDRA Duong. 77 y/o male hx of  neuropathy, HLD, prostate cancer s/prostatectomy p/w progressive generalized weakness. sent from pcp for low na and elevated K. would like admission for rehab.  denies fever. chills, cp, sob, n/v, abd pain  labs, admission    NP Ata called ahead and requests admission for hyponatremia, hyperkalemia + weakness.     I performed a face to face bedside interview with patient regarding history of present illness, review of symptoms and past medical history. I completed an independent physical exam.  I have discussed the patient's plan of care with Physician Assistant (PA). I agree with note as stated above, having amended the EMR as needed to reflect my findings.   This includes History of Present Illness, HIV, Past Medical/Surgical/Family/Social History, Allergies and Home Medications, Review of Systems, Physical Exam, and any Progress Notes during the time I functioned as the attending physician for this patient.

## 2022-09-27 NOTE — H&P ADULT - NSHPPHYSICALEXAM_GEN_ALL_CORE
T(C): 36.6 (09-27-22 @ 13:19), Max: 36.6 (09-27-22 @ 13:19)  HR: 81 (09-27-22 @ 13:19) (81 - 81)  BP: 132/78 (09-27-22 @ 13:19) (132/78 - 132/78)  RR: 17 (09-27-22 @ 13:19) (17 - 17)  SpO2: 99% (09-27-22 @ 13:19) (99% - 99%)    GENERAL: patient appears well, no acute distress, appropriate, pleasant  EYES: sclera clear, no exudates  ENMT: oropharynx clear without erythema, no exudates, moist mucous membranes  NECK: supple, soft, no thyromegaly noted  LUNGS: good air entry bilaterally, clear to auscultation, symmetric breath sounds, no wheezing or rhonchi appreciated  HEART: soft S1/S2, regular rate and rhythm, no murmurs noted, no lower extremity edema  GASTROINTESTINAL: abdomen is soft, nontender, nondistended, normoactive bowel sounds, no palpable masses  INTEGUMENT: warm and perfused  MUSCULOSKELETAL: no clubbing or cyanosis, no obvious deformity  NEUROLOGIC: awake, alert, oriented x3, good muscle tone in 4 extremities, no obvious sensory deficits  PSYCHIATRIC: mood is good, affect is congruent, linear and logical thought process

## 2022-09-27 NOTE — H&P ADULT - NSHPREVIEWOFSYSTEMS_GEN_ALL_CORE
Family History  Family history of Alzheimer's disease (V17.2) (Z82.0) : Father  Family history of hemochromatosis (V18.19) (Z83.49) : Sister  Family history of myocardial infarction (V17.3) (Z82.49) : Mother  Family history of High cholesterol : Sibling    Social History  Denies smoking, illicit drug use  Admits to occasional EtOH    Current Meds  Alendronate Sodium 70 MG Oral Tablet; TAKE 1 TABLET ONCE EVERY WEEK  amLODIPine Besylate 10 MG Oral Tablet; Take 1 tablet daily  Aspirin Adult Low Dose 81 MG Oral Tablet Delayed Release  Atorvastatin Calcium 10 MG Oral Tablet; TAKE 1 TABLET DAILY AS DIRECTED  Citracal TBEF  Fish Oil 1200 MG Oral Capsule  Gabapentin 800 MG Oral Tablet; TAKE 1 TABLET 3 TIMES A DAY  Levothyroxine Sodium 25 MCG Oral Tablet; Take 1 tablet daily  Losartan Potassium 50 MG Oral Tablet; TAKE 1 TABLET EVERY DAY  Melatonin 5 MG Oral Capsule  Multi Vitamin Daily Oral Tablet  Neurontin 600 MG Oral Tablet; Take 2 tabs in am and pm and 800 mg in afternoon and  1200 mg at night  Vitamin B12 TABS CONSTITUTIONAL: denies fever, chills, admits to fatigue, weakness  HEENT: denies blurred vision, sore throat  CARDIOVASCULAR: denies chest pain, chest pressure, palpitations  RESPIRATORY: denies shortness of breath, sputum production  GASTROINTESTINAL: denies nausea, vomiting, diarrhea, abdominal pain  GENITOURINARY: denies dysuria, discharge  NEUROLOGICAL: denies numbness, headache, focal weakness  MUSCULOSKELETAL: denies new joint pain, muscle aches  HEMATOLOGIC: denies gross bleeding, bruising  LYMPHATICS: denies enlarged lymph nodes, extremity swelling  PSYCHIATRIC: denies recent changes in anxiety, depression  ENDOCRINOLOGIC: denies sweating, cold or heat intolerance

## 2022-09-27 NOTE — ED PROVIDER NOTE - PHYSICAL EXAMINATION
General:     NAD, well-nourished, well-appearing  Eyes: PERRL  Head:     NC/AT, oral mucosa moist  Neck:     trachea midline  Lungs:     CTA b/l  CVS:     RRR, +murmur  Abd:     +BS, s/nt/nd  Ext:   no deformities   Neuro: AAOx3

## 2022-09-27 NOTE — PATIENT PROFILE ADULT - FALL HARM RISK - RISK INTERVENTIONS

## 2022-09-27 NOTE — CONSULT NOTE ADULT - SUBJECTIVE AND OBJECTIVE BOX
NEPHROLOGY CONSULTATION    CHIEF COMPLAINT: weak    HPI:  Pt is 75 yo male w/hx of neuropathy, HLD, prostate cancer s/prostatectomy p/w progressive generalized weakness. Sent in by pcp for low na and elevated K. No fever, chills, cp, sob, n/v, d/c, abd pain.    ROS:  as above    Allergies:  No Known Allergies    PAST MEDICAL & SURGICAL HISTORY:  BPH (benign prostatic hypertrophy)  Nocturia  x2-3  Thoracic spine fracture  T12, June 21, 2017  Neuropathy  bilateral feet, cause unknown  Hyperlipidemia  Hypertension  Osteoarthritis  Anemia  mild, cause unknown  Prostate cancer  2008, no chemo or RT  S/P prostatectomy  2008  S/P cystoscopy  June 2017, &quot;removal of scar tissue at the base of the bladder&quot;  S/P colonoscopy  2017, negative  History of kyphoplasty    SOCIAL HISTORY:  negative    FAMILY HISTORY:  Family history of myocardial infarction  Family history of Alzheimer    MEDICATIONS  (STANDING):  sodium chloride 0.9%. 1000 milliLiter(s) (75 mL/Hr) IV Continuous <Continuous>    Vital Signs Last 24 Hrs  T(C): 36.6 (09-27-22 @ 13:19), Max: 36.6 (09-27-22 @ 13:19)  T(F): 97.8 (09-27-22 @ 13:19), Max: 97.8 (09-27-22 @ 13:19)  HR: 81 (09-27-22 @ 13:19) (81 - 81)  BP: 132/78 (09-27-22 @ 13:19) (132/78 - 132/78)  RR: 17 (09-27-22 @ 13:19) (17 - 17)  SpO2: 99% (09-27-22 @ 13:19) (99% - 99%)    LABS:                        11.9   8.74  )-----------( 231      ( 27 Sep 2022 13:40 )             34.2     09-27    126<L>  |  94<L>  |  26<H>  ----------------------------<  113<H>  5.4<H>   |  26  |  1.45<H>    Ca    9.9      27 Sep 2022 13:40  Mg     2.1     09-27    TPro  7.4  /  Alb  4.0  /  TBili  1.2  /  DBili  x   /  AST  20  /  ALT  33  /  AlkPhos  37<L>  09-27    LIVER FUNCTIONS - ( 27 Sep 2022 13:40 )  Alb: 4.0 g/dL / Pro: 7.4 g/dL / ALK PHOS: 37 U/L / ALT: 33 U/L / AST: 20 U/L / GGT: x           A/P:    full consult to follow    [phone]   NEPHROLOGY CONSULTATION    CHIEF COMPLAINT: weak    HPI:  Pt is 75 yo male w/hx of neuropathy, HLD, prostate cancer s/prostatectomy p/w progressive generalized weakness. Sent in by pcp for low NA, JESUS and elevated K. No fever, chills, cp, sob, n/v, d/c, abd pain. Pt w/prior hx of hyponatremia.    ROS:  as above    Allergies:  No Known Allergies    PAST MEDICAL & SURGICAL HISTORY:  BPH (benign prostatic hypertrophy)  Nocturia  x2-3  Thoracic spine fracture  T12, June 21, 2017  Neuropathy  bilateral feet, cause unknown  Hyperlipidemia  Hypertension  Osteoarthritis  Anemia  mild, cause unknown  Prostate cancer  2008, no chemo or RT  S/P prostatectomy  2008  S/P cystoscopy  June 2017, &quot;removal of scar tissue at the base of the bladder&quot;  S/P colonoscopy  2017, negative  History of kyphoplasty    SOCIAL HISTORY:  negative    FAMILY HISTORY:  Family history of myocardial infarction  Family history of Alzheimer    acetaminophen     Tablet .. 650 milliGRAM(s) Oral every 6 hours PRN  aluminum hydroxide/magnesium hydroxide/simethicone Suspension 30 milliLiter(s) Oral every 4 hours PRN  amLODIPine   Tablet 10 milliGRAM(s) Oral daily  aspirin enteric coated 81 milliGRAM(s) Oral daily  atorvastatin 10 milliGRAM(s) Oral at bedtime  cyanocobalamin 1000 MICROGram(s) Oral daily  DULoxetine 60 milliGRAM(s) Oral daily  heparin   Injectable 5000 Unit(s) SubCutaneous every 8 hours  influenza  Vaccine (HIGH DOSE) 0.7 milliLiter(s) IntraMuscular once  levothyroxine 25 MICROGram(s) Oral daily  melatonin 3 milliGRAM(s) Oral at bedtime PRN  ondansetron Injectable 4 milliGRAM(s) IV Push every 8 hours PRN    Vital Signs Last 24 Hrs  T(C): 36.6 (09-27-22 @ 13:19), Max: 36.6 (09-27-22 @ 13:19)  T(F): 97.8 (09-27-22 @ 13:19), Max: 97.8 (09-27-22 @ 13:19)  HR: 81 (09-27-22 @ 13:19) (81 - 81)  BP: 132/78 (09-27-22 @ 13:19) (132/78 - 132/78)  RR: 17 (09-27-22 @ 13:19) (17 - 17)  SpO2: 99% (09-27-22 @ 13:19) (99% - 99%)    LABS:                        11.9   8.74  )-----------( 231      ( 27 Sep 2022 13:40 )             34.2     09-27    126<L>  |  94<L>  |  26<H>  ----------------------------<  113<H>  5.4<H>   |  26  |  1.45<H>    Ca    9.9      27 Sep 2022 13:40  Mg     2.1     09-27    TPro  7.4  /  Alb  4.0  /  TBili  1.2  /  DBili  x   /  AST  20  /  ALT  33  /  AlkPhos  37<L>  09-27    LIVER FUNCTIONS - ( 27 Sep 2022 13:40 )  Alb: 4.0 g/dL / Pro: 7.4 g/dL / ALK PHOS: 37 U/L / ALT: 33 U/L / AST: 20 U/L / GGT: x           A/P:    Possibly pre-renal JESUS (Cr 1.23 - 9/23/22, Cr 1.16 - 7/6/22)  Hyponatremia in setting of JESUS  Trial of NS  BMP tonight and in am  Will check uric acid, TSH, urine lytes, osms  Regular sodium, low K diet  kelma as ordered  Will f/u SONO, r/o hydro/retention   Avoid nephrotoxins    [phone]   NEPHROLOGY CONSULTATION    CHIEF COMPLAINT: weak    HPI:  Pt is 75 yo male w/hx of neuropathy, HLD, prostate cancer s/prostatectomy p/w progressive generalized weakness. Sent in by pcp for low NA, JESUS and elevated K. No fever, chills, cp, sob, n/v, d/c, abd pain. Pt w/prior hx of hyponatremia.    ROS:  as above    Allergies:  No Known Allergies    PAST MEDICAL & SURGICAL HISTORY:  BPH (benign prostatic hypertrophy)  Nocturia  x2-3  Thoracic spine fracture  T12, June 21, 2017  Neuropathy  bilateral feet, cause unknown  Hyperlipidemia  Hypertension  Osteoarthritis  Anemia  mild, cause unknown  Prostate cancer  2008, no chemo or RT  S/P prostatectomy  2008  S/P cystoscopy  June 2017, &quot;removal of scar tissue at the base of the bladder&quot;  S/P colonoscopy  2017, negative  History of kyphoplasty    SOCIAL HISTORY:  negative    FAMILY HISTORY:  Family history of myocardial infarction  Family history of Alzheimer    acetaminophen     Tablet .. 650 milliGRAM(s) Oral every 6 hours PRN  aluminum hydroxide/magnesium hydroxide/simethicone Suspension 30 milliLiter(s) Oral every 4 hours PRN  amLODIPine   Tablet 10 milliGRAM(s) Oral daily  aspirin enteric coated 81 milliGRAM(s) Oral daily  atorvastatin 10 milliGRAM(s) Oral at bedtime  cyanocobalamin 1000 MICROGram(s) Oral daily  DULoxetine 60 milliGRAM(s) Oral daily  heparin   Injectable 5000 Unit(s) SubCutaneous every 8 hours  influenza  Vaccine (HIGH DOSE) 0.7 milliLiter(s) IntraMuscular once  levothyroxine 25 MICROGram(s) Oral daily  melatonin 3 milliGRAM(s) Oral at bedtime PRN  ondansetron Injectable 4 milliGRAM(s) IV Push every 8 hours PRN    Vital Signs Last 24 Hrs  T(C): 36.6 (09-27-22 @ 13:19), Max: 36.6 (09-27-22 @ 13:19)  T(F): 97.8 (09-27-22 @ 13:19), Max: 97.8 (09-27-22 @ 13:19)  HR: 81 (09-27-22 @ 13:19) (81 - 81)  BP: 132/78 (09-27-22 @ 13:19) (132/78 - 132/78)  RR: 17 (09-27-22 @ 13:19) (17 - 17)  SpO2: 99% (09-27-22 @ 13:19) (99% - 99%)    s1s2  b/l air entry  soft, ND  no edema    LABS:                        11.9   8.74  )-----------( 231      ( 27 Sep 2022 13:40 )             34.2     09-27    126<L>  |  94<L>  |  26<H>  ----------------------------<  113<H>  5.4<H>   |  26  |  1.45<H>    Ca    9.9      27 Sep 2022 13:40  Mg     2.1     09-27    TPro  7.4  /  Alb  4.0  /  TBili  1.2  /  DBili  x   /  AST  20  /  ALT  33  /  AlkPhos  37<L>  09-27    LIVER FUNCTIONS - ( 27 Sep 2022 13:40 )  Alb: 4.0 g/dL / Pro: 7.4 g/dL / ALK PHOS: 37 U/L / ALT: 33 U/L / AST: 20 U/L / GGT: x           A/P:    Possibly pre-renal JESUS (Cr 1.23 - 9/23/22, Cr 1.16 - 7/6/22)  Hyponatremia in setting of JESUS  Trial of NS  BMP tonight and in am  Will check uric acid, TSH, urine lytes, osms  Regular sodium, low K diet  Lokelma as ordered  Will f/u SONO, r/o hydro/retention   Avoid nephrotoxins    [phone]

## 2022-09-27 NOTE — ED PROVIDER NOTE - CLINICAL SUMMARY MEDICAL DECISION MAKING FREE TEXT BOX
75 y/o male hx of  neuropathy, HLD, prostate cancer s/prostatectomy p/w progressive generalized weakness. sent from pcp for low na and elevated K. would like admission for rehab.  denies fever. chills, cp, sob, n/v, abd pain  labs, admission 75 y/o male hx of  neuropathy, HLD, prostate cancer s/prostatectomy p/w progressive generalized weakness. sent from pcp for low na and elevated K. would like admission for rehab.  denies fever. chills, cp, sob, n/v, abd pain  labs, admission    NP Berumen called ahead and requests admission for hyponatremia, hyperkalemia + weakness.

## 2022-09-27 NOTE — H&P ADULT - ASSESSMENT
76 year old M PMH neuropathy, HTN, HLD, hypothryoidism, prostate cancer s/p prostatectomy presenting to the ED with progressive generalized weakness.     #weakness  - admit to medicine  - likely in setting of __________  - patient does not apper infectious at this time, no leukocytosis and afebrile  - follow up UA and UCx, RVP  - PT consulted    #JESUS  #hyponatremia  #hyperkalemia  - prerenal azotemia likely in setting of dehydration along with electrolyte deranagements  - continue gentle IVF  - hold losartan in setting of JESUS  - follow up BMP in the AM  - follow up renal US  - nephrology, Dr. Martinez, consulted    #HTN  #HLD  - continue home amlodipine 5mg daily and atorvastatin 10mg daily  - continue asa 81mg daily  - hold home losartan 50mg daily in setting of JESUS    #hypothyroidism  - continue home levothyroxine 25mcg daily  - follow up thyroid panel    #neuropathy  - continue home gabapentin __________    #DVT ppx  - heparin 5000U TID 76 year old M PMH neuropathy, HTN, HLD, hypothryoidism, prostate cancer s/p prostatectomy presenting to the ED with progressive generalized weakness.     #weakness  - admit to medicine  - likely in setting of dehydration and decreased PO intake  - patient does not apper infectious at this time, no leukocytosis and afebrile  - follow up UA and UCx, RVP  - check orthostatics  - PT consulted    #JESUS  #hyponatremia  #hyperkalemia  - prerenal azotemia likely in setting of dehydration along with electrolyte derangements  - continue gentle IVF  - patient states he was on losartan but was stopped last week by PCP  - follow up BMP in the AM  - follow up renal US  - nephrology, Dr. Martinez, consulted    #HTN  #HLD  - continue home amlodipine 10mg daily and atorvastatin 10mg daily  - continue asa 81mg daily    #hypothyroidism  - continue home levothyroxine 25mcg daily  - follow up thyroid panel    #anxiety/depression  - continue cymbalta    #DVT ppx  - heparin 5000U TID

## 2022-09-27 NOTE — H&P ADULT - HISTORY OF PRESENT ILLNESS
76 year old M PMH neuropathy, HTN, HLD, prostate cancer s/p prostatectomy presenting to the ED with progressive generalized weakness. Patient sent in by PCP for low sodium and elevated potassium.     In the ED, T 97.8F, HR 81, /78, RR 17, SpO2 99% on RA. Labs showed H/H 11.9/34.2, ___________  Received __________    EKG: ___  CXR: no acute pathology on wet read   76 year old M PMH neuropathy, HTN, HLD, prostate cancer s/p prostatectomy presenting to the ED with progressive generalized weakness. Patient sent in by PCP for low sodium and elevated potassium. In the ED, labs were improved. Patient states he got his flu shot about 3 weeks ago and since then has been feeling generalized weakness. Denies muscles ache but just feels tired. Lives alone and still able to perform ADLs however patient at baseline doesn't use cane much and recently has been using it more often. Denies chest pain, SOB, abd pain. Admits to some nausea but no vomiting.     In the ED, T 97.8F, HR 81, /78, RR 17, SpO2 99% on RA. Labs showed H/H 11.9/34.2, Na 126, K 5.4, BUN/Cr 1.45/26.     CXR: no acute pathology on wet read 76 year old M PMH neuropathy, HTN, HLD, prostate cancer s/p prostatectomy presenting to the ED with progressive generalized weakness. Patient sent in by PCP for low sodium and elevated potassium. In the ED, labs were improved. Patient states he got his flu shot about 3 weeks ago and since then has been feeling generalized weakness. Hasn't been eating and drinking as much as he usually does, urinating less. Denies muscles ache but just feels tired. Lives alone and still able to perform ADLs however patient at baseline doesn't use cane much and recently has been using it more often. Denies chest pain, SOB, abd pain. Admits to some nausea but no vomiting.     In the ED, T 97.8F, HR 81, /78, RR 17, SpO2 99% on RA. Labs showed H/H 11.9/34.2, Na 126, K 5.4, BUN/Cr 1.45/26.     CXR: no acute pathology on wet read

## 2022-09-27 NOTE — ED ADULT TRIAGE NOTE - CHIEF COMPLAINT QUOTE
Pt sent in by Dr. Ross, pt stated with ongoing high K and low Na levels feels weak, possible admission

## 2022-09-27 NOTE — H&P ADULT - NSHPSOCIALHISTORY_GEN_ALL_CORE
Lives at home alone  Ambulates independently, uses cane occasionally  Performs most ADLs on his own  Denies smoking, EtoH, illicit drug use

## 2022-09-27 NOTE — ED PROVIDER NOTE - OBJECTIVE STATEMENT
75 y/o male hx of  neuropathy, HLD, prostate cancer s/prostatectomy p/w progressive generalized weakness. sent from pcp for low na and elevated K. would like admission for rehab.  denies fever. chills, cp, sob, n/v, abd pain

## 2022-09-28 ENCOUNTER — APPOINTMENT (OUTPATIENT)
Dept: FAMILY MEDICINE | Facility: CLINIC | Age: 77
End: 2022-09-28

## 2022-09-28 DIAGNOSIS — I10 ESSENTIAL (PRIMARY) HYPERTENSION: ICD-10-CM

## 2022-09-28 LAB
ALBUMIN SERPL ELPH-MCNC: 3.3 G/DL — SIGNIFICANT CHANGE UP (ref 3.3–5)
ALP SERPL-CCNC: 32 U/L — LOW (ref 40–120)
ALT FLD-CCNC: 28 U/L — SIGNIFICANT CHANGE UP (ref 10–45)
ANION GAP SERPL CALC-SCNC: 5 MMOL/L — SIGNIFICANT CHANGE UP (ref 5–17)
AST SERPL-CCNC: 14 U/L — SIGNIFICANT CHANGE UP (ref 10–40)
BASOPHILS # BLD AUTO: 0.03 K/UL — SIGNIFICANT CHANGE UP (ref 0–0.2)
BASOPHILS NFR BLD AUTO: 0.4 % — SIGNIFICANT CHANGE UP (ref 0–2)
BILIRUB SERPL-MCNC: 1.2 MG/DL — SIGNIFICANT CHANGE UP (ref 0.2–1.2)
BUN SERPL-MCNC: 20 MG/DL — SIGNIFICANT CHANGE UP (ref 7–23)
CALCIUM SERPL-MCNC: 8.6 MG/DL — SIGNIFICANT CHANGE UP (ref 8.4–10.5)
CHLORIDE SERPL-SCNC: 96 MMOL/L — SIGNIFICANT CHANGE UP (ref 96–108)
CO2 SERPL-SCNC: 27 MMOL/L — SIGNIFICANT CHANGE UP (ref 22–31)
CREAT SERPL-MCNC: 1.2 MG/DL — SIGNIFICANT CHANGE UP (ref 0.5–1.3)
EGFR: 62 ML/MIN/1.73M2 — SIGNIFICANT CHANGE UP
EOSINOPHIL # BLD AUTO: 0.15 K/UL — SIGNIFICANT CHANGE UP (ref 0–0.5)
EOSINOPHIL NFR BLD AUTO: 2.1 % — SIGNIFICANT CHANGE UP (ref 0–6)
FOLATE SERPL-MCNC: 12.3 NG/ML — SIGNIFICANT CHANGE UP
GLUCOSE SERPL-MCNC: 92 MG/DL — SIGNIFICANT CHANGE UP (ref 70–99)
HCT VFR BLD CALC: 31.2 % — LOW (ref 39–50)
HGB BLD-MCNC: 11 G/DL — LOW (ref 13–17)
IMM GRANULOCYTES NFR BLD AUTO: 0.4 % — SIGNIFICANT CHANGE UP (ref 0–0.9)
LYMPHOCYTES # BLD AUTO: 2.11 K/UL — SIGNIFICANT CHANGE UP (ref 1–3.3)
LYMPHOCYTES # BLD AUTO: 29.7 % — SIGNIFICANT CHANGE UP (ref 13–44)
MCHC RBC-ENTMCNC: 31.4 PG — SIGNIFICANT CHANGE UP (ref 27–34)
MCHC RBC-ENTMCNC: 35.3 GM/DL — SIGNIFICANT CHANGE UP (ref 32–36)
MCV RBC AUTO: 89.1 FL — SIGNIFICANT CHANGE UP (ref 80–100)
MONOCYTES # BLD AUTO: 0.97 K/UL — HIGH (ref 0–0.9)
MONOCYTES NFR BLD AUTO: 13.7 % — SIGNIFICANT CHANGE UP (ref 2–14)
NEUTROPHILS # BLD AUTO: 3.81 K/UL — SIGNIFICANT CHANGE UP (ref 1.8–7.4)
NEUTROPHILS NFR BLD AUTO: 53.7 % — SIGNIFICANT CHANGE UP (ref 43–77)
NRBC # BLD: 0 /100 WBCS — SIGNIFICANT CHANGE UP (ref 0–0)
PLATELET # BLD AUTO: 206 K/UL — SIGNIFICANT CHANGE UP (ref 150–400)
POTASSIUM SERPL-MCNC: 4.7 MMOL/L — SIGNIFICANT CHANGE UP (ref 3.5–5.3)
POTASSIUM SERPL-SCNC: 4.7 MMOL/L — SIGNIFICANT CHANGE UP (ref 3.5–5.3)
PROT SERPL-MCNC: 6.2 G/DL — SIGNIFICANT CHANGE UP (ref 6–8.3)
RBC # BLD: 3.5 M/UL — LOW (ref 4.2–5.8)
RBC # FLD: 11.9 % — SIGNIFICANT CHANGE UP (ref 10.3–14.5)
SODIUM SERPL-SCNC: 128 MMOL/L — LOW (ref 135–145)
T4 FREE SERPL-MCNC: 1.3 NG/DL — SIGNIFICANT CHANGE UP (ref 0.9–1.8)
TSH SERPL-MCNC: 1.35 UIU/ML — SIGNIFICANT CHANGE UP (ref 0.36–3.74)
URATE SERPL-MCNC: 4.5 MG/DL — SIGNIFICANT CHANGE UP (ref 3.4–8.8)
VIT B12 SERPL-MCNC: 1210 PG/ML — SIGNIFICANT CHANGE UP (ref 232–1245)
WBC # BLD: 7.1 K/UL — SIGNIFICANT CHANGE UP (ref 3.8–10.5)
WBC # FLD AUTO: 7.1 K/UL — SIGNIFICANT CHANGE UP (ref 3.8–10.5)

## 2022-09-28 PROCEDURE — 99233 SBSQ HOSP IP/OBS HIGH 50: CPT

## 2022-09-28 RX ORDER — SODIUM CHLORIDE 9 MG/ML
1000 INJECTION INTRAMUSCULAR; INTRAVENOUS; SUBCUTANEOUS
Refills: 0 | Status: DISCONTINUED | OUTPATIENT
Start: 2022-09-28 | End: 2022-09-29

## 2022-09-28 RX ADMIN — Medication 81 MILLIGRAM(S): at 11:53

## 2022-09-28 RX ADMIN — Medication 3 MILLIGRAM(S): at 22:25

## 2022-09-28 RX ADMIN — HEPARIN SODIUM 5000 UNIT(S): 5000 INJECTION INTRAVENOUS; SUBCUTANEOUS at 22:24

## 2022-09-28 RX ADMIN — ONDANSETRON 4 MILLIGRAM(S): 8 TABLET, FILM COATED ORAL at 08:35

## 2022-09-28 RX ADMIN — ATORVASTATIN CALCIUM 10 MILLIGRAM(S): 80 TABLET, FILM COATED ORAL at 22:25

## 2022-09-28 RX ADMIN — PREGABALIN 1000 MICROGRAM(S): 225 CAPSULE ORAL at 11:53

## 2022-09-28 RX ADMIN — SODIUM CHLORIDE 50 MILLILITER(S): 9 INJECTION INTRAMUSCULAR; INTRAVENOUS; SUBCUTANEOUS at 11:59

## 2022-09-28 RX ADMIN — DULOXETINE HYDROCHLORIDE 60 MILLIGRAM(S): 30 CAPSULE, DELAYED RELEASE ORAL at 11:52

## 2022-09-28 RX ADMIN — Medication 650 MILLIGRAM(S): at 23:25

## 2022-09-28 RX ADMIN — Medication 25 MICROGRAM(S): at 05:11

## 2022-09-28 RX ADMIN — Medication 650 MILLIGRAM(S): at 22:25

## 2022-09-28 RX ADMIN — AMLODIPINE BESYLATE 10 MILLIGRAM(S): 2.5 TABLET ORAL at 05:11

## 2022-09-28 RX ADMIN — HEPARIN SODIUM 5000 UNIT(S): 5000 INJECTION INTRAVENOUS; SUBCUTANEOUS at 14:16

## 2022-09-28 RX ADMIN — SODIUM CHLORIDE 75 MILLILITER(S): 9 INJECTION INTRAMUSCULAR; INTRAVENOUS; SUBCUTANEOUS at 06:37

## 2022-09-28 RX ADMIN — HEPARIN SODIUM 5000 UNIT(S): 5000 INJECTION INTRAVENOUS; SUBCUTANEOUS at 05:11

## 2022-09-28 NOTE — PROGRESS NOTE ADULT - SUBJECTIVE AND OBJECTIVE BOX
Patient is a 76y old  Male who presents with a chief complaint of JESUS, weakness (27 Sep 2022 16:03)      Patient seen and examined at bedside. No overnight events reported. Patient states he feels well this morning.     ALLERGIES:  No Known Allergies    MEDICATIONS  (STANDING):  amLODIPine   Tablet 10 milliGRAM(s) Oral daily  aspirin enteric coated 81 milliGRAM(s) Oral daily  atorvastatin 10 milliGRAM(s) Oral at bedtime  cyanocobalamin 1000 MICROGram(s) Oral daily  DULoxetine 60 milliGRAM(s) Oral daily  heparin   Injectable 5000 Unit(s) SubCutaneous every 8 hours  influenza  Vaccine (HIGH DOSE) 0.7 milliLiter(s) IntraMuscular once  levothyroxine 25 MICROGram(s) Oral daily  sodium chloride 0.9%. 1000 milliLiter(s) (50 mL/Hr) IV Continuous <Continuous>    MEDICATIONS  (PRN):  acetaminophen     Tablet .. 650 milliGRAM(s) Oral every 6 hours PRN Temp greater or equal to 38C (100.4F), Mild Pain (1 - 3)  aluminum hydroxide/magnesium hydroxide/simethicone Suspension 30 milliLiter(s) Oral every 4 hours PRN Dyspepsia  melatonin 3 milliGRAM(s) Oral at bedtime PRN Insomnia  ondansetron Injectable 4 milliGRAM(s) IV Push every 8 hours PRN Nausea and/or Vomiting    Vital Signs Last 24 Hrs  T(F): 98.8 (28 Sep 2022 05:15), Max: 98.8 (28 Sep 2022 05:15)  HR: 82 (28 Sep 2022 05:15) (73 - 92)  BP: 138/79 (28 Sep 2022 05:15) (115/63 - 144/73)  RR: 20 (28 Sep 2022 05:15) (17 - 20)  SpO2: 97% (28 Sep 2022 05:15) (97% - 99%)  I&O's Summary    PHYSICAL EXAM:  General: NAD, A/O x 3  ENT: No gross hearing impairment, Moist mucous membranes, no thrush  Neck: Supple, No JVD  Lungs: Clear to auscultation bilaterally, good air entry, non-labored breathing  Cardio: RRR, S1/S2, No murmur  Abdomen: Soft, Nontender, Nondistended; Bowel sounds present  Extremities: No calf tenderness, No cyanosis, No pitting edema  Psych: Appropriate mood and affect    LABS:                        11.0   7.10  )-----------( 206      ( 28 Sep 2022 06:30 )             31.2         128  |  96  |  20  ----------------------------<  92  4.7   |  27  |  1.20    Ca    8.6      28 Sep 2022 06:30  Mg     2.1         TPro  6.2  /  Alb  3.3  /  TBili  1.2  /  DBili  x   /  AST  14  /  ALT  28  /  AlkPhos  32                        TSH 1.354   TSH with FT4 reflex --  Total T3 --                  Urinalysis Basic - ( 27 Sep 2022 18:30 )    Color: Yellow / Appearance: Clear / S.025 / pH: x  Gluc: x / Ketone: Trace  / Bili: Negative / Urobili: Negative   Blood: x / Protein: 15 / Nitrite: Negative   Leuk Esterase: Negative / RBC: 0-4 /HPF / WBC 0-2 /HPF   Sq Epi: x / Non Sq Epi: Neg.-Few / Bacteria: Few /HPF        COVID-19 PCR: NotDetec (22 @ 13:40)  COVID-19 PCR: NotDetec (22 @ 15:55)    RADIOLOGY & ADDITIONAL TESTS:  < from: US Renal (22 @ 22:00) >  IMPRESSION:  Normal renal ultrasound.    < end of copied text >      Care Discussed with Consultants/Other Providers:

## 2022-09-28 NOTE — HISTORY OF PRESENT ILLNESS
[de-identified] : Patient presents with  fatigue, weakness, nausea and vomiting that has been persistent for 2 weeks.\par Has been followed closely by PCP Dr. Ross during this time and,he was also been evaluated in emergency room.\par He continues to suffer from persistent hyponatremia, and hyperkalemia.\par Symptoms have been persistent.\par On Friday he was encouraged to report to ED-  he reports he was not feeling up to.\par \par Due to persistent symptoms and concerning lab values he was encouraged to follow-up in emergency room again which he agreed to\par \par \par \par \par History of prostate cancer in 2008

## 2022-09-28 NOTE — PROGRESS NOTE ADULT - NS ATTEND AMEND GEN_ALL_CORE FT
76 year old M PMH neuropathy, HTN, HLD, hypothryoidism, prostate cancer s/p prostatectomy presenting to the ED with progressive generalized weakness.  Plan: correct hyponatremia, apprec renal recs, monitor clinical course, dc likelyy tomorrow if improved

## 2022-09-28 NOTE — PROGRESS NOTE ADULT - SUBJECTIVE AND OBJECTIVE BOX
W/o complaints, reports poor appetite    Vital Signs Last 24 Hrs  T(C): 36.6 (22 @ 21:13), Max: 37.1 (22 @ 05:15)  T(F): 97.8 (22 @ 21:13), Max: 98.8 (22 @ 05:15)  HR: 84 (22 @ 21:13) (80 - 84)  BP: 141/67 (22 @ 21:13) (120/64 - 141/67)  RR: 17 (22 @ 21:13) (17 - 20)  SpO2: 96% (22 @ 21:13) (94% - 97%)    s1s2  b/l air entry  soft, ND  no edema                        11.0   7.10  )-----------( 206      ( 28 Sep 2022 06:30 )             31.2     28 Sep 2022 06:30    128    |  96     |  20     ----------------------------<  92     4.7     |  27     |  1.20     Ca    8.6        28 Sep 2022 06:30  Mg     2.1       27 Sep 2022 13:40    TPro  6.2    /  Alb  3.3    /  TBili  1.2    /  DBili  x      /  AST  14     /  ALT  28     /  AlkPhos  32     28 Sep 2022 06:30    LIVER FUNCTIONS - ( 28 Sep 2022 06:30 )  Alb: 3.3 g/dL / Pro: 6.2 g/dL / ALK PHOS: 32 U/L / ALT: 28 U/L / AST: 14 U/L / GGT: x           Urinalysis Basic - ( 27 Sep 2022 18:30 )    Color: Yellow / Appearance: Clear / S.025 / pH: x  Gluc: x / Ketone: Trace  / Bili: Negative / Urobili: Negative   Blood: x / Protein: 15 / Nitrite: Negative   Leuk Esterase: Negative / RBC: 0-4 /HPF / WBC 0-2 /HPF   Sq Epi: x / Non Sq Epi: Neg.-Few / Bacteria: Few /HPF    acetaminophen     Tablet .. 650 milliGRAM(s) Oral every 6 hours PRN  aluminum hydroxide/magnesium hydroxide/simethicone Suspension 30 milliLiter(s) Oral every 4 hours PRN  amLODIPine   Tablet 10 milliGRAM(s) Oral daily  aspirin enteric coated 81 milliGRAM(s) Oral daily  atorvastatin 10 milliGRAM(s) Oral at bedtime  cyanocobalamin 1000 MICROGram(s) Oral daily  DULoxetine 60 milliGRAM(s) Oral daily  heparin   Injectable 5000 Unit(s) SubCutaneous every 8 hours  influenza  Vaccine (HIGH DOSE) 0.7 milliLiter(s) IntraMuscular once  levothyroxine 25 MICROGram(s) Oral daily  melatonin 3 milliGRAM(s) Oral at bedtime PRN  ondansetron Injectable 4 milliGRAM(s) IV Push every 8 hours PRN  sodium chloride 0.9%. 1000 milliLiter(s) IV Continuous <Continuous>    A/P:    Resolving pre-renal JESUS (Cr 1.23 - 22, Cr 1.16 - 22)  Hyponatremia in setting of JESUS, improving  Continue NS for another day  BMP in am  Regular sodium diet  Renal SONO w/o hydro   Avoid nephrotoxins    [phone]

## 2022-09-28 NOTE — PROGRESS NOTE ADULT - ASSESSMENT
76 year old M PMH neuropathy, HTN, HLD, hypothryoidism, prostate cancer s/p prostatectomy presenting to the ED with progressive generalized weakness.     #weakness  - admit to medicine  - likely in setting of dehydration and decreased PO intake  - patient does not apper infectious at this time, no leukocytosis and afebrile  - UA negative   - F/U UCx, RVP  - check orthostatics  - PT consulted    #JESUS  #hyponatremia-improving  #hyperkalemia-resolved  - prerenal azotemia likely in setting of dehydration along with electrolyte derangements  - continue gentle IVF  - patient states he was on losartan but was stopped last week by PCP  - follow up BMP in the AM  - renal US-normal  - nephrology, Dr. Martinez, consulted    #HTN  #HLD  - continue home amlodipine 10mg daily and atorvastatin 10mg daily  - continue asa 81mg daily    #hypothyroidism  - continue home levothyroxine 25mcg daily  - thyroid panel WNL    #anxiety/depression  - continue cymbalta    #DVT ppx  - heparin 5000U TID 76 year old M PMH neuropathy, HTN, HLD, hypothryoidism, prostate cancer s/p prostatectomy presenting to the ED with progressive generalized weakness.     #weakness  - admit to medicine  - likely in setting of dehydration and decreased PO intake  - patient does not apper infectious at this time, no leukocytosis and afebrile  - UA negative   - F/U UCx, RVP  - check orthostatics  - PT consulted  -B12, Folate, TSH, uric acid within normal limits    #JESUS  #hyponatremia-improving  #hyperkalemia-resolved  - prerenal azotemia likely in setting of dehydration along with electrolyte derangements  - continue gentle IVF  - patient states he was on losartan but was stopped last week by PCP  - follow up BMP in the AM  - renal US-normal  - nephrology, Dr. Martinez, consulted    #HTN  #HLD  - continue home amlodipine 10mg daily and atorvastatin 10mg daily  - continue asa 81mg daily    #hypothyroidism  - continue home levothyroxine 25mcg daily  - thyroid panel WNL    #anxiety/depression  - continue cymbalta    #DVT ppx  - heparin 5000U TID 76 year old M PMH neuropathy, HTN, HLD, hypothryoidism, prostate cancer s/p prostatectomy presenting to the ED with progressive generalized weakness.     #weakness  - likely in setting of dehydration and decreased PO intake  - patient does not apper infectious at this time, no leukocytosis and afebrile  - UA negative   - F/U UCx, RVP  - check orthostatics  - PT consulted  -B12, Folate, TSH, uric acid within normal limits    #JESUS  #hyponatremia-improving  #hyperkalemia-resolved  - prerenal azotemia likely in setting of dehydration along with electrolyte derangements  - continue gentle IVF  - patient states he was on losartan but was stopped last week by PCP  - follow up BMP in the AM  - renal US-normal  - nephrology, Dr. Martinez, consulted    #HTN  #HLD  - continue home amlodipine 10mg daily and atorvastatin 10mg daily  - continue asa 81mg daily    #hypothyroidism  - continue home levothyroxine 25mcg daily  - thyroid panel WNL    #anxiety/depression  - continue cymbalta    #DVT ppx  - heparin 5000U TID

## 2022-09-28 NOTE — PHYSICAL EXAM
[No Acute Distress] : no acute distress [Well Nourished] : well nourished [Well Developed] : well developed [Well-Appearing] : well-appearing [Normal Sclera/Conjunctiva] : normal sclera/conjunctiva [PERRL] : pupils equal round and reactive to light [EOMI] : extraocular movements intact [Normal Outer Ear/Nose] : the outer ears and nose were normal in appearance [Normal Oropharynx] : the oropharynx was normal [No JVD] : no jugular venous distention [No Lymphadenopathy] : no lymphadenopathy [Supple] : supple [Thyroid Normal, No Nodules] : the thyroid was normal and there were no nodules present [No Respiratory Distress] : no respiratory distress  [No Accessory Muscle Use] : no accessory muscle use [Clear to Auscultation] : lungs were clear to auscultation bilaterally [Normal Rate] : normal rate  [Regular Rhythm] : with a regular rhythm [Normal S1, S2] : normal S1 and S2 [No Murmur] : no murmur heard [No Carotid Bruits] : no carotid bruits [No Abdominal Bruit] : a ~M bruit was not heard ~T in the abdomen [No Varicosities] : no varicosities [Pedal Pulses Present] : the pedal pulses are present [No Edema] : there was no peripheral edema [No Palpable Aorta] : no palpable aorta [No Extremity Clubbing/Cyanosis] : no extremity clubbing/cyanosis [Soft] : abdomen soft [Non Tender] : non-tender [Non-distended] : non-distended [No Masses] : no abdominal mass palpated [No HSM] : no HSM [Normal Bowel Sounds] : normal bowel sounds [Normal Posterior Cervical Nodes] : no posterior cervical lymphadenopathy [Normal Anterior Cervical Nodes] : no anterior cervical lymphadenopathy [No CVA Tenderness] : no CVA  tenderness [No Spinal Tenderness] : no spinal tenderness [No Joint Swelling] : no joint swelling [Grossly Normal Strength/Tone] : grossly normal strength/tone [No Rash] : no rash [Coordination Grossly Intact] : coordination grossly intact [No Focal Deficits] : no focal deficits [Normal Gait] : normal gait [Deep Tendon Reflexes (DTR)] : deep tendon reflexes were 2+ and symmetric [Normal Affect] : the affect was normal [Normal Insight/Judgement] : insight and judgment were intact [de-identified] : Slightly enlarged right from femoral lymphadenopathy noted

## 2022-09-28 NOTE — ASSESSMENT
[FreeTextEntry1] : Due to recent blood work, weight loss, persistent fatigue and anorexia he was encouraged to report to emergency room for further evaluation.  Patient agreeable.  [facility] was notified and handout provided to ED MD\par ECG taken due to recent hyperkalemia- no peaked T waves or arrhythmias noted

## 2022-09-28 NOTE — PHYSICAL THERAPY INITIAL EVALUATION ADULT - ADDITIONAL COMMENTS
pt lives alone in private house, 3 wiley w/1 rail, 1st floor setup. pt uses sac to ambulate, has RW and shower chair, pt states he is independent w/ADL's, +

## 2022-09-28 NOTE — PHYSICAL THERAPY INITIAL EVALUATION ADULT - PERTINENT HX OF CURRENT PROBLEM, REHAB EVAL
76 year old M PMH neuropathy, HTN, HLD, prostate cancer s/p prostatectomy presenting to the ED with progressive generalized weakness. Patient sent in by PCP for low sodium and elevated potassium. In the ED, labs were improved. Patient states he got his flu shot about 3 weeks ago and since then has been feeling generalized weakness. Hasn't been eating and drinking as much as he usually does, urinating less. Denies muscles ache but just feels tired. Lives alone and still able to perform ADLs however patient at baseline doesn't use cane much and recently has been using it more often.

## 2022-09-29 LAB
ALBUMIN SERPL ELPH-MCNC: 4.6 G/DL
ALDOSTERONE SERUM: 7.8 NG/DL
ALP BLD-CCNC: 33 U/L
ALT SERPL-CCNC: 11 U/L
ANION GAP SERPL CALC-SCNC: 14 MMOL/L
ANION GAP SERPL CALC-SCNC: 5 MMOL/L — SIGNIFICANT CHANGE UP (ref 5–17)
AST SERPL-CCNC: 13 U/L
BASOPHILS # BLD AUTO: 0.04 K/UL
BASOPHILS NFR BLD AUTO: 0.5 %
BILIRUB SERPL-MCNC: 1 MG/DL
BUN SERPL-MCNC: 19 MG/DL — SIGNIFICANT CHANGE UP (ref 7–23)
BUN SERPL-MCNC: 24 MG/DL
CALCIUM SERPL-MCNC: 10.5 MG/DL
CALCIUM SERPL-MCNC: 8.8 MG/DL — SIGNIFICANT CHANGE UP (ref 8.4–10.5)
CHLORIDE SERPL-SCNC: 94 MMOL/L
CHLORIDE SERPL-SCNC: 96 MMOL/L — SIGNIFICANT CHANGE UP (ref 96–108)
CHLORIDE UR-SCNC: 84 MMOL/L — SIGNIFICANT CHANGE UP
CO2 SERPL-SCNC: 22 MMOL/L
CO2 SERPL-SCNC: 27 MMOL/L — SIGNIFICANT CHANGE UP (ref 22–31)
CREAT SERPL-MCNC: 1.17 MG/DL — SIGNIFICANT CHANGE UP (ref 0.5–1.3)
CREAT SERPL-MCNC: 1.35 MG/DL
CULTURE RESULTS: SIGNIFICANT CHANGE UP
EGFR: 54 ML/MIN/1.73M2
EGFR: 64 ML/MIN/1.73M2 — SIGNIFICANT CHANGE UP
EOSINOPHIL # BLD AUTO: 0.08 K/UL
EOSINOPHIL NFR BLD AUTO: 1 %
GLUCOSE SERPL-MCNC: 100 MG/DL — HIGH (ref 70–99)
GLUCOSE SERPL-MCNC: 115 MG/DL
HCT VFR BLD CALC: 31.3 % — LOW (ref 39–50)
HCT VFR BLD CALC: 33.7 %
HGB BLD-MCNC: 11.1 G/DL — LOW (ref 13–17)
HGB BLD-MCNC: 11.9 G/DL
IMM GRANULOCYTES NFR BLD AUTO: 0.4 %
LYMPHOCYTES # BLD AUTO: 1.97 K/UL
LYMPHOCYTES NFR BLD AUTO: 24.2 %
MAN DIFF?: NORMAL
MCHC RBC-ENTMCNC: 31.3 PG — SIGNIFICANT CHANGE UP (ref 27–34)
MCHC RBC-ENTMCNC: 31.8 PG
MCHC RBC-ENTMCNC: 35.3 GM/DL
MCHC RBC-ENTMCNC: 35.5 GM/DL — SIGNIFICANT CHANGE UP (ref 32–36)
MCV RBC AUTO: 88.2 FL — SIGNIFICANT CHANGE UP (ref 80–100)
MCV RBC AUTO: 90.1 FL
MONOCYTES # BLD AUTO: 1.2 K/UL
MONOCYTES NFR BLD AUTO: 14.8 %
NEUTROPHILS # BLD AUTO: 4.81 K/UL
NEUTROPHILS NFR BLD AUTO: 59.1 %
NRBC # BLD: 0 /100 WBCS — SIGNIFICANT CHANGE UP (ref 0–0)
OSMOLALITY UR: 606 MOSM/KG — SIGNIFICANT CHANGE UP (ref 50–1200)
PLATELET # BLD AUTO: 211 K/UL — SIGNIFICANT CHANGE UP (ref 150–400)
PLATELET # BLD AUTO: 255 K/UL
POTASSIUM SERPL-MCNC: 4.4 MMOL/L — SIGNIFICANT CHANGE UP (ref 3.5–5.3)
POTASSIUM SERPL-SCNC: 4.4 MMOL/L — SIGNIFICANT CHANGE UP (ref 3.5–5.3)
POTASSIUM SERPL-SCNC: 5.5 MMOL/L
PROT SERPL-MCNC: 7 G/DL
RBC # BLD: 3.55 M/UL — LOW (ref 4.2–5.8)
RBC # BLD: 3.74 M/UL
RBC # FLD: 11.9 % — SIGNIFICANT CHANGE UP (ref 10.3–14.5)
RBC # FLD: 12.6 %
SODIUM SERPL-SCNC: 128 MMOL/L — LOW (ref 135–145)
SODIUM SERPL-SCNC: 130 MMOL/L
SODIUM UR-SCNC: 92 MMOL/L — SIGNIFICANT CHANGE UP
SPECIMEN SOURCE: SIGNIFICANT CHANGE UP
WBC # BLD: 6.55 K/UL — SIGNIFICANT CHANGE UP (ref 3.8–10.5)
WBC # FLD AUTO: 6.55 K/UL — SIGNIFICANT CHANGE UP (ref 3.8–10.5)
WBC # FLD AUTO: 8.13 K/UL

## 2022-09-29 PROCEDURE — 99233 SBSQ HOSP IP/OBS HIGH 50: CPT

## 2022-09-29 RX ORDER — UREA 15 G
15 POWDER IN PACKET (EA) ORAL DAILY
Refills: 0 | Status: DISCONTINUED | OUTPATIENT
Start: 2022-09-29 | End: 2022-09-30

## 2022-09-29 RX ADMIN — Medication 650 MILLIGRAM(S): at 21:38

## 2022-09-29 RX ADMIN — HEPARIN SODIUM 5000 UNIT(S): 5000 INJECTION INTRAVENOUS; SUBCUTANEOUS at 14:58

## 2022-09-29 RX ADMIN — Medication 81 MILLIGRAM(S): at 11:18

## 2022-09-29 RX ADMIN — ONDANSETRON 4 MILLIGRAM(S): 8 TABLET, FILM COATED ORAL at 06:01

## 2022-09-29 RX ADMIN — Medication 650 MILLIGRAM(S): at 22:08

## 2022-09-29 RX ADMIN — HEPARIN SODIUM 5000 UNIT(S): 5000 INJECTION INTRAVENOUS; SUBCUTANEOUS at 06:02

## 2022-09-29 RX ADMIN — PREGABALIN 1000 MICROGRAM(S): 225 CAPSULE ORAL at 11:18

## 2022-09-29 RX ADMIN — HEPARIN SODIUM 5000 UNIT(S): 5000 INJECTION INTRAVENOUS; SUBCUTANEOUS at 21:37

## 2022-09-29 RX ADMIN — Medication 3 MILLIGRAM(S): at 21:37

## 2022-09-29 RX ADMIN — AMLODIPINE BESYLATE 10 MILLIGRAM(S): 2.5 TABLET ORAL at 06:01

## 2022-09-29 RX ADMIN — Medication 25 MICROGRAM(S): at 06:01

## 2022-09-29 RX ADMIN — ATORVASTATIN CALCIUM 10 MILLIGRAM(S): 80 TABLET, FILM COATED ORAL at 21:38

## 2022-09-29 RX ADMIN — DULOXETINE HYDROCHLORIDE 60 MILLIGRAM(S): 30 CAPSULE, DELAYED RELEASE ORAL at 11:18

## 2022-09-29 RX ADMIN — Medication 15 GRAM(S): at 18:56

## 2022-09-29 NOTE — PROGRESS NOTE ADULT - NS ATTEND AMEND GEN_ALL_CORE FT
Hyponatremia with acute kidney injury on CKD Stage 3  - may need to decrease duloxetine to 30 mg (SIADH)

## 2022-09-29 NOTE — PROGRESS NOTE ADULT - SUBJECTIVE AND OBJECTIVE BOX
W/o complaints    Vital Signs Last 24 Hrs  T(C): 36.6 (22 @ 13:00), Max: 36.6 (22 @ 21:13)  T(F): 97.8 (22 @ 13:00), Max: 97.8 (22 @ 21:13)  HR: 79 (22 @ 13:00) (78 - 84)  BP: 138/66 (22 @ 13:00) (138/66 - 152/81)  RR: 17 (22 @ 13:00) (17 - 17)  SpO2: 98% (22 @ 13:00) (96% - 98%)    s1s2  b/l air entry  soft, ND  no edema                                11.1   6.55  )-----------( 211      ( 29 Sep 2022 07:40 )             31.3     29 Sep 2022 07:40    128    |  96     |  19     ----------------------------<  100    4.4     |  27     |  1.17     Ca    8.8        29 Sep 2022 07:40    TPro  6.2    /  Alb  3.3    /  TBili  1.2    /  DBili  x      /  AST  14     /  ALT  28     /  AlkPhos  32     28 Sep 2022 06:30    LIVER FUNCTIONS - ( 28 Sep 2022 06:30 )  Alb: 3.3 g/dL / Pro: 6.2 g/dL / ALK PHOS: 32 U/L / ALT: 28 U/L / AST: 14 U/L / GGT: x           Urinalysis Basic - ( 27 Sep 2022 18:30 )    Color: Yellow / Appearance: Clear / S.025 / pH: x  Gluc: x / Ketone: Trace  / Bili: Negative / Urobili: Negative   Blood: x / Protein: 15 / Nitrite: Negative   Leuk Esterase: Negative / RBC: 0-4 /HPF / WBC 0-2 /HPF   Sq Epi: x / Non Sq Epi: Neg.-Few / Bacteria: Few /HPF    Culture - Urine (collected 27 Sep 2022 18:30)  Source: Clean Catch Clean Catch (Midstream)  Final Report (29 Sep 2022 05:36):    <10,000 CFU/mL Normal Urogenital Annalee    acetaminophen     Tablet .. 650 milliGRAM(s) Oral every 6 hours PRN  aluminum hydroxide/magnesium hydroxide/simethicone Suspension 30 milliLiter(s) Oral every 4 hours PRN  amLODIPine   Tablet 10 milliGRAM(s) Oral daily  aspirin enteric coated 81 milliGRAM(s) Oral daily  atorvastatin 10 milliGRAM(s) Oral at bedtime  cyanocobalamin 1000 MICROGram(s) Oral daily  DULoxetine 60 milliGRAM(s) Oral daily  heparin   Injectable 5000 Unit(s) SubCutaneous every 8 hours  influenza  Vaccine (HIGH DOSE) 0.7 milliLiter(s) IntraMuscular once  levothyroxine 25 MICROGram(s) Oral daily  melatonin 3 milliGRAM(s) Oral at bedtime PRN  ondansetron Injectable 4 milliGRAM(s) IV Push every 8 hours PRN  urea Oral Powder 15 Gram(s) Oral daily    A/P:    Resolved pre-renal JESUS (Cr 1.16 - 22)  Renal SONO w/o hydro   Avoid nephrotoxins  Hyponatremia, suspect SIADH   Will d/c IVF and add urea powder  BMP in am  Regular sodium diet    [phone]

## 2022-09-29 NOTE — PROGRESS NOTE ADULT - ASSESSMENT
76 year old M PMH neuropathy, HTN, HLD, hypothryoidism, prostate cancer s/p prostatectomy presenting to the ED with progressive generalized weakness.     #Weakness  - likely in setting of dehydration and decreased PO intake  - patient does not apper infectious at this time, no leukocytosis and afebrile  - UA negative   - UCx normal urogenital lisbeth  - check orthostatics  - PT consulted- recs home PT  -B12, Folate, TSH, uric acid within normal limits    #JESUS  #hyponatremia-improving, pt has hx of hyponatremia  #hyperkalemia-resolved  - No signs of AMS  - prerenal azotemia likely in setting of dehydration along with electrolyte derangements  - continue gentle IVF  - patient states he was on losartan but was stopped last week by PCP  - follow up BMP in the AM  - renal US-normal  - nephrology, Dr. Martinez, following, recs IV NS for another day  - Pt states he was taking salt tabs at home, consider restarting if sodium does not increase by tomorrow am    #HTN  #HLD  - continue home amlodipine 10mg daily and atorvastatin 10mg daily  - continue asa 81mg daily    #hypothyroidism  - continue home levothyroxine 25mcg daily  - thyroid panel WNL    #anxiety/depression  - continue cymbalta    #DVT ppx  - heparin 5000U TID    9/29 Updated son Bill on patient's plan of care 76 year old M PMH neuropathy, HTN, HLD, hypothryoidism, prostate cancer s/p prostatectomy presenting to the ED with progressive generalized weakness.     Acute kidney Injury with Hyponatremia  - consider decreasing duloxetine to 30 mg  - likely in setting of dehydration and decreased PO intake  - patient does not appear infectious at this time, no leukocytosis and afebrile  - UA negative   - UCx normal urogenital lisbeth  - check orthostatics  - PT consulted- recs home PT  -B12, Folate, TSH, uric acid within normal limits    #HTN  #HLD  - continue home amlodipine 10mg daily and atorvastatin 10mg daily  - continue asa 81mg daily    #hypothyroidism  - continue home levothyroxine 25mcg daily  - thyroid panel WNL    #anxiety/depression  - continue cymbalta    #DVT ppx  - heparin 5000U TID    9/29 Updated son Bill on patient's plan of care

## 2022-09-29 NOTE — PROGRESS NOTE ADULT - SUBJECTIVE AND OBJECTIVE BOX
Patient is a 76y old Male who presents with a chief complaint of JESUS, weakness (28 Sep 2022 23:39)      Patient seen and examined at bedside. No overnight events reported. Patient denies nausea, vomiting, chest pain, dizziness.     ALLERGIES:  No Known Allergies    MEDICATIONS  (STANDING):  amLODIPine   Tablet 10 milliGRAM(s) Oral daily  aspirin enteric coated 81 milliGRAM(s) Oral daily  atorvastatin 10 milliGRAM(s) Oral at bedtime  cyanocobalamin 1000 MICROGram(s) Oral daily  DULoxetine 60 milliGRAM(s) Oral daily  heparin   Injectable 5000 Unit(s) SubCutaneous every 8 hours  influenza  Vaccine (HIGH DOSE) 0.7 milliLiter(s) IntraMuscular once  levothyroxine 25 MICROGram(s) Oral daily  sodium chloride 0.9%. 1000 milliLiter(s) (50 mL/Hr) IV Continuous <Continuous>    MEDICATIONS  (PRN):  acetaminophen     Tablet .. 650 milliGRAM(s) Oral every 6 hours PRN Temp greater or equal to 38C (100.4F), Mild Pain (1 - 3)  aluminum hydroxide/magnesium hydroxide/simethicone Suspension 30 milliLiter(s) Oral every 4 hours PRN Dyspepsia  melatonin 3 milliGRAM(s) Oral at bedtime PRN Insomnia  ondansetron Injectable 4 milliGRAM(s) IV Push every 8 hours PRN Nausea and/or Vomiting    Vital Signs Last 24 Hrs  T(F): 97.7 (29 Sep 2022 05:37), Max: 98 (28 Sep 2022 12:42)  HR: 78 (29 Sep 2022 05:37) (78 - 84)  BP: 152/81 (29 Sep 2022 05:37) (120/64 - 152/81)  RR: 17 (29 Sep 2022 05:37) (17 - 17)  SpO2: 97% (29 Sep 2022 05:37) (94% - 97%)  I&O's Summary    28 Sep 2022 07:01  -  29 Sep 2022 07:00  --------------------------------------------------------  IN: 0 mL / OUT: 600 mL / NET: -600 mL      PHYSICAL EXAM:  General: NAD, A/O x 3  ENT: No gross hearing impairment, Moist mucous membranes, no thrush  Neck: Supple, No JVD  Lungs: Clear to auscultation bilaterally, good air entry, non-labored breathing  Cardio: RRR, S1/S2, No murmur  Abdomen: Soft, Nontender, Nondistended; Bowel sounds present  Extremities: No calf tenderness, No cyanosis, No pitting edema  Psych: Appropriate mood and affect    LABS:                        11.1   6.55  )-----------( 211      ( 29 Sep 2022 07:40 )             31.3         128  |  96  |  19  ----------------------------<  100  4.4   |  27  |  1.17    Ca    8.8      29 Sep 2022 07:40  Mg     2.1         TPro  6.2  /  Alb  3.3  /  TBili  1.2  /  DBili  x   /  AST  14  /  ALT  28  /  AlkPhos  32                        TSH 1.354   TSH with FT4 reflex --  Total T3 --                  Urinalysis Basic - ( 27 Sep 2022 18:30 )    Color: Yellow / Appearance: Clear / S.025 / pH: x  Gluc: x / Ketone: Trace  / Bili: Negative / Urobili: Negative   Blood: x / Protein: 15 / Nitrite: Negative   Leuk Esterase: Negative / RBC: 0-4 /HPF / WBC 0-2 /HPF   Sq Epi: x / Non Sq Epi: Neg.-Few / Bacteria: Few /HPF        Culture - Urine (collected 27 Sep 2022 18:30)  Source: Clean Catch Clean Catch (Midstream)  Final Report (29 Sep 2022 05:36):    <10,000 CFU/mL Normal Urogenital Annalee      COVID-19 PCR: NotDetec (22 @ 13:40)  COVID-19 PCR: NotDetec (22 @ 15:55)    RADIOLOGY & ADDITIONAL TESTS:    Care Discussed with Consultants/Other Providers:    Patient is a 76y old Male who presents with a chief complaint of JESUS, weakness (28 Sep 2022 23:39)      Patient seen and examined at bedside. No overnight events reported. Patient denies nausea, vomiting, chest pain, dizziness.     ALLERGIES:  No Known Allergies    MEDICATIONS  (STANDING):  amLODIPine   Tablet 10 milliGRAM(s) Oral daily  aspirin enteric coated 81 milliGRAM(s) Oral daily  atorvastatin 10 milliGRAM(s) Oral at bedtime  cyanocobalamin 1000 MICROGram(s) Oral daily  DULoxetine 60 milliGRAM(s) Oral daily  heparin   Injectable 5000 Unit(s) SubCutaneous every 8 hours  influenza  Vaccine (HIGH DOSE) 0.7 milliLiter(s) IntraMuscular once  levothyroxine 25 MICROGram(s) Oral daily  sodium chloride 0.9%. 1000 milliLiter(s) (50 mL/Hr) IV Continuous <Continuous>    MEDICATIONS  (PRN):  acetaminophen     Tablet .. 650 milliGRAM(s) Oral every 6 hours PRN Temp greater or equal to 38C (100.4F), Mild Pain (1 - 3)  aluminum hydroxide/magnesium hydroxide/simethicone Suspension 30 milliLiter(s) Oral every 4 hours PRN Dyspepsia  melatonin 3 milliGRAM(s) Oral at bedtime PRN Insomnia  ondansetron Injectable 4 milliGRAM(s) IV Push every 8 hours PRN Nausea and/or Vomiting    Vital Signs Last 24 Hrs  T(F): 97.7 (29 Sep 2022 05:37), Max: 98 (28 Sep 2022 12:42)  HR: 78 (29 Sep 2022 05:37) (78 - 84)  BP: 152/81 (29 Sep 2022 05:37) (120/64 - 152/81)  RR: 17 (29 Sep 2022 05:37) (17 - 17)  SpO2: 97% (29 Sep 2022 05:37) (94% - 97%)  I&O's Summary    28 Sep 2022 07:01  -  29 Sep 2022 07:00  --------------------------------------------------------  IN: 0 mL / OUT: 600 mL / NET: -600 mL    PHYSICAL EXAM:  General: NAD, A/O x 3  ENT: No gross hearing impairment, Moist mucous membranes, no thrush  Neck: Supple, No JVD  Lungs: Clear to auscultation bilaterally, good air entry, non-labored breathing  Cardio: RRR, S1/S2, No murmur  Abdomen: Soft, Nontender, Nondistended; Bowel sounds present  Extremities: No calf tenderness, No cyanosis, No pitting edema  Psych: Appropriate mood and affect    LABS:                        11.1   6.55  )-----------( 211      ( 29 Sep 2022 07:40 )             31.3         128  |  96  |  19  ----------------------------<  100  4.4   |  27  |  1.17    Ca    8.8      29 Sep 2022 07:40  Mg     2.1         TPro  6.2  /  Alb  3.3  /  TBili  1.2  /  DBili  x   /  AST  14  /  ALT  28  /  AlkPhos  32      TSH 1.354   TSH with FT4 reflex --  Total T3 --    Urinalysis Basic - ( 27 Sep 2022 18:30 )    Color: Yellow / Appearance: Clear / S.025 / pH: x  Gluc: x / Ketone: Trace  / Bili: Negative / Urobili: Negative   Blood: x / Protein: 15 / Nitrite: Negative   Leuk Esterase: Negative / RBC: 0-4 /HPF / WBC 0-2 /HPF   Sq Epi: x / Non Sq Epi: Neg.-Few / Bacteria: Few /HPF    Culture - Urine (collected 27 Sep 2022 18:30)  Source: Clean Catch Clean Catch (Midstream)  Final Report (29 Sep 2022 05:36):  <10,000 CFU/mL Normal Urogenital Annalee    COVID-19 PCR: NotDetec (22 @ 13:40)  COVID-19 PCR: NotDetec (22 @ 15:55)    RADIOLOGY & ADDITIONAL TESTS:    Care Discussed with Consultants/Other Providers: Discussed with Dr. Martinez regarding hyponatremia - consideration of decreasing duloxetine

## 2022-09-30 ENCOUNTER — TRANSCRIPTION ENCOUNTER (OUTPATIENT)
Age: 77
End: 2022-09-30

## 2022-09-30 VITALS
HEART RATE: 87 BPM | SYSTOLIC BLOOD PRESSURE: 149 MMHG | RESPIRATION RATE: 16 BRPM | TEMPERATURE: 99 F | OXYGEN SATURATION: 98 % | DIASTOLIC BLOOD PRESSURE: 86 MMHG

## 2022-09-30 LAB
ANION GAP SERPL CALC-SCNC: 5 MMOL/L — SIGNIFICANT CHANGE UP (ref 5–17)
BUN SERPL-MCNC: 26 MG/DL — HIGH (ref 7–23)
CALCIUM SERPL-MCNC: 9.1 MG/DL — SIGNIFICANT CHANGE UP (ref 8.4–10.5)
CHLORIDE SERPL-SCNC: 94 MMOL/L — LOW (ref 96–108)
CO2 SERPL-SCNC: 28 MMOL/L — SIGNIFICANT CHANGE UP (ref 22–31)
CREAT SERPL-MCNC: 1.29 MG/DL — SIGNIFICANT CHANGE UP (ref 0.5–1.3)
EGFR: 57 ML/MIN/1.73M2 — LOW
GLUCOSE SERPL-MCNC: 101 MG/DL — HIGH (ref 70–99)
HCT VFR BLD CALC: 30.9 % — LOW (ref 39–50)
HGB BLD-MCNC: 10.9 G/DL — LOW (ref 13–17)
MCHC RBC-ENTMCNC: 31.6 PG — SIGNIFICANT CHANGE UP (ref 27–34)
MCHC RBC-ENTMCNC: 35.3 GM/DL — SIGNIFICANT CHANGE UP (ref 32–36)
MCV RBC AUTO: 89.6 FL — SIGNIFICANT CHANGE UP (ref 80–100)
NRBC # BLD: 0 /100 WBCS — SIGNIFICANT CHANGE UP (ref 0–0)
OSMOLALITY SERPL: 278 MOSMOL/KG
PLATELET # BLD AUTO: 220 K/UL — SIGNIFICANT CHANGE UP (ref 150–400)
POTASSIUM SERPL-MCNC: 4.6 MMOL/L — SIGNIFICANT CHANGE UP (ref 3.5–5.3)
POTASSIUM SERPL-SCNC: 4.6 MMOL/L — SIGNIFICANT CHANGE UP (ref 3.5–5.3)
RBC # BLD: 3.45 M/UL — LOW (ref 4.2–5.8)
RBC # FLD: 12 % — SIGNIFICANT CHANGE UP (ref 10.3–14.5)
SODIUM SERPL-SCNC: 127 MMOL/L — LOW (ref 135–145)
WBC # BLD: 6.78 K/UL — SIGNIFICANT CHANGE UP (ref 3.8–10.5)
WBC # FLD AUTO: 6.78 K/UL — SIGNIFICANT CHANGE UP (ref 3.8–10.5)

## 2022-09-30 PROCEDURE — 71045 X-RAY EXAM CHEST 1 VIEW: CPT

## 2022-09-30 PROCEDURE — 99239 HOSP IP/OBS DSCHRG MGMT >30: CPT

## 2022-09-30 PROCEDURE — 85027 COMPLETE CBC AUTOMATED: CPT

## 2022-09-30 PROCEDURE — 84300 ASSAY OF URINE SODIUM: CPT

## 2022-09-30 PROCEDURE — 82436 ASSAY OF URINE CHLORIDE: CPT

## 2022-09-30 PROCEDURE — 93005 ELECTROCARDIOGRAM TRACING: CPT

## 2022-09-30 PROCEDURE — 81001 URINALYSIS AUTO W/SCOPE: CPT

## 2022-09-30 PROCEDURE — 82607 VITAMIN B-12: CPT

## 2022-09-30 PROCEDURE — 80048 BASIC METABOLIC PNL TOTAL CA: CPT

## 2022-09-30 PROCEDURE — 36415 COLL VENOUS BLD VENIPUNCTURE: CPT

## 2022-09-30 PROCEDURE — 82746 ASSAY OF FOLIC ACID SERUM: CPT

## 2022-09-30 PROCEDURE — 84443 ASSAY THYROID STIM HORMONE: CPT

## 2022-09-30 PROCEDURE — 85025 COMPLETE CBC W/AUTO DIFF WBC: CPT

## 2022-09-30 PROCEDURE — 99285 EMERGENCY DEPT VISIT HI MDM: CPT | Mod: 25

## 2022-09-30 PROCEDURE — 97162 PT EVAL MOD COMPLEX 30 MIN: CPT

## 2022-09-30 PROCEDURE — 84439 ASSAY OF FREE THYROXINE: CPT

## 2022-09-30 PROCEDURE — 76775 US EXAM ABDO BACK WALL LIM: CPT

## 2022-09-30 PROCEDURE — 87635 SARS-COV-2 COVID-19 AMP PRB: CPT

## 2022-09-30 PROCEDURE — 80053 COMPREHEN METABOLIC PANEL: CPT

## 2022-09-30 PROCEDURE — 83735 ASSAY OF MAGNESIUM: CPT

## 2022-09-30 PROCEDURE — 83935 ASSAY OF URINE OSMOLALITY: CPT

## 2022-09-30 PROCEDURE — 87086 URINE CULTURE/COLONY COUNT: CPT

## 2022-09-30 PROCEDURE — 84550 ASSAY OF BLOOD/URIC ACID: CPT

## 2022-09-30 RX ORDER — UREA 15 G
1 POWDER IN PACKET (EA) ORAL
Qty: 0 | Refills: 0 | DISCHARGE
Start: 2022-09-30

## 2022-09-30 RX ORDER — DULOXETINE HYDROCHLORIDE 30 MG/1
30 CAPSULE, DELAYED RELEASE ORAL DAILY
Refills: 0 | Status: DISCONTINUED | OUTPATIENT
Start: 2022-09-30 | End: 2022-09-30

## 2022-09-30 RX ORDER — DULOXETINE HYDROCHLORIDE 30 MG/1
1 CAPSULE, DELAYED RELEASE ORAL
Qty: 0 | Refills: 0 | DISCHARGE

## 2022-09-30 RX ORDER — TOLVAPTAN 15 MG/1
15 TABLET ORAL ONCE
Refills: 0 | Status: COMPLETED | OUTPATIENT
Start: 2022-09-30 | End: 2022-09-30

## 2022-09-30 RX ORDER — DULOXETINE HYDROCHLORIDE 30 MG/1
1 CAPSULE, DELAYED RELEASE ORAL
Qty: 30 | Refills: 0
Start: 2022-09-30 | End: 2022-10-29

## 2022-09-30 RX ADMIN — PREGABALIN 1000 MICROGRAM(S): 225 CAPSULE ORAL at 11:33

## 2022-09-30 RX ADMIN — Medication 25 MICROGRAM(S): at 05:08

## 2022-09-30 RX ADMIN — Medication 15 GRAM(S): at 11:32

## 2022-09-30 RX ADMIN — AMLODIPINE BESYLATE 10 MILLIGRAM(S): 2.5 TABLET ORAL at 05:08

## 2022-09-30 RX ADMIN — HEPARIN SODIUM 5000 UNIT(S): 5000 INJECTION INTRAVENOUS; SUBCUTANEOUS at 05:09

## 2022-09-30 RX ADMIN — Medication 81 MILLIGRAM(S): at 11:32

## 2022-09-30 RX ADMIN — DULOXETINE HYDROCHLORIDE 30 MILLIGRAM(S): 30 CAPSULE, DELAYED RELEASE ORAL at 12:00

## 2022-09-30 RX ADMIN — ONDANSETRON 4 MILLIGRAM(S): 8 TABLET, FILM COATED ORAL at 05:08

## 2022-09-30 RX ADMIN — TOLVAPTAN 15 MILLIGRAM(S): 15 TABLET ORAL at 12:01

## 2022-09-30 NOTE — DISCHARGE NOTE PROVIDER - CARE PROVIDER_API CALL
Lizeth Ross)  Family Medicine  [address] [place] [address], [address]  [place] [address]  Phone: [phone]  Fax: [phone]  Follow Up Time:

## 2022-09-30 NOTE — DISCHARGE NOTE NURSING/CASE MANAGEMENT/SOCIAL WORK - NSDCPEFALRISK_GEN_ALL_CORE
For information on Fall & Injury Prevention, visit: [email or web address] OR  [email or web address] OR  https://www.cdc.gov/steadi/patient.html

## 2022-09-30 NOTE — DISCHARGE NOTE PROVIDER - NSDCCPCAREPLAN_GEN_ALL_CORE_FT
PRINCIPAL DISCHARGE DIAGNOSIS  Diagnosis: Hyponatremia  Assessment and Plan of Treatment: You were admitted for  generalize weakness / dehydration  You were diagnosed with low sodium  You were treated with medication change and IV fluids , a nephrology consult and medication(tolvaptan)- IV in hospital  You were prescribed the following new medications:  Cymbalta lowered from 60 to 30 mg daily    You will need to follow up with your primary care physician. Dr Ross  next week      SECONDARY DISCHARGE DIAGNOSES  Diagnosis: Acute renal insufficiency  Assessment and Plan of Treatment:

## 2022-09-30 NOTE — DISCHARGE NOTE PROVIDER - NSDCFUSCHEDAPPT_GEN_ALL_CORE_FT
Donta Fisher  [facility] Physician [facility]  GASTRO [address]  Scheduled Appointment: 10/03/2022    Lizeth Ross  [facility]  FAMILYMED [address]  Scheduled Appointment: 10/07/2022    Irineo Gunn  [facility]  CARDIOLOGY [address]  Scheduled Appointment: 12/13/2022    Gayle Reynoso  [facility]  NEUROLOGY [address]  Scheduled Appointment: 12/21/2022

## 2022-09-30 NOTE — PROGRESS NOTE ADULT - SUBJECTIVE AND OBJECTIVE BOX
W/o complaints    Vital Signs Last 24 Hrs  T(C): 37.1 (09-30-22 @ 15:10), Max: 37.1 (09-29-22 @ 20:00)  T(F): 98.7 (09-30-22 @ 15:10), Max: 98.8 (09-29-22 @ 20:00)  HR: 87 (09-30-22 @ 15:10) (70 - 88)  BP: 149/86 (09-30-22 @ 15:10) (136/76 - 149/86)  RR: 16 (09-30-22 @ 15:10) (16 - 17)  SpO2: 98% (09-30-22 @ 15:10) (96% - 99%)    s1s2  b/l air entry  soft, ND  no edema                                         10.9   6.78  )-----------( 220      ( 30 Sep 2022 06:00 )             30.9     30 Sep 2022 06:00    127    |  94     |  26     ----------------------------<  101    4.6     |  28     |  1.29     Ca    9.1        30 Sep 2022 06:00    Culture - Urine (collected 27 Sep 2022 18:30)  Source: Clean Catch Clean Catch (Midstream)  Final Report (29 Sep 2022 05:36):    <10,000 CFU/mL Normal Urogenital Annalee    acetaminophen     Tablet .. 650 milliGRAM(s) Oral every 6 hours PRN  aluminum hydroxide/magnesium hydroxide/simethicone Suspension 30 milliLiter(s) Oral every 4 hours PRN  amLODIPine   Tablet 10 milliGRAM(s) Oral daily  aspirin enteric coated 81 milliGRAM(s) Oral daily  atorvastatin 10 milliGRAM(s) Oral at bedtime  cyanocobalamin 1000 MICROGram(s) Oral daily  DULoxetine 30 milliGRAM(s) Oral daily  heparin   Injectable 5000 Unit(s) SubCutaneous every 8 hours  influenza  Vaccine (HIGH DOSE) 0.7 milliLiter(s) IntraMuscular once  levothyroxine 25 MICROGram(s) Oral daily  melatonin 3 milliGRAM(s) Oral at bedtime PRN  ondansetron Injectable 4 milliGRAM(s) IV Push every 8 hours PRN  urea Oral Powder 15 Gram(s) Oral daily    A/P:    Improved pre-renal JESUS (Cr 1.16 - 7/6/22)  Renal SONO w/o hydro   Avoid nephrotoxins  Hyponatremia, SIADH   Continue urea powder  Tolvaptan 15mg PO x 1  Regular sodium diet  F/u BMP  Renal f/u as op    [phone]

## 2022-09-30 NOTE — PROGRESS NOTE ADULT - TIME BILLING
Discharge Planning
care coordination, plan of care discussed with patient face to face, 2 tele IDR team

## 2022-09-30 NOTE — PROGRESS NOTE ADULT - ASSESSMENT
76 year old M PMH neuropathy, HTN, HLD, hypothryoidism, prostate cancer s/p prostatectomy presenting to the ED with progressive generalized weakness.     #Weakness  - likely in setting of dehydration and decreased PO intake  - patient does not apper infectious at this time, no leukocytosis and afebrile  - UA negative   - UCx normal urogenital lisbeth  - check orthostatics  - PT consulted- recs home PT  -B12, Folate, TSH, uric acid within normal limits    #JESUS  #hyponatremia-improving, pt has hx of hyponatremia  #hyperkalemia-resolved  - No signs of AMS  - prerenal azotemia likely in setting of dehydration along with electrolyte derangements  - continue gentle IVF  - patient states he was on losartan but was stopped last week by PCP  - follow up BMP in the AM  - renal US-normal  - nephrology, Dr. Martinez, following, recs IV NS for another day  - Pt states he was taking salt tabs at home, consider restarting if sodium does not increase by tomorrow am    #HTN  #HLD  - continue home amlodipine 10mg daily and atorvastatin 10mg daily  - continue asa 81mg daily    #hypothyroidism  - continue home levothyroxine 25mcg daily  - thyroid panel WNL    #anxiety/depression  - continue cymbalta    #DVT ppx  - heparin 5000U TID    9/30 Updated son Bill on patient's plan of care 76 year old M PMH neuropathy, HTN, HLD, hypothryoidism, prostate cancer s/p prostatectomy presenting to the ED with progressive generalized weakness.     #Weakness  - likely in setting of dehydration and decreased PO intake  - patient does not apper infectious at this time, no leukocytosis and afebrile  - UA negative   - UCx normal urogenital lisbeth  - check orthostatics  - PT consulted- recs home PT  -B12, Folate, TSH, uric acid within normal limits    #JESUS  #hyponatremia-improving, pt has hx of hyponatremia  #hyperkalemia-resolved  - No signs of AMS  - prerenal azotemia likely in setting of dehydration along with electrolyte derangements  - nephrology following -ordered 1 dose tolvaptan this am prior to discharge   - patient states he was on losartan but was stopped last week by PCP  - follow up BMP in the AM  - renal US-normal  - nephrology, Dr. Martinez, following, recs IV NS for another day  - Pt states he was taking salt tabs at home, consider restarting if sodium does not increase by tomorrow am    #HTN  #HLD  - continue home amlodipine 10mg daily and atorvastatin 10mg daily  - continue asa 81mg daily    #hypothyroidism  - continue home levothyroxine 25mcg daily  - thyroid panel WNL    #anxiety/depression  - continue cymbalta    #DVT ppx  - heparin 5000U TID    9/30 Updated son Bill on patient's plan of care  Pt intent on going home today   long discussion with patient about going home and possibility of adopting a rescue pet for companionship   Home care for home pt  76 year old M PMH neuropathy, HTN, HLD, hypothryoidism, prostate cancer s/p prostatectomy presenting to the ED with progressive generalized weakness.     Hyponatremia  - likely in setting of dehydration and decreased PO intake  - patient does not apper infectious at this time, no leukocytosis and afebrile  - UA negative   - UCx normal urogenital lisbeth  - check orthostatics  - PT consulted- recs home PT  -B12, Folate, TSH, uric acid within normal limits    #JESUS  #hyponatremia-improving, pt has hx of hyponatremia  #hyperkalemia-resolved  - No signs of AMS  - prerenal azotemia likely in setting of dehydration along with electrolyte derangements  - nephrology following -ordered 1 dose tolvaptan this am prior to discharge   - patient states he was on losartan but was stopped last week by PCP  - follow up BMP in the AM  - renal US-normal  - nephrology, Dr. Martinez, following, recs IV NS for another day  - Pt states he was taking salt tabs at home, consider restarting if sodium does not increase by tomorrow am    #HTN  #HLD  - continue home amlodipine 10mg daily and atorvastatin 10mg daily  - continue asa 81mg daily    #hypothyroidism  - continue home levothyroxine 25mcg daily  - thyroid panel WNL    #anxiety/depression  - continue cymbalta    #DVT ppx  - heparin 5000U TID    9/30 Updated son Bill on patient's plan of care  Pt intent on going home today   long discussion with patient about going home and possibility of adopting a rescue pet for companionship   Home care for home pt

## 2022-09-30 NOTE — DISCHARGE NOTE PROVIDER - CARE PROVIDERS DIRECT ADDRESSES
[email or web address]

## 2022-09-30 NOTE — PROGRESS NOTE ADULT - SUBJECTIVE AND OBJECTIVE BOX
Patient is a 76y old  Male who presents with a chief complaint of JESUS, weakness (29 Sep 2022 17:10)      Patient seen and examined at bedside.    ALLERGIES:  No Known Allergies    MEDICATIONS  (STANDING):  amLODIPine   Tablet 10 milliGRAM(s) Oral daily  aspirin enteric coated 81 milliGRAM(s) Oral daily  atorvastatin 10 milliGRAM(s) Oral at bedtime  cyanocobalamin 1000 MICROGram(s) Oral daily  DULoxetine 60 milliGRAM(s) Oral daily  heparin   Injectable 5000 Unit(s) SubCutaneous every 8 hours  influenza  Vaccine (HIGH DOSE) 0.7 milliLiter(s) IntraMuscular once  levothyroxine 25 MICROGram(s) Oral daily  urea Oral Powder 15 Gram(s) Oral daily    MEDICATIONS  (PRN):  acetaminophen     Tablet .. 650 milliGRAM(s) Oral every 6 hours PRN Temp greater or equal to 38C (100.4F), Mild Pain (1 - 3)  aluminum hydroxide/magnesium hydroxide/simethicone Suspension 30 milliLiter(s) Oral every 4 hours PRN Dyspepsia  melatonin 3 milliGRAM(s) Oral at bedtime PRN Insomnia  ondansetron Injectable 4 milliGRAM(s) IV Push every 8 hours PRN Nausea and/or Vomiting    Vital Signs Last 24 Hrs  T(F): 97.5 (30 Sep 2022 05:08), Max: 98.8 (29 Sep 2022 20:00)  HR: 70 (30 Sep 2022 05:08) (70 - 80)  BP: 136/76 (30 Sep 2022 05:08) (136/76 - 142/75)  RR: 16 (30 Sep 2022 05:08) (16 - 17)  SpO2: 96% (30 Sep 2022 05:08) (96% - 98%)  I&O's Summary    29 Sep 2022 07:01  -  30 Sep 2022 07:00  --------------------------------------------------------  IN: 200 mL / OUT: 1 mL / NET: 199 mL      PHYSICAL EXAM:  General: NAD, A/O x 3  ENT: MMM  Neck: Supple, No JVD  Lungs: Clear to auscultation bilaterally, Non labored breathing   Cardio: RRR, S1/S2, No murmurs  Abdomen: Soft, Nontender, Nondistended; Bowel sounds present  Extremities: No calf tenderness, No pitting edema    LABS:                        11.1   6.55  )-----------( 211      ( 29 Sep 2022 07:40 )             31.3         128  |  96  |  19  ----------------------------<  100  4.4   |  27  |  1.17    Ca    8.8      29 Sep 2022 07:40  Mg     2.1         TPro  6.2  /  Alb  3.3  /  TBili  1.2  /  DBili  x   /  AST  14  /  ALT  28  /  AlkPhos  32                  TSH 1.354   TSH with FT4 reflex --  Total T3 --                  Urinalysis Basic - ( 27 Sep 2022 18:30 )    Color: Yellow / Appearance: Clear / S.025 / pH: x  Gluc: x / Ketone: Trace  / Bili: Negative / Urobili: Negative   Blood: x / Protein: 15 / Nitrite: Negative   Leuk Esterase: Negative / RBC: 0-4 /HPF / WBC 0-2 /HPF   Sq Epi: x / Non Sq Epi: Neg.-Few / Bacteria: Few /HPF        Culture - Urine (collected 27 Sep 2022 18:30)  Source: Clean Catch Clean Catch (Midstream)  Final Report (29 Sep 2022 05:36):    <10,000 CFU/mL Normal Urogenital Annalee      COVID-19 PCR: NotDetec (22 @ 13:40)  COVID-19 PCR: NotDetec (22 @ 15:55)    RADIOLOGY & ADDITIONAL TESTS:    Care Discussed with Consultants/Other Providers:    Patient is a 76y old  Male who presents with a chief complaint of JESUS, weakness (29 Sep 2022 17:10)      Patient seen and examined at bedside.  Pt resting comfortable in bed- feeling better than yesterday- Na still low 127     ALLERGIES:  No Known Allergies    MEDICATIONS  (STANDING):  amLODIPine   Tablet 10 milliGRAM(s) Oral daily  aspirin enteric coated 81 milliGRAM(s) Oral daily  atorvastatin 10 milliGRAM(s) Oral at bedtime  cyanocobalamin 1000 MICROGram(s) Oral daily  DULoxetine 60 milliGRAM(s) Oral daily  heparin   Injectable 5000 Unit(s) SubCutaneous every 8 hours  influenza  Vaccine (HIGH DOSE) 0.7 milliLiter(s) IntraMuscular once  levothyroxine 25 MICROGram(s) Oral daily  urea Oral Powder 15 Gram(s) Oral daily    MEDICATIONS  (PRN):  acetaminophen     Tablet .. 650 milliGRAM(s) Oral every 6 hours PRN Temp greater or equal to 38C (100.4F), Mild Pain (1 - 3)  aluminum hydroxide/magnesium hydroxide/simethicone Suspension 30 milliLiter(s) Oral every 4 hours PRN Dyspepsia  melatonin 3 milliGRAM(s) Oral at bedtime PRN Insomnia  ondansetron Injectable 4 milliGRAM(s) IV Push every 8 hours PRN Nausea and/or Vomiting    Vital Signs Last 24 Hrs  T(F): 97.5 (30 Sep 2022 05:08), Max: 98.8 (29 Sep 2022 20:00)  HR: 70 (30 Sep 2022 05:08) (70 - 80)  BP: 136/76 (30 Sep 2022 05:08) (136/76 - 142/75)  RR: 16 (30 Sep 2022 05:08) (16 - 17)  SpO2: 96% (30 Sep 2022 05:08) (96% - 98%)  I&O's Summary    29 Sep 2022 07:01  -  30 Sep 2022 07:00  --------------------------------------------------------  IN: 200 mL / OUT: 1 mL / NET: 199 mL      PHYSICAL EXAM:  General: 75 y/o male in NAD, A/O x 3  ENT: MMM  Neck: Supple, No JVD  Lungs: Clear to auscultation bilaterally, Non labored breathing   Cardio: RRR, S1/S2, No murmurs  Abdomen: Soft, Nontender, Nondistended; Bowel sounds present  Extremities: No calf tenderness, No pitting edema    LABS:                        11.1   6.55  )-----------( 211      ( 29 Sep 2022 07:40 )             31.3         128  |  96  |  19  ----------------------------<  100  4.4   |  27  |  1.17    Ca    8.8      29 Sep 2022 07:40  Mg     2.1         TPro  6.2  /  Alb  3.3  /  TBili  1.2  /  DBili  x   /  AST  14  /  ALT  28  /  AlkPhos  32                  TSH 1.354   TSH with FT4 reflex --  Total T3 --                  Urinalysis Basic - ( 27 Sep 2022 18:30 )    Color: Yellow / Appearance: Clear / S.025 / pH: x  Gluc: x / Ketone: Trace  / Bili: Negative / Urobili: Negative   Blood: x / Protein: 15 / Nitrite: Negative   Leuk Esterase: Negative / RBC: 0-4 /HPF / WBC 0-2 /HPF   Sq Epi: x / Non Sq Epi: Neg.-Few / Bacteria: Few /HPF        Culture - Urine (collected 27 Sep 2022 18:30)  Source: Clean Catch Clean Catch (Midstream)  Final Report (29 Sep 2022 05:36):    <10,000 CFU/mL Normal Urogenital Annalee      COVID-19 PCR: NotDetec (22 @ 13:40)  COVID-19 PCR: NotDetec (22 @ 15:55)    RADIOLOGY & ADDITIONAL TESTS:    Care Discussed with Consultants/Other Providers:

## 2022-09-30 NOTE — DISCHARGE NOTE PROVIDER - NSDCMRMEDTOKEN_GEN_ALL_CORE_FT
amLODIPine 10 mg oral tablet: 1 tab(s) orally once a day  aspirin 81 mg oral delayed release tablet: 1 tab(s) orally once a day  atorvastatin 10 mg oral tablet: 1 tab(s) orally once a day  Citracal + D 250 mg-12.5 mcg (500 intl units) oral tablet, chewable: 2 tab(s) orally 2 times a day (with meals)  DULoxetine 60 mg oral delayed release capsule: 1 cap(s) orally once a day  Fish Oil 1200 mg oral capsule: 1 cap(s) orally once a day  levothyroxine 25 mcg (0.025 mg) oral tablet: 1 tab(s) orally once a day  Vitamin B12 1000 mcg oral tablet: 1 tab(s) orally once a day   amLODIPine 10 mg oral tablet: 1 tab(s) orally once a day  aspirin 81 mg oral delayed release tablet: 1 tab(s) orally once a day  atorvastatin 10 mg oral tablet: 1 tab(s) orally once a day  Citracal + D 250 mg-12.5 mcg (500 intl units) oral tablet, chewable: 2 tab(s) orally 2 times a day (with meals)  DULoxetine 30 mg oral delayed release capsule: 1 cap(s) orally once a day  Fish Oil 1200 mg oral capsule: 1 cap(s) orally once a day  levothyroxine 25 mcg (0.025 mg) oral tablet: 1 tab(s) orally once a day  urea 15 g oral powder for reconstitution: 1 packet(s) orally once a day  Vitamin B12 1000 mcg oral tablet: 1 tab(s) orally once a day

## 2022-09-30 NOTE — PROGRESS NOTE ADULT - NS ATTEND AMEND GEN_ALL_CORE FT
Hyponatremia and acute kidney injury  SIADH  - decrease duloxetine to 30 mg daily  - discussed with Dr. Martinez -- will give 1 dose tolvaptan today before discharge

## 2022-09-30 NOTE — DISCHARGE NOTE PROVIDER - HOSPITAL COURSE
Hospital Course  HPI:  76 year old M PMH neuropathy, HTN, HLD, prostate cancer s/p prostatectomy presenting to the ED with progressive generalized weakness. Patient sent in by PCP for low sodium and elevated potassium. In the ED, labs were improved. Patient states he got his flu shot about 3 weeks ago and since then has been feeling generalized weakness. Hasn't been eating and drinking as much as he usually does, urinating less. Denies muscles ache but just feels tired. Lives alone and still able to perform ADLs however patient at baseline doesn't use cane much and recently has been using it more often. Denies chest pain, SOB, abd pain. Admits to some nausea but no vomiting.     In the ED, T 97.8F, HR 81, /78, RR 17, SpO2 99% on RA. Labs showed H/H 11.9/34.2, Na 126, K 5.4, BUN/Cr 1.45/26.     CXR: no acute pathology on wet read (27 Sep 2022 16:03)      You were admitted for  generalize weakness   You were diagnosed with   You were treated with   You were prescribed the following new medications:    You will need to follow up with your primary care physician.    Source of Infection: Na   Antibiotic / Last Day: Na     Palliative Care / Advanced Care Planning  Code Status: full code   Patient/Family agreeable to Hospice/Palliative (N)  Summary of Goals of Care Conversation:    Discharging Provider:  Sinai Fang   Contact Info: Cell [phone] - Please call with any questions or concerns.    Outpatient Provider:     Hospital Course  HPI:  76 year old M PMH neuropathy, HTN, HLD, prostate cancer s/p prostatectomy presenting to the ED with progressive generalized weakness. Patient sent in by PCP for low sodium and elevated potassium. In the ED, labs were improved. Patient states he got his flu shot about 3 weeks ago and since then has been feeling generalized weakness. Hasn't been eating and drinking as much as he usually does, urinating less. Denies muscles ache but just feels tired. Lives alone and still able to perform ADLs however patient at baseline doesn't use cane much and recently has been using it more often. Admits to some nausea but no vomiting. Pt admitted with Hyponatremia.  Medication adjusted and pt treated with     In the ED, T 97.8F, HR 81, /78, RR 17, SpO2 99% on RA. Labs showed H/H 11.9/34.2, Na 126, K 5.4, BUN/Cr 1.45/26.     CXR: no acute pathology on wet read (27 Sep 2022 16:03)      You were admitted for  generalize weakness / dehydration  You were diagnosed with low sodium  You were treated with medication change and IV fluids , a nephrology consult and medication(tolvaptan)- IV in hospital  You were prescribed the following new medications:  Cymbalta lowered from 60 to 30 mg daily      You will need to follow up with your primary care physician. Dr Ross  next week     Source of Infection: Na   Antibiotic / Last Day: Na     Palliative Care / Advanced Care Planning  Code Status: full code   Patient/Family agreeable to Hospice/Palliative (N)  Summary of Goals of Care Conversation:    Discharging Provider:  Sinai Fang   Contact Info: Cell [phone] - Please call with any questions or concerns.    Outpatient Provider: Dr Ross

## 2022-09-30 NOTE — DISCHARGE NOTE NURSING/CASE MANAGEMENT/SOCIAL WORK - PATIENT PORTAL LINK FT
You can access the FollowMyHealth Patient Portal offered by [facility] by registering at the following website: [email or web address]. By joining [employer]’s FollowMyHealth portal, you will also be able to view your health information using other applications (apps) compatible with our system.

## 2022-10-03 ENCOUNTER — APPOINTMENT (OUTPATIENT)
Dept: GASTROENTEROLOGY | Facility: CLINIC | Age: 77
End: 2022-10-03

## 2022-10-03 ENCOUNTER — NON-APPOINTMENT (OUTPATIENT)
Age: 77
End: 2022-10-03

## 2022-10-07 ENCOUNTER — APPOINTMENT (OUTPATIENT)
Dept: FAMILY MEDICINE | Facility: CLINIC | Age: 77
End: 2022-10-07

## 2022-10-07 VITALS
BODY MASS INDEX: 25.54 KG/M2 | OXYGEN SATURATION: 98 % | DIASTOLIC BLOOD PRESSURE: 70 MMHG | WEIGHT: 199 LBS | TEMPERATURE: 97.3 F | HEART RATE: 113 BPM | RESPIRATION RATE: 20 BRPM | HEIGHT: 74 IN | SYSTOLIC BLOOD PRESSURE: 122 MMHG

## 2022-10-07 DIAGNOSIS — E22.2 SYNDROME OF INAPPROPRIATE SECRETION OF ANTIDIURETIC HORMONE: ICD-10-CM

## 2022-10-07 DIAGNOSIS — D63.8 ANEMIA IN OTHER CHRONIC DISEASES CLASSIFIED ELSEWHERE: ICD-10-CM

## 2022-10-07 DIAGNOSIS — R63.4 ABNORMAL WEIGHT LOSS: ICD-10-CM

## 2022-10-07 LAB
ALBUMIN MFR SERPL ELPH: 61.2 %
ALBUMIN SERPL-MCNC: 4.3 G/DL
ALBUMIN/GLOB SERPL: 1.6 RATIO
ALPHA1 GLOB MFR SERPL ELPH: 3.7 %
ALPHA1 GLOB SERPL ELPH-MCNC: 0.3 G/DL
ALPHA2 GLOB MFR SERPL ELPH: 8.8 %
ALPHA2 GLOB SERPL ELPH-MCNC: 0.6 G/DL
B-GLOBULIN MFR SERPL ELPH: 10.4 %
B-GLOBULIN SERPL ELPH-MCNC: 0.7 G/DL
GAMMA GLOB FLD ELPH-MCNC: 1.1 G/DL
GAMMA GLOB MFR SERPL ELPH: 15.9 %
INTERPRETATION SERPL IEP-IMP: NORMAL
PROT SERPL-MCNC: 7 G/DL
PROT SERPL-MCNC: 7 G/DL

## 2022-10-07 PROCEDURE — 99215 OFFICE O/P EST HI 40 MIN: CPT | Mod: 25

## 2022-10-07 PROCEDURE — 99496 TRANSJ CARE MGMT HIGH F2F 7D: CPT | Mod: 25

## 2022-10-07 PROCEDURE — 36415 COLL VENOUS BLD VENIPUNCTURE: CPT

## 2022-10-07 NOTE — REVIEW OF SYSTEMS
[Fatigue] : fatigue [Dizziness] : dizziness [Unsteady Walk] : ataxia [Negative] : Heme/Lymph [Headache] : no headache [Fainting] : no fainting [Confusion] : no confusion [Memory Loss] : no memory loss [FreeTextEntry7] : decreased appetite [de-identified] : uses cane for walking

## 2022-10-07 NOTE — PLAN
[FreeTextEntry1] : Renewed Fosamax.\par Salt tab daily. Defer to nephrology for further management. obtain blood work to repeat B,MP .\par  Stay hydrated.\par Pt. had episode of dizziness, pallor, blank stare at check out desk witnessed by staff member , pt. seated in chair, given water and salty pretzels. Patient felt better,VSS and he was escorted to his car on wheelchair by RN. \par  Call placed to patient- he reached home safely. Advised him to consider home health aide. He agreed and I will place order for Home care to help him with ADL's . \par Discussed need to do MOLST form/HCP/advanced planning. Pt. expressed understanding.

## 2022-10-07 NOTE — HEALTH RISK ASSESSMENT
[With Patient/Caregiver] : , with patient/caregiver [Never] : Never [No] : In the past 12 months have you used drugs other than those required for medical reasons? No [No falls in past year] : Patient reported no falls in the past year [0] : 2) Feeling down, depressed, or hopeless: Not at all (0) [PHQ-2 Negative - No further assessment needed] : PHQ-2 Negative - No further assessment needed [None] : None [Feels Safe at Home] : Feels safe at home [Fully functional (bathing, dressing, toileting, transferring, walking, feeding)] : Fully functional (bathing, dressing, toileting, transferring, walking, feeding) [Fully functional (using the telephone, shopping, preparing meals, housekeeping, doing laundry, using] : Fully functional and needs no help or supervision to perform IADLs (using the telephone, shopping, preparing meals, housekeeping, doing laundry, using transportation, managing medications and managing finances) [Independent] : managing finances [Reports normal functional visual acuity (ie: able to read med bottle)] : Reports normal functional visual acuity [Smoke Detector] : smoke detector [Carbon Monoxide Detector] : carbon monoxide detector [Seat Belt] :  uses seat belt [Sunscreen] : uses sunscreen [de-identified] : sedentary [de-identified] : regular [[id number]] : 0 [Change in mental status noted] : No change in mental status noted [Language] : denies difficulty with language [Behavior] : denies difficulty with behavior [Learning/Retaining New Information] : denies difficulty learning/retaining new information [Handling Complex Tasks] : denies difficulty handling complex tasks [Reasoning] : denies difficulty with reasoning [Spatial Ability and Orientation] : denies difficulty with spatial ability and orientation [Reports changes in hearing] : Reports no changes in hearing [Reports changes in vision] : Reports no changes in vision [Reports changes in dental health] : Reports no changes in dental health [AdvancecareDate] : 10/22

## 2022-10-07 NOTE — PHYSICAL EXAM
[Well Nourished] : well nourished [Ill-Appearing] : ill-appearing [PERRL] : pupils equal round and reactive to light [EOMI] : extraocular movements intact [Normal Oropharynx] : the oropharynx was normal [No JVD] : no jugular venous distention [Supple] : supple [No Respiratory Distress] : no respiratory distress  [No Accessory Muscle Use] : no accessory muscle use [Clear to Auscultation] : lungs were clear to auscultation bilaterally [Normal Rate] : normal rate  [Regular Rhythm] : with a regular rhythm [Normal S1, S2] : normal S1 and S2 [No Varicosities] : no varicosities [No Edema] : there was no peripheral edema [Soft] : abdomen soft [Non Tender] : non-tender [Non-distended] : non-distended [No Masses] : no abdominal mass palpated [No HSM] : no HSM [Normal Bowel Sounds] : normal bowel sounds [Normal Posterior Cervical Nodes] : no posterior cervical lymphadenopathy [Normal Anterior Cervical Nodes] : no anterior cervical lymphadenopathy [No Spinal Tenderness] : no spinal tenderness [No Joint Swelling] : no joint swelling [No Rash] : no rash [Normal Affect] : the affect was normal [Normal Mood] : the mood was normal [Normal Insight/Judgement] : insight and judgment were intact [64442 - High Complexity requires an extensive number of possible diagnoses and/or the management options, extensive complexity of the medical data (tests, etc.) to be reviewed, and a high risk of significant complications, morbidity, and/or mortality as w] : High Complexity  [de-identified] : gait instability using cane

## 2022-10-07 NOTE — HISTORY OF PRESENT ILLNESS
[Post-hospitalization from ___ Hospital] : Post-hospitalization from [unfilled] Hospital [Admitted on: ___] : The patient was admitted on [unfilled] [Discharged on ___] : discharged on [unfilled] [Discharge Summary] : discharge summary [Pertinent Labs] : pertinent labs [Radiology Findings] : radiology findings [Discharge Med List] : discharge medication list [Med Reconciliation] : medication reconciliation has been completed [Patient Contacted By: ____] : and contacted by [unfilled] [FreeTextEntry2] : Patient hospitalized for JESUS , weakness, nausea, electrolyte imbalance and progressive weight loss-10 pounds in 1 month . He still fells weak and unstable  and is using cane. His Sodium was corrected using IVF, medication  , consult with nephrology and his Cymbalta was lowered to 30 mg daily. He is drinking water 2 bottles a day, Boost  and Gatorade. He is eating and his BP is stable, off Losartan. He is requesting refill for Fosamax, denies N/V/abd. pain/BPR/Change in B.M/ GERD.

## 2022-10-10 LAB
ANION GAP SERPL CALC-SCNC: 14 MMOL/L
APPEARANCE: CLEAR
BILIRUBIN URINE: NEGATIVE
BLOOD URINE: NEGATIVE
BUN SERPL-MCNC: 18 MG/DL
CALCIUM SERPL-MCNC: 9.7 MG/DL
CHLORIDE SERPL-SCNC: 94 MMOL/L
CO2 SERPL-SCNC: 20 MMOL/L
COLOR: YELLOW
CREAT SERPL-MCNC: 1.14 MG/DL
CREAT SPEC-SCNC: 152 MG/DL
CREAT/PROT UR: 0.1 RATIO
EGFR: 66 ML/MIN/1.73M2
FREE KAPPA URINE: 55.79 MG/L
FREE KAPPA/LAMDA RATIO: 8.86 RATIO
FREE LAMDA URINE: 6.3 MG/L
GLUCOSE QUALITATIVE U: NEGATIVE
GLUCOSE SERPL-MCNC: 109 MG/DL
KETONES URINE: NEGATIVE
LEUKOCYTE ESTERASE URINE: NEGATIVE
NITRITE URINE: NEGATIVE
PH URINE: 6.5
POTASSIUM SERPL-SCNC: 4.4 MMOL/L
PROT UR-MCNC: 16 MG/DL
PROTEIN URINE: NORMAL
SODIUM SERPL-SCNC: 128 MMOL/L
SPECIFIC GRAVITY URINE: 1.02
UROBILINOGEN URINE: NORMAL

## 2022-10-12 ENCOUNTER — RESULT REVIEW (OUTPATIENT)
Age: 77
End: 2022-10-12

## 2022-10-12 ENCOUNTER — APPOINTMENT (OUTPATIENT)
Dept: CT IMAGING | Facility: HOSPITAL | Age: 77
End: 2022-10-12

## 2022-10-12 ENCOUNTER — OUTPATIENT (OUTPATIENT)
Dept: OUTPATIENT SERVICES | Facility: HOSPITAL | Age: 77
LOS: 1 days | End: 2022-10-12
Payer: COMMERCIAL

## 2022-10-12 DIAGNOSIS — D64.9 ANEMIA, UNSPECIFIED: ICD-10-CM

## 2022-10-12 DIAGNOSIS — Z98.890 OTHER SPECIFIED POSTPROCEDURAL STATES: Chronic | ICD-10-CM

## 2022-10-12 DIAGNOSIS — E87.1 HYPO-OSMOLALITY AND HYPONATREMIA: ICD-10-CM

## 2022-10-12 DIAGNOSIS — E22.2 SYNDROME OF INAPPROPRIATE SECRETION OF ANTIDIURETIC HORMONE: ICD-10-CM

## 2022-10-12 DIAGNOSIS — N18.30 CHRONIC KIDNEY DISEASE, STAGE 3 UNSPECIFIED: ICD-10-CM

## 2022-10-12 DIAGNOSIS — Z90.79 ACQUIRED ABSENCE OF OTHER GENITAL ORGAN(S): Chronic | ICD-10-CM

## 2022-10-12 LAB — RENIN ACTIVITY, PLASMA: 0.67 NG/ML/HR

## 2022-10-12 PROCEDURE — 74176 CT ABD & PELVIS W/O CONTRAST: CPT | Mod: 26

## 2022-10-12 PROCEDURE — 74176 CT ABD & PELVIS W/O CONTRAST: CPT

## 2022-10-13 ENCOUNTER — APPOINTMENT (OUTPATIENT)
Dept: INTERNAL MEDICINE | Facility: CLINIC | Age: 77
End: 2022-10-13

## 2022-10-14 ENCOUNTER — NON-APPOINTMENT (OUTPATIENT)
Age: 77
End: 2022-10-14

## 2022-10-17 ENCOUNTER — APPOINTMENT (OUTPATIENT)
Dept: NEPHROLOGY | Facility: CLINIC | Age: 77
End: 2022-10-17

## 2022-10-17 VITALS
DIASTOLIC BLOOD PRESSURE: 79 MMHG | WEIGHT: 198.41 LBS | HEART RATE: 93 BPM | OXYGEN SATURATION: 98 % | SYSTOLIC BLOOD PRESSURE: 135 MMHG | BODY MASS INDEX: 25.46 KG/M2 | HEIGHT: 74 IN | TEMPERATURE: 97.7 F

## 2022-10-17 DIAGNOSIS — E87.5 HYPERKALEMIA: ICD-10-CM

## 2022-10-17 PROCEDURE — 99204 OFFICE O/P NEW MOD 45 MIN: CPT

## 2022-10-17 NOTE — PHYSICAL EXAM
[General Appearance - Alert] : alert [General Appearance - In No Acute Distress] : in no acute distress [General Appearance - Well Nourished] : well nourished [General Appearance - Well Developed] : well developed [General Appearance - Well-Appearing] : healthy appearing [Sclera] : the sclera and conjunctiva were normal [PERRL With Normal Accommodation] : pupils were equal in size, round, and reactive to light [Extraocular Movements] : extraocular movements were intact [Outer Ear] : the ears and nose were normal in appearance [Hearing Threshold Finger Rub Not [place]kin] : hearing was normal [Examination Of The Oral Cavity] : the lips and gums were normal [Both Tympanic Membranes Were Examined] : both tympanic membranes were normal [Neck Appearance] : the appearance of the neck was normal [Respiration, Rhythm And Depth] : normal respiratory rhythm and effort [Exaggerated Use Of Accessory Muscles For Inspiration] : no accessory muscle use [Auscultation Breath Sounds / Voice Sounds] : lungs were clear to auscultation bilaterally [Chest Palpation] : palpation of the chest revealed no abnormalities [Heart Rate And Rhythm] : heart rate was normal and rhythm regular [Heart Sounds Gallop] : no gallops [Murmurs] : no murmurs [Heart Sounds Pericardial Friction Rub] : no pericardial rub [Arterial Pulses Carotid] : carotid pulses were normal with no bruits [Abdominal Aorta] : the abdominal aorta was normal [Edema] : there was no peripheral edema [Bowel Sounds] : normal bowel sounds [Abdomen Soft] : soft [Abdomen Tenderness] : non-tender [No CVA Tenderness] : no ~M costovertebral angle tenderness [No Spinal Tenderness] : no spinal tenderness [Abnormal Walk] : normal gait [Nail Clubbing] : no clubbing  or cyanosis of the fingernails [Involuntary Movements] : no involuntary movements were seen [Musculoskeletal - Swelling] : no joint swelling seen [Motor Tone] : muscle strength and tone were normal [Skin Color & Pigmentation] : normal skin color and pigmentation [Skin Turgor] : normal skin turgor [] : no rash [Skin Lesions] : no skin lesions [Cranial Nerves] : cranial nerves 2-12 were intact [Deep Tendon Reflexes (DTR)] : deep tendon reflexes were 2+ and symmetric [Sensation] : the sensory exam was normal to light touch and pinprick [Motor Exam] : the motor exam was normal [Oriented To Time, Place, And Person] : oriented to person, place, and time [Impaired Insight] : insight and judgment were intact [Affect] : the affect was normal [Mood] : the mood was normal

## 2022-10-18 LAB
CORTICOSTEROID BIND GLOBULIN: 1.4 MG/DL
CORTIS SERPL-MCNC: 12 UG/DL
CORTISOL, FREE: 3.3 UG/DL
PFCX: 28 %

## 2022-10-19 NOTE — HISTORY OF PRESENT ILLNESS
[FreeTextEntry1] : referred for hyponatremia and hyperkalemia \par \par 75 yo male with medical problems as listed below, who had a flu shot in september and states he felt very tired after, leading to his presenting to an outside hospital and being admitted with JESUS , weakness, nausea, hyponatremia, hyperkalemia. his sodium was found to be 126-128, he received normal saline with improvement in his kidney function but sodium stayed around 128. 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 He was given 1 dose of Tolvaptan 15 mg and discharged on fluid restriction and sodium chloride 1 gm daily \par \par looking back at his records, his sodium has been ~134-135. states he was never really told that he had any problem with sodium in the past. \par \par also he was initiated o Cymbalta in early september \par feb 2022- sodium was 138 \par July- 130\par september 2022- 128 when he was hospitalized \par 10/7- 128 \par \par since discharge, he has been feeling better but not completely himself. \par he is taking salt tab 1 gm daily and on ~1 L fluids per day \par he is still on Cymbalta but on a reduced dose \par \par no fluid losses \par no new medications \par \par ROS \par - No changes in urination, no blood in urine \par CVS- No chest pain, no shortness of breath \par all other systems reviewed in detail and were negative except as above. \par \par PMH- As below \par \par social h- denies smoking/ alcohol use currently \par \par family h- none of kidney disease \par \par

## 2022-10-19 NOTE — ASSESSMENT
[FreeTextEntry1] : Hyponatremia- \par \par during hospitalization- his urine osm was high and urine sodium was high suggesting he may have SIADH. \par the time line of events suggests that it might be related to the medication Cymbalta. Currently he is on reduced dose of Cymbalta and on fluid restriction. he is on salt tabs 1 gm daily \par \par will ask Dr. Ross if Cymbalta can be switched back to Gabapentin that he was taking for leg discomfort ( he states not much difference between the 2) \par increase salt tabs to 1 gm tid \par continue fluid restriction \par check serum cortisol \par

## 2022-10-25 ENCOUNTER — RX RENEWAL (OUTPATIENT)
Age: 77
End: 2022-10-25

## 2022-10-25 RX ORDER — DULOXETINE HYDROCHLORIDE 30 MG/1
30 CAPSULE, DELAYED RELEASE PELLETS ORAL
Qty: 1 | Refills: 0 | Status: DISCONTINUED | COMMUNITY
Start: 2022-09-08 | End: 2022-10-25

## 2022-10-30 ENCOUNTER — TRANSCRIPTION ENCOUNTER (OUTPATIENT)
Age: 77
End: 2022-10-30

## 2022-10-31 ENCOUNTER — INPATIENT (INPATIENT)
Facility: HOSPITAL | Age: 77
LOS: 4 days | Discharge: NURSING, REHAB OR LONG-TERM CARE | DRG: 481 | End: 2022-11-05
Attending: STUDENT IN AN ORGANIZED HEALTH CARE EDUCATION/TRAINING PROGRAM | Admitting: STUDENT IN AN ORGANIZED HEALTH CARE EDUCATION/TRAINING PROGRAM
Payer: COMMERCIAL

## 2022-10-31 ENCOUNTER — TRANSCRIPTION ENCOUNTER (OUTPATIENT)
Age: 77
End: 2022-10-31

## 2022-10-31 VITALS
HEART RATE: 113 BPM | TEMPERATURE: 98 F | SYSTOLIC BLOOD PRESSURE: 135 MMHG | OXYGEN SATURATION: 98 % | WEIGHT: 205.03 LBS | DIASTOLIC BLOOD PRESSURE: 77 MMHG | RESPIRATION RATE: 16 BRPM | HEIGHT: 74 IN

## 2022-10-31 DIAGNOSIS — S72.142A DISPLACED INTERTROCHANTERIC FRACTURE OF LEFT FEMUR, INITIAL ENCOUNTER FOR CLOSED FRACTURE: ICD-10-CM

## 2022-10-31 DIAGNOSIS — Z90.79 ACQUIRED ABSENCE OF OTHER GENITAL ORGAN(S): Chronic | ICD-10-CM

## 2022-10-31 DIAGNOSIS — Z98.890 OTHER SPECIFIED POSTPROCEDURAL STATES: Chronic | ICD-10-CM

## 2022-10-31 LAB
ALBUMIN SERPL ELPH-MCNC: 3.2 G/DL — LOW (ref 3.3–5)
ALP SERPL-CCNC: 30 U/L — LOW (ref 40–120)
ALT FLD-CCNC: 24 U/L — SIGNIFICANT CHANGE UP (ref 10–45)
ANION GAP SERPL CALC-SCNC: 8 MMOL/L — SIGNIFICANT CHANGE UP (ref 5–17)
APTT BLD: 27.4 SEC — LOW (ref 27.5–35.5)
AST SERPL-CCNC: 23 U/L — SIGNIFICANT CHANGE UP (ref 10–40)
BASOPHILS # BLD AUTO: 0.02 K/UL — SIGNIFICANT CHANGE UP (ref 0–0.2)
BASOPHILS NFR BLD AUTO: 0.2 % — SIGNIFICANT CHANGE UP (ref 0–2)
BILIRUB SERPL-MCNC: 1.3 MG/DL — HIGH (ref 0.2–1.2)
BUN SERPL-MCNC: 23 MG/DL — SIGNIFICANT CHANGE UP (ref 7–23)
CALCIUM SERPL-MCNC: 9.1 MG/DL — SIGNIFICANT CHANGE UP (ref 8.4–10.5)
CHLORIDE SERPL-SCNC: 103 MMOL/L — SIGNIFICANT CHANGE UP (ref 96–108)
CO2 SERPL-SCNC: 27 MMOL/L — SIGNIFICANT CHANGE UP (ref 22–31)
CREAT SERPL-MCNC: 1.28 MG/DL — SIGNIFICANT CHANGE UP (ref 0.5–1.3)
EGFR: 57 ML/MIN/1.73M2 — LOW
EOSINOPHIL # BLD AUTO: 0.1 K/UL — SIGNIFICANT CHANGE UP (ref 0–0.5)
EOSINOPHIL NFR BLD AUTO: 1 % — SIGNIFICANT CHANGE UP (ref 0–6)
GLUCOSE SERPL-MCNC: 95 MG/DL — SIGNIFICANT CHANGE UP (ref 70–99)
HCT VFR BLD CALC: 27.1 % — LOW (ref 39–50)
HGB BLD-MCNC: 9.1 G/DL — LOW (ref 13–17)
IMM GRANULOCYTES NFR BLD AUTO: 0.5 % — SIGNIFICANT CHANGE UP (ref 0–0.9)
INR BLD: 1.13 RATIO — SIGNIFICANT CHANGE UP (ref 0.88–1.16)
LYMPHOCYTES # BLD AUTO: 1.75 K/UL — SIGNIFICANT CHANGE UP (ref 1–3.3)
LYMPHOCYTES # BLD AUTO: 17.4 % — SIGNIFICANT CHANGE UP (ref 13–44)
MCHC RBC-ENTMCNC: 31.9 PG — SIGNIFICANT CHANGE UP (ref 27–34)
MCHC RBC-ENTMCNC: 33.6 GM/DL — SIGNIFICANT CHANGE UP (ref 32–36)
MCV RBC AUTO: 95.1 FL — SIGNIFICANT CHANGE UP (ref 80–100)
MONOCYTES # BLD AUTO: 1.51 K/UL — HIGH (ref 0–0.9)
MONOCYTES NFR BLD AUTO: 15 % — HIGH (ref 2–14)
NEUTROPHILS # BLD AUTO: 6.62 K/UL — SIGNIFICANT CHANGE UP (ref 1.8–7.4)
NEUTROPHILS NFR BLD AUTO: 65.9 % — SIGNIFICANT CHANGE UP (ref 43–77)
NRBC # BLD: 0 /100 WBCS — SIGNIFICANT CHANGE UP (ref 0–0)
PLATELET # BLD AUTO: 170 K/UL — SIGNIFICANT CHANGE UP (ref 150–400)
POTASSIUM SERPL-MCNC: 4.7 MMOL/L — SIGNIFICANT CHANGE UP (ref 3.5–5.3)
POTASSIUM SERPL-SCNC: 4.7 MMOL/L — SIGNIFICANT CHANGE UP (ref 3.5–5.3)
PROT SERPL-MCNC: 6.5 G/DL — SIGNIFICANT CHANGE UP (ref 6–8.3)
PROTHROM AB SERPL-ACNC: 13.1 SEC — SIGNIFICANT CHANGE UP (ref 10.5–13.4)
RBC # BLD: 2.85 M/UL — LOW (ref 4.2–5.8)
RBC # FLD: 13.3 % — SIGNIFICANT CHANGE UP (ref 10.3–14.5)
SARS-COV-2 RNA SPEC QL NAA+PROBE: SIGNIFICANT CHANGE UP
SODIUM SERPL-SCNC: 138 MMOL/L — SIGNIFICANT CHANGE UP (ref 135–145)
WBC # BLD: 10.05 K/UL — SIGNIFICANT CHANGE UP (ref 3.8–10.5)
WBC # FLD AUTO: 10.05 K/UL — SIGNIFICANT CHANGE UP (ref 3.8–10.5)

## 2022-10-31 PROCEDURE — 27245 TREAT THIGH FRACTURE: CPT | Mod: AS,LT

## 2022-10-31 PROCEDURE — 99223 1ST HOSP IP/OBS HIGH 75: CPT

## 2022-10-31 PROCEDURE — 99285 EMERGENCY DEPT VISIT HI MDM: CPT

## 2022-10-31 PROCEDURE — 73501 X-RAY EXAM HIP UNI 1 VIEW: CPT | Mod: 26,LT

## 2022-10-31 PROCEDURE — 27245 TREAT THIGH FRACTURE: CPT | Mod: LT

## 2022-10-31 PROCEDURE — 93010 ELECTROCARDIOGRAM REPORT: CPT

## 2022-10-31 PROCEDURE — 73552 X-RAY EXAM OF FEMUR 2/>: CPT | Mod: 26,LT

## 2022-10-31 PROCEDURE — 71045 X-RAY EXAM CHEST 1 VIEW: CPT | Mod: 26

## 2022-10-31 DEVICE — NAIL FEM GAMMA3 LNG 125 DEG 11X180MM: Type: IMPLANTABLE DEVICE | Site: LEFT | Status: FUNCTIONAL

## 2022-10-31 DEVICE — KWIRE 3.2X450MM: Type: IMPLANTABLE DEVICE | Site: LEFT | Status: FUNCTIONAL

## 2022-10-31 DEVICE — SCREW LOCK FULLY THREADED 5X37.5MM: Type: IMPLANTABLE DEVICE | Site: LEFT | Status: FUNCTIONAL

## 2022-10-31 DEVICE — IMPLANTABLE DEVICE: Type: IMPLANTABLE DEVICE | Site: LEFT | Status: FUNCTIONAL

## 2022-10-31 RX ORDER — AMLODIPINE BESYLATE 2.5 MG/1
10 TABLET ORAL DAILY
Refills: 0 | Status: DISCONTINUED | OUTPATIENT
Start: 2022-10-31 | End: 2022-11-05

## 2022-10-31 RX ORDER — ACETAMINOPHEN 500 MG
1000 TABLET ORAL ONCE
Refills: 0 | Status: COMPLETED | OUTPATIENT
Start: 2022-11-01 | End: 2022-11-01

## 2022-10-31 RX ORDER — SODIUM CHLORIDE 9 MG/ML
1000 INJECTION, SOLUTION INTRAVENOUS
Refills: 0 | Status: DISCONTINUED | OUTPATIENT
Start: 2022-10-31 | End: 2022-11-01

## 2022-10-31 RX ORDER — ONDANSETRON 8 MG/1
4 TABLET, FILM COATED ORAL EVERY 8 HOURS
Refills: 0 | Status: DISCONTINUED | OUTPATIENT
Start: 2022-10-31 | End: 2022-10-31

## 2022-10-31 RX ORDER — OXYCODONE HYDROCHLORIDE 5 MG/1
5 TABLET ORAL
Refills: 0 | Status: DISCONTINUED | OUTPATIENT
Start: 2022-10-31 | End: 2022-11-03

## 2022-10-31 RX ORDER — AMLODIPINE BESYLATE 2.5 MG/1
10 TABLET ORAL DAILY
Refills: 0 | Status: DISCONTINUED | OUTPATIENT
Start: 2022-10-31 | End: 2022-10-31

## 2022-10-31 RX ORDER — HYDROMORPHONE HYDROCHLORIDE 2 MG/ML
0.5 INJECTION INTRAMUSCULAR; INTRAVENOUS; SUBCUTANEOUS
Refills: 0 | Status: DISCONTINUED | OUTPATIENT
Start: 2022-10-31 | End: 2022-11-01

## 2022-10-31 RX ORDER — ENOXAPARIN SODIUM 100 MG/ML
40 INJECTION SUBCUTANEOUS EVERY 24 HOURS
Refills: 0 | Status: DISCONTINUED | OUTPATIENT
Start: 2022-11-01 | End: 2022-11-05

## 2022-10-31 RX ORDER — PREGABALIN 225 MG/1
1000 CAPSULE ORAL DAILY
Refills: 0 | Status: DISCONTINUED | OUTPATIENT
Start: 2022-10-31 | End: 2022-10-31

## 2022-10-31 RX ORDER — LEVOTHYROXINE SODIUM 125 MCG
75 TABLET ORAL DAILY
Refills: 0 | Status: DISCONTINUED | OUTPATIENT
Start: 2022-10-31 | End: 2022-10-31

## 2022-10-31 RX ORDER — CEFAZOLIN SODIUM 1 G
2000 VIAL (EA) INJECTION EVERY 8 HOURS
Refills: 0 | Status: COMPLETED | OUTPATIENT
Start: 2022-11-01 | End: 2022-11-01

## 2022-10-31 RX ORDER — SODIUM CHLORIDE 9 MG/ML
1 INJECTION INTRAMUSCULAR; INTRAVENOUS; SUBCUTANEOUS THREE TIMES A DAY
Refills: 0 | Status: DISCONTINUED | OUTPATIENT
Start: 2022-10-31 | End: 2022-11-05

## 2022-10-31 RX ORDER — ATORVASTATIN CALCIUM 80 MG/1
10 TABLET, FILM COATED ORAL AT BEDTIME
Refills: 0 | Status: DISCONTINUED | OUTPATIENT
Start: 2022-10-31 | End: 2022-11-05

## 2022-10-31 RX ORDER — HYDROMORPHONE HYDROCHLORIDE 2 MG/ML
1 INJECTION INTRAMUSCULAR; INTRAVENOUS; SUBCUTANEOUS
Refills: 0 | Status: DISCONTINUED | OUTPATIENT
Start: 2022-10-31 | End: 2022-10-31

## 2022-10-31 RX ORDER — ONDANSETRON 8 MG/1
4 TABLET, FILM COATED ORAL EVERY 8 HOURS
Refills: 0 | Status: DISCONTINUED | OUTPATIENT
Start: 2022-10-31 | End: 2022-11-05

## 2022-10-31 RX ORDER — ACETAMINOPHEN 500 MG
650 TABLET ORAL EVERY 6 HOURS
Refills: 0 | Status: DISCONTINUED | OUTPATIENT
Start: 2022-10-31 | End: 2022-10-31

## 2022-10-31 RX ORDER — SODIUM CHLORIDE 9 MG/ML
1000 INJECTION, SOLUTION INTRAVENOUS
Refills: 0 | Status: DISCONTINUED | OUTPATIENT
Start: 2022-10-31 | End: 2022-10-31

## 2022-10-31 RX ORDER — LANOLIN ALCOHOL/MO/W.PET/CERES
3 CREAM (GRAM) TOPICAL AT BEDTIME
Refills: 0 | Status: DISCONTINUED | OUTPATIENT
Start: 2022-10-31 | End: 2022-10-31

## 2022-10-31 RX ORDER — ATORVASTATIN CALCIUM 80 MG/1
10 TABLET, FILM COATED ORAL AT BEDTIME
Refills: 0 | Status: DISCONTINUED | OUTPATIENT
Start: 2022-10-31 | End: 2022-10-31

## 2022-10-31 RX ORDER — LEVOTHYROXINE SODIUM 125 MCG
1 TABLET ORAL
Qty: 0 | Refills: 0 | DISCHARGE

## 2022-10-31 RX ORDER — LIDOCAINE 4 G/100G
1 CREAM TOPICAL ONCE
Refills: 0 | Status: COMPLETED | OUTPATIENT
Start: 2022-10-31 | End: 2022-10-31

## 2022-10-31 RX ORDER — ONDANSETRON 8 MG/1
4 TABLET, FILM COATED ORAL ONCE
Refills: 0 | Status: DISCONTINUED | OUTPATIENT
Start: 2022-10-31 | End: 2022-10-31

## 2022-10-31 RX ORDER — GABAPENTIN 400 MG/1
800 CAPSULE ORAL
Refills: 0 | Status: DISCONTINUED | OUTPATIENT
Start: 2022-10-31 | End: 2022-10-31

## 2022-10-31 RX ORDER — LANOLIN ALCOHOL/MO/W.PET/CERES
3 CREAM (GRAM) TOPICAL AT BEDTIME
Refills: 0 | Status: DISCONTINUED | OUTPATIENT
Start: 2022-10-31 | End: 2022-11-02

## 2022-10-31 RX ORDER — GABAPENTIN 400 MG/1
800 CAPSULE ORAL
Refills: 0 | Status: DISCONTINUED | OUTPATIENT
Start: 2022-10-31 | End: 2022-11-02

## 2022-10-31 RX ORDER — HYDROMORPHONE HYDROCHLORIDE 2 MG/ML
0.5 INJECTION INTRAMUSCULAR; INTRAVENOUS; SUBCUTANEOUS
Refills: 0 | Status: DISCONTINUED | OUTPATIENT
Start: 2022-10-31 | End: 2022-10-31

## 2022-10-31 RX ORDER — ALENDRONATE SODIUM 70 MG/1
1 TABLET ORAL
Qty: 0 | Refills: 0 | DISCHARGE

## 2022-10-31 RX ORDER — OXYCODONE HYDROCHLORIDE 5 MG/1
10 TABLET ORAL
Refills: 0 | Status: DISCONTINUED | OUTPATIENT
Start: 2022-10-31 | End: 2022-11-03

## 2022-10-31 RX ORDER — SODIUM CHLORIDE 9 MG/ML
1 INJECTION INTRAMUSCULAR; INTRAVENOUS; SUBCUTANEOUS THREE TIMES A DAY
Refills: 0 | Status: DISCONTINUED | OUTPATIENT
Start: 2022-10-31 | End: 2022-10-31

## 2022-10-31 RX ORDER — CHLORHEXIDINE GLUCONATE 213 G/1000ML
1 SOLUTION TOPICAL ONCE
Refills: 0 | Status: DISCONTINUED | OUTPATIENT
Start: 2022-10-31 | End: 2022-10-31

## 2022-10-31 RX ORDER — ASPIRIN/CALCIUM CARB/MAGNESIUM 324 MG
1 TABLET ORAL
Qty: 0 | Refills: 0 | DISCHARGE

## 2022-10-31 RX ADMIN — SODIUM CHLORIDE 1 GRAM(S): 9 INJECTION INTRAMUSCULAR; INTRAVENOUS; SUBCUTANEOUS at 23:36

## 2022-10-31 RX ADMIN — ATORVASTATIN CALCIUM 10 MILLIGRAM(S): 80 TABLET, FILM COATED ORAL at 21:54

## 2022-10-31 RX ADMIN — Medication 100 MILLIGRAM(S): at 23:40

## 2022-10-31 NOTE — CONSULT NOTE ADULT - ASSESSMENT
76M with PMH HTN, HLD, hx of prostate Ca presents from home after mechanical fall off a stool in his kitchen on Saturday admitted for left intertrochanteric fracture.     #Left intertrochanteric fracture  -Admit to medicine  -NPO except medications  -Gentle IVF while NPO  - plan for ORIF  tonight with ortho Dr Stephens   -Pain control with Tylenol PRN  -Continue Vitamin D+ Ca  above discussed with dr stephens.  he will see patient today

## 2022-10-31 NOTE — ED PROVIDER NOTE - CLINICAL SUMMARY MEDICAL DECISION MAKING FREE TEXT BOX
Well appearing pt here with mechanical fall, left hip pain, will xray, possible sprain vs pubic ramus fx, likely weight bearing as tolerated

## 2022-10-31 NOTE — H&P ADULT - NSHPREVIEWOFSYSTEMS_GEN_ALL_CORE
CONSTITUTIONAL: No fever, No fatigue  EYES: No eye pain or discharge  ENMT:   No ear pain; No sinus or throat pain  NECK: No pain or stiffness  RESPIRATORY: No cough; No shortness of breath  CARDIOVASCULAR: No chest pain, palpitations  GASTROINTESTINAL: No abdominal pain. No nausea, vomiting; No diarrhea or constipation.  GENITOURINARY: No dysuria, frequency  NEUROLOGICAL: No headaches, numbness; No dizziness  SKIN: No itching, burning  ENDOCRINE: No heat or cold intolerance; No hair loss  MUSCULOSKELETAL: L hip pain; no muscle or back pain  PSYCHIATRIC: No depression, anxiety  HEME/LYMPH: No easy bruising, or bleeding gums  ALLERGY AND IMMUNOLOGIC: No hives or eczema

## 2022-10-31 NOTE — H&P ADULT - NSHPPHYSICALEXAM_GEN_ALL_CORE
VITALS:   T(C): 36.8 (10-31-22 @ 10:36), Max: 36.8 (10-31-22 @ 10:36)  HR: 113 (10-31-22 @ 10:36) (113 - 113)  BP: 135/77 (10-31-22 @ 10:36) (135/77 - 135/77)  RR: 16 (10-31-22 @ 10:36) (16 - 16)  SpO2: 98% (10-31-22 @ 10:36) (98% - 98%)    GENERAL: NAD  HEENT:  AT/NC, anicteric, moist mucous membranes, EOMI, PERRL, no lid-lag, conjunctiva and sclera clear  CHEST/LUNG:  CTA b/l, no rales, wheezes, or rhonchi,  normal respiratory effort, no intercostal retractions  HEART:  RRR, S1, S2, no murmurs; no pitting edema  ABDOMEN:  BS+, soft, nontender, nondistended; No HSM  MSK/EXTREMITIES: palpable peripheral pulses, no clubbing or cyanosis  NERVOUS SYSTEM: answers questions and follows commands appropriately A&Ox3 grossly moves all extremities, no focal deficits, speech clear  SKIN: No rashes or lesions  PSYCH: Appropriate affect, Alert & Awake; Good judgement

## 2022-10-31 NOTE — BRIEF OPERATIVE NOTE - NSICDXBRIEFPROCEDURE_GEN_ALL_CORE_FT
PROCEDURES:  Open reduction and internal fixation (ORIF) of fracture of left hip using locking intramedullary sara 31-Oct-2022 19:43:36  Pee Sharpe

## 2022-10-31 NOTE — ED PROVIDER NOTE - MUSCULOSKELETAL MINIMAL EXAM
Pelvis stable, no rotation of left hip, no deformity of hip, normal ROM left hip/atraumatic/normal range of motion/no muscle tenderness

## 2022-10-31 NOTE — ED ADULT NURSE NOTE - OBJECTIVE STATEMENT
Assumed pt care for a 76 yr old male alert and oriented x4 complaining of a fall. Pt reports he was trying to sit on a stool and missed it landing on his left hip. Pt reports pain when he tries to move. Denies any current pain. Awaiting further disposition. Pt refused lidocaine patch. MD aware.

## 2022-10-31 NOTE — ED PROVIDER NOTE - CARE PLAN
1 Principal Discharge DX:	Injury of left hip  Secondary Diagnosis:	Fall   Principal Discharge DX:	Intertrochanteric fracture of left hip  Secondary Diagnosis:	Fall

## 2022-10-31 NOTE — CONSULT NOTE ADULT - SUBJECTIVE AND OBJECTIVE BOX
76M with PMH HTN, HLD, hx of prostate Ca presents from home after mechanical fall off a stool in his kitchen on Saturday. Reports hitting his left hip on the tiled floor, was able to get up and ambulate afterwards. Denies LOC. Denies head trauma. Reports sharp left hip pain, rated 8/10 with movement. Patient has been using an old cane at home to ambulate due to pain, but at baseline ambulates independently. Denies numbness, tingling. Took Tylenol for his pain with improvement.     In the ED:  temp 98.3, /77, , RR 16, Spo2 98% RA  H/H: 9.1/27.1,   EKG personally reviewed NSR 92  Chest x-ray prelim personally reviewed no acute infiltrate  Awaiting CT imaging - prelim read intertrochanteric fracture of left hip  Received Lidocaine patch in the ED.   Dr Nuno orthopedics consulted for hip fracture.   Ct scan reveals ITT fx.  Pt to have ORIF today.      Allergies:  	No Known Allergies:     Home Medications:   * Patient Currently Takes Medications as of 31-Oct-2022 15:15 documented in Structured Notes  · 	levothyroxine 75 mcg (0.075 mg) oral capsule: Last Dose Taken:  , 1 cap(s) orally once a day  · 	sodium chloride 1 g oral tablet: orally 3 times a day  · 	gabapentin 800 mg oral tablet: Last Dose Taken:  , 1 tab(s) orally 2 times a day  · 	alendronate 70 mg oral tablet: Last Dose Taken:  , 1 tab(s) orally once a week  · 	atorvastatin 10 mg oral tablet: Last Dose Taken:  , 1 tab(s) orally once a day  · 	amLODIPine 10 mg oral tablet: Last Dose Taken:  , 1 tab(s) orally once a day  · 	aspirin 81 mg oral delayed release tablet: Last Dose Taken:  , 1 tab(s) orally once a day  · 	Citracal + D 250 mg-12.5 mcg (500 intl units) oral tablet, chewable: Last Dose Taken:  , 2 tab(s) orally 2 times a day (with meals)  · 	Vitamin B12 1000 mcg oral tablet: Last Dose Taken:  , 1 tab(s) orally once a day  · 	Fish Oil 1200 mg oral capsule: Last Dose Taken:  , 1 cap(s) orally once a dayPast Medical, Past Surgical, and Family History:    PAST MEDICAL HISTORY:  Anemia mild, cause unknown    BPH (benign prostatic hypertrophy)     Hyperlipidemia     Hypertension     Neuropathy bilateral feet, cause unknown    Nocturia x2-3    Osteoarthritis     Prostate cancer 2008, no chemo or RT    Thoracic spine fracture T12, June 21, 2017.     PAST SURGICAL HISTORY:  History of kyphoplasty     S/P colonoscopy 2017, negative    S/P cystoscopy June 2017, "removal of scar tissue at the base of the bladder"    S/P prostatectomy 2008.     FAMILY HISTORY:  Family history of Alzheimer's disease  Family history of myocardial infarction.    Social History:  · Substance use	No   · Social History (marital status, living situation, occupation, and sexual history)	Lives alone  Denies smoking, alcohol use, illicit drug use  Ambulates independently, performs ADLs without assistance  Drives, uses stairs                          9.1    10.05 )-----------( 170      ( 31 Oct 2022 11:45 )             27.1       10-31    138  |  103  |  23  ----------------------------<  95  4.7   |  27  |  1.28    Ca    9.1      31 Oct 2022 11:45    TPro  6.5  /  Alb  3.2<L>  /  TBili  1.3<H>  /  DBili  x   /  AST  23  /  ALT  24  /  AlkPhos  30<L>  10-31      PT/INR - ( 31 Oct 2022 11:45 )   PT: 13.1 sec;   INR: 1.13 ratio         PTT - ( 31 Oct 2022 11:45 )  PTT:27.4 sec    Antibody Screen: NEG (10.31.22 @ 12:42) ABO RH Interpretation: AB POS (10.31.22 @ 12:42) COVID-19 PCR: NotDetec: Y< from: Xray Femur 2 Views, Left (10.31.22 @ 12:19) >  ACC: [id number] EXAM:  XR FEMUR 2 VIEWS LT                        ACC: [id number] EXAM:  XR HIP WITH PELV 1V LT                          PROCEDURE DATE:  10/31/2022          INTERPRETATION:  Left hip with pelvis and left femur. Patient had local   trauma.    Left hip with pelvis. 3 views.    On January 28, 2019 oh is a comminuted fracture of the upper right femur   which has been secured with hardware. The alignment is good and no   fractures well healed with exuberant new bone formation medially off the   inferior trochanter.    Clips in the low pelvis again noted.    Both hips show slight degeneration.    Present study shows a nondisplaced intertrochanteric fracture left hip.    Left femur. 4 views.    Moderate left knee degeneration. No further fracture.    IMPRESSION: Acute nondisplaced fracture intertrochanteric of the left   hip. Other findings  as above.    --- End of Report ---            BRIANA KUMAR MD; Attending Radiologist  This document has been electronically signed. Oct 31 2022  3:33PM    < end of copied text >  PE: LLE: rotated limitred ROM due to pain NVI sensory intact moves toes feet warm gd cap refill

## 2022-10-31 NOTE — H&P ADULT - ASSESSMENT
76M with PMH HTN, HLD, hx of prostate Ca presents from home after mechanical fall off a stool in his kitchen on Saturday admitted for left intertrochanteric fracture.     #Left intertrochanteric fracture  -Admit to medicine  -NPO except medications  -Gentle IVF while NPO  -Per ED, plan for OR tonight with ortho Dr Reed   -Pain control with Tylenol PRN  -Continue Vitamin D+ Ca    #HTN  -Continue Amlodipine  -Continue ASA  -Monitor vitals    #Hx of hyponatremia  -Continue Urea  -Monitor BMP    #Anxiety  -Continue Duloxetine     #HLD  -Continue Lipitor    #Hypothyroidism  -Continue Synthroid    #Prophylactic Measure  -DVT ppx: SCDs, hold chemical ppx for planned OR  76M with PMH HTN, HLD, hx of prostate Ca presents from home after mechanical fall off a stool in his kitchen on Saturday admitted for left intertrochanteric fracture.     #Left intertrochanteric fracture  -Admit to medicine  -NPO except medications  -Gentle IVF while NPO  -Per ED, plan for OR tonight with ortho Dr Reed   -Pain control with Tylenol PRN  -Continue Vitamin D+ Ca    #Preop clearance   Patient low risk for low risk planned procedure.   I personally reviewed the patient's labs: CBC, BMP, coagulation profile, EKG, Chest x-ray  There are no absolute contraindications to this planned procedure  Cardiac cath performed 2/2020. METS >4.      #HTN  -Continue Amlodipine  -Monitor vitals    #Hx of hyponatremia  -Continue NaCl 1G TID  -Monitor BMP    #Neuropathy  -Continue gabapentin BID    #HLD  -Continue Lipitor    #Hypothyroidism  -Continue Synthroid    #Prophylactic Measure  -DVT ppx: SCDs, hold chemical ppx for planned OR     Declines I update family at this time.

## 2022-10-31 NOTE — H&P ADULT - NSHPSOCIALHISTORY_GEN_ALL_CORE
Lives alone  Denies smoking, alcohol use, illicit drug use  Ambulates independently, performs ADLs without assistance  Drives, uses stairs

## 2022-10-31 NOTE — ED PROVIDER NOTE - OBJECTIVE STATEMENT
76M h/o HTN, HLD, prostate ca in the past, not on blood thinners, lives alone, ambulates without assistance but has a cane at home because in the past required right hip surgery (does not recall name of surgeon), here with friend because 2 days ago pt sustained a mechanical fall on tiled floor in the kitchen while trying to sit on a stool. Did not hit head, no LOC, c/o left hip pain, able to ambulate with cane but with pain. Does not want anything for pain. Pain persisted so friend asked him to come in.

## 2022-10-31 NOTE — PATIENT PROFILE ADULT - FALL HARM RISK - HARM RISK INTERVENTIONS
Assistance with ambulation/Assistance OOB with selected safe patient handling equipment/Communicate Risk of Fall with Harm to all staff/Discuss with provider need for PT consult/Monitor gait and stability/Provide patient with walking aids - walker, cane, crutches/Reinforce activity limits and safety measures with patient and family/Sit up slowly, dangle for a short time, stand at bedside before walking/Tailored Fall Risk Interventions/Use of alarms - bed, chair and/or voice tab/Visual Cue: Yellow wristband and red socks/Bed in lowest position, wheels locked, appropriate side rails in place/Call bell, personal items and telephone in reach/Instruct patient to call for assistance before getting out of bed or chair/Non-slip footwear when patient is out of bed/[place] to call system/Physically safe environment - no spills, clutter or unnecessary equipment/Purposeful Proactive Rounding/Room/bathroom lighting operational, light cord in reach

## 2022-10-31 NOTE — H&P ADULT - HISTORY OF PRESENT ILLNESS
76M with PMH HTN, HLD, hx of prostate Ca presents from home after mechanical fall off a stool in his kitchen on Saturday. Reports hitting his left hip on the tiled floor, was able to get up and ambulate afterwards. Denies LOC. Denies head trauma. Reports left hip pain, rated 10/10 with movement. Patient has been using an old cane at home to ambulate due to pain, but at baseline ambulates independently. Denies numbness, tingling. Did not take medication for his pain.     In the ED:  temp 98.3, /77, , RR 16, Spo2 98% RA  H/H: 9.1/27.1,   EKG personally reviewed NSR 92  Chest x-ray prelim personally reviewed no acute infiltrate  Awaiting CT imaging - prelim read intertrochanteric fracture of left hip  Received Lidocaine patch in the ED.   ED discussed with Dr Ureña, plan for OR tonight.  76M with PMH HTN, HLD, hx of prostate Ca presents from home after mechanical fall off a stool in his kitchen on Saturday. Reports hitting his left hip on the tiled floor, was able to get up and ambulate afterwards. Denies LOC. Denies head trauma. Reports sharp left hip pain, rated 8/10 with movement. Patient has been using an old cane at home to ambulate due to pain, but at baseline ambulates independently. Denies numbness, tingling. Took Tylenol for his pain with improvement.     In the ED:  temp 98.3, /77, , RR 16, Spo2 98% RA  H/H: 9.1/27.1,   EKG personally reviewed NSR 92  Chest x-ray prelim personally reviewed no acute infiltrate  Awaiting CT imaging - prelim read intertrochanteric fracture of left hip  Received Lidocaine patch in the ED.   ED discussed with Dr Ureña, plan for OR tonight.

## 2022-10-31 NOTE — PATIENT PROFILE ADULT - NSPRESCRALCFREQ_GEN_A_NUR
Anesthesia Volume In Cc: 2.5 Secondary Defect Width (In Cm): 0 Information: Selecting Yes will display possible errors in your note based on the variables you have selected. This validation is only offered as a suggestion for you. PLEASE NOTE THAT THE VALIDATION TEXT WILL BE REMOVED WHEN YOU FINALIZE YOUR NOTE. IF YOU WANT TO FAX A PRELIMINARY NOTE YOU WILL NEED TO TOGGLE THIS TO 'NO' IF YOU DO NOT WANT IT IN YOUR FAXED NOTE. Complex Repair And A-T Advancement Flap Text: The defect edges were debeveled with a #15 scalpel blade.  The primary defect was closed partially with a complex linear closure.  Given the location of the remaining defect, shape of the defect and the proximity to free margins an A-T advancement flap was deemed most appropriate for complete closure of the defect.  Using a sterile surgical marker, an appropriate advancement flap was drawn incorporating the defect and placing the expected incisions within the relaxed skin tension lines where possible.    The area thus outlined was incised deep to adipose tissue with a #15 scalpel blade.  The skin margins were undermined to an appropriate distance in all directions utilizing iris scissors. Complex Repair And Epidermal Autograft Text: The defect edges were debeveled with a #15 scalpel blade.  The primary defect was closed partially with a complex linear closure.  Given the location of the defect, shape of the defect and the proximity to free margins an epidermal autograft was deemed most appropriate to repair the remaining defect.  The graft was trimmed to fit the size of the remaining defect.  The graft was then placed in the primary defect, oriented appropriately, and sutured into place. Lip Wedge Excision Repair Text: Given the location of the defect and the proximity to free margins a full thickness wedge repair was deemed most appropriate.  Using a sterile surgical marker, the appropriate repair was drawn incorporating the defect and placing the expected incisions perpendicular to the vermilion border.  The vermilion border was also meticulously outlined to ensure appropriate reapproximation during the repair.  The area thus outlined was incised through and through with a #15 scalpel blade.  The muscularis and dermis were reaproximated with deep sutures following hemostasis. Care was taken to realign the vermilion border before proceeding with the superficial closure.  Once the vermilion was realigned the superfical and mucosal closure was finished. Keystone Flap Text: The defect edges were debeveled with a #15 scalpel blade.  Given the location of the defect, shape of the defect a keystone flap was deemed most appropriate.  Using a sterile surgical marker, an appropriate keystone flap was drawn incorporating the defect, outlining the appropriate donor tissue and placing the expected incisions within the relaxed skin tension lines where possible. The area thus outlined was incised deep to adipose tissue with a #15 scalpel blade.  The skin margins were undermined to an appropriate distance in all directions around the primary defect and laterally outward around the flap utilizing iris scissors. Lab: -[id number] Perilesional Excision Additional Text (Leave Blank If You Do Not Want): The margin was drawn around the clinically apparent lesion. Incisions were then made along these lines to the appropriate tissue plane and the lesion was extirpated. Advancement Flap (Single) Text: The defect edges were debeveled with a #15 scalpel blade.  Given the location of the defect and the proximity to free margins a single advancement flap was deemed most appropriate.  Using a sterile surgical marker, an appropriate advancement flap was drawn incorporating the defect and placing the expected incisions within the relaxed skin tension lines where possible.    The area thus outlined was incised deep to adipose tissue with a #15 scalpel blade.  The skin margins were undermined to an appropriate distance in all directions utilizing iris scissors. Scalpel Size: 15 blade Star Wedge Flap Text: The defect edges were debeveled with a #15 scalpel blade.  Given the location of the defect, shape of the defect and the proximity to free margins a star wedge flap was deemed most appropriate.  Using a sterile surgical marker, an appropriate rotation flap was drawn incorporating the defect and placing the expected incisions within the relaxed skin tension lines where possible. The area thus outlined was incised deep to adipose tissue with a #15 scalpel blade.  The skin margins were undermined to an appropriate distance in all directions utilizing iris scissors. Complex Repair Preamble Text (Leave Blank If You Do Not Want): Extensive wide undermining was performed. Complex Repair And O-T Advancement Flap Text: The defect edges were debeveled with a #15 scalpel blade.  The primary defect was closed partially with a complex linear closure.  Given the location of the remaining defect, shape of the defect and the proximity to free margins an O-T advancement flap was deemed most appropriate for complete closure of the defect.  Using a sterile surgical marker, an appropriate advancement flap was drawn incorporating the defect and placing the expected incisions within the relaxed skin tension lines where possible.    The area thus outlined was incised deep to adipose tissue with a #15 scalpel blade.  The skin margins were undermined to an appropriate distance in all directions utilizing iris scissors. Advancement Flap (Double) Text: The defect edges were debeveled with a #15 scalpel blade.  Given the location of the defect and the proximity to free margins a double advancement flap was deemed most appropriate.  Using a sterile surgical marker, the appropriate advancement flaps were drawn incorporating the defect and placing the expected incisions within the relaxed skin tension lines where possible.    The area thus outlined was incised deep to adipose tissue with a #15 scalpel blade.  The skin margins were undermined to an appropriate distance in all directions utilizing iris scissors. Transposition Flap Text: The defect edges were debeveled with a #15 scalpel blade.  Given the location of the defect and the proximity to free margins a transposition flap was deemed most appropriate.  Using a sterile surgical marker, an appropriate transposition flap was drawn incorporating the defect.    The area thus outlined was incised deep to adipose tissue with a #15 scalpel blade.  The skin margins were undermined to an appropriate distance in all directions utilizing iris scissors. Epidermal Sutures: 5-0 Nylon Rhomboid Transposition Flap Text: The defect edges were debeveled with a #15 scalpel blade.  Given the location of the defect and the proximity to free margins a rhomboid transposition flap was deemed most appropriate.  Using a sterile surgical marker, an appropriate rhomboid flap was drawn incorporating the defect.    The area thus outlined was incised deep to adipose tissue with a #15 scalpel blade.  The skin margins were undermined to an appropriate distance in all directions utilizing iris scissors. Excision Method: Elliptical Ftsg Text: The defect edges were debeveled with a #15 scalpel blade.  Given the location of the defect, shape of the defect and the proximity to free margins a full thickness skin graft was deemed most appropriate.  Using a sterile surgical marker, the primary defect shape was transferred to the donor site. The area thus outlined was incised deep to adipose tissue with a #15 scalpel blade.  The harvested graft was then trimmed of adipose tissue until only dermis and epidermis was left.  The skin margins of the secondary defect were undermined to an appropriate distance in all directions utilizing iris scissors.  The secondary defect was closed with interrupted buried subcutaneous sutures.  The skin edges were then re-apposed with running  sutures.  The skin graft was then placed in the primary defect and oriented appropriately. Show Date Of Previous Biopsy Variable: Yes Suture Removal: 7 days O-T Plasty Text: The defect edges were debeveled with a #15 scalpel blade.  Given the location of the defect, shape of the defect and the proximity to free margins an O-T plasty was deemed most appropriate.  Using a sterile surgical marker, an appropriate O-T plasty was drawn incorporating the defect and placing the expected incisions within the relaxed skin tension lines where possible.    The area thus outlined was incised deep to adipose tissue with a #15 scalpel blade.  The skin margins were undermined to an appropriate distance in all directions utilizing iris scissors. Complex Repair And Dermal Autograft Text: The defect edges were debeveled with a #15 scalpel blade.  The primary defect was closed partially with a complex linear closure.  Given the location of the defect, shape of the defect and the proximity to free margins an dermal autograft was deemed most appropriate to repair the remaining defect.  The graft was trimmed to fit the size of the remaining defect.  The graft was then placed in the primary defect, oriented appropriately, and sutured into place. Render The Repair Note As A Separate Paragraph: No Complex Repair And O-L Flap Text: The defect edges were debeveled with a #15 scalpel blade.  The primary defect was closed partially with a complex linear closure.  Given the location of the remaining defect, shape of the defect and the proximity to free margins an O-L flap was deemed most appropriate for complete closure of the defect.  Using a sterile surgical marker, an appropriate flap was drawn incorporating the defect and placing the expected incisions within the relaxed skin tension lines where possible.    The area thus outlined was incised deep to adipose tissue with a #15 scalpel blade.  The skin margins were undermined to an appropriate distance in all directions utilizing iris scissors. Never Intermediate Repair Preamble Text (Leave Blank If You Do Not Want): Undermining was performed with blunt dissection. Complex Repair And Tissue Cultured Epidermal Autograft Text: The defect edges were debeveled with a #15 scalpel blade.  The primary defect was closed partially with a complex linear closure.  Given the location of the defect, shape of the defect and the proximity to free margins an tissue cultured epidermal autograft was deemed most appropriate to repair the remaining defect.  The graft was trimmed to fit the size of the remaining defect.  The graft was then placed in the primary defect, oriented appropriately, and sutured into place. Repair Performed By Another Provider Text (Leave Blank If You Do Not Want): After the tissue was excised the defect was repaired by another provider. Bi-Rhombic Flap Text: The defect edges were debeveled with a #15 scalpel blade.  Given the location of the defect and the proximity to free margins a bi-rhombic flap was deemed most appropriate.  Using a sterile surgical marker, an appropriate rhombic flap was drawn incorporating the defect. The area thus outlined was incised deep to adipose tissue with a #15 scalpel blade.  The skin margins were undermined to an appropriate distance in all directions utilizing iris scissors. O-Z Plasty Text: The defect edges were debeveled with a #15 scalpel blade.  Given the location of the defect, shape of the defect and the proximity to free margins an O-Z plasty (double transposition flap) was deemed most appropriate.  Using a sterile surgical marker, the appropriate transposition flaps were drawn incorporating the defect and placing the expected incisions within the relaxed skin tension lines where possible.    The area thus outlined was incised deep to adipose tissue with a #15 scalpel blade.  The skin margins were undermined to an appropriate distance in all directions utilizing iris scissors.  Hemostasis was achieved with electrocautery.  The flaps were then transposed into place, one clockwise and the other counterclockwise, and anchored with interrupted buried subcutaneous sutures. Split-Thickness Skin Graft Text: The defect edges were debeveled with a #15 scalpel blade.  Given the location of the defect, shape of the defect and the proximity to free margins a split thickness skin graft was deemed most appropriate.  Using a sterile surgical marker, the primary defect shape was transferred to the donor site. The split thickness graft was then harvested.  The skin graft was then placed in the primary defect and oriented appropriately. Burow's Advancement Flap Text: The defect edges were debeveled with a #15 scalpel blade.  Given the location of the defect and the proximity to free margins a Burow's advancement flap was deemed most appropriate.  Using a sterile surgical marker, the appropriate advancement flap was drawn incorporating the defect and placing the expected incisions within the relaxed skin tension lines where possible.    The area thus outlined was incised deep to adipose tissue with a #15 scalpel blade.  The skin margins were undermined to an appropriate distance in all directions utilizing iris scissors. Muscle Hinge Flap Text: The defect edges were debeveled with a #15 scalpel blade.  Given the size, depth and location of the defect and the proximity to free margins a muscle hinge flap was deemed most appropriate.  Using a sterile surgical marker, an appropriate hinge flap was drawn incorporating the defect. The area thus outlined was incised with a #15 scalpel blade.  The skin margins were undermined to an appropriate distance in all directions utilizing iris scissors. Complex Repair And Bilobe Flap Text: The defect edges were debeveled with a #15 scalpel blade.  The primary defect was closed partially with a complex linear closure.  Given the location of the remaining defect, shape of the defect and the proximity to free margins a bilobe flap was deemed most appropriate for complete closure of the defect.  Using a sterile surgical marker, an appropriate advancement flap was drawn incorporating the defect and placing the expected incisions within the relaxed skin tension lines where possible.    The area thus outlined was incised deep to adipose tissue with a #15 scalpel blade.  The skin margins were undermined to an appropriate distance in all directions utilizing iris scissors. No Repair - Repaired With Adjacent Surgical Defect Text (Leave Blank If You Do Not Want): After the excision the defect was repaired concurrently with another surgical defect which was in close approximation. Helical Rim Advancement Flap Text: The defect edges were debeveled with a #15 blade scalpel.  Given the location of the defect and the proximity to free margins (helical rim) a double helical rim advancement flap was deemed most appropriate.  Using a sterile surgical marker, the appropriate advancement flaps were drawn incorporating the defect and placing the expected incisions between the helical rim and antihelix where possible.  The area thus outlined was incised through and through with a #15 scalpel blade.  With a skin hook and iris scissors, the flaps were gently and sharply undermined and freed up. Crescentic Advancement Flap Text: The defect edges were debeveled with a #15 scalpel blade.  Given the location of the defect and the proximity to free margins a crescentic advancement flap was deemed most appropriate.  Using a sterile surgical marker, the appropriate advancement flap was drawn incorporating the defect and placing the expected incisions within the relaxed skin tension lines where possible.    The area thus outlined was incised deep to adipose tissue with a #15 scalpel blade.  The skin margins were undermined to an appropriate distance in all directions utilizing iris scissors. Melolabial Transposition Flap Text: The defect edges were debeveled with a #15 scalpel blade.  Given the location of the defect and the proximity to free margins a melolabial flap was deemed most appropriate.  Using a sterile surgical marker, an appropriate melolabial transposition flap was drawn incorporating the defect.    The area thus outlined was incised deep to adipose tissue with a #15 scalpel blade.  The skin margins were undermined to an appropriate distance in all directions utilizing iris scissors. Complex Repair And Xenograft Text: The defect edges were debeveled with a #15 scalpel blade.  The primary defect was closed partially with a complex linear closure.  Given the location of the defect, shape of the defect and the proximity to free margins a xenograft was deemed most appropriate to repair the remaining defect.  The graft was trimmed to fit the size of the remaining defect.  The graft was then placed in the primary defect, oriented appropriately, and sutured into place. Cartilage Graft Text: The defect edges were debeveled with a #15 scalpel blade.  Given the location of the defect, shape of the defect, the fact the defect involved a full thickness cartilage defect a cartilage graft was deemed most appropriate.  An appropriate donor site was identified, cleansed, and anesthetized. The cartilage graft was then harvested and transferred to the recipient site, oriented appropriately and then sutured into place.  The secondary defect was then repaired using a primary closure. Double O-Z Plasty Text: The defect edges were debeveled with a #15 scalpel blade.  Given the location of the defect, shape of the defect and the proximity to free margins a Double O-Z plasty (double transposition flap) was deemed most appropriate.  Using a sterile surgical marker, the appropriate transposition flaps were drawn incorporating the defect and placing the expected incisions within the relaxed skin tension lines where possible. The area thus outlined was incised deep to adipose tissue with a #15 scalpel blade.  The skin margins were undermined to an appropriate distance in all directions utilizing iris scissors.  Hemostasis was achieved with electrocautery.  The flaps were then transposed into place, one clockwise and the other counterclockwise, and anchored with interrupted buried subcutaneous sutures. A-T Advancement Flap Text: The defect edges were debeveled with a #15 scalpel blade.  Given the location of the defect, shape of the defect and the proximity to free margins an A-T advancement flap was deemed most appropriate.  Using a sterile surgical marker, an appropriate advancement flap was drawn incorporating the defect and placing the expected incisions within the relaxed skin tension lines where possible.    The area thus outlined was incised deep to adipose tissue with a #15 scalpel blade.  The skin margins were undermined to an appropriate distance in all directions utilizing iris scissors. Complex Repair And Melolabial Flap Text: The defect edges were debeveled with a #15 scalpel blade.  The primary defect was closed partially with a complex linear closure.  Given the location of the remaining defect, shape of the defect and the proximity to free margins a melolabial flap was deemed most appropriate for complete closure of the defect.  Using a sterile surgical marker, an appropriate advancement flap was drawn incorporating the defect and placing the expected incisions within the relaxed skin tension lines where possible.    The area thus outlined was incised deep to adipose tissue with a #15 scalpel blade.  The skin margins were undermined to an appropriate distance in all directions utilizing iris scissors. Complex Repair And Skin Substitute Graft Text: The defect edges were debeveled with a #15 scalpel blade.  The primary defect was closed partially with a complex linear closure.  Given the location of the remaining defect, shape of the defect and the proximity to free margins a skin substitute graft was deemed most appropriate to repair the remaining defect.  The graft was trimmed to fit the size of the remaining defect.  The graft was then placed in the primary defect, oriented appropriately, and sutured into place. Bilateral Helical Rim Advancement Flap Text: The defect edges were debeveled with a #15 blade scalpel.  Given the location of the defect and the proximity to free margins (helical rim) a bilateral helical rim advancement flap was deemed most appropriate.  Using a sterile surgical marker, the appropriate advancement flaps were drawn incorporating the defect and placing the expected incisions between the helical rim and antihelix where possible.  The area thus outlined was incised through and through with a #15 scalpel blade.  With a skin hook and iris scissors, the flaps were gently and sharply undermined and freed up. Repair Type: Intermediate Composite Graft Text: The defect edges were debeveled with a #15 scalpel blade.  Given the location of the defect, shape of the defect, the proximity to free margins and the fact the defect was full thickness a composite graft was deemed most appropriate.  The defect was outline and then transferred to the donor site.  A full thickness graft was then excised from the donor site. The graft was then placed in the primary defect, oriented appropriately and then sutured into place.  The secondary defect was then repaired using a primary closure. Rhombic Flap Text: The defect edges were debeveled with a #15 scalpel blade.  Given the location of the defect and the proximity to free margins a rhombic flap was deemed most appropriate.  Using a sterile surgical marker, an appropriate rhombic flap was drawn incorporating the defect.    The area thus outlined was incised deep to adipose tissue with a #15 scalpel blade.  The skin margins were undermined to an appropriate distance in all directions utilizing iris scissors. S Plasty Text: Given the location and shape of the defect, and the orientation of relaxed skin tension lines, an S-plasty was deemed most appropriate for repair.  Using a sterile surgical marker, the appropriate outline of the S-plasty was drawn, incorporating the defect and placing the expected incisions within the relaxed skin tension lines where possible.  The area thus outlined was incised deep to adipose tissue with a #15 scalpel blade.  The skin margins were undermined to an appropriate distance in all directions utilizing iris scissors. The skin flaps were advanced over the defect.  The opposing margins were then approximated with interrupted buried subcutaneous sutures. Complex Repair And Rotation Flap Text: The defect edges were debeveled with a #15 scalpel blade.  The primary defect was closed partially with a complex linear closure.  Given the location of the remaining defect, shape of the defect and the proximity to free margins a rotation flap was deemed most appropriate for complete closure of the defect.  Using a sterile surgical marker, an appropriate advancement flap was drawn incorporating the defect and placing the expected incisions within the relaxed skin tension lines where possible.    The area thus outlined was incised deep to adipose tissue with a #15 scalpel blade.  The skin margins were undermined to an appropriate distance in all directions utilizing iris scissors. V-Y Plasty Text: The defect edges were debeveled with a #15 scalpel blade.  Given the location of the defect, shape of the defect and the proximity to free margins an V-Y advancement flap was deemed most appropriate.  Using a sterile surgical marker, an appropriate advancement flap was drawn incorporating the defect and placing the expected incisions within the relaxed skin tension lines where possible.    The area thus outlined was incised deep to adipose tissue with a #15 scalpel blade.  The skin margins were undermined to an appropriate distance in all directions utilizing iris scissors. Anesthesia Type: 1% lidocaine with epinephrine Ear Star Wedge Flap Text: The defect edges were debeveled with a #15 blade scalpel.  Given the location of the defect and the proximity to free margins (helical rim) an ear star wedge flap was deemed most appropriate.  Using a sterile surgical marker, the appropriate flap was drawn incorporating the defect and placing the expected incisions between the helical rim and antihelix where possible.  The area thus outlined was incised through and through with a #15 scalpel blade. O-T Advancement Flap Text: The defect edges were debeveled with a #15 scalpel blade.  Given the location of the defect, shape of the defect and the proximity to free margins an O-T advancement flap was deemed most appropriate.  Using a sterile surgical marker, an appropriate advancement flap was drawn incorporating the defect and placing the expected incisions within the relaxed skin tension lines where possible.    The area thus outlined was incised deep to adipose tissue with a #15 scalpel blade.  The skin margins were undermined to an appropriate distance in all directions utilizing iris scissors. O-L Flap Text: The defect edges were debeveled with a #15 scalpel blade.  Given the location of the defect, shape of the defect and the proximity to free margins an O-L flap was deemed most appropriate.  Using a sterile surgical marker, an appropriate advancement flap was drawn incorporating the defect and placing the expected incisions within the relaxed skin tension lines where possible.    The area thus outlined was incised deep to adipose tissue with a #15 scalpel blade.  The skin margins were undermined to an appropriate distance in all directions utilizing iris scissors. Epidermal Autograft Text: The defect edges were debeveled with a #15 scalpel blade.  Given the location of the defect, shape of the defect and the proximity to free margins an epidermal autograft was deemed most appropriate.  Using a sterile surgical marker, the primary defect shape was transferred to the donor site. The epidermal graft was then harvested.  The skin graft was then placed in the primary defect and oriented appropriately. Complex Repair And Rhombic Flap Text: The defect edges were debeveled with a #15 scalpel blade.  The primary defect was closed partially with a complex linear closure.  Given the location of the remaining defect, shape of the defect and the proximity to free margins a rhombic flap was deemed most appropriate for complete closure of the defect.  Using a sterile surgical marker, an appropriate advancement flap was drawn incorporating the defect and placing the expected incisions within the relaxed skin tension lines where possible.    The area thus outlined was incised deep to adipose tissue with a #15 scalpel blade.  The skin margins were undermined to an appropriate distance in all directions utilizing iris scissors. Path Notes (To The Dermatopathologist): Excision epidermal cyst. Dermal Autograft Text: The defect edges were debeveled with a #15 scalpel blade.  Given the location of the defect, shape of the defect and the proximity to free margins a dermal autograft was deemed most appropriate.  Using a sterile surgical marker, the primary defect shape was transferred to the donor site. The area thus outlined was incised deep to adipose tissue with a #15 scalpel blade.  The harvested graft was then trimmed of adipose and epidermal tissue until only dermis was left.  The skin graft was then placed in the primary defect and oriented appropriately. Banner Transposition Flap Text: The defect edges were debeveled with a #15 scalpel blade.  Given the location of the defect and the proximity to free margins a Banner transposition flap was deemed most appropriate.  Using a sterile surgical marker, an appropriate flap drawn around the defect. The area thus outlined was incised deep to adipose tissue with a #15 scalpel blade.  The skin margins were undermined to an appropriate distance in all directions utilizing iris scissors. H Plasty Text: Given the location of the defect, shape of the defect and the proximity to free margins a H-plasty was deemed most appropriate for repair.  Using a sterile surgical marker, the appropriate advancement arms of the H-plasty were drawn incorporating the defect and placing the expected incisions within the relaxed skin tension lines where possible. The area thus outlined was incised deep to adipose tissue with a #15 scalpel blade. The skin margins were undermined to an appropriate distance in all directions utilizing iris scissors.  The opposing advancement arms were then advanced into place in opposite direction and anchored with interrupted buried subcutaneous sutures. Medical Necessity Information: It is in your best interest to select a reason for this procedure from the list below. All of these items fulfill various CMS LCD requirements except lesion extends to a margin. Complex Repair And Transposition Flap Text: The defect edges were debeveled with a #15 scalpel blade.  The primary defect was closed partially with a complex linear closure.  Given the location of the remaining defect, shape of the defect and the proximity to free margins a transposition flap was deemed most appropriate for complete closure of the defect.  Using a sterile surgical marker, an appropriate advancement flap was drawn incorporating the defect and placing the expected incisions within the relaxed skin tension lines where possible.    The area thus outlined was incised deep to adipose tissue with a #15 scalpel blade.  The skin margins were undermined to an appropriate distance in all directions utilizing iris scissors. Epidermal Closure: simple interrupted W Plasty Text: The lesion was extirpated to the level of the fat with a #15 scalpel blade.  Given the location of the defect, shape of the defect and the proximity to free margins a W-plasty was deemed most appropriate for repair.  Using a sterile surgical marker, the appropriate transposition arms of the W-plasty were drawn incorporating the defect and placing the expected incisions within the relaxed skin tension lines where possible.    The area thus outlined was incised deep to adipose tissue with a #15 scalpel blade.  The skin margins were undermined to an appropriate distance in all directions utilizing iris scissors.  The opposing transposition arms were then transposed into place in opposite direction and anchored with interrupted buried subcutaneous sutures. Bilobed Flap Text: The defect edges were debeveled with a #15 scalpel blade.  Given the location of the defect and the proximity to free margins a bilobe flap was deemed most appropriate.  Using a sterile surgical marker, an appropriate bilobe flap drawn around the defect.    The area thus outlined was incised deep to adipose tissue with a #15 scalpel blade.  The skin margins were undermined to an appropriate distance in all directions utilizing iris scissors. Intermediate / Complex Repair - Final Wound Length In Cm: 1.8 O-Z Flap Text: The defect edges were debeveled with a #15 scalpel blade.  Given the location of the defect, shape of the defect and the proximity to free margins an O-Z flap was deemed most appropriate.  Using a sterile surgical marker, an appropriate transposition flap was drawn incorporating the defect and placing the expected incisions within the relaxed skin tension lines where possible. The area thus outlined was incised deep to adipose tissue with a #15 scalpel blade.  The skin margins were undermined to an appropriate distance in all directions utilizing iris scissors. Skin Substitute Text: The defect edges were debeveled with a #15 scalpel blade.  Given the location of the defect, shape of the defect and the proximity to free margins a skin substitute graft was deemed most appropriate.  The graft material was trimmed to fit the size of the defect. The graft was then placed in the primary defect and oriented appropriately. Double O-Z Flap Text: The defect edges were debeveled with a #15 scalpel blade.  Given the location of the defect, shape of the defect and the proximity to free margins a Double O-Z flap was deemed most appropriate.  Using a sterile surgical marker, an appropriate transposition flap was drawn incorporating the defect and placing the expected incisions within the relaxed skin tension lines where possible. The area thus outlined was incised deep to adipose tissue with a #15 scalpel blade.  The skin margins were undermined to an appropriate distance in all directions utilizing iris scissors. Complex Repair And V-Y Plasty Text: The defect edges were debeveled with a #15 scalpel blade.  The primary defect was closed partially with a complex linear closure.  Given the location of the remaining defect, shape of the defect and the proximity to free margins a V-Y plasty was deemed most appropriate for complete closure of the defect.  Using a sterile surgical marker, an appropriate advancement flap was drawn incorporating the defect and placing the expected incisions within the relaxed skin tension lines where possible.    The area thus outlined was incised deep to adipose tissue with a #15 scalpel blade.  The skin margins were undermined to an appropriate distance in all directions utilizing iris scissors. Tissue Cultured Epidermal Autograft Text: The defect edges were debeveled with a #15 scalpel blade.  Given the location of the defect, shape of the defect and the proximity to free margins a tissue cultured epidermal autograft was deemed most appropriate.  The graft was then trimmed to fit the size of the defect.  The graft was then placed in the primary defect and oriented appropriately. Z Plasty Text: The lesion was extirpated to the level of the fat with a #15 scalpel blade.  Given the location of the defect, shape of the defect and the proximity to free margins a Z-plasty was deemed most appropriate for repair.  Using a sterile surgical marker, the appropriate transposition arms of the Z-plasty were drawn incorporating the defect and placing the expected incisions within the relaxed skin tension lines where possible.    The area thus outlined was incised deep to adipose tissue with a #15 scalpel blade.  The skin margins were undermined to an appropriate distance in all directions utilizing iris scissors.  The opposing transposition arms were then transposed into place in opposite direction and anchored with interrupted buried subcutaneous sutures. Bilobed Transposition Flap Text: The defect edges were debeveled with a #15 scalpel blade.  Given the location of the defect and the proximity to free margins a bilobed transposition flap was deemed most appropriate.  Using a sterile surgical marker, an appropriate bilobe flap drawn around the defect.    The area thus outlined was incised deep to adipose tissue with a #15 scalpel blade.  The skin margins were undermined to an appropriate distance in all directions utilizing iris scissors. Complex Repair And M Plasty Text: The defect edges were debeveled with a #15 scalpel blade.  The primary defect was closed partially with a complex linear closure.  Given the location of the remaining defect, shape of the defect and the proximity to free margins an M plasty was deemed most appropriate for complete closure of the defect.  Using a sterile surgical marker, an appropriate advancement flap was drawn incorporating the defect and placing the expected incisions within the relaxed skin tension lines where possible.    The area thus outlined was incised deep to adipose tissue with a #15 scalpel blade.  The skin margins were undermined to an appropriate distance in all directions utilizing iris scissors. Consent was obtained from the patient. The risks and benefits to therapy were discussed in detail. Specifically, the risks of infection, scarring, bleeding, prolonged wound healing, incomplete removal, allergy to anesthesia, nerve injury and recurrence were addressed. Prior to the procedure, the treatment site was clearly identified and confirmed by the patient. All components of Universal Protocol/PAUSE Rule completed. Cheek Interpolation Flap Text: A decision was made to reconstruct the defect utilizing an interpolation axial flap and a staged reconstruction.  A telfa template was made of the defect.  This telfa template was then used to outline the Cheek Interpolation flap.  The donor area for the pedicle flap was then injected with anesthesia.  The flap was excised through the skin and subcutaneous tissue down to the layer of the underlying musculature.  The interpolation flap was carefully excised within this deep plane to maintain its blood supply.  The edges of the donor site were undermined.   The donor site was closed in a primary fashion.  The pedicle was then rotated into position and sutured.  Once the tube was sutured into place, adequate blood supply was confirmed with blanching and refill.  The pedicle was then wrapped with xeroform gauze and dressed appropriately with a telfa and gauze bandage to ensure continued blood supply and protect the attached pedicle. Additional Anesthesia Volume In Cc: 6 Trilobed Flap Text: The defect edges were debeveled with a #15 scalpel blade.  Given the location of the defect and the proximity to free margins a trilobed flap was deemed most appropriate.  Using a sterile surgical marker, an appropriate trilobed flap drawn around the defect.    The area thus outlined was incised deep to adipose tissue with a #15 scalpel blade.  The skin margins were undermined to an appropriate distance in all directions utilizing iris scissors. V-Y Flap Text: The defect edges were debeveled with a #15 scalpel blade.  Given the location of the defect, shape of the defect and the proximity to free margins a V-Y flap was deemed most appropriate.  Using a sterile surgical marker, an appropriate advancement flap was drawn incorporating the defect and placing the expected incisions within the relaxed skin tension lines where possible.    The area thus outlined was incised deep to adipose tissue with a #15 scalpel blade.  The skin margins were undermined to an appropriate distance in all directions utilizing iris scissors. Deep Sutures: 5-0 Monocryl Xenograft Text: The defect edges were debeveled with a #15 scalpel blade.  Given the location of the defect, shape of the defect and the proximity to free margins a xenograft was deemed most appropriate.  The graft was then trimmed to fit the size of the defect.  The graft was then placed in the primary defect and oriented appropriately. Complex Repair And Double M Plasty Text: The defect edges were debeveled with a #15 scalpel blade.  The primary defect was closed partially with a complex linear closure.  Given the location of the remaining defect, shape of the defect and the proximity to free margins a double M plasty was deemed most appropriate for complete closure of the defect.  Using a sterile surgical marker, an appropriate advancement flap was drawn incorporating the defect and placing the expected incisions within the relaxed skin tension lines where possible.    The area thus outlined was incised deep to adipose tissue with a #15 scalpel blade.  The skin margins were undermined to an appropriate distance in all directions utilizing iris scissors. Dorsal Nasal Flap Text: The defect edges were debeveled with a #15 scalpel blade.  Given the location of the defect and the proximity to free margins a dorsal nasal flap was deemed most appropriate.  Using a sterile surgical marker, an appropriate dorsal nasal flap was drawn around the defect.    The area thus outlined was incised deep to adipose tissue with a #15 scalpel blade.  The skin margins were undermined to an appropriate distance in all directions utilizing iris scissors. Wound Care: Petrolatum Mercedes Flap Text: The defect edges were debeveled with a #15 scalpel blade.  Given the location of the defect, shape of the defect and the proximity to free margins a Mercedes flap was deemed most appropriate.  Using a sterile surgical marker, an appropriate advancement flap was drawn incorporating the defect and placing the expected incisions within the relaxed skin tension lines where possible. The area thus outlined was incised deep to adipose tissue with a #15 scalpel blade.  The skin margins were undermined to an appropriate distance in all directions utilizing iris scissors. Cheek-To-Nose Interpolation Flap Text: A decision was made to reconstruct the defect utilizing an interpolation axial flap and a staged reconstruction.  A telfa template was made of the defect.  This telfa template was then used to outline the Cheek-To-Nose Interpolation flap.  The donor area for the pedicle flap was then injected with anesthesia.  The flap was excised through the skin and subcutaneous tissue down to the layer of the underlying musculature.  The interpolation flap was carefully excised within this deep plane to maintain its blood supply.  The edges of the donor site were undermined.   The donor site was closed in a primary fashion.  The pedicle was then rotated into position and sutured.  Once the tube was sutured into place, adequate blood supply was confirmed with blanching and refill.  The pedicle was then wrapped with xeroform gauze and dressed appropriately with a telfa and gauze bandage to ensure continued blood supply and protect the attached pedicle. Purse String (Intermediate) Text: Given the location of the defect and the characteristics of the surrounding skin a purse string intermediate closure was deemed most appropriate.  Undermining was performed circumferentially around the surgical defect.  A purse string suture was then placed and tightened. Billing Type: Third-Party Bill Complex Repair And W Plasty Text: The defect edges were debeveled with a #15 scalpel blade.  The primary defect was closed partially with a complex linear closure.  Given the location of the remaining defect, shape of the defect and the proximity to free margins a W plasty was deemed most appropriate for complete closure of the defect.  Using a sterile surgical marker, an appropriate advancement flap was drawn incorporating the defect and placing the expected incisions within the relaxed skin tension lines where possible.    The area thus outlined was incised deep to adipose tissue with a #15 scalpel blade.  The skin margins were undermined to an appropriate distance in all directions utilizing iris scissors. Island Pedicle Flap Text: The defect edges were debeveled with a #15 scalpel blade.  Given the location of the defect, shape of the defect and the proximity to free margins an island pedicle advancement flap was deemed most appropriate.  Using a sterile surgical marker, an appropriate advancement flap was drawn incorporating the defect, outlining the appropriate donor tissue and placing the expected incisions within the relaxed skin tension lines where possible.    The area thus outlined was incised deep to adipose tissue with a #15 scalpel blade.  The skin margins were undermined to an appropriate distance in all directions around the primary defect and laterally outward around the island pedicle utilizing iris scissors.  There was minimal undermining beneath the pedicle flap. Interpolation Flap Text: A decision was made to reconstruct the defect utilizing an interpolation axial flap and a staged reconstruction.  A telfa template was made of the defect.  This telfa template was then used to outline the interpolation flap.  The donor area for the pedicle flap was then injected with anesthesia.  The flap was excised through the skin and subcutaneous tissue down to the layer of the underlying musculature.  The interpolation flap was carefully excised within this deep plane to maintain its blood supply.  The edges of the donor site were undermined.   The donor site was closed in a primary fashion.  The pedicle was then rotated into position and sutured.  Once the tube was sutured into place, adequate blood supply was confirmed with blanching and refill.  The pedicle was then wrapped with xeroform gauze and dressed appropriately with a telfa and gauze bandage to ensure continued blood supply and protect the attached pedicle. Fusiform Excision Additional Text (Leave Blank If You Do Not Want): The margin was drawn around the clinically apparent lesion.  A fusiform shape was then drawn on the skin incorporating the lesion and margins.  Incisions were then made along these lines to the appropriate tissue plane and the lesion was extirpated. Modified Advancement Flap Text: The defect edges were debeveled with a #15 scalpel blade.  Given the location of the defect, shape of the defect and the proximity to free margins a modified advancement flap was deemed most appropriate.  Using a sterile surgical marker, an appropriate advancement flap was drawn incorporating the defect and placing the expected incisions within the relaxed skin tension lines where possible.    The area thus outlined was incised deep to adipose tissue with a #15 scalpel blade.  The skin margins were undermined to an appropriate distance in all directions utilizing iris scissors. Purse String (Simple) Text: Given the location of the defect and the characteristics of the surrounding skin a purse string simple closure was deemed most appropriate.  Undermining was performed circumferentially around the surgical defect.  A purse string suture was then placed and tightened. Eliptical Excision Additional Text (Leave Blank If You Do Not Want): The margin was drawn around the clinically apparent lesion.  An elliptical shape was then drawn on the skin incorporating the lesion and margins.  Incisions were then made along these lines to the appropriate tissue plane and the lesion was extirpated. Dressing: dry sterile dressing Graft Donor Site Bandage (Optional-Leave Blank If You Don't Want In Note): Steri-strips and a pressure bandage were applied to the donor site. Complex Repair And Z Plasty Text: The defect edges were debeveled with a #15 scalpel blade.  The primary defect was closed partially with a complex linear closure.  Given the location of the remaining defect, shape of the defect and the proximity to free margins a Z plasty was deemed most appropriate for complete closure of the defect.  Using a sterile surgical marker, an appropriate advancement flap was drawn incorporating the defect and placing the expected incisions within the relaxed skin tension lines where possible.    The area thus outlined was incised deep to adipose tissue with a #15 scalpel blade.  The skin margins were undermined to an appropriate distance in all directions utilizing iris scissors. Post-Care Instructions: I reviewed with the patient in detail post-care instructions. Patient is not to engage in any heavy lifting, exercise, or swimming for the next 14 days. Should the patient develop any fevers, chills, bleeding, severe pain patient will contact the office immediately. Island Pedicle Flap With Canthal Suspension Text: The defect edges were debeveled with a #15 scalpel blade.  Given the location of the defect, shape of the defect and the proximity to free margins an island pedicle advancement flap was deemed most appropriate.  Using a sterile surgical marker, an appropriate advancement flap was drawn incorporating the defect, outlining the appropriate donor tissue and placing the expected incisions within the relaxed skin tension lines where possible. The area thus outlined was incised deep to adipose tissue with a #15 scalpel blade.  The skin margins were undermined to an appropriate distance in all directions around the primary defect and laterally outward around the island pedicle utilizing iris scissors.  There was minimal undermining beneath the pedicle flap. A suspension suture was placed in the canthal tendon to prevent tension and prevent ectropion. Mucosal Advancement Flap Text: Given the location of the defect, shape of the defect and the proximity to free margins a mucosal advancement flap was deemed most appropriate. Incisions were made with a 15 blade scalpel in the appropriate fashion along the cutaneous vermillion border and the mucosal lip. The remaining actinically damaged mucosal tissue was excised.  The mucosal advancement flap was then elevated to the gingival sulcus with care taken to preserve the neurovascular structures and advanced into the primary defect. Care was taken to ensure that precise realignment of the vermilion border was achieved. Complex Repair And Single Advancement Flap Text: The defect edges were debeveled with a #15 scalpel blade.  The primary defect was closed partially with a complex linear closure.  Given the location of the remaining defect, shape of the defect and the proximity to free margins a single advancement flap was deemed most appropriate for complete closure of the defect.  Using a sterile surgical marker, an appropriate advancement flap was drawn incorporating the defect and placing the expected incisions within the relaxed skin tension lines where possible.    The area thus outlined was incised deep to adipose tissue with a #15 scalpel blade.  The skin margins were undermined to an appropriate distance in all directions utilizing iris scissors. Size Of Lesion In Cm: 1.4 Hemostasis: Manual Pressure Detail Level: Detailed Melolabial Interpolation Flap Text: A decision was made to reconstruct the defect utilizing an interpolation axial flap and a staged reconstruction.  A telfa template was made of the defect.  This telfa template was then used to outline the melolabial interpolation flap.  The donor area for the pedicle flap was then injected with anesthesia.  The flap was excised through the skin and subcutaneous tissue down to the layer of the underlying musculature.  The pedicle flap was carefully excised within this deep plane to maintain its blood supply.  The edges of the donor site were undermined.   The donor site was closed in a primary fashion.  The pedicle was then rotated into position and sutured.  Once the tube was sutured into place, adequate blood supply was confirmed with blanching and refill.  The pedicle was then wrapped with xeroform gauze and dressed appropriately with a telfa and gauze bandage to ensure continued blood supply and protect the attached pedicle. Hatchet Flap Text: The defect edges were debeveled with a #15 scalpel blade.  Given the location of the defect, shape of the defect and the proximity to free margins a hatchet flap was deemed most appropriate.  Using a sterile surgical marker, an appropriate hatchet flap was drawn incorporating the defect and placing the expected incisions within the relaxed skin tension lines where possible.    The area thus outlined was incised deep to adipose tissue with a #15 scalpel blade.  The skin margins were undermined to an appropriate distance in all directions utilizing iris scissors. Home Suture Removal Text: Patient was provided a home suture removal kit and will remove their sutures at home.  If they have any questions or difficulties they will call the office. Alar Island Pedicle Flap Text: The defect edges were debeveled with a #15 scalpel blade.  Given the location of the defect, shape of the defect and the proximity to the alar rim an island pedicle advancement flap was deemed most appropriate.  Using a sterile surgical marker, an appropriate advancement flap was drawn incorporating the defect, outlining the appropriate donor tissue and placing the expected incisions within the nasal ala running parallel to the alar rim. The area thus outlined was incised with a #15 scalpel blade.  The skin margins were undermined minimally to an appropriate distance in all directions around the primary defect and laterally outward around the island pedicle utilizing iris scissors.  There was minimal undermining beneath the pedicle flap. Complex Repair And Dorsal Nasal Flap Text: The defect edges were debeveled with a #15 scalpel blade.  The primary defect was closed partially with a complex linear closure.  Given the location of the remaining defect, shape of the defect and the proximity to free margins a dorsal nasal flap was deemed most appropriate for complete closure of the defect.  Using a sterile surgical marker, an appropriate flap was drawn incorporating the defect and placing the expected incisions within the relaxed skin tension lines where possible.    The area thus outlined was incised deep to adipose tissue with a #15 scalpel blade.  The skin margins were undermined to an appropriate distance in all directions utilizing iris scissors. Mastoid Interpolation Flap Text: A decision was made to reconstruct the defect utilizing an interpolation axial flap and a staged reconstruction.  A telfa template was made of the defect.  This telfa template was then used to outline the mastoid interpolation flap.  The donor area for the pedicle flap was then injected with anesthesia.  The flap was excised through the skin and subcutaneous tissue down to the layer of the underlying musculature.  The pedicle flap was carefully excised within this deep plane to maintain its blood supply.  The edges of the donor site were undermined.   The donor site was closed in a primary fashion.  The pedicle was then rotated into position and sutured.  Once the tube was sutured into place, adequate blood supply was confirmed with blanching and refill.  The pedicle was then wrapped with xeroform gauze and dressed appropriately with a telfa and gauze bandage to ensure continued blood supply and protect the attached pedicle. Saucerization Excision Additional Text (Leave Blank If You Do Not Want): The margin was drawn around the clinically apparent lesion.  Incisions were then made along these lines, in a tangential fashion, to the appropriate tissue plane and the lesion was extirpated. Complex Repair And Double Advancement Flap Text: The defect edges were debeveled with a #15 scalpel blade.  The primary defect was closed partially with a complex linear closure.  Given the location of the remaining defect, shape of the defect and the proximity to free margins a double advancement flap was deemed most appropriate for complete closure of the defect.  Using a sterile surgical marker, an appropriate advancement flap was drawn incorporating the defect and placing the expected incisions within the relaxed skin tension lines where possible.    The area thus outlined was incised deep to adipose tissue with a #15 scalpel blade.  The skin margins were undermined to an appropriate distance in all directions utilizing iris scissors. Size Of Margin In Cm: 0.2 Estimated Blood Loss (Cc): minimal Double Island Pedicle Flap Text: The defect edges were debeveled with a #15 scalpel blade.  Given the location of the defect, shape of the defect and the proximity to free margins a double island pedicle advancement flap was deemed most appropriate.  Using a sterile surgical marker, an appropriate advancement flap was drawn incorporating the defect, outlining the appropriate donor tissue and placing the expected incisions within the relaxed skin tension lines where possible.    The area thus outlined was incised deep to adipose tissue with a #15 scalpel blade.  The skin margins were undermined to an appropriate distance in all directions around the primary defect and laterally outward around the island pedicle utilizing iris scissors.  There was minimal undermining beneath the pedicle flap. Medical Necessity Clause: This procedure was medically necessary because the lesion that was treated was: Complex Repair And Ftsg Text: The defect edges were debeveled with a #15 scalpel blade.  The primary defect was closed partially with a complex linear closure.  Given the location of the defect, shape of the defect and the proximity to free margins a full thickness skin graft was deemed most appropriate to repair the remaining defect.  The graft was trimmed to fit the size of the remaining defect.  The graft was then placed in the primary defect, oriented appropriately, and sutured into place. Slit Excision Additional Text (Leave Blank If You Do Not Want): A linear line was drawn on the skin overlying the lesion. An incision was made slowly until the lesion was visualized.  Once visualized, the lesion was removed with blunt dissection. Rotation Flap Text: The defect edges were debeveled with a #15 scalpel blade.  Given the location of the defect, shape of the defect and the proximity to free margins a rotation flap was deemed most appropriate.  Using a sterile surgical marker, an appropriate rotation flap was drawn incorporating the defect and placing the expected incisions within the relaxed skin tension lines where possible.    The area thus outlined was incised deep to adipose tissue with a #15 scalpel blade.  The skin margins were undermined to an appropriate distance in all directions utilizing iris scissors. Excision Depth: adipose tissue Spiral Flap Text: The defect edges were debeveled with a #15 scalpel blade.  Given the location of the defect, shape of the defect and the proximity to free margins a spiral flap was deemed most appropriate.  Using a sterile surgical marker, an appropriate rotation flap was drawn incorporating the defect and placing the expected incisions within the relaxed skin tension lines where possible. The area thus outlined was incised deep to adipose tissue with a #15 scalpel blade.  The skin margins were undermined to an appropriate distance in all directions utilizing iris scissors. Posterior Auricular Interpolation Flap Text: A decision was made to reconstruct the defect utilizing an interpolation axial flap and a staged reconstruction.  A telfa template was made of the defect.  This telfa template was then used to outline the posterior auricular interpolation flap.  The donor area for the pedicle flap was then injected with anesthesia.  The flap was excised through the skin and subcutaneous tissue down to the layer of the underlying musculature.  The pedicle flap was carefully excised within this deep plane to maintain its blood supply.  The edges of the donor site were undermined.   The donor site was closed in a primary fashion.  The pedicle was then rotated into position and sutured.  Once the tube was sutured into place, adequate blood supply was confirmed with blanching and refill.  The pedicle was then wrapped with xeroform gauze and dressed appropriately with a telfa and gauze bandage to ensure continued blood supply and protect the attached pedicle. Complex Repair And Modified Advancement Flap Text: The defect edges were debeveled with a #15 scalpel blade.  The primary defect was closed partially with a complex linear closure.  Given the location of the remaining defect, shape of the defect and the proximity to free margins a modified advancement flap was deemed most appropriate for complete closure of the defect.  Using a sterile surgical marker, an appropriate advancement flap was drawn incorporating the defect and placing the expected incisions within the relaxed skin tension lines where possible.    The area thus outlined was incised deep to adipose tissue with a #15 scalpel blade.  The skin margins were undermined to an appropriate distance in all directions utilizing iris scissors. Paramedian Forehead Flap Text: A decision was made to reconstruct the defect utilizing an interpolation axial flap and a staged reconstruction.  A telfa template was made of the defect.  This telfa template was then used to outline the paramedian forehead pedicle flap.  The donor area for the pedicle flap was then injected with anesthesia.  The flap was excised through the skin and subcutaneous tissue down to the layer of the underlying musculature.  The pedicle flap was carefully excised within this deep plane to maintain its blood supply.  The edges of the donor site were undermined.   The donor site was closed in a primary fashion.  The pedicle was then rotated into position and sutured.  Once the tube was sutured into place, adequate blood supply was confirmed with blanching and refill.  The pedicle was then wrapped with xeroform gauze and dressed appropriately with a telfa and gauze bandage to ensure continued blood supply and protect the attached pedicle. Complex Repair And Split-Thickness Skin Graft Text: The defect edges were debeveled with a #15 scalpel blade.  The primary defect was closed partially with a complex linear closure.  Given the location of the defect, shape of the defect and the proximity to free margins a split thickness skin graft was deemed most appropriate to repair the remaining defect.  The graft was trimmed to fit the size of the remaining defect.  The graft was then placed in the primary defect, oriented appropriately, and sutured into place. Excisional Biopsy Additional Text (Leave Blank If You Do Not Want): The margin was drawn around the clinically apparent lesion. An elliptical shape was then drawn on the skin incorporating the lesion and margins.  Incisions were then made along these lines to the appropriate tissue plane and the lesion was extirpated. Island Pedicle Flap-Requiring Vessel Identification Text: The defect edges were debeveled with a #15 scalpel blade.  Given the location of the defect, shape of the defect and the proximity to free margins an island pedicle advancement flap was deemed most appropriate.  Using a sterile surgical marker, an appropriate advancement flap was drawn, based on the axial vessel mentioned above, incorporating the defect, outlining the appropriate donor tissue and placing the expected incisions within the relaxed skin tension lines where possible.    The area thus outlined was incised deep to adipose tissue with a #15 scalpel blade.  The skin margins were undermined to an appropriate distance in all directions around the primary defect and laterally outward around the island pedicle utilizing iris scissors.  There was minimal undermining beneath the pedicle flap.

## 2022-11-01 ENCOUNTER — TRANSCRIPTION ENCOUNTER (OUTPATIENT)
Age: 77
End: 2022-11-01

## 2022-11-01 DIAGNOSIS — Z98.890 OTHER SPECIFIED POSTPROCEDURAL STATES: ICD-10-CM

## 2022-11-01 LAB
ANION GAP SERPL CALC-SCNC: 12 MMOL/L — SIGNIFICANT CHANGE UP (ref 5–17)
BUN SERPL-MCNC: 21 MG/DL — SIGNIFICANT CHANGE UP (ref 7–23)
CALCIUM SERPL-MCNC: 8.5 MG/DL — SIGNIFICANT CHANGE UP (ref 8.4–10.5)
CHLORIDE SERPL-SCNC: 102 MMOL/L — SIGNIFICANT CHANGE UP (ref 96–108)
CO2 SERPL-SCNC: 21 MMOL/L — LOW (ref 22–31)
CREAT SERPL-MCNC: 1.51 MG/DL — HIGH (ref 0.5–1.3)
EGFR: 47 ML/MIN/1.73M2 — LOW
GLUCOSE SERPL-MCNC: 152 MG/DL — HIGH (ref 70–99)
HCT VFR BLD CALC: 26.3 % — LOW (ref 39–50)
HGB BLD-MCNC: 9 G/DL — LOW (ref 13–17)
MCHC RBC-ENTMCNC: 32 PG — SIGNIFICANT CHANGE UP (ref 27–34)
MCHC RBC-ENTMCNC: 34.2 GM/DL — SIGNIFICANT CHANGE UP (ref 32–36)
MCV RBC AUTO: 93.6 FL — SIGNIFICANT CHANGE UP (ref 80–100)
NRBC # BLD: 0 /100 WBCS — SIGNIFICANT CHANGE UP (ref 0–0)
PLATELET # BLD AUTO: 161 K/UL — SIGNIFICANT CHANGE UP (ref 150–400)
POTASSIUM SERPL-MCNC: 4.7 MMOL/L — SIGNIFICANT CHANGE UP (ref 3.5–5.3)
POTASSIUM SERPL-SCNC: 4.7 MMOL/L — SIGNIFICANT CHANGE UP (ref 3.5–5.3)
RBC # BLD: 2.81 M/UL — LOW (ref 4.2–5.8)
RBC # FLD: 13.1 % — SIGNIFICANT CHANGE UP (ref 10.3–14.5)
SODIUM SERPL-SCNC: 135 MMOL/L — SIGNIFICANT CHANGE UP (ref 135–145)
WBC # BLD: 7.71 K/UL — SIGNIFICANT CHANGE UP (ref 3.8–10.5)
WBC # FLD AUTO: 7.71 K/UL — SIGNIFICANT CHANGE UP (ref 3.8–10.5)

## 2022-11-01 PROCEDURE — 99233 SBSQ HOSP IP/OBS HIGH 50: CPT

## 2022-11-01 RX ORDER — ACETAMINOPHEN 500 MG
1000 TABLET ORAL ONCE
Refills: 0 | Status: COMPLETED | OUTPATIENT
Start: 2022-11-01 | End: 2022-11-01

## 2022-11-01 RX ORDER — SODIUM CHLORIDE 9 MG/ML
1000 INJECTION, SOLUTION INTRAVENOUS
Refills: 0 | Status: DISCONTINUED | OUTPATIENT
Start: 2022-11-01 | End: 2022-11-02

## 2022-11-01 RX ADMIN — SODIUM CHLORIDE 1 GRAM(S): 9 INJECTION INTRAMUSCULAR; INTRAVENOUS; SUBCUTANEOUS at 05:22

## 2022-11-01 RX ADMIN — Medication 400 MILLIGRAM(S): at 18:23

## 2022-11-01 RX ADMIN — SODIUM CHLORIDE 1 GRAM(S): 9 INJECTION INTRAMUSCULAR; INTRAVENOUS; SUBCUTANEOUS at 21:24

## 2022-11-01 RX ADMIN — ATORVASTATIN CALCIUM 10 MILLIGRAM(S): 80 TABLET, FILM COATED ORAL at 21:24

## 2022-11-01 RX ADMIN — AMLODIPINE BESYLATE 10 MILLIGRAM(S): 2.5 TABLET ORAL at 05:22

## 2022-11-01 RX ADMIN — Medication 100 MILLIGRAM(S): at 08:27

## 2022-11-01 RX ADMIN — SODIUM CHLORIDE 100 MILLILITER(S): 9 INJECTION, SOLUTION INTRAVENOUS at 13:54

## 2022-11-01 RX ADMIN — GABAPENTIN 800 MILLIGRAM(S): 400 CAPSULE ORAL at 17:22

## 2022-11-01 RX ADMIN — ENOXAPARIN SODIUM 40 MILLIGRAM(S): 100 INJECTION SUBCUTANEOUS at 08:26

## 2022-11-01 RX ADMIN — SODIUM CHLORIDE 1 GRAM(S): 9 INJECTION INTRAMUSCULAR; INTRAVENOUS; SUBCUTANEOUS at 13:53

## 2022-11-01 RX ADMIN — Medication 400 MILLIGRAM(S): at 03:02

## 2022-11-01 RX ADMIN — GABAPENTIN 800 MILLIGRAM(S): 400 CAPSULE ORAL at 05:22

## 2022-11-01 RX ADMIN — Medication 1000 MILLIGRAM(S): at 03:30

## 2022-11-01 NOTE — DISCHARGE NOTE PROVIDER - NSDCMRMEDTOKEN_GEN_ALL_CORE_FT
alendronate 70 mg oral tablet: 1 tab(s) orally once a week  amLODIPine 10 mg oral tablet: 1 tab(s) orally once a day  aspirin 81 mg oral delayed release tablet: 1 tab(s) orally once a day  atorvastatin 10 mg oral tablet: 1 tab(s) orally once a day  Citracal + D 250 mg-12.5 mcg (500 intl units) oral tablet, chewable: 2 tab(s) orally 2 times a day (with meals)  Fish Oil 1200 mg oral capsule: 1 cap(s) orally once a day  gabapentin 800 mg oral tablet: 1 tab(s) orally 2 times a day  levothyroxine 75 mcg (0.075 mg) oral capsule: 1 cap(s) orally once a day  sodium chloride 1 g oral tablet: orally 3 times a day  Vitamin B12 1000 mcg oral tablet: 1 tab(s) orally once a day   alendronate 70 mg oral tablet: 1 tab(s) orally once a week  amLODIPine 10 mg oral tablet: 1 tab(s) orally once a day  aspirin 81 mg oral delayed release tablet: 1 tab(s) orally once a day  atorvastatin 10 mg oral tablet: 1 tab(s) orally once a day  Citracal + D 250 mg-12.5 mcg (500 intl units) oral tablet, chewable: 2 tab(s) orally 2 times a day (with meals)  enoxaparin: 40 milligram(s) subcutaneous once a day  gabapentin 400 mg oral capsule: 2 cap(s) orally 3 times a day  levothyroxine 75 mcg (0.075 mg) oral capsule: 1 cap(s) orally once a day  oxyCODONE 5 mg oral tablet: 1 tab(s) orally every 6 hours, as needed for pain  sodium chloride 1 g oral tablet: orally 3 times a day  Vitamin B12 1000 mcg oral tablet: 1 tab(s) orally once a day   alendronate 70 mg oral tablet: 1 tab(s) orally once a week  amLODIPine 10 mg oral tablet: 1 tab(s) orally once a day  aspirin 81 mg oral delayed release tablet: 1 tab(s) orally once a day  atorvastatin 10 mg oral tablet: 1 tab(s) orally once a day  Citracal + D 250 mg-12.5 mcg (500 intl units) oral tablet, chewable: 2 tab(s) orally 2 times a day (with meals)  enoxaparin: 40 milligram(s) subcutaneous once a day  Fish Oil 1200 mg oral capsule: 1 cap(s) orally once a day  gabapentin 400 mg oral capsule: 2 cap(s) orally 3 times a day  levothyroxine 75 mcg (0.075 mg) oral capsule: 1 cap(s) orally once a day  oxyCODONE 5 mg oral tablet: 1 tab(s) orally every 6 hours, as needed for pain  sodium chloride 1 g oral tablet: orally 3 times a day  Vitamin B12 1000 mcg oral tablet: 1 tab(s) orally once a day

## 2022-11-01 NOTE — PROGRESS NOTE ADULT - SUBJECTIVE AND OBJECTIVE BOX
F/U Note:    76y Male admitted POD # 1 from ORIF of left hip using intermedullary sara        Vital Signs Last 24 Hrs  T(C): 36.7 (01 Nov 2022 12:15), Max: 36.7 (01 Nov 2022 12:15)  T(F): 98 (01 Nov 2022 12:15), Max: 98 (01 Nov 2022 12:15)  HR: 75 (01 Nov 2022 13:15) (74 - 82)  BP: 127/77 (01 Nov 2022 13:15) (105/51 - 127/77)  RR: 17 (01 Nov 2022 12:15) (15 - 17)  SpO2: 98% (01 Nov 2022 13:15) (96% - 98%)    Parameters below as of 01 Nov 2022 13:15  Patient On (Oxygen Delivery Method): room air                                9.0    7.71  )-----------( 161      ( 01 Nov 2022 06:20 )             26.3         11-01    135  |  102  |  21  ----------------------------<  152<H>  4.7   |  21<L>  |  1.51<H>    Ca    8.5      01 Nov 2022 06:20    TPro  6.5  /  Alb  3.2<L>  /  TBili  1.3<H>  /  DBili  x   /  AST  23  /  ALT  24  /  AlkPhos  30<L>  10-31        NEURO: no headaches, blurry vision, tremors, depression, anxity  CV: no chest pain, palpitations, murmurs, orthopnea  Resp: no shortness of breath, cough, wheeze, sputum production  GI: no stomach pain,nausea, vomitting, flatulence, hematemesis, hematochezia  PV: no swelling of extremities, no hair loss, no coolness to extremities  ENDO: no polydypsia, polyphagia, polyuria, weight loss, night sweats          NEURO: awake and alert  CV: (+) S1/S2, rrr, no mrg  RESP: CTA b/l  GI: soft, non tender

## 2022-11-01 NOTE — DISCHARGE NOTE PROVIDER - ATTENDING DISCHARGE PHYSICAL EXAMINATION:
General: NAD, lying in bed  Eyes: Anicteric  ENT: Moist mucous membranes  Neck: Supple, No JVD  Lungs: Clear to auscultation bilaterally, normal respiratory effort  Cardio: RRR, S1/S2, No murmurs  Abdomen: Soft, Nontender, Nondistended; Bowel sounds present  Extremities: No calf tenderness, No pitting edema, no digital cyanosis; left hip pressure dressings c/d/i  Skin: Warm, dry  Psych: A&O x 3, follows commands

## 2022-11-01 NOTE — DISCHARGE NOTE PROVIDER - CARE PROVIDERS DIRECT ADDRESSES
[email or web address]

## 2022-11-01 NOTE — PHYSICAL THERAPY INITIAL EVALUATION ADULT - PERTINENT HX OF CURRENT PROBLEM, REHAB EVAL
76M with PMH HTN, HLD, hx of prostate Ca presents from home after mechanical fall off a stool in his kitchen on Saturday. Reports hitting his left hip on the tiled floor, was able to get up and ambulate afterwards. Denies LOC. Denies head trauma. Reports sharp left hip pain, rated 8/10 with movement. Patient has been using an old cane at home to ambulate due to pain, but at baseline ambulates independently.

## 2022-11-01 NOTE — PROGRESS NOTE ADULT - SUBJECTIVE AND OBJECTIVE BOX
POD 1 Open reduction and internal fixation (ORIF) of fracture of left hip using locking intramedullary 10/31/22, pt in bed comfortable, denies any pain, denies cps/ob/n/v    PAST MEDICAL & SURGICAL HISTORY:  BPH (benign prostatic hypertrophy)      Nocturia  x2-3      Thoracic spine fracture  T12, June 21, 2017      Neuropathy  bilateral feet, cause unknown      Hyperlipidemia      Hypertension      Osteoarthritis      Anemia  mild, cause unknown      Prostate cancer  2008, no chemo or RT      S/P prostatectomy  2008      S/P cystoscopy  June 2017, &quot;removal of scar tissue at the base of the bladder&quot;      S/P colonoscopy  2017, negative      History of kyphoplasty      MEDICATIONS  (STANDING):  acetaminophen   IVPB .. 1000 milliGRAM(s) IV Intermittent once  amLODIPine   Tablet 10 milliGRAM(s) Oral daily  atorvastatin 10 milliGRAM(s) Oral at bedtime  enoxaparin Injectable 40 milliGRAM(s) SubCutaneous every 24 hours  gabapentin 800 milliGRAM(s) Oral two times a day  lactated ringers. 1000 milliLiter(s) (100 mL/Hr) IV Continuous <Continuous>  sodium chloride 1 Gram(s) Oral three times a day    MEDICATIONS  (PRN):  melatonin 3 milliGRAM(s) Oral at bedtime PRN Insomnia  ondansetron Injectable 4 milliGRAM(s) IV Push every 8 hours PRN Nausea and/or Vomiting  oxyCODONE    IR 5 milliGRAM(s) Oral every 3 hours PRN Mild Pain (1 - 3)  oxyCODONE    IR 10 milliGRAM(s) Oral every 3 hours PRN Moderate Pain (4 - 6)      PE: Vital Signs Last 24 Hrs  T(C): 36.4 (01 Nov 2022 05:05), Max: 37.1 (31 Oct 2022 19:33)  T(F): 97.6 (01 Nov 2022 05:05), Max: 98.8 (31 Oct 2022 19:33)  HR: 74 (01 Nov 2022 05:05) (74 - 113)  BP: 117/63 (01 Nov 2022 05:21) (105/51 - 135/77)  BP(mean): --  RR: 16 (01 Nov 2022 05:05) (15 - 16)  SpO2: 96% (01 Nov 2022 05:05) (96% - 100%)    Parameters below as of 01 Nov 2022 05:05  Patient On (Oxygen Delivery Method): room air    Gen: Alert awake in nad  abd: soft, nt, nd,ng  LE: Left thigh soft, mild tenderness and swelling, able to flex and extend knee, planter and dorsi flexion intact, Pt palpable, sensation intact  calfs: bilat soft, nontender, no swelling.                            9.0    7.71  )-----------( 161      ( 01 Nov 2022 06:20 )             26.3

## 2022-11-01 NOTE — PROGRESS NOTE ADULT - ASSESSMENT
PLAN:    -serial exams, any changes in exam to be escelated to surgical team immedietly  -pain control  -finished post op anbx  -on regular diet   -dressing changes per surgical team  -DVT prophylaxis  -AM labs

## 2022-11-01 NOTE — DISCHARGE NOTE PROVIDER - HOSPITAL COURSE
Hospital Course  HPI:  76M with PMH HTN, HLD, hx of prostate Ca presents from home after mechanical fall off a stool in his kitchen on Saturday. Reports hitting his left hip on the tiled floor, was able to get up and ambulate afterwards. Denies LOC. Denies head trauma. Reports sharp left hip pain, rated 8/10 with movement. Patient has been using an old cane at home to ambulate due to pain, but at baseline ambulates independently. Denies numbness, tingling. Took Tylenol for his pain with improvement.     In the ED:  temp 98.3, /77, , RR 16, Spo2 98% RA  H/H: 9.1/27.1,   EKG personally reviewed NSR 92  Chest x-ray prelim personally reviewed no acute infiltrate  Awaiting CT imaging - prelim read intertrochanteric fracture of left hip  Received Lidocaine patch in the ED.   ED discussed with Dr Ureña, plan for OR tonight.  (31 Oct 2022 13:02)    Patient was admitted to medicine. He underwent L hip ORIF on 10/31/22 with Dr Nuno. Post-op course significant for mild JESUS, patient was treated with IVF. PT and OT recommended acute rehab.               Palliative Care / Advanced Care Planning  Code Status:  Patient/Family agreeable to Hospice/Palliative (Y/N)?  Summary of Goals of Care Conversation:    Discharging Provider:    Contact Info:  - Please call with any questions or concerns.    Outpatient Provider:    Signout given to  SNF Provider:   Hospital Course  HPI:  76M with PMH HTN, HLD, hx of prostate Ca presents from home after mechanical fall off a stool in his kitchen on Saturday. Reports hitting his left hip on the tiled floor, was able to get up and ambulate afterwards. Denies LOC. Denies head trauma. Reports sharp left hip pain, rated 8/10 with movement. Patient has been using an old cane at home to ambulate due to pain, but at baseline ambulates independently. Denies numbness, tingling. Took Tylenol for his pain with improvement.     In the ED:  temp 98.3, /77, , RR 16, Spo2 98% RA  H/H: 9.1/27.1,   EKG personally reviewed NSR 92  Chest x-ray prelim personally reviewed no acute infiltrate  Awaiting CT imaging - prelim read intertrochanteric fracture of left hip  Received Lidocaine patch in the ED.   ED discussed with Dr Ureña, plan for OR tonight.  (31 Oct 2022 13:02)    Patient was admitted to medicine. He underwent L hip ORIF on 10/31/22 with Dr Nuno. Post-op course significant for mild JESUS, patient was treated with IVF. PT and OT recommended acute rehab. Patient was accepted to acute rehab.    Noted leukocytosis, suspect reactive, no infectious symptoms, surgical site is clean. Afebrile. f/u repeat labwork at Rehab     Discharging Provider:  Verena Briseno NP  Contact Info: [phone] - Please call with any questions or concerns.    Outpatient Provider:    Signout given to  SNF Provider:   Hospital Course  HPI:  76M with PMH HTN, HLD, hx of prostate Ca presents from home after mechanical fall off a stool in his kitchen on Saturday. Reports hitting his left hip on the tiled floor, was able to get up and ambulate afterwards. Denies LOC. Denies head trauma. Reports sharp left hip pain, rated 8/10 with movement. Patient has been using an old cane at home to ambulate due to pain, but at baseline ambulates independently. Denies numbness, tingling. Took Tylenol for his pain with improvement.     In the ED:  temp 98.3, /77, , RR 16, Spo2 98% RA  H/H: 9.1/27.1,   EKG personally reviewed NSR 92  Chest x-ray prelim personally reviewed no acute infiltrate  Awaiting CT imaging - prelim read intertrochanteric fracture of left hip  Received Lidocaine patch in the ED.   ED discussed with Dr Ureña, plan for OR tonight.  (31 Oct 2022 13:02)    Patient was admitted to medicine. He underwent L hip ORIF on 10/31/22 with Dr Nuno. Post-op course significant for mild JESUS, patient was treated with IVF. PT and OT recommended acute rehab. Patient was accepted to acute rehab.    Noted leukocytosis, suspect reactive, no infectious symptoms, surgical site is clean. Afebrile. f/u repeat labwork (CBC, CMP) at Rehab     Discharging Provider:  Verena Briseno NP  Contact Info: [phone] - Please call with any questions or concerns.    Outpatient Provider:    Signout given to  SNF Provider:   Hospital Course  HPI:  76M with PMH HTN, HLD, hx of prostate Ca presents from home after mechanical fall off a stool in his kitchen on Saturday. Reports hitting his left hip on the tiled floor, was able to get up and ambulate afterwards. Denies LOC. Denies head trauma. Reports sharp left hip pain, rated 8/10 with movement. Patient has been using an old cane at home to ambulate due to pain, but at baseline ambulates independently. Denies numbness, tingling. Took Tylenol for his pain with improvement.     In the ED:  temp 98.3, /77, , RR 16, Spo2 98% RA  H/H: 9.1/27.1,   EKG personally reviewed NSR 92  Chest x-ray prelim personally reviewed no acute infiltrate  Awaiting CT imaging - prelim read intertrochanteric fracture of left hip  Received Lidocaine patch in the ED.   ED discussed with Dr Ureña, plan for OR tonight.  (31 Oct 2022 13:02)    Patient was admitted to medicine. He underwent L hip ORIF on 10/31/22 with Dr Nuno. Post-op course significant for mild JESUS, patient was treated with IVF. PT and OT recommended acute rehab. Patient was accepted to acute rehab.    Noted leukocytosis, suspect reactive, no infectious symptoms, surgical site is clean. Afebrile. Repeat labwork (CBC, CMP) at Rehab     Discharging Provider:  Verena Briseno NP  Contact Info: [phone] - Please call with any questions or concerns.    Outpatient Provider: Dr Busby - notified     Signout given to  SNF Provider:   HPI:  76M with PMH HTN, HLD, hx of prostate Ca presents from home after mechanical fall off a stool in his kitchen on Saturday. Reports hitting his left hip on the tiled floor, was able to get up and ambulate afterwards. Denies LOC. Denies head trauma. Reports sharp left hip pain, rated 8/10 with movement. Patient has been using an old cane at home to ambulate due to pain, but at baseline ambulates independently. Denies numbness, tingling. Took Tylenol for his pain with improvement.     In the ED:  temp 98.3, /77, , RR 16, Spo2 98% RA  H/H: 9.1/27.1,   EKG personally reviewed NSR 92  Chest x-ray prelim personally reviewed no acute infiltrate  Awaiting CT imaging - prelim read intertrochanteric fracture of left hip  Received Lidocaine patch in the ED.   ED discussed with Dr Ureña, plan for OR tonight.  (31 Oct 2022 13:02)    Patient was admitted to medicine. He underwent L hip ORIF on 10/31/22 with Dr Nuno. Post-op course significant for mild JESUS, patient was treated with IVF. PT and OT recommended acute rehab. Patient was accepted to acute rehab.    Noted leukocytosis, suspect reactive, no infectious symptoms, surgical site is clean. Afebrile. UA negative. CXR personally reviewed, read with left lower zone ill-defined opacity/infiltrates. Given patient is asymptomatic, afebrile and with normal WBC count, will watch off Abx at this time. Repeat labwork (CBC, CMP) and monitoring at Rehab.    Discharging Provider: Fransico Moreno DO  Contact Info: [phone] - Please call with any questions or concerns.    Outpatient Provider: Dr Busby - notified

## 2022-11-01 NOTE — DISCHARGE NOTE PROVIDER - NSDCCPCAREPLAN_GEN_ALL_CORE_FT
PRINCIPAL DISCHARGE DIAGNOSIS  Diagnosis: Intertrochanteric fracture of left hip  Assessment and Plan of Treatment: You were admitted for left hip fracture  You had surgery on 10/31/22 with Dr. Nuno   Follow up with Dr Nuno in two weeks   Follow up with medical team at rehab and then with your primary care doctor.      SECONDARY DISCHARGE DIAGNOSES  Diagnosis: Fall  Assessment and Plan of Treatment:      PRINCIPAL DISCHARGE DIAGNOSIS  Diagnosis: Intertrochanteric fracture of left hip  Assessment and Plan of Treatment: You were admitted for left hip fracture.  You had surgery on 10/31/22 with Dr. Nuno.  Follow up with Dr Nuno in two weeks.  Follow up with medical team at rehab and then with your primary care doctor.      SECONDARY DISCHARGE DIAGNOSES  Diagnosis: Fall  Assessment and Plan of Treatment:

## 2022-11-01 NOTE — PROGRESS NOTE ADULT - ASSESSMENT
POD 1 Open reduction and internal fixation (ORIF) of fracture of left hip using locking intramedullary 10/31/22,

## 2022-11-01 NOTE — PHYSICAL THERAPY INITIAL EVALUATION ADULT - ADDITIONAL COMMENTS
pt lives alone in private house, 6 wiley w/1 rail, 1st floor setup. pt uses sac to ambulate, has RW and shower chair, pt states he is independent w/ADL's, +

## 2022-11-01 NOTE — PROGRESS NOTE ADULT - ASSESSMENT
76M with PMH HTN, HLD, hx of prostate Ca presents from home after mechanical fall off a stool in his kitchen on Saturday admitted for left intertrochanteric fracture.     #Left intertrochanteric fracture - s/p L Hip ORIF 10/31  - s/p ORIF with Dr Nuno, POD 1  - Complete post-op abx  - Pain control   - DVT ppx with lovenox  - PT eval  - Surgery follow up     #HTN  - Continue Amlodipine  - Monitor vitals    #Hx of hyponatremia  - Continue NaCl 1G TID  - Monitor BMP    #Neuropathy  - Continue gabapentin BID    #HLD  - Continue Lipitor    #Hypothyroidism  - Continue Synthroid    #DVT ppx  - Lovenox    Dispo: PT eval pending    76M with PMH HTN, HLD, hx of prostate Ca presents from home after mechanical fall off a stool in his kitchen on Saturday admitted for left intertrochanteric fracture.     #Left intertrochanteric fracture - s/p L Hip ORIF 10/31  - s/p ORIF with Dr Nuno, POD 1  - Complete post-op abx  - Pain control   - DVT ppx with lovenox  - PT eval   - Surgery follow up     #HTN  - Continue Amlodipine  - Monitor vitals    #Hx of hyponatremia  - Continue NaCl 1G TID  - Monitor BMP    #Neuropathy  - Continue gabapentin BID    #HLD  - Continue Lipitor    #Hypothyroidism  - Continue Synthroid    #DVT ppx  - Lovenox    Dispo: PT eval pending. Patient lives alone    Daughter Kimberlyn (in [place]) - [phone]   76M with PMH HTN, HLD, hx of prostate Ca presents from home after mechanical fall off a stool in his kitchen on Saturday admitted for left intertrochanteric fracture.     #Left intertrochanteric fracture - s/p L Hip ORIF 10/31  - s/p ORIF with Dr Nuno, POD 1  - Complete post-op abx  - Pain control   - DVT ppx with lovenox  - PT eval   - Surgery follow up     #HTN  - Continue Amlodipine  - Monitor vitals    #Hx of hyponatremia  - Continue NaCl 1G TID  - Monitor BMP    #Neuropathy  - Continue gabapentin BID    #HLD  - Continue Lipitor    #Hypothyroidism  - Continue Synthroid    #DVT ppx  - Lovenox    Dispo: PT eval pending. Patient lives alone    11/1: Tried to call daughter Kimberlyn (in [place]) at [phone], busy signal x3 times    76M with PMH HTN, HLD, hx of prostate Ca presents from home after mechanical fall off a stool in his kitchen on Saturday admitted for left intertrochanteric fracture.     #Left intertrochanteric fracture - s/p L Hip ORIF 10/31  - s/p ORIF with Dr Nuno, POD 1  - Complete post-op abx  - Pain control   - DVT ppx with lovenox  - PT eval   - Surgery follow up     #HTN  - Continue Amlodipine  - Monitor vitals    #Hx of hyponatremia  - Continue NaCl 1G TID  - Monitor BMP    #Neuropathy  - Continue gabapentin BID    #HLD  - Continue Lipitor    #Hypothyroidism  - Continue Synthroid    #DVT ppx  - Lovenox    Dispo: PT eval pending. Patient lives alone    11/1: Left  with callback number for daughter Kimberlyn (in [place]) at [phone]   76M with PMH HTN, HLD, hx of prostate Ca presents from home after mechanical fall off a stool in his kitchen on Saturday admitted for left intertrochanteric fracture.     #Left intertrochanteric fracture - s/p L Hip ORIF 10/31  - s/p ORIF with Dr Nuno, POD 1  - Complete post-op abx  - Pain control   - DVT ppx with lovenox  - PT recommending acute rehab, OT and PMR evals pending  - Surgery follow up     #HTN  - Continue Amlodipine  - Monitor vitals    #JESUS  - Noted slight increase in Cr  - Continue IVF, repeat BMP in AM    #Hx of hyponatremia  - Continue NaCl 1G TID  - Monitor BMP    #Neuropathy  - Continue gabapentin BID    #HLD  - Continue Lipitor    #Hypothyroidism  - Continue Synthroid    #DVT ppx  - Lovenox    Dispo: Acute rehab eval pending     11/1: Left VM with callback number for daughter Kimberlyn (in [place]) at [phone]   76M with PMH HTN, HLD, hx of prostate Ca presents from home after mechanical fall off a stool in his kitchen on Saturday admitted for left intertrochanteric fracture.     #Left intertrochanteric fracture - s/p L Hip ORIF 10/31  - s/p ORIF with Dr Nuno, POD 1  - Complete post-op abx  - Pain control   - DVT ppx with lovenox  - PT recommending acute rehab, OT and PMR evals pending  - Surgery follow up     #HTN  - Continue Amlodipine  - Monitor vitals    #JESUS  - Noted slight increase in Cr  - Continue IVF, repeat BMP in AM    #Hx of hyponatremia  - Continue NaCl 1G TID  - Monitor BMP    #Neuropathy  - Continue gabapentin BID    #HLD  - Continue Lipitor    #Hypothyroidism  - Continue Synthroid    #DVT ppx  - Lovenox    Dispo: Acute rehab eval pending     11/1: Updated daughter Kimberlyn (in [place]) at [phone]

## 2022-11-01 NOTE — OCCUPATIONAL THERAPY INITIAL EVALUATION ADULT - PERTINENT HX OF CURRENT PROBLEM, REHAB EVAL
76M with PMH HTN, HLD, hx of prostate Ca presents from home after mechanical fall off a stool in his kitchen on Saturday admitted for left intertrochanteric fracture, s/p L hip ORIF 10/31

## 2022-11-01 NOTE — DISCHARGE NOTE PROVIDER - CARE PROVIDER_API CALL
Lizeth Ross)  Family Medicine  [address] [place] [address], [address]  [place] [address]  Phone: [phone]  Fax: [phone]  Follow Up Time:     Mike Nuno)  Orthopaedic Sports Medicine; Orthopaedic Surgery  [address] [facility] [address]  [place] [address]  Phone: [phone]  Fax: [phone]  Follow Up Time:    Lizeth Ross)  Family Medicine  [address] [place] [address], [address]  [place] [address]  Phone: [phone]  Fax: [phone]  Follow Up Time:     Mike Nuno)  Orthopaedic Sports Medicine; Orthopaedic Surgery  [address] [facility] [address]  [place] [address]  Phone: [phone]  Fax: [phone]  Follow Up Time: 2 weeks

## 2022-11-01 NOTE — PROGRESS NOTE ADULT - SUBJECTIVE AND OBJECTIVE BOX
Patient is a 76y old  Male who presents with a chief complaint of left hip fracture (01 Nov 2022 07:21)    Patient seen and examined at bedside. No overnight events reported. Patient denies pain.     ALLERGIES:  No Known Allergies    MEDICATIONS  (STANDING):  amLODIPine   Tablet 10 milliGRAM(s) Oral daily  atorvastatin 10 milliGRAM(s) Oral at bedtime  ceFAZolin   IVPB 2000 milliGRAM(s) IV Intermittent every 8 hours  enoxaparin Injectable 40 milliGRAM(s) SubCutaneous every 24 hours  gabapentin 800 milliGRAM(s) Oral two times a day  lactated ringers. 1000 milliLiter(s) (100 mL/Hr) IV Continuous <Continuous>  sodium chloride 1 Gram(s) Oral three times a day    MEDICATIONS  (PRN):  HYDROmorphone  Injectable 0.5 milliGRAM(s) IV Push every 3 hours PRN Severe Pain (7 - 10)  melatonin 3 milliGRAM(s) Oral at bedtime PRN Insomnia  ondansetron Injectable 4 milliGRAM(s) IV Push every 8 hours PRN Nausea and/or Vomiting  oxyCODONE    IR 5 milliGRAM(s) Oral every 3 hours PRN Mild Pain (1 - 3)  oxyCODONE    IR 10 milliGRAM(s) Oral every 3 hours PRN Moderate Pain (4 - 6)    Vital Signs Last 24 Hrs  T(F): 97.6 (01 Nov 2022 05:05), Max: 98.8 (31 Oct 2022 19:33)  HR: 74 (01 Nov 2022 05:05) (74 - 113)  BP: 117/63 (01 Nov 2022 05:21) (105/51 - 135/77)  RR: 16 (01 Nov 2022 05:05) (15 - 16)  SpO2: 96% (01 Nov 2022 05:05) (96% - 100%)    I&O's Summary  31 Oct 2022 07:01  -  01 Nov 2022 07:00  --------------------------------------------------------  IN: 75 mL / OUT: 800 mL / NET: -725 mL    PHYSICAL EXAM:  General: NAD, A/O x 3  ENT: No gross hearing impairment, Moist mucous membranes, no thrush  Neck: Supple, No JVD  Lungs: Clear to auscultation bilaterally, good air entry, non-labored breathing  Cardio: RRR, S1/S2, No murmur  Abdomen: Soft, Nontender, Nondistended; Bowel sounds present  Extremities: No calf tenderness, No cyanosis, No pitting edema. L hip surgical sites clean, dry intact. Thigh is soft. Pulses and sensory intact   Psych: Appropriate mood and affect    LABS:                        9.0    7.71  )-----------( 161      ( 01 Nov 2022 06:20 )             26.3     11-01    135  |  102  |  21  ----------------------------<  152  4.7   |  21  |  1.51    Ca    8.5      01 Nov 2022 06:20    TPro  6.5  /  Alb  3.2  /  TBili  1.3  /  DBili  x   /  AST  23  /  ALT  24  /  AlkPhos  30  10-31    PT/INR - ( 31 Oct 2022 11:45 )   PT: 13.1 sec;   INR: 1.13 ratio         PTT - ( 31 Oct 2022 11:45 )  PTT:27.4 sec    COVID-19 PCR: NotDetec (10-31-22 @ 12:42)    RADIOLOGY & ADDITIONAL TESTS:    Care Discussed with Consultants/Other Providers:    Patient is a 76y old  Male who presents with a chief complaint of left hip fracture (01 Nov 2022 07:21)    Patient seen and examined at bedside. No overnight events reported. Patient denies pain.     ALLERGIES:  No Known Allergies    MEDICATIONS  (STANDING):  amLODIPine   Tablet 10 milliGRAM(s) Oral daily  atorvastatin 10 milliGRAM(s) Oral at bedtime  ceFAZolin   IVPB 2000 milliGRAM(s) IV Intermittent every 8 hours  enoxaparin Injectable 40 milliGRAM(s) SubCutaneous every 24 hours  gabapentin 800 milliGRAM(s) Oral two times a day  lactated ringers. 1000 milliLiter(s) (100 mL/Hr) IV Continuous <Continuous>  sodium chloride 1 Gram(s) Oral three times a day    MEDICATIONS  (PRN):  HYDROmorphone  Injectable 0.5 milliGRAM(s) IV Push every 3 hours PRN Severe Pain (7 - 10)  melatonin 3 milliGRAM(s) Oral at bedtime PRN Insomnia  ondansetron Injectable 4 milliGRAM(s) IV Push every 8 hours PRN Nausea and/or Vomiting  oxyCODONE    IR 5 milliGRAM(s) Oral every 3 hours PRN Mild Pain (1 - 3)  oxyCODONE    IR 10 milliGRAM(s) Oral every 3 hours PRN Moderate Pain (4 - 6)    Vital Signs Last 24 Hrs  T(F): 97.6 (01 Nov 2022 05:05), Max: 98.8 (31 Oct 2022 19:33)  HR: 74 (01 Nov 2022 05:05) (74 - 113)  BP: 117/63 (01 Nov 2022 05:21) (105/51 - 135/77)  RR: 16 (01 Nov 2022 05:05) (15 - 16)  SpO2: 96% (01 Nov 2022 05:05) (96% - 100%)    I&O's Summary  31 Oct 2022 07:01  -  01 Nov 2022 07:00  --------------------------------------------------------  IN: 75 mL / OUT: 800 mL / NET: -725 mL    PHYSICAL EXAM:  General: NAD, A/O x 3  ENT: No gross hearing impairment, Moist mucous membranes, no thrush  Neck: Supple, No JVD  Lungs: Clear to auscultation bilaterally, good air entry, non-labored breathing  Cardio: RRR, S1/S2, No murmur  Abdomen: Soft, Nontender, Nondistended; Bowel sounds present  Extremities: No calf tenderness, No cyanosis, No pitting edema. L hip surgical sites clean, dry intact. Thigh is soft. Pulses and sensory intact   Psych: Appropriate mood and affect    LABS:                        9.0    7.71  )-----------( 161      ( 01 Nov 2022 06:20 )             26.3     11-01    135  |  102  |  21  ----------------------------<  152  4.7   |  21  |  1.51    Ca    8.5      01 Nov 2022 06:20    TPro  6.5  /  Alb  3.2  /  TBili  1.3  /  DBili  x   /  AST  23  /  ALT  24  /  AlkPhos  30  10-31    PT/INR - ( 31 Oct 2022 11:45 )   PT: 13.1 sec;   INR: 1.13 ratio         PTT - ( 31 Oct 2022 11:45 )  PTT:27.4 sec    COVID-19 PCR: NotDetec (10-31-22 @ 12:42)    RADIOLOGY & ADDITIONAL TESTS:    Care Discussed with Consultants/Other Providers: Surgical PA

## 2022-11-01 NOTE — DISCHARGE NOTE PROVIDER - NSDCFUSCHEDAPPT_GEN_ALL_CORE_FT
Jeannine Flores  [facility]  NEPHRO [address]  Scheduled Appointment: 11/11/2022    Irineo Gunn  [facility]  CARDIOLOGY [address]  Scheduled Appointment: 12/13/2022    Gayle Reynoso  [facility]  NEUROLOGY [address]  Scheduled Appointment: 12/21/2022    Chele Vaughn  [facility]  Med Endocr [address] [facility]  Scheduled Appointment: 01/27/2023     [facility] Hel[facility]  GC PreAdmits  Scheduled Appointment: 11/05/2022    Irineo Gunn  [facility]  CARDIOLOGY [address]  Scheduled Appointment: 12/13/2022    Gayle Reynoso  [facility]  NEUROLOGY [address]  Scheduled Appointment: 12/21/2022    Chele Vaughn  [facility]  Med Endocr [address] [facility]  Scheduled Appointment: 01/27/2023

## 2022-11-02 LAB
ALBUMIN SERPL ELPH-MCNC: 2.8 G/DL — LOW (ref 3.3–5)
ALP SERPL-CCNC: 32 U/L — LOW (ref 40–120)
ALT FLD-CCNC: 40 U/L — SIGNIFICANT CHANGE UP (ref 10–45)
ANION GAP SERPL CALC-SCNC: 5 MMOL/L — SIGNIFICANT CHANGE UP (ref 5–17)
APPEARANCE UR: CLEAR — SIGNIFICANT CHANGE UP
AST SERPL-CCNC: 45 U/L — HIGH (ref 10–40)
BACTERIA # UR AUTO: NEGATIVE /HPF — SIGNIFICANT CHANGE UP
BASOPHILS # BLD AUTO: 0.02 K/UL — SIGNIFICANT CHANGE UP (ref 0–0.2)
BASOPHILS NFR BLD AUTO: 0.1 % — SIGNIFICANT CHANGE UP (ref 0–2)
BILIRUB DIRECT SERPL-MCNC: 0.2 MG/DL — SIGNIFICANT CHANGE UP (ref 0–0.3)
BILIRUB INDIRECT FLD-MCNC: 0.6 MG/DL — SIGNIFICANT CHANGE UP (ref 0.2–1)
BILIRUB SERPL-MCNC: 0.8 MG/DL — SIGNIFICANT CHANGE UP (ref 0.2–1.2)
BILIRUB UR-MCNC: NEGATIVE — SIGNIFICANT CHANGE UP
BUN SERPL-MCNC: 30 MG/DL — HIGH (ref 7–23)
CALCIUM SERPL-MCNC: 9 MG/DL — SIGNIFICANT CHANGE UP (ref 8.4–10.5)
CHLORIDE SERPL-SCNC: 104 MMOL/L — SIGNIFICANT CHANGE UP (ref 96–108)
CO2 SERPL-SCNC: 28 MMOL/L — SIGNIFICANT CHANGE UP (ref 22–31)
COLOR SPEC: YELLOW — SIGNIFICANT CHANGE UP
CREAT SERPL-MCNC: 1.29 MG/DL — SIGNIFICANT CHANGE UP (ref 0.5–1.3)
DIFF PNL FLD: ABNORMAL
EGFR: 57 ML/MIN/1.73M2 — LOW
EOSINOPHIL # BLD AUTO: 0.05 K/UL — SIGNIFICANT CHANGE UP (ref 0–0.5)
EOSINOPHIL NFR BLD AUTO: 0.3 % — SIGNIFICANT CHANGE UP (ref 0–6)
EPI CELLS # UR: SIGNIFICANT CHANGE UP
GLUCOSE SERPL-MCNC: 116 MG/DL — HIGH (ref 70–99)
GLUCOSE UR QL: NEGATIVE — SIGNIFICANT CHANGE UP
HCT VFR BLD CALC: 24.9 % — LOW (ref 39–50)
HCT VFR BLD CALC: 25.6 % — LOW (ref 39–50)
HGB BLD-MCNC: 8.5 G/DL — LOW (ref 13–17)
HGB BLD-MCNC: 8.6 G/DL — LOW (ref 13–17)
IMM GRANULOCYTES NFR BLD AUTO: 0.7 % — SIGNIFICANT CHANGE UP (ref 0–0.9)
KETONES UR-MCNC: NEGATIVE — SIGNIFICANT CHANGE UP
LEUKOCYTE ESTERASE UR-ACNC: NEGATIVE — SIGNIFICANT CHANGE UP
LYMPHOCYTES # BLD AUTO: 17.8 % — SIGNIFICANT CHANGE UP (ref 13–44)
LYMPHOCYTES # BLD AUTO: 2.7 K/UL — SIGNIFICANT CHANGE UP (ref 1–3.3)
MCHC RBC-ENTMCNC: 31.5 PG — SIGNIFICANT CHANGE UP (ref 27–34)
MCHC RBC-ENTMCNC: 32.1 PG — SIGNIFICANT CHANGE UP (ref 27–34)
MCHC RBC-ENTMCNC: 33.2 GM/DL — SIGNIFICANT CHANGE UP (ref 32–36)
MCHC RBC-ENTMCNC: 34.5 GM/DL — SIGNIFICANT CHANGE UP (ref 32–36)
MCV RBC AUTO: 92.9 FL — SIGNIFICANT CHANGE UP (ref 80–100)
MCV RBC AUTO: 94.8 FL — SIGNIFICANT CHANGE UP (ref 80–100)
MONOCYTES # BLD AUTO: 1.65 K/UL — HIGH (ref 0–0.9)
MONOCYTES NFR BLD AUTO: 10.9 % — SIGNIFICANT CHANGE UP (ref 2–14)
NEUTROPHILS # BLD AUTO: 10.63 K/UL — HIGH (ref 1.8–7.4)
NEUTROPHILS NFR BLD AUTO: 70.2 % — SIGNIFICANT CHANGE UP (ref 43–77)
NITRITE UR-MCNC: NEGATIVE — SIGNIFICANT CHANGE UP
NRBC # BLD: 0 /100 WBCS — SIGNIFICANT CHANGE UP (ref 0–0)
NRBC # BLD: 0 /100 WBCS — SIGNIFICANT CHANGE UP (ref 0–0)
PH UR: 6 — SIGNIFICANT CHANGE UP (ref 5–8)
PLATELET # BLD AUTO: 177 K/UL — SIGNIFICANT CHANGE UP (ref 150–400)
PLATELET # BLD AUTO: 188 K/UL — SIGNIFICANT CHANGE UP (ref 150–400)
POTASSIUM SERPL-MCNC: 4.9 MMOL/L — SIGNIFICANT CHANGE UP (ref 3.5–5.3)
POTASSIUM SERPL-SCNC: 4.9 MMOL/L — SIGNIFICANT CHANGE UP (ref 3.5–5.3)
PROT SERPL-MCNC: 6.2 G/DL — SIGNIFICANT CHANGE UP (ref 6–8.3)
PROT UR-MCNC: NEGATIVE — SIGNIFICANT CHANGE UP
RBC # BLD: 2.68 M/UL — LOW (ref 4.2–5.8)
RBC # BLD: 2.7 M/UL — LOW (ref 4.2–5.8)
RBC # FLD: 13.4 % — SIGNIFICANT CHANGE UP (ref 10.3–14.5)
RBC # FLD: 13.4 % — SIGNIFICANT CHANGE UP (ref 10.3–14.5)
RBC CASTS # UR COMP ASSIST: SIGNIFICANT CHANGE UP /HPF (ref 0–4)
SODIUM SERPL-SCNC: 137 MMOL/L — SIGNIFICANT CHANGE UP (ref 135–145)
SP GR SPEC: 1.01 — SIGNIFICANT CHANGE UP (ref 1.01–1.02)
UROBILINOGEN FLD QL: NEGATIVE — SIGNIFICANT CHANGE UP
WBC # BLD: 14.02 K/UL — HIGH (ref 3.8–10.5)
WBC # BLD: 15.15 K/UL — HIGH (ref 3.8–10.5)
WBC # FLD AUTO: 14.02 K/UL — HIGH (ref 3.8–10.5)
WBC # FLD AUTO: 15.15 K/UL — HIGH (ref 3.8–10.5)
WBC UR QL: NEGATIVE /HPF — SIGNIFICANT CHANGE UP (ref 0–5)

## 2022-11-02 PROCEDURE — 99239 HOSP IP/OBS DSCHRG MGMT >30: CPT

## 2022-11-02 PROCEDURE — 71045 X-RAY EXAM CHEST 1 VIEW: CPT | Mod: 26

## 2022-11-02 RX ORDER — ACETAMINOPHEN 500 MG
650 TABLET ORAL ONCE
Refills: 0 | Status: COMPLETED | OUTPATIENT
Start: 2022-11-02 | End: 2022-11-02

## 2022-11-02 RX ORDER — ENOXAPARIN SODIUM 100 MG/ML
40 INJECTION SUBCUTANEOUS
Qty: 0 | Refills: 0 | DISCHARGE
Start: 2022-11-02

## 2022-11-02 RX ORDER — OMEGA-3 ACID ETHYL ESTERS 1 G
1 CAPSULE ORAL
Qty: 0 | Refills: 0 | DISCHARGE

## 2022-11-02 RX ORDER — GABAPENTIN 400 MG/1
800 CAPSULE ORAL THREE TIMES A DAY
Refills: 0 | Status: DISCONTINUED | OUTPATIENT
Start: 2022-11-02 | End: 2022-11-05

## 2022-11-02 RX ORDER — OXYCODONE HYDROCHLORIDE 5 MG/1
1 TABLET ORAL
Qty: 0 | Refills: 0 | DISCHARGE
Start: 2022-11-02

## 2022-11-02 RX ORDER — LANOLIN ALCOHOL/MO/W.PET/CERES
3 CREAM (GRAM) TOPICAL AT BEDTIME
Refills: 0 | Status: DISCONTINUED | OUTPATIENT
Start: 2022-11-02 | End: 2022-11-05

## 2022-11-02 RX ORDER — GABAPENTIN 400 MG/1
2 CAPSULE ORAL
Qty: 0 | Refills: 0 | DISCHARGE
Start: 2022-11-02

## 2022-11-02 RX ORDER — GABAPENTIN 400 MG/1
1 CAPSULE ORAL
Qty: 0 | Refills: 0 | DISCHARGE

## 2022-11-02 RX ADMIN — GABAPENTIN 800 MILLIGRAM(S): 400 CAPSULE ORAL at 05:12

## 2022-11-02 RX ADMIN — Medication 3 MILLIGRAM(S): at 21:59

## 2022-11-02 RX ADMIN — AMLODIPINE BESYLATE 10 MILLIGRAM(S): 2.5 TABLET ORAL at 05:11

## 2022-11-02 RX ADMIN — SODIUM CHLORIDE 1 GRAM(S): 9 INJECTION INTRAMUSCULAR; INTRAVENOUS; SUBCUTANEOUS at 21:27

## 2022-11-02 RX ADMIN — Medication 650 MILLIGRAM(S): at 22:29

## 2022-11-02 RX ADMIN — GABAPENTIN 800 MILLIGRAM(S): 400 CAPSULE ORAL at 13:17

## 2022-11-02 RX ADMIN — SODIUM CHLORIDE 1 GRAM(S): 9 INJECTION INTRAMUSCULAR; INTRAVENOUS; SUBCUTANEOUS at 05:11

## 2022-11-02 RX ADMIN — Medication 3 MILLIGRAM(S): at 02:33

## 2022-11-02 RX ADMIN — Medication 650 MILLIGRAM(S): at 21:59

## 2022-11-02 RX ADMIN — SODIUM CHLORIDE 1 GRAM(S): 9 INJECTION INTRAMUSCULAR; INTRAVENOUS; SUBCUTANEOUS at 13:17

## 2022-11-02 RX ADMIN — GABAPENTIN 800 MILLIGRAM(S): 400 CAPSULE ORAL at 21:27

## 2022-11-02 RX ADMIN — ENOXAPARIN SODIUM 40 MILLIGRAM(S): 100 INJECTION SUBCUTANEOUS at 11:37

## 2022-11-02 RX ADMIN — ATORVASTATIN CALCIUM 10 MILLIGRAM(S): 80 TABLET, FILM COATED ORAL at 21:27

## 2022-11-02 NOTE — PROGRESS NOTE ADULT - SUBJECTIVE AND OBJECTIVE BOX
Patient is a 76y old  Male who presents with a chief complaint of left hip fracture (02 Nov 2022 09:09)    Patient seen and examined at bedside. No overnight events reported. No new symptoms. Patient is ambulating around room, pain is well controlled     ALLERGIES:  No Known Allergies    MEDICATIONS  (STANDING):  amLODIPine   Tablet 10 milliGRAM(s) Oral daily  atorvastatin 10 milliGRAM(s) Oral at bedtime  enoxaparin Injectable 40 milliGRAM(s) SubCutaneous every 24 hours  gabapentin 800 milliGRAM(s) Oral two times a day  sodium chloride 1 Gram(s) Oral three times a day    MEDICATIONS  (PRN):  melatonin 3 milliGRAM(s) Oral at bedtime PRN Insomnia  ondansetron Injectable 4 milliGRAM(s) IV Push every 8 hours PRN Nausea and/or Vomiting  oxyCODONE    IR 5 milliGRAM(s) Oral every 3 hours PRN Mild Pain (1 - 3)  oxyCODONE    IR 10 milliGRAM(s) Oral every 3 hours PRN Moderate Pain (4 - 6)    Vital Signs Last 24 Hrs  T(F): 98.1 (02 Nov 2022 05:23), Max: 98.1 (02 Nov 2022 05:23)  HR: 77 (02 Nov 2022 05:23) (75 - 80)  BP: 124/62 (02 Nov 2022 05:23) (114/58 - 127/77)  RR: 16 (02 Nov 2022 05:23) (16 - 17)  SpO2: 96% (02 Nov 2022 05:23) (95% - 98%)    I&O's Summary  01 Nov 2022 07:01  -  02 Nov 2022 07:00  --------------------------------------------------------  IN: 0 mL / OUT: 3780 mL / NET: -3780 mL    PHYSICAL EXAM:  General: NAD, A/O x 3  ENT: No gross hearing impairment, Moist mucous membranes, no thrush  Neck: Supple, No JVD  Lungs: Clear to auscultation bilaterally, good air entry, non-labored breathing  Cardio: RRR, S1/S2, No murmur  Abdomen: Soft, Nontender, Nondistended; Bowel sounds present  Extremities: No calf tenderness, No cyanosis, No pitting edema. L hip surgical sites clean, dry intact. Thigh is soft. Pulses and sensory intact. Small area ecchymosis left interior thigh   Psych: Appropriate mood and affect    LABS:                        8.6    14.02 )-----------( 177      ( 02 Nov 2022 06:20 )             24.9     11-02    137  |  104  |  30  ----------------------------<  116  4.9   |  28  |  1.29    Ca    9.0      02 Nov 2022 06:20    TPro  6.5  /  Alb  3.2  /  TBili  1.3  /  DBili  x   /  AST  23  /  ALT  24  /  AlkPhos  30  10-31          PT/INR - ( 31 Oct 2022 11:45 )   PT: 13.1 sec;   INR: 1.13 ratio         PTT - ( 31 Oct 2022 11:45 )  PTT:27.4 sec        COVID-19 PCR: NotDetec (10-31-22 @ 12:42)    RADIOLOGY & ADDITIONAL TESTS:    Care Discussed with Consultants/Other Providers:

## 2022-11-02 NOTE — PROGRESS NOTE ADULT - PROBLEM SELECTOR PROBLEM 1
Status post-operative repair of closed hip fracture
Status post-operative repair of closed hip fracture

## 2022-11-02 NOTE — PROGRESS NOTE ADULT - PROBLEM SELECTOR PLAN 1
Stable   PT/WBAT  DVT/PE PROPHYLAXIS  Staples on incision  recommend follow up with surgeon in 2 weeks  DC as per medicine
PT WBAT   Rehab as per medicine  dvt/pe prophylaxis  follow up with surgeon in 2 weeks.  manage as per medicine

## 2022-11-02 NOTE — PROGRESS NOTE ADULT - SUBJECTIVE AND OBJECTIVE BOX
Left intertrochanteric fracture - s/p L Hip ORIF 10/31, pt in bed comfortable, admits to mild pain on the left hip and thigh. denies chest pain/sob/n/v      PAST MEDICAL & SURGICAL HISTORY:  BPH (benign prostatic hypertrophy)      Nocturia  x2-3      Thoracic spine fracture  T12, June 21, 2017      Neuropathy  bilateral feet, cause unknown      Hyperlipidemia      Hypertension      Osteoarthritis      Anemia  mild, cause unknown      Prostate cancer  2008, no chemo or RT      S/P prostatectomy  2008      S/P cystoscopy  June 2017, &quot;removal of scar tissue at the base of the bladder&quot;      S/P colonoscopy  2017, negative      History of kyphoplasty      MEDICATIONS  (STANDING):  amLODIPine   Tablet 10 milliGRAM(s) Oral daily  atorvastatin 10 milliGRAM(s) Oral at bedtime  enoxaparin Injectable 40 milliGRAM(s) SubCutaneous every 24 hours  gabapentin 800 milliGRAM(s) Oral two times a day  lactated ringers. 1000 milliLiter(s) (100 mL/Hr) IV Continuous <Continuous>  sodium chloride 1 Gram(s) Oral three times a day    MEDICATIONS  (PRN):  melatonin 3 milliGRAM(s) Oral at bedtime PRN Insomnia  ondansetron Injectable 4 milliGRAM(s) IV Push every 8 hours PRN Nausea and/or Vomiting  oxyCODONE    IR 5 milliGRAM(s) Oral every 3 hours PRN Mild Pain (1 - 3)  oxyCODONE    IR 10 milliGRAM(s) Oral every 3 hours PRN Moderate Pain (4 - 6)      PE: Vital Signs Last 24 Hrs  T(C): 36.7 (02 Nov 2022 05:23), Max: 36.7 (01 Nov 2022 12:15)  T(F): 98.1 (02 Nov 2022 05:23), Max: 98.1 (02 Nov 2022 05:23)  HR: 77 (02 Nov 2022 05:23) (75 - 80)  BP: 124/62 (02 Nov 2022 05:23) (114/58 - 127/77)  BP(mean): --  RR: 16 (02 Nov 2022 05:23) (16 - 17)  SpO2: 96% (02 Nov 2022 05:23) (95% - 98%)    Parameters below as of 02 Nov 2022 05:23  Patient On (Oxygen Delivery Method): room air    Gen: Awake in NAD  ABD: Soft, nt, nd, ng  : voiding   LE: Left thigh soft, mild tenderness and swelling, able to flex and extend knee, planter and dorsi flexion intact, Pt pulse palpable, sensation intact  calfs: bilat soft, nontender, no swelling.                          8.6    14.02 )-----------( 177      ( 02 Nov 2022 06:20 )             24.9

## 2022-11-02 NOTE — PROGRESS NOTE ADULT - SUBJECTIVE AND OBJECTIVE BOX
F/U Note:    76y Male admitted POD # 2 from ORIF of left hip using intermedullary sara        Vital Signs Last 24 Hrs  T(C): 37.3 (02 Nov 2022 19:59), Max: 37.3 (02 Nov 2022 19:59)  T(F): 99.1 (02 Nov 2022 19:59), Max: 99.1 (02 Nov 2022 19:59)  HR: 86 (02 Nov 2022 19:59) (77 - 98)  BP: 105/66 (02 Nov 2022 19:59) (105/66 - 124/62)  RR: 16 (02 Nov 2022 19:59) (15 - 16)  SpO2: 95% (02 Nov 2022 19:59) (95% - 100%)    Parameters below as of 02 Nov 2022 19:59  Patient On (Oxygen Delivery Method): room air                                8.5    15.15 )-----------( 188      ( 02 Nov 2022 15:15 )             25.6         11-02    137  |  104  |  30<H>  ----------------------------<  116<H>  4.9   |  28  |  1.29    Ca    9.0      02 Nov 2022 06:20    TPro  6.2  /  Alb  2.8<L>  /  TBili  0.8  /  DBili  0.2  /  AST  45<H>  /  ALT  40  /  AlkPhos  32<L>  11-02        NEURO: no headaches, blurry vision, tremors, depression, anxity  CV: no chest pain, palpitations, murmurs, orthopnea  Resp: no shortness of breath, cough, wheeze, sputum production  GI: no stomach pain,nausea, vomitting, flatulence, hematemesis, hematochezia  PV: no swelling of extremities, no hair loss, no coolness to extremities  ENDO: no polydypsia, polyphagia, polyuria, weight loss, night sweats          NEURO: awake and alert  CV: (+) S1/S2, rrr, no mrg  RESP: CTA b/l  GI: soft, non tender

## 2022-11-02 NOTE — PROGRESS NOTE ADULT - ASSESSMENT
76M with PMH HTN, HLD, hx of prostate Ca presents from home after mechanical fall off a stool in his kitchen on Saturday admitted for left intertrochanteric fracture.     #Left intertrochanteric fracture - s/p L Hip ORIF 10/31  - s/p ORIF with Dr Nuno, POD 1  - Pain control   - DVT ppx with lovenox  - PT/OT recommending acute rehab, acute rehab eval pending  - Surgery follow up     #HTN  - Continue Amlodipine  - Monitor vitals    #JESUS  - Noted slight increase in Cr  - Continue IVF, repeat BMP in AM    #Hx of hyponatremia  - Continue NaCl 1G TID  - Monitor BMP    #Neuropathy  - Continue gabapentin BID    #HLD  - Continue Lipitor    #Hypothyroidism  - Continue Synthroid    #DVT ppx  - Lovenox    Dispo: Acute rehab eval pending     11/1: Updated daughter Kimberlyn (in [place]) at [phone]   76M with PMH HTN, HLD, hx of prostate Ca presents from home after mechanical fall off a stool in his kitchen on Saturday admitted for left intertrochanteric fracture.     #Left intertrochanteric fracture - s/p L Hip ORIF 10/31  - s/p ORIF with Dr Nuno, POD 1  - Pain control   - DVT ppx with lovenox  - PT/OT recommending acute rehab, acute rehab eval pending  - Surgery follow up     #Leukocytosis  - Afebrile, no new symptoms  - Suspect reactive  - Will check CBC + diff, PCT, LFTs    #HTN  - Continue Amlodipine  - Monitor vitals    #JESUS - resolved     #Hx of hyponatremia  - Continue NaCl 1G TID  - Monitor BMP    #Neuropathy  - Continue gabapentin BID    #HLD  - Continue Lipitor    #Hypothyroidism  - Continue Synthroid    #DVT ppx  - Lovenox    Dispo: Acute rehab eval pending     11/1: Updated daughter Kimberlyn (in [place]) at [phone]   76M with PMH HTN, HLD, hx of prostate Ca presents from home after mechanical fall off a stool in his kitchen on Saturday admitted for left intertrochanteric fracture.     #Left intertrochanteric fracture - s/p L Hip ORIF 10/31  - s/p ORIF with Dr Nuno, POD 1  - Pain control   - DVT ppx with lovenox  - PT/OT recommending acute rehab, acute rehab eval pending  - Surgery follow up     #Leukocytosis  - Afebrile, no new symptoms  - Suspect reactive  - Monitor CBC + diff, PCT, LFTs    #HTN  - Continue Amlodipine  - Monitor vitals    #JESUS - resolved     #Hx of hyponatremia  - Continue NaCl 1G TID  - Monitor BMP    #Neuropathy  - Continue gabapentin BID    #HLD  - Continue Lipitor    #Hypothyroidism  - Continue Synthroid    #DVT ppx  - Lovenox    Dispo: Acute rehab eval pending     11/1: Updated daughter Kimberlyn (in [place]) at [phone]   76M with PMH HTN, HLD, hx of prostate Ca presents from home after mechanical fall off a stool in his kitchen on Saturday admitted for left intertrochanteric fracture.     #Left intertrochanteric fracture - s/p L Hip ORIF 10/31  - s/p ORIF with Dr Nuno, POD 1  - Pain control   - DVT ppx with lovenox  - PT/OT recommending acute rehab, acute rehab eval pending  - Surgery follow up     #Leukocytosis  - Afebrile, no new symptoms  - Suspect reactive  - Monitor CBC + diff, PCT, LFTs    #HTN  - Continue Amlodipine  - Monitor vitals    #JESUS - resolved     #Hx of hyponatremia  - Continue NaCl 1G TID  - Monitor BMP    #Neuropathy  - Continue gabapentin BID    #HLD  - Continue Lipitor    #Hypothyroidism  - Continue Synthroid    #DVT ppx  - Lovenox    Dispo: Acute rehab eval pending     11/2: Left VM with callback # for daughter Kimberlyn (in [place]) at [phone]

## 2022-11-03 LAB
ANION GAP SERPL CALC-SCNC: 8 MMOL/L — SIGNIFICANT CHANGE UP (ref 5–17)
BASOPHILS # BLD AUTO: 0.02 K/UL — SIGNIFICANT CHANGE UP (ref 0–0.2)
BASOPHILS NFR BLD AUTO: 0.2 % — SIGNIFICANT CHANGE UP (ref 0–2)
BUN SERPL-MCNC: 26 MG/DL — HIGH (ref 7–23)
CALCIUM SERPL-MCNC: 9.1 MG/DL — SIGNIFICANT CHANGE UP (ref 8.4–10.5)
CHLORIDE SERPL-SCNC: 104 MMOL/L — SIGNIFICANT CHANGE UP (ref 96–108)
CO2 SERPL-SCNC: 26 MMOL/L — SIGNIFICANT CHANGE UP (ref 22–31)
CREAT SERPL-MCNC: 1.28 MG/DL — SIGNIFICANT CHANGE UP (ref 0.5–1.3)
EGFR: 57 ML/MIN/1.73M2 — LOW
EOSINOPHIL # BLD AUTO: 0.15 K/UL — SIGNIFICANT CHANGE UP (ref 0–0.5)
EOSINOPHIL NFR BLD AUTO: 1.7 % — SIGNIFICANT CHANGE UP (ref 0–6)
GLUCOSE SERPL-MCNC: 103 MG/DL — HIGH (ref 70–99)
HCT VFR BLD CALC: 27.6 % — LOW (ref 39–50)
HGB BLD-MCNC: 9.2 G/DL — LOW (ref 13–17)
IMM GRANULOCYTES NFR BLD AUTO: 0.7 % — SIGNIFICANT CHANGE UP (ref 0–0.9)
LYMPHOCYTES # BLD AUTO: 2.64 K/UL — SIGNIFICANT CHANGE UP (ref 1–3.3)
LYMPHOCYTES # BLD AUTO: 29.1 % — SIGNIFICANT CHANGE UP (ref 13–44)
MCHC RBC-ENTMCNC: 31.7 PG — SIGNIFICANT CHANGE UP (ref 27–34)
MCHC RBC-ENTMCNC: 33.3 GM/DL — SIGNIFICANT CHANGE UP (ref 32–36)
MCV RBC AUTO: 95.2 FL — SIGNIFICANT CHANGE UP (ref 80–100)
MONOCYTES # BLD AUTO: 1.04 K/UL — HIGH (ref 0–0.9)
MONOCYTES NFR BLD AUTO: 11.5 % — SIGNIFICANT CHANGE UP (ref 2–14)
NEUTROPHILS # BLD AUTO: 5.15 K/UL — SIGNIFICANT CHANGE UP (ref 1.8–7.4)
NEUTROPHILS NFR BLD AUTO: 56.8 % — SIGNIFICANT CHANGE UP (ref 43–77)
NRBC # BLD: 0 /100 WBCS — SIGNIFICANT CHANGE UP (ref 0–0)
PLATELET # BLD AUTO: 207 K/UL — SIGNIFICANT CHANGE UP (ref 150–400)
POTASSIUM SERPL-MCNC: 3.9 MMOL/L — SIGNIFICANT CHANGE UP (ref 3.5–5.3)
POTASSIUM SERPL-SCNC: 3.9 MMOL/L — SIGNIFICANT CHANGE UP (ref 3.5–5.3)
PROCALCITONIN SERPL-MCNC: 0.08 NG/ML — SIGNIFICANT CHANGE UP
RBC # BLD: 2.9 M/UL — LOW (ref 4.2–5.8)
RBC # FLD: 13.4 % — SIGNIFICANT CHANGE UP (ref 10.3–14.5)
SODIUM SERPL-SCNC: 138 MMOL/L — SIGNIFICANT CHANGE UP (ref 135–145)
WBC # BLD: 9.06 K/UL — SIGNIFICANT CHANGE UP (ref 3.8–10.5)
WBC # FLD AUTO: 9.06 K/UL — SIGNIFICANT CHANGE UP (ref 3.8–10.5)

## 2022-11-03 PROCEDURE — 99233 SBSQ HOSP IP/OBS HIGH 50: CPT

## 2022-11-03 PROCEDURE — 99222 1ST HOSP IP/OBS MODERATE 55: CPT

## 2022-11-03 RX ORDER — ACETAMINOPHEN 500 MG
650 TABLET ORAL EVERY 6 HOURS
Refills: 0 | Status: DISCONTINUED | OUTPATIENT
Start: 2022-11-03 | End: 2022-11-05

## 2022-11-03 RX ADMIN — AMLODIPINE BESYLATE 10 MILLIGRAM(S): 2.5 TABLET ORAL at 06:11

## 2022-11-03 RX ADMIN — ENOXAPARIN SODIUM 40 MILLIGRAM(S): 100 INJECTION SUBCUTANEOUS at 08:42

## 2022-11-03 RX ADMIN — GABAPENTIN 800 MILLIGRAM(S): 400 CAPSULE ORAL at 22:06

## 2022-11-03 RX ADMIN — GABAPENTIN 800 MILLIGRAM(S): 400 CAPSULE ORAL at 06:11

## 2022-11-03 RX ADMIN — SODIUM CHLORIDE 1 GRAM(S): 9 INJECTION INTRAMUSCULAR; INTRAVENOUS; SUBCUTANEOUS at 14:10

## 2022-11-03 RX ADMIN — Medication 3 MILLIGRAM(S): at 22:08

## 2022-11-03 RX ADMIN — SODIUM CHLORIDE 1 GRAM(S): 9 INJECTION INTRAMUSCULAR; INTRAVENOUS; SUBCUTANEOUS at 06:11

## 2022-11-03 RX ADMIN — ATORVASTATIN CALCIUM 10 MILLIGRAM(S): 80 TABLET, FILM COATED ORAL at 22:06

## 2022-11-03 RX ADMIN — Medication 650 MILLIGRAM(S): at 22:08

## 2022-11-03 RX ADMIN — SODIUM CHLORIDE 1 GRAM(S): 9 INJECTION INTRAMUSCULAR; INTRAVENOUS; SUBCUTANEOUS at 22:06

## 2022-11-03 RX ADMIN — GABAPENTIN 800 MILLIGRAM(S): 400 CAPSULE ORAL at 14:11

## 2022-11-03 NOTE — PHARMACOTHERAPY INTERVENTION NOTE - COMMENTS
discussed current medications with patient   patient expressed understanding  all questions answered 
Pt does not want opioids, has not used since admission. Recommended to d/c.

## 2022-11-03 NOTE — PROGRESS NOTE ADULT - ASSESSMENT
76M with PMH HTN, HLD, hx of prostate Ca presents from home after mechanical fall off a stool in his kitchen on Saturday admitted for left intertrochanteric fracture.     #Left intertrochanteric fracture - s/p L Hip ORIF 10/31  - s/p ORIF with Dr Nuno, POD 2  - Pain control   - DVT ppx with Lovenox  - PT/OT recommending acute rehab  - Discussed with PM&R NP Patricia, recommending acute rehab  - Surgery follow up     #Leukocytosis  - Suspect reactive  - Resolved today  - UA negative  - CXR personally reviewed, read with left lower zone ill-defined opacity/infiltrates. Given patient is asymptomatic, afebrile and with normal WBC count, will watch off Abx at this time  - Monitor CBC + diff, PCT, LFTs    #HTN  - Continue Amlodipine  - Monitor vitals    #JESUS   - resolved     #Hx of hyponatremia  - Continue NaCl 1G TID  - Monitor BMP    #Neuropathy  - Continue gabapentin BID    #HLD  - Continue Lipitor    #Hypothyroidism  - Continue Synthroid    #DVT ppx  - Lovenox    Dispo: Acute rehab eval pending     11/2: Left VM with callback # for daughter Kimberyln (in [place]) at [phone]  11/3: Called damir Sofia, no answer 76M with PMH HTN, HLD, hx of prostate Ca presents from home after mechanical fall off a stool in his kitchen on Saturday admitted for left intertrochanteric fracture.     #Left intertrochanteric fracture - s/p L Hip ORIF 10/31  - s/p ORIF with Dr Nuno  - Pain control   - DVT ppx with Lovenox  - PT/OT recommending acute rehab  - Discussed with PM&R NP Patricia, recommending acute rehab as well  - Surgery follow up     #Leukocytosis  - Suspect reactive  - Resolved today  - UA negative  - CXR personally reviewed, read with left lower zone ill-defined opacity/infiltrates. Given patient is asymptomatic, afebrile and with normal WBC count, will watch off Abx at this time  - Continue monitoring labs in acute rehab    #HTN  - Continue Amlodipine  - Monitor vitals    #JESUS   - resolved     #Hx of hyponatremia  - Continue NaCl 1G TID  - Monitor BMP    #Neuropathy  - Continue gabapentin BID    #HLD  - Continue Lipitor    #Hypothyroidism  - Continue Synthroid    #DVT ppx  - Lovenox    Dispo: Acute rehab eval as above, d/w SW team    11/2: Left VM with callback # for daughter Kimberlyn (in [place]) at [phone]  11/3: Called damir Sofia, no answer

## 2022-11-03 NOTE — CONSULT NOTE ADULT - ASSESSMENT
------------------------------------------------------------------------------------------------  ASSESSMENT/PLAN  This is a 77 YO male with PMH of HTN, HLD, hx of prostate CA who presented to [facility] on 10/31 from home after mechanical fall at home. CT imaging showed intertrochanteric fracture of left hip. He is s/p ORIF Left hip on 10/31. Post-op course significant for mild JESUS s/p IVF, leukocytosis likely reactive no source of infection noted, and hyponatremia.   Medical management per hospitalist  Pain management -Tylenol, gabapentin  DVT PPX : SCDs, Lovenox  Bowel Regimen: consider adding senna  WB status: WBAT    Rehab Disposition: - Acute Inpatient Rehab     Recommend ACUTE inpatient rehabilitation for the functional deficits consisting of 3 hours of therapy/day & 24 hour RN/daily PMR physician for comorbid medical management. Will continue to follow for ongoing rehab needs and recommendations. Patient will be able to tolerate 3 hours a day.        ------------------------------------------------------------------------------------------------  ASSESSMENT/PLAN  This is a 75 YO male with PMH of HTN, HLD, hx of prostate CA who presented to [facility] on 10/31 from home after mechanical fall at home. CT imaging showed intertrochanteric fracture of left hip. He is s/p ORIF Left hip on 10/31. Post-op course significant for mild JESUS s/p IVF, leukocytosis likely reactive no source of infection noted, and hyponatremia.   Medical management per hospitalist  Pain management -Tylenol, gabapentin  DVT PPX : SCDs, Lovenox  Bowel Regimen: consider adding senna PRN  WB status: WBAT    Rehab Disposition: - Acute Inpatient Rehab     Recommend ACUTE inpatient rehabilitation for the functional deficits consisting of 3 hours of therapy/day & 24 hour RN/daily PMR physician for comorbid medical management. Will continue to follow for ongoing rehab needs and recommendations. Patient will be able to tolerate 3 hours a day.        ------------------------------------------------------------------------------------------------  ASSESSMENT/PLAN  This is a 77 YO male with PMH of HTN, HLD, hx of prostate CA who presented to [facility] on 10/31 from home after mechanical fall at home. CT imaging showed intertrochanteric fracture of left hip. He is s/p ORIF Left hip on 10/31. Post-op course significant for mild JESUS s/p IVF, leukocytosis likely reactive: no source of infection noted, and hyponatremia.   Medical management per hospitalist  Pain management -Tylenol, gabapentin  DVT PPX : SCDs, Lovenox  Bowel Regimen: consider adding senna PRN  WB status: WBAT    Rehab Disposition: - Acute Inpatient Rehab     Recommend ACUTE inpatient rehabilitation for the functional deficits consisting of 3 hours of therapy/day & 24 hour RN/daily PMR physician for comorbid medical management. Will continue to follow for ongoing rehab needs and recommendations. Patient will be able to tolerate 3 hours a day.

## 2022-11-03 NOTE — PROGRESS NOTE ADULT - SUBJECTIVE AND OBJECTIVE BOX
Patient is a 76y old  Male who presents with a chief complaint of left hip fracture (2022 13:43)    Patient seen and examined at bedside. No acute overnight events. Denies any fever, chills, HA, cough, SOB, wheezing, URI symptoms, N/V/D, urinary symptoms including dysuria, or other complaints.    ALLERGIES:  No Known Allergies    MEDICATIONS  (STANDING):  amLODIPine   Tablet 10 milliGRAM(s) Oral daily  atorvastatin 10 milliGRAM(s) Oral at bedtime  enoxaparin Injectable 40 milliGRAM(s) SubCutaneous every 24 hours  gabapentin 800 milliGRAM(s) Oral three times a day  sodium chloride 1 Gram(s) Oral three times a day    MEDICATIONS  (PRN):  acetaminophen     Tablet .. 650 milliGRAM(s) Oral every 6 hours PRN Mild Pain (1 - 3)  melatonin 3 milliGRAM(s) Oral at bedtime PRN Insomnia  ondansetron Injectable 4 milliGRAM(s) IV Push every 8 hours PRN Nausea and/or Vomiting    Vital Signs Last 24 Hrs  T(F): 98 (2022 12:50), Max: 99.1 (2022 19:59)  HR: 74 (2022 12:50) (71 - 86)  BP: 123/64 (2022 12:50) (105/66 - 123/64)  RR: 15 (2022 12:50) (13 - 16)  SpO2: 99% (2022 12:50) (95% - 99%)    I&O's Summary    2022 07:01  -  2022 07:00  --------------------------------------------------------  IN: 200 mL / OUT: 2100 mL / NET: -1900 mL    2022 07:01  -  2022 16:07  --------------------------------------------------------  IN: 0 mL / OUT: 900 mL / NET: -900 mL      BMI (kg/m2): 26.3 (10-31-22 @ 10:36)    PHYSICAL EXAM:  General: NAD, lying in bed  Eyes: Anicteric  ENT: Moist mucous membranes  Neck: Supple, No JVD  Lungs: Clear to auscultation bilaterally, normal respiratory effort  Cardio: RRR, S1/S2, No murmurs  Abdomen: Soft, Nontender, Nondistended; Bowel sounds present  Extremities: No calf tenderness, No pitting edema, no digital cyanosis; left hip pressure dressings c/d/i  Skin: Warm, dry  Psych: A&O x 3, follows commands    LABS:                        9.2    9.06  )-----------( 207      ( 2022 08:03 )             27.6       11-03    138  |  104  |  26  ----------------------------<  103  3.9   |  26  |  1.28    Ca    9.1      2022 08:03    TPro  6.2  /  Alb  2.8  /  TBili  0.8  /  DBili  0.2  /  AST  45  /  ALT  40  /  AlkPhos  32  11-02              Urinalysis Basic - ( 2022 16:55 )    Color: Yellow / Appearance: Clear / S.010 / pH: x  Gluc: x / Ketone: Negative  / Bili: Negative / Urobili: Negative   Blood: x / Protein: Negative / Nitrite: Negative   Leuk Esterase: Negative / RBC: 0-4 /HPF / WBC Negative /HPF   Sq Epi: x / Non Sq Epi: Neg.-Few / Bacteria: Negative /HPF        COVID-19 PCR: NotDetec (10-31-22 @ 12:42)      RADIOLOGY & ADDITIONAL TESTS:   Personally reviewed.     Consultant(s) Notes Reviewed:  [x] YES  [ ] NO    Care Discussed with Consultants/Other Providers:

## 2022-11-03 NOTE — CONSULT NOTE ADULT - SUBJECTIVE AND OBJECTIVE BOX
Patient is a 76y old  Male who presents with a chief complaint of left hip fracture (2022 20:37)      HPI:  This is a 75 YO male with PMH of HTN, HLD, hx of prostate CA who  presented to [facility] on 10/31 from home after mechanical fall off a stool in his kitchen on Saturday. Reports hitting his left hip on the tiled floor, was able to get up and ambulate afterwards.  He reported sharp left hip pain, rated 8/10 with movement. Patient has been using an old cane at home to ambulate due to pain, but at baseline ambulates independently. Denies numbness, tingling. Took Tylenol for his pain with improvement. In the ED:  temp 98.3, /77, , RR 16, Spo2 98% RA. H/H: 9.1/27.1. CT imaging showed intertrochanteric fracture of left hip. Ortho consulted and he was taken to OR on 10/31 for ORIF Left hip. Post-op course significant for mild JESUS s/p IVF, leukocytosis likely reactive no source of infection noted.     Subjective  Patient was examined in room        REVIEW OF SYSTEMS  Constitutional: No fever, No Chills, No fatigue  HEENT: No eye pain, No visual disturbances, No difficulty hearing  Pulm: No cough,  No shortness of breath  Cardio: No chest pain, No palpitations  GI:  No abdominal pain, No nausea, No vomiting, No diarrhea, No constipation  : No dysuria, No frequency, No hematuria  Neuro: No headaches, No memory loss, + loss of strength, + numbness, No tremors  Skin: No itching, No rashes, No lesions   Endo: No temperature intolerance  MSK: No joint pain, No joint swelling, No muscle pain, No Neck or back pain  Psych:  No depression, No anxiety      PAST MEDICAL & SURGICAL HISTORY  BPH (benign prostatic hypertrophy)    Nocturia    Thoracic spine fracture    Neuropathy    Hyperlipidemia    Hypertension    Osteoarthritis    Anemia    Prostate cancer    S/P TURP    S/P prostatectomy    S/P cystoscopy    S/P colonoscopy    History of kyphoplasty        FAMILY HISTORY   Family history of myocardial infarction    Family history of Alzheimer&#x27;s disease        SOCIAL HISTORY  Smoking -   EtOH -   Drugs -     Marital status:    FUNCTIONAL HISTORY:   Patient lives alone in private house, 6 WON with 1 rail, 1st floor setup. Patient uses SAC to ambulate, has RW and shower chair, pt states he is independent w/ADL's, +  PTA: Independent in ADLs and ambulation     CURRENT FUNCTIONAL STATUS  Date: 11/3  Bed Mobility: CG, 1 person  Transfers: CG, 1 person  Gait: stand-by assist, 1 person 40ft RW  Lower Body Dressing: Stand-by-assist, 1 person  Grooming: supervision    RECENT LABS/IMAGING  CBC Full  -  ( 2022 08:03 )  WBC Count : 9.06 K/uL  RBC Count : 2.90 M/uL  Hemoglobin : 9.2 g/dL  Hematocrit : 27.6 %  Platelet Count - Automated : 207 K/uL  Mean Cell Volume : 95.2 fl  Mean Cell Hemoglobin : 31.7 pg  Mean Cell Hemoglobin Concentration : 33.3 gm/dL  Auto Neutrophil # : 5.15 K/uL  Auto Lymphocyte # : 2.64 K/uL  Auto Monocyte # : 1.04 K/uL  Auto Eosinophil # : 0.15 K/uL  Auto Basophil # : 0.02 K/uL  Auto Neutrophil % : 56.8 %  Auto Lymphocyte % : 29.1 %  Auto Monocyte % : 11.5 %  Auto Eosinophil % : 1.7 %  Auto Basophil % : 0.2 %        138  |  104  |  26<H>  ----------------------------<  103<H>  3.9   |  26  |  1.28    Ca    9.1      2022 08:03    TPro  6.2  /  Alb  2.8<L>  /  TBili  0.8  /  DBili  0.2  /  AST  45<H>  /  ALT  40  /  AlkPhos  32<L>      Urinalysis Basic - ( 2022 16:55 )    Color: Yellow / Appearance: Clear / S.010 / pH: x  Gluc: x / Ketone: Negative  / Bili: Negative / Urobili: Negative   Blood: x / Protein: Negative / Nitrite: Negative   Leuk Esterase: Negative / RBC: 0-4 /HPF / WBC Negative /HPF   Sq Epi: x / Non Sq Epi: Neg.-Few / Bacteria: Negative /HPF      VITALS  T(C): 36.7 (22 @ 12:50), Max: 37.3 (22 @ 19:59)  HR: 74 (22 @ 12:50) (71 - 86)  BP: 123/64 (22 @ 12:50) (105/66 - 123/64)  RR: 15 (22 @ 12:50) (13 - 16)  SpO2: 99% (22 @ 12:50) (95% - 99%)  Wt(kg): --    ALLERGIES  No Known Allergies      MEDICATIONS   acetaminophen     Tablet .. 650 milliGRAM(s) Oral every 6 hours PRN  amLODIPine   Tablet 10 milliGRAM(s) Oral daily  atorvastatin 10 milliGRAM(s) Oral at bedtime  enoxaparin Injectable 40 milliGRAM(s) SubCutaneous every 24 hours  gabapentin 800 milliGRAM(s) Oral three times a day  melatonin 3 milliGRAM(s) Oral at bedtime PRN  ondansetron Injectable 4 milliGRAM(s) IV Push every 8 hours PRN  sodium chloride 1 Gram(s) Oral three times a day      ----------------------------------------------------------------------------------------  PHYSICAL EXAM  Gen - NAD, Comfortable  HEENT - NCAT, EOMI, MMM  Neck - Supple, No limited ROM  Pulm - CTAB, No wheeze, No rhonchi, No crackles  Cardiovascular - RRR, S1S2, No murmurs  Abdomen - Soft, NT/ND, +BS  Extremities - No clubbing, no cyanosis, no peripheral edema, no calf tenderness  Neuro-     Cognitive - Awake, Alert, Oriented  to self, place, date, year, situation. Able  to follow command     Communication - Fluent, Comprehensible,  No dysarthria, no aphasia     Attention: Intact      Judgement: Good evidence of judgement     Memory: Recall 3 objects immediate and 3 min later         Cranial Nerves - CN 2-12 intact. No facial asymmetry, Tongue midline, EOMI, Shoulder shrug intact     Motor - No focal deficits, right/left hemiplegia                    LEFT    UE - ShAB 5/5, EF 5/5, EE 5/5,  5/5                    RIGHT UE - ShAB 5/5, EF 5/5, EE 5/5,   5/5                    LEFT    LE - HF 5/5, KE 5/5, DF 5/5, PF 5/5                    RIGHT LE - HF 5/5, KE 5/5, DF 5/5, PF 5/5        Sensory - Intact to LT     Reflexes - DTR Intact, No primitive reflexive     Coordination - FTN intact/ HTS intact     Tone - normal  Psychiatric - Mood stable, Affect WNL  Skin:  all skin intact                    Patient is a 76y old  Male who presents with a chief complaint of left hip fracture (2022 20:37)      HPI:  This is a 77 YO male with PMH of HTN, HLD, hx of prostate CA, neuropathy who  presented to [facility] on 10/31 from home after mechanical fall off a stool in his kitchen on Saturday. Reports hitting his left hip on the tiled floor, was able to get up and ambulate afterwards.  He reported sharp left hip pain, rated 8/10 with movement. Patient has been using an old cane at home to ambulate due to pain, but at baseline ambulates independently. Denies numbness, tingling. Took Tylenol for his pain with improvement. In the ED:  temp 98.3, /77, , RR 16, Spo2 98% RA. H/H: 9.1/27.1. CT imaging showed intertrochanteric fracture of left hip. Ortho consulted and he was taken to OR on 10/31 for ORIF Left hip. Post-op course significant for mild JESUS s/p IVF, leukocytosis likely reactive no source of infection noted.     Subjective  Patient was examined in room. He denies HA,CP, cough, fever, or chills. He is moving his bowel. Pain is well controlled.  Good appetite      REVIEW OF SYSTEMS  Constitutional: No fever, No Chills, No fatigue  HEENT: No eye pain, No visual disturbances, No difficulty hearing  Pulm: No cough,  No shortness of breath  Cardio: No chest pain, No palpitations  GI:  No abdominal pain, No nausea, No vomiting, No diarrhea, No constipation  : No dysuria, No frequency, No hematuria  Neuro: No headaches, No memory loss, + loss of strength, no numbness, No tremors  Skin: No itching, No rashes, No lesions   Endo: No temperature intolerance  MSK: + joint pain, No joint swelling, No muscle pain, No Neck or back pain  Psych:  No depression, No anxiety      PAST MEDICAL & SURGICAL HISTORY  BPH (benign prostatic hypertrophy)    Nocturia    Thoracic spine fracture    Neuropathy    Hyperlipidemia    Hypertension    Osteoarthritis    Anemia    Prostate cancer    S/P TURP    S/P prostatectomy    S/P cystoscopy    S/P colonoscopy    History of kyphoplasty        FAMILY HISTORY   Family history of myocardial infarction    Family history of Alzheimer&#x27;s disease        SOCIAL HISTORY  Smoking - Denies  EtOH - Denies  Drugs - Denies    Marital status:     FUNCTIONAL HISTORY:   Patient lives alone in private house, 6 WON with 1 rail, 1st floor setup. Patient uses SAC to ambulate, has RW and shower chair, pt states he is independent w/ADL's, +  PTA: Independent in ADLs and ambulation     CURRENT FUNCTIONAL STATUS  Date: 11/3  Bed Mobility: CG, 1 person  Transfers: CG, 1 person  Gait: stand-by assist, 1 person 40ft RW  Lower Body Dressing: Stand-by-assist, 1 person  Grooming: supervision    RECENT LABS/IMAGING  CBC Full  -  ( 2022 08:03 )  WBC Count : 9.06 K/uL  RBC Count : 2.90 M/uL  Hemoglobin : 9.2 g/dL  Hematocrit : 27.6 %  Platelet Count - Automated : 207 K/uL  Mean Cell Volume : 95.2 fl  Mean Cell Hemoglobin : 31.7 pg  Mean Cell Hemoglobin Concentration : 33.3 gm/dL  Auto Neutrophil # : 5.15 K/uL  Auto Lymphocyte # : 2.64 K/uL  Auto Monocyte # : 1.04 K/uL  Auto Eosinophil # : 0.15 K/uL  Auto Basophil # : 0.02 K/uL  Auto Neutrophil % : 56.8 %  Auto Lymphocyte % : 29.1 %  Auto Monocyte % : 11.5 %  Auto Eosinophil % : 1.7 %  Auto Basophil % : 0.2 %        138  |  104  |  26<H>  ----------------------------<  103<H>  3.9   |  26  |  1.28    Ca    9.1      2022 08:03    TPro  6.2  /  Alb  2.8<L>  /  TBili  0.8  /  DBili  0.2  /  AST  45<H>  /  ALT  40  /  AlkPhos  32<L>      Urinalysis Basic - ( 2022 16:55 )    Color: Yellow / Appearance: Clear / S.010 / pH: x  Gluc: x / Ketone: Negative  / Bili: Negative / Urobili: Negative   Blood: x / Protein: Negative / Nitrite: Negative   Leuk Esterase: Negative / RBC: 0-4 /HPF / WBC Negative /HPF   Sq Epi: x / Non Sq Epi: Neg.-Few / Bacteria: Negative /HPF     Xray Femur 2 Views, Left (10.31.22 @ 12:19)   IMPRESSION: Acute nondisplaced fracture intertrochanteric of the left   hip. Other findings  as above.    Xray Hip w/ Pelvis 1 View, Left (10.. @ 12:19)   IMPRESSION: Acute nondisplaced fracture intertrochanteric of the left   hip.       VITALS  T(C): 36.7 (22 @ 12:50), Max: 37.3 (22 @ 19:59)  HR: 74 (22 @ 12:50) (71 - 86)  BP: 123/64 (22 @ 12:50) (105/66 - 123/64)  RR: 15 (22 @ 12:50) (13 - 16)  SpO2: 99% (22 @ 12:50) (95% - 99%)  Wt(kg): --    ALLERGIES  No Known Allergies      MEDICATIONS   acetaminophen     Tablet .. 650 milliGRAM(s) Oral every 6 hours PRN  amLODIPine   Tablet 10 milliGRAM(s) Oral daily  atorvastatin 10 milliGRAM(s) Oral at bedtime  enoxaparin Injectable 40 milliGRAM(s) SubCutaneous every 24 hours  gabapentin 800 milliGRAM(s) Oral three times a day  melatonin 3 milliGRAM(s) Oral at bedtime PRN  ondansetron Injectable 4 milliGRAM(s) IV Push every 8 hours PRN  sodium chloride 1 Gram(s) Oral three times a day      ----------------------------------------------------------------------------------------  PHYSICAL EXAM  Gen - NAD, Comfortable  HEENT - NCAT, EOMI, MMM  Neck - Supple, No limited ROM  Pulm - CTAB, No wheeze, No rhonchi, No crackles  Cardiovascular - RRR, S1S2, No murmurs  Abdomen - Soft, NT/ND, +BS  Extremities - No clubbing, no cyanosis, +edema to left femur, no calf tenderness  Neuro-     Cognitive - Awake, Alert, Oriented  to self, place, date, year, situation. Able to follow command     Communication - Fluent, Comprehensible     Attention: Intact      Judgement: Good evidence of judgement     Memory: Recall 3 objects immediate and 3 min later         Cranial Nerves - CN 2-12 intact. No facial asymmetry, Tongue midline, EOMI, Shoulder shrug intact     Motor -                     LEFT    UE - ShAB 5/5, EF 5/5, EE 5/5,  5/5                    RIGHT UE - ShAB 5/5, EF 5/5, EE 5/5,   5/5                    LEFT    LE - HF 3/5, KE 3/5, DF 5/5, PF 5/5                    RIGHT LE - HF 5/5, KE 5/5, DF 5/5, PF 5/5        Sensory - Intact to LT     Reflexes - DTR Intact, No primitive reflexive     Coordination - FTN intact     Tone - normal  Psychiatric - Mood stable, Affect WNL  Skin:  left hip surgical incision with aquacel dressing c/d/i                  Patient is a 76y old  Male who presents with a chief complaint of left hip fracture (2022 20:37)      HPI:  This is a 75 YO male with PMH of HTN, HLD, hx of prostate CA, neuropathy who  presented to [facility] on 10/31 from home after mechanical fall off a stool in his kitchen on Saturday. Reports hitting his left hip on the tiled floor, was able to get up and ambulate afterwards.  He reported sharp left hip pain, rated 8/10 with movement. Patient has been using an old cane at home to ambulate due to pain, but at baseline ambulates independently. Denies numbness, tingling. Took Tylenol for his pain with improvement. In the ED:  temp 98.3, /77, , RR 16, Spo2 98% RA. H/H: 9.1/27.1. CT imaging showed intertrochanteric fracture of left hip. Ortho consulted and he was taken to OR on 10/31 for ORIF Left hip. Post-op course significant for mild JESUS s/p IVF, leukocytosis likely reactive no source of infection noted.     Subjective  Patient was examined in room. He denies HA,CP, cough, fever, or chills. He is moving his bowels. Pain is well controlled. Good appetite      REVIEW OF SYSTEMS  Constitutional: No fever, No Chills, No fatigue  HEENT: No eye pain, No visual disturbances, No difficulty hearing  Pulm: No cough,  No shortness of breath  Cardio: No chest pain, No palpitations  GI:  No abdominal pain, No nausea, No vomiting, No diarrhea, No constipation  : No dysuria, No frequency, No hematuria  Neuro: No headaches, No memory loss, + loss of strength, no numbness, No tremors  Skin: No itching, No rashes, No lesions   Endo: No temperature intolerance  MSK: + joint pain, No joint swelling, No muscle pain, No Neck or back pain  Psych:  No depression, No anxiety      PAST MEDICAL & SURGICAL HISTORY  BPH (benign prostatic hypertrophy)    Nocturia    Thoracic spine fracture    Neuropathy    Hyperlipidemia    Hypertension    Osteoarthritis    Anemia    Prostate cancer    S/P TURP    S/P prostatectomy    S/P cystoscopy    S/P colonoscopy    History of kyphoplasty        FAMILY HISTORY   Family history of myocardial infarction    Family history of Alzheimer&#x27;s disease      SOCIAL HISTORY  Smoking - Denies  EtOH - Denies  Drugs - Denies    Marital status:     FUNCTIONAL HISTORY:   Patient lives alone in private house, 6 WON from front door, 4+5 steps from garage. Patient uses SAC to ambulate, has RW and shower chair, pt states he is independent with ADL's. He drives.      CURRENT FUNCTIONAL STATUS  Date: 11/3  Bed Mobility: CG, 1 person  Transfers: CG, 1 person  Gait: stand-by assist, 1 person 40ft RW  Lower Body Dressing: Stand-by-assist, 1 person  Grooming: supervision    RECENT LABS/IMAGING  CBC Full  -  ( 2022 08:03 )  WBC Count : 9.06 K/uL  RBC Count : 2.90 M/uL  Hemoglobin : 9.2 g/dL  Hematocrit : 27.6 %  Platelet Count - Automated : 207 K/uL  Mean Cell Volume : 95.2 fl  Mean Cell Hemoglobin : 31.7 pg  Mean Cell Hemoglobin Concentration : 33.3 gm/dL  Auto Neutrophil # : 5.15 K/uL  Auto Lymphocyte # : 2.64 K/uL  Auto Monocyte # : 1.04 K/uL  Auto Eosinophil # : 0.15 K/uL  Auto Basophil # : 0.02 K/uL  Auto Neutrophil % : 56.8 %  Auto Lymphocyte % : 29.1 %  Auto Monocyte % : 11.5 %  Auto Eosinophil % : 1.7 %  Auto Basophil % : 0.2 %        138  |  104  |  26<H>  ----------------------------<  103<H>  3.9   |  26  |  1.28    Ca    9.1      2022 08:03    TPro  6.2  /  Alb  2.8<L>  /  TBili  0.8  /  DBili  0.2  /  AST  45<H>  /  ALT  40  /  AlkPhos  32<L>      Urinalysis Basic - ( 2022 16:55 )    Color: Yellow / Appearance: Clear / S.010 / pH: x  Gluc: x / Ketone: Negative  / Bili: Negative / Urobili: Negative   Blood: x / Protein: Negative / Nitrite: Negative   Leuk Esterase: Negative / RBC: 0-4 /HPF / WBC Negative /HPF   Sq Epi: x / Non Sq Epi: Neg.-Few / Bacteria: Negative /HPF     Xray Femur 2 Views, Left (10.31.22 @ 12:19)   IMPRESSION: Acute nondisplaced fracture intertrochanteric of the left   hip. Other findings  as above.    Xray Hip w/ Pelvis 1 View, Left (10.31.22 @ 12:19)   IMPRESSION: Acute nondisplaced fracture intertrochanteric of the left   hip.       VITALS  T(C): 36.7 (22 @ 12:50), Max: 37.3 (22 @ 19:59)  HR: 74 (22 @ 12:50) (71 - 86)  BP: 123/64 (22 @ 12:50) (105/66 - 123/64)  RR: 15 (22 @ 12:50) (13 - 16)  SpO2: 99% (22 @ 12:50) (95% - 99%)  Wt(kg): --    ALLERGIES  No Known Allergies      MEDICATIONS   acetaminophen     Tablet .. 650 milliGRAM(s) Oral every 6 hours PRN  amLODIPine   Tablet 10 milliGRAM(s) Oral daily  atorvastatin 10 milliGRAM(s) Oral at bedtime  enoxaparin Injectable 40 milliGRAM(s) SubCutaneous every 24 hours  gabapentin 800 milliGRAM(s) Oral three times a day  melatonin 3 milliGRAM(s) Oral at bedtime PRN  ondansetron Injectable 4 milliGRAM(s) IV Push every 8 hours PRN  sodium chloride 1 Gram(s) Oral three times a day      ----------------------------------------------------------------------------------------  PHYSICAL EXAM  Gen - NAD, Comfortable  HEENT - NCAT, EOMI, MMM  Neck - Supple, No limited ROM  Pulm - CTAB, No wheeze, No rhonchi, No crackles  Cardiovascular - RRR, S1S2, No murmurs  Abdomen - Soft, NT/ND, +BS  Extremities - No clubbing, no cyanosis, +edema to left hip, no calf tenderness  Neuro-     Cognitive - Awake, Alert, Oriented  to self, place, date, year, situation. Able to follow command     Communication - Fluent     Attention: Intact      Cranial Nerves - CN 2-12 intact. No facial asymmetry, Tongue midline, EOMI, Shoulder shrug intact     Motor -                     LEFT    UE - ShAB 5/5, EF 5/5, EE 5/5,  5/5                    RIGHT UE - ShAB 5/5, EF 5/5, EE 5/5,   5/5                    LEFT    LE - HF 3/5, KE 3/5, DF 5/5, PF 5/5                    RIGHT LE - HF 5/5, KE 5/5, DF 5/5, PF 5/5        Sensory - Intact to LT     Reflexes - DTR Intact     Coordination - FTN intact  Psychiatric - Mood stable, Affect WNL  Skin:  left hip surgical incision with aquacel dressing c/d/i

## 2022-11-04 LAB — SARS-COV-2 RNA SPEC QL NAA+PROBE: SIGNIFICANT CHANGE UP

## 2022-11-04 PROCEDURE — 99233 SBSQ HOSP IP/OBS HIGH 50: CPT

## 2022-11-04 RX ORDER — ENOXAPARIN SODIUM 100 MG/ML
40 INJECTION SUBCUTANEOUS EVERY 24 HOURS
Refills: 0 | Status: DISCONTINUED | OUTPATIENT
Start: 2022-11-06 | End: 2022-11-12

## 2022-11-04 RX ORDER — ACETAMINOPHEN 500 MG
650 TABLET ORAL EVERY 6 HOURS
Refills: 0 | Status: DISCONTINUED | OUTPATIENT
Start: 2022-11-05 | End: 2022-11-12

## 2022-11-04 RX ORDER — SENNA PLUS 8.6 MG/1
2 TABLET ORAL AT BEDTIME
Refills: 0 | Status: DISCONTINUED | OUTPATIENT
Start: 2022-11-05 | End: 2022-11-12

## 2022-11-04 RX ORDER — ATORVASTATIN CALCIUM 80 MG/1
10 TABLET, FILM COATED ORAL AT BEDTIME
Refills: 0 | Status: DISCONTINUED | OUTPATIENT
Start: 2022-11-05 | End: 2022-11-12

## 2022-11-04 RX ORDER — POLYETHYLENE GLYCOL 3350 17 G/17G
17 POWDER, FOR SOLUTION ORAL
Refills: 0 | Status: DISCONTINUED | OUTPATIENT
Start: 2022-11-05 | End: 2022-11-12

## 2022-11-04 RX ORDER — AMLODIPINE BESYLATE 2.5 MG/1
10 TABLET ORAL DAILY
Refills: 0 | Status: DISCONTINUED | OUTPATIENT
Start: 2022-11-06 | End: 2022-11-07

## 2022-11-04 RX ORDER — LEVOTHYROXINE SODIUM 125 MCG
25 TABLET ORAL DAILY
Refills: 0 | Status: DISCONTINUED | OUTPATIENT
Start: 2022-11-05 | End: 2022-11-12

## 2022-11-04 RX ORDER — GABAPENTIN 400 MG/1
800 CAPSULE ORAL THREE TIMES A DAY
Refills: 0 | Status: DISCONTINUED | OUTPATIENT
Start: 2022-11-05 | End: 2022-11-12

## 2022-11-04 RX ORDER — SODIUM CHLORIDE 9 MG/ML
1 INJECTION INTRAMUSCULAR; INTRAVENOUS; SUBCUTANEOUS THREE TIMES A DAY
Refills: 0 | Status: DISCONTINUED | OUTPATIENT
Start: 2022-11-05 | End: 2022-11-09

## 2022-11-04 RX ORDER — PREGABALIN 225 MG/1
1000 CAPSULE ORAL DAILY
Refills: 0 | Status: DISCONTINUED | OUTPATIENT
Start: 2022-11-05 | End: 2022-11-12

## 2022-11-04 RX ORDER — OXYCODONE HYDROCHLORIDE 5 MG/1
5 TABLET ORAL EVERY 6 HOURS
Refills: 0 | Status: DISCONTINUED | OUTPATIENT
Start: 2022-11-05 | End: 2022-11-10

## 2022-11-04 RX ORDER — LANOLIN ALCOHOL/MO/W.PET/CERES
3 CREAM (GRAM) TOPICAL AT BEDTIME
Refills: 0 | Status: DISCONTINUED | OUTPATIENT
Start: 2022-11-05 | End: 2022-11-12

## 2022-11-04 RX ADMIN — ENOXAPARIN SODIUM 40 MILLIGRAM(S): 100 INJECTION SUBCUTANEOUS at 08:30

## 2022-11-04 RX ADMIN — GABAPENTIN 800 MILLIGRAM(S): 400 CAPSULE ORAL at 05:48

## 2022-11-04 RX ADMIN — SODIUM CHLORIDE 1 GRAM(S): 9 INJECTION INTRAMUSCULAR; INTRAVENOUS; SUBCUTANEOUS at 05:48

## 2022-11-04 RX ADMIN — GABAPENTIN 800 MILLIGRAM(S): 400 CAPSULE ORAL at 13:25

## 2022-11-04 RX ADMIN — SODIUM CHLORIDE 1 GRAM(S): 9 INJECTION INTRAMUSCULAR; INTRAVENOUS; SUBCUTANEOUS at 13:25

## 2022-11-04 RX ADMIN — Medication 3 MILLIGRAM(S): at 21:18

## 2022-11-04 RX ADMIN — Medication 650 MILLIGRAM(S): at 21:18

## 2022-11-04 RX ADMIN — AMLODIPINE BESYLATE 10 MILLIGRAM(S): 2.5 TABLET ORAL at 05:48

## 2022-11-04 RX ADMIN — ATORVASTATIN CALCIUM 10 MILLIGRAM(S): 80 TABLET, FILM COATED ORAL at 21:19

## 2022-11-04 RX ADMIN — SODIUM CHLORIDE 1 GRAM(S): 9 INJECTION INTRAMUSCULAR; INTRAVENOUS; SUBCUTANEOUS at 21:19

## 2022-11-04 RX ADMIN — Medication 650 MILLIGRAM(S): at 21:38

## 2022-11-04 RX ADMIN — GABAPENTIN 800 MILLIGRAM(S): 400 CAPSULE ORAL at 21:19

## 2022-11-04 NOTE — PROGRESS NOTE ADULT - NS ATTEND AMEND GEN_ALL_CORE FT
Patient is accepted to and agreeable with acute rehab however was denied by insurance, requested P2P today  Possible reactive leukocytosis 2/2 recent stress/surgery has resolved  No obvious source of infection, negative UA on 11/2, CXR with opacity but no correlating symptoms, fever
feels well  agreeable to acute rehab  possible reactive leukocytosis - recent stress/surgery  no apparent infection  trend CBC, CMP in acute rehab  hospitalist consult in acute rehab  spent 38 mins
pain controlled  hydrate  acute rehab

## 2022-11-04 NOTE — PROGRESS NOTE ADULT - ASSESSMENT
76M with PMH HTN, HLD, hx of prostate Ca presents from home after mechanical fall off a stool in his kitchen on Saturday admitted for left intertrochanteric fracture.     #Left intertrochanteric fracture - s/p L Hip ORIF 10/31  - s/p ORIF with Dr Nuno-POD #4  - Pain control   - DVT ppx with Lovenox  - PT/OT recommending acute rehab--accepted by [facility]  - Discussed with PM&R NP Patricia, recommending acute rehab as well  - Surgery follow up 2 weeks     #Leukocytosis  - Resolved today  - UA negative  - CXR personally reviewed, read with left lower zone ill-defined opacity/infiltrates. Given patient is asymptomatic, afebrile and with normal WBC count, will watch off Abx at this time  - Continue monitoring labs in acute rehab    #HTN  - Continue Amlodipine  - Monitor vitals    #JESUS   - resolved     #Hx of hyponatremia  - Continue NaCl 1G TID  - Monitor BMP    #Neuropathy  - Continue gabapentin BID    #HLD  - Continue Lipitor    #Hypothyroidism  - Continue Synthroid    #DVT ppx  - Lovenox    Dispo: Acute rehab eval as above, d/w SW team. Medically stable     11/2: Left VM with callback # for daughter Kimberlyn (in [place]) at [phone]  11/3: Called damir Sofia, no answer  11/4: Called damir Sofia, no answer  76M with PMH HTN, HLD, hx of prostate Ca presents from home after mechanical fall off a stool in his kitchen on Saturday admitted for left intertrochanteric fracture.     #Left intertrochanteric fracture - s/p L Hip ORIF 10/31  - s/p ORIF with Dr Nuno-POD #4  - Pain control   - DVT ppx with Lovenox  - PT/OT recommending acute rehab--  - Denied by acute rehab   - Surgery follow up 2 weeks     #Leukocytosis  - Resolved today  - UA negative  - CXR personally reviewed, read with left lower zone ill-defined opacity/infiltrates. Given patient is asymptomatic, afebrile and with normal WBC count, will watch off Abx at this time  - Continue monitoring labs in acute rehab    #HTN  - Continue Amlodipine  - Monitor vitals    #JESUS   - resolved     #Hx of hyponatremia  - Continue NaCl 1G TID  - Monitor BMP    #Neuropathy  - Continue gabapentin BID    #HLD  - Continue Lipitor    #Hypothyroidism  - Continue Synthroid    #DVT ppx  - Lovenox    Dispo: awaiting peer to peer for acute rehab as pt was denied     11/2: Left VM with callback # for daughter Kimberlyn (in [place]) at [phone]  11/3: Called daughter Kimberlyn, no answer  11/4: Called damir Sofia, no answer

## 2022-11-04 NOTE — PROGRESS NOTE ADULT - SUBJECTIVE AND OBJECTIVE BOX
Patient is a 76y old  Male who presents with a chief complaint of left hip fracture (2022 13:43)    Eager to go to acute rehab   Using incentive spirometer   Patient seen and examined at bedside.    ALLERGIES:  No Known Allergies    MEDICATIONS  (STANDING):  amLODIPine   Tablet 10 milliGRAM(s) Oral daily  atorvastatin 10 milliGRAM(s) Oral at bedtime  enoxaparin Injectable 40 milliGRAM(s) SubCutaneous every 24 hours  gabapentin 800 milliGRAM(s) Oral three times a day  sodium chloride 1 Gram(s) Oral three times a day    MEDICATIONS  (PRN):  acetaminophen     Tablet .. 650 milliGRAM(s) Oral every 6 hours PRN Mild Pain (1 - 3)  melatonin 3 milliGRAM(s) Oral at bedtime PRN Insomnia  ondansetron Injectable 4 milliGRAM(s) IV Push every 8 hours PRN Nausea and/or Vomiting    Vital Signs Last 24 Hrs  T(F): 97.9 (2022 05:47), Max: 98.4 (2022 19:59)  HR: 71 (2022 05:47) (71 - 81)  BP: 125/73 (2022 05:47) (120/68 - 125/73)  RR: 20 (2022 05:47) (15 - 20)  SpO2: 99% (2022 05:47) (99% - 100%)  I&O's Summary    2022 07:01  -  2022 07:00  --------------------------------------------------------  IN: 0 mL / OUT: 2500 mL / NET: -2500 mL      BMI (kg/m2): 26.3 (10-31-22 @ 10:36)  PHYSICAL EXAM:  General: NAD, A/O x 3  ENT: MMM, no thrush  Neck: Supple, No JVD  Lungs: Non labored breathing,  Clear to auscultation bilaterally,   Cardio: RRR, S1/S2,  no pitting edema bilaterally  Abdomen: Soft, Nontender, Nondistended; Bowel sounds present  Extremities: No calf tenderness, L hip aquacell incision c/d/i    LABS:                        9.2    9.06  )-----------( 207      ( 2022 08:03 )             27.6       11-03    138  |  104  |  26  ----------------------------<  103  3.9   |  26  |  1.28    Ca    9.1      2022 08:03    TPro  6.2  /  Alb  2.8  /  TBili  0.8  /  DBili  0.2  /  AST  45  /  ALT  40  /  AlkPhos  32  11-02                                  Urinalysis Basic - ( 2022 16:55 )    Color: Yellow / Appearance: Clear / S.010 / pH: x  Gluc: x / Ketone: Negative  / Bili: Negative / Urobili: Negative   Blood: x / Protein: Negative / Nitrite: Negative   Leuk Esterase: Negative / RBC: 0-4 /HPF / WBC Negative /HPF   Sq Epi: x / Non Sq Epi: Neg.-Few / Bacteria: Negative /HPF        COVID-19 PCR: Lety (10-31-22 @ 12:42)      RADIOLOGY & ADDITIONAL TESTS:    Care Discussed with Consultants/Other Providers:

## 2022-11-05 ENCOUNTER — TRANSCRIPTION ENCOUNTER (OUTPATIENT)
Age: 77
End: 2022-11-05

## 2022-11-05 ENCOUNTER — INPATIENT (INPATIENT)
Facility: HOSPITAL | Age: 77
LOS: 6 days | Discharge: HOME HEALTH | DRG: 949 | End: 2022-11-12
Attending: PHYSICAL MEDICINE & REHABILITATION | Admitting: PHYSICAL MEDICINE & REHABILITATION
Payer: COMMERCIAL

## 2022-11-05 VITALS
WEIGHT: 197.53 LBS | TEMPERATURE: 98 F | SYSTOLIC BLOOD PRESSURE: 102 MMHG | HEIGHT: 74 IN | HEART RATE: 85 BPM | DIASTOLIC BLOOD PRESSURE: 64 MMHG | RESPIRATION RATE: 14 BRPM

## 2022-11-05 VITALS
HEART RATE: 89 BPM | RESPIRATION RATE: 15 BRPM | TEMPERATURE: 98 F | DIASTOLIC BLOOD PRESSURE: 56 MMHG | SYSTOLIC BLOOD PRESSURE: 111 MMHG | OXYGEN SATURATION: 98 %

## 2022-11-05 DIAGNOSIS — Z98.890 OTHER SPECIFIED POSTPROCEDURAL STATES: Chronic | ICD-10-CM

## 2022-11-05 DIAGNOSIS — Z90.79 ACQUIRED ABSENCE OF OTHER GENITAL ORGAN(S): Chronic | ICD-10-CM

## 2022-11-05 DIAGNOSIS — S72.146A NONDISPLACED INTERTROCHANTERIC FRACTURE OF UNSPECIFIED FEMUR, INITIAL ENCOUNTER FOR CLOSED FRACTURE: ICD-10-CM

## 2022-11-05 LAB — SARS-COV-2 RNA SPEC QL NAA+PROBE: SIGNIFICANT CHANGE UP

## 2022-11-05 PROCEDURE — 85610 PROTHROMBIN TIME: CPT

## 2022-11-05 PROCEDURE — 86901 BLOOD TYPING SEROLOGIC RH(D): CPT

## 2022-11-05 PROCEDURE — 36415 COLL VENOUS BLD VENIPUNCTURE: CPT

## 2022-11-05 PROCEDURE — 85027 COMPLETE CBC AUTOMATED: CPT

## 2022-11-05 PROCEDURE — 80076 HEPATIC FUNCTION PANEL: CPT

## 2022-11-05 PROCEDURE — 80053 COMPREHEN METABOLIC PANEL: CPT

## 2022-11-05 PROCEDURE — 84145 PROCALCITONIN (PCT): CPT

## 2022-11-05 PROCEDURE — C1713: CPT

## 2022-11-05 PROCEDURE — 85730 THROMBOPLASTIN TIME PARTIAL: CPT

## 2022-11-05 PROCEDURE — 99221 1ST HOSP IP/OBS SF/LOW 40: CPT | Mod: GC

## 2022-11-05 PROCEDURE — 97116 GAIT TRAINING THERAPY: CPT

## 2022-11-05 PROCEDURE — 85025 COMPLETE CBC W/AUTO DIFF WBC: CPT

## 2022-11-05 PROCEDURE — 73501 X-RAY EXAM HIP UNI 1 VIEW: CPT

## 2022-11-05 PROCEDURE — 71045 X-RAY EXAM CHEST 1 VIEW: CPT

## 2022-11-05 PROCEDURE — 76000 FLUOROSCOPY <1 HR PHYS/QHP: CPT

## 2022-11-05 PROCEDURE — 80048 BASIC METABOLIC PNL TOTAL CA: CPT

## 2022-11-05 PROCEDURE — 93005 ELECTROCARDIOGRAM TRACING: CPT

## 2022-11-05 PROCEDURE — 97110 THERAPEUTIC EXERCISES: CPT

## 2022-11-05 PROCEDURE — 73552 X-RAY EXAM OF FEMUR 2/>: CPT

## 2022-11-05 PROCEDURE — 86850 RBC ANTIBODY SCREEN: CPT

## 2022-11-05 PROCEDURE — 87635 SARS-COV-2 COVID-19 AMP PRB: CPT

## 2022-11-05 PROCEDURE — C1776: CPT

## 2022-11-05 PROCEDURE — 99232 SBSQ HOSP IP/OBS MODERATE 35: CPT

## 2022-11-05 PROCEDURE — 86900 BLOOD TYPING SEROLOGIC ABO: CPT

## 2022-11-05 PROCEDURE — 97162 PT EVAL MOD COMPLEX 30 MIN: CPT

## 2022-11-05 PROCEDURE — 99285 EMERGENCY DEPT VISIT HI MDM: CPT

## 2022-11-05 PROCEDURE — 97166 OT EVAL MOD COMPLEX 45 MIN: CPT

## 2022-11-05 PROCEDURE — 81001 URINALYSIS AUTO W/SCOPE: CPT

## 2022-11-05 RX ORDER — OMEGA-3 ACID ETHYL ESTERS 1 G
1 CAPSULE ORAL
Qty: 0 | Refills: 0 | DISCHARGE

## 2022-11-05 RX ADMIN — Medication 3 MILLIGRAM(S): at 21:56

## 2022-11-05 RX ADMIN — SODIUM CHLORIDE 1 GRAM(S): 9 INJECTION INTRAMUSCULAR; INTRAVENOUS; SUBCUTANEOUS at 05:54

## 2022-11-05 RX ADMIN — GABAPENTIN 800 MILLIGRAM(S): 400 CAPSULE ORAL at 13:47

## 2022-11-05 RX ADMIN — SODIUM CHLORIDE 1 GRAM(S): 9 INJECTION INTRAMUSCULAR; INTRAVENOUS; SUBCUTANEOUS at 13:48

## 2022-11-05 RX ADMIN — SENNA PLUS 2 TABLET(S): 8.6 TABLET ORAL at 21:56

## 2022-11-05 RX ADMIN — ENOXAPARIN SODIUM 40 MILLIGRAM(S): 100 INJECTION SUBCUTANEOUS at 11:25

## 2022-11-05 RX ADMIN — SODIUM CHLORIDE 1 GRAM(S): 9 INJECTION INTRAMUSCULAR; INTRAVENOUS; SUBCUTANEOUS at 21:56

## 2022-11-05 RX ADMIN — GABAPENTIN 800 MILLIGRAM(S): 400 CAPSULE ORAL at 21:57

## 2022-11-05 RX ADMIN — AMLODIPINE BESYLATE 10 MILLIGRAM(S): 2.5 TABLET ORAL at 05:55

## 2022-11-05 RX ADMIN — ATORVASTATIN CALCIUM 10 MILLIGRAM(S): 80 TABLET, FILM COATED ORAL at 21:56

## 2022-11-05 RX ADMIN — GABAPENTIN 800 MILLIGRAM(S): 400 CAPSULE ORAL at 05:54

## 2022-11-05 NOTE — H&P ADULT - NSHPPHYSICALEXAM_GEN_ALL_CORE
Constitutional - NAD, Comfortable  HEENT - NCAT, EOMI  Neck - Supple, No limited ROM  Chest - good chest expansion, good respiratory effort, CTAB   Cardio - warm and well perfused, RRR, no murmur  Abdomen -  Soft, NTND  Extremities - some edema at left hip, No calf tenderness   Neurologic Exam:                    Cognitive -             Orientation: Awake, Alert, AAO to self, place, date, year, situation            Attention:  Days of week, recall 3 objects without cuing            Memory: Recent/ remote memory intact            Thought: process, content appropriate     Speech - Fluent, Comprehensible, No dysarthria, No aphasia      Cranial Nerves - No facial asymmetry, Tongue midline, EOMI, Shoulder shrug intact     Motor -                      LEFT    UE - ShAB 5/5, EF 5/5, EE 5/5, WE 5/5,  WNL                    RIGHT UE - ShAB 5/5, EF 5/5, EE 5/5, WE 5/5,  WNL                    LEFT    LE - HF 4-/5, KE 5/5, DF 5/5, PF 5/5                    RIGHT LE - HF 4+/5, KE 5/5, DF 5/5, PF 5/5        Sensory - Intact to LT bilateral     Reflexes - DTR  intact     Coordination - FTN intact     OculoVestibular -  No nystagmus  Psychiatric - Mood stable, Affect WNL  Skin on admission: left hip surgical incision with aquacel dressing c/d/i

## 2022-11-05 NOTE — H&P ADULT - ASSESSMENT
This is a 75 YO male with PMH of HTN, HLD, hx of prostate CA who presented to [facility] on 10/31 from home after mechanical fall at home. CT imaging showed intertrochanteric fracture of left hip. He is s/p ORIF Left hip on 10/31. Post-op course significant for mild JESUS s/p IVF, leukocytosis likely reactive: no source of infection noted, and hyponatremia.     #Comprehensive Multidisciplinary Rehab Program:  - Gait, ADL, Functional impairments  - PT/OT/ SLP 3 hours a day 5 days a week    #Left intertrochanteric fracture - s/p L Hip ORIF 10/31  follow up with surgery in 2 weeks     #HTN   -continue amlodipine 10mg PO QD   -monitor vitals    #Hx of hyponatremia  -Continue NaCl 1g TID   -Monitor BMP     #HLD   Continue Lipitor 10mg PO at bedtime     #Hypothyroidism   Continue synthroid     #Mood / Cognition:  - Neuropsych evaluation     #Sleep:  - Maintain quiet hours and low stim environment  - Melatonin 3mg PO at bedtime, PRN    #Pain:  - Tylenol 650mg PO q6 Q6, PRN for moderate pain (1-3)   -Gabapentin 800mg PO TID   - avoid sedating meds that may affect cognitive recovery    #GI/Bowel:  - Zofran PRN for N/V  - Senna 2 tabs daily  - GI ppx:     #/Bladder:  - Monitor PVR if no void in 8h; SC for >400 cc  - Toileting schedule q4h    #Diet / Dysphagia:    - Diet: Regular  - ongoing SLP assessment  - Nutrition to follow    #Skin/ Pressure Injury Prevention:  - assessment on admission   - Incisions:  - Turn Q2hrs in bed while awake, OOB to Chair, PT/OT/SLP     #DVT prophylaxis:  - Lovenox 40mg QD   - SCDs  - Doppler not performed     #Precautions/ Restrictions  - Falls  - Ortho:      Weight bearing status: WBAT  - COVID PCR: COVID Neg 11/4/22    --------------------------------------------  Outpatient Follow up:  Lizeth Ross)  Family Medicine  [address] [place] [address], [address]  [place] [address]  Phone: [phone]  Fax: [phone]  Follow Up Time:     Mike Nuno)  Orthopaedic Sports Medicine; Orthopaedic Surgery  [address] [facility], [address]  [place] [address]  Phone: [phone]  Fax: [phone]  Follow Up Time: 2 weeks  Jeannine Flores  [facility] Physician [facility]  NEPHRO [address]  Scheduled Appointment: 11/11/2022    Irineo Gunn  [facility]  CARDIOLOGY [address]  Scheduled Appointment: 12/13/2022    Gayle Reynoso  [facility]  NEUROLOGY [address]  Scheduled Appointment: 12/21/2022    Chele Vaughn  [facility]  Med Endocr [address] [facility]  Scheduled Appointment: 01/27/2023        --------------------------------------------      MEDICAL PROGNOSIS: GOOD            REHAB POTENTIAL: GOOD             ESTIMATED DISPOSITION: HOME WITH HOME CARE            ELOS: 10-14 Days   EXPECTED THERAPY:     P.T. 1hr/day       O.T. 1hr/day      S.L.P. 1hr/day     P&O Unnecessary     EXP FREQUENCY: 5 days per 7 day period     PRESCREEN COMPARISON:   I have reviewed the prescreen information and I have found no relevant changes between the preadmission screening and my post admission evaluation     RATIONALE FOR INPATIENT ADMISSION - Patient demonstrates the following: (check all that apply)  [X] Medically appropriate for rehabilitation admission  [X] Has attainable rehab goals with an appropriate initial discharge plan  [X] Has rehabilitation potential (expected to make a significant improvement within a reasonable period of time)   [X] Requires close medical management by a rehab physician, rehab nursing care, Hospitalist and comprehensive interdisciplinary team (including PT, OT, & or SLP, Prosthetics and Orthotics)     This is a 77 YO male with PMH of HTN, HLD, hx of prostate CA who presented to [facility] on 10/31 from home after mechanical fall at home. CT imaging showed intertrochanteric fracture of left hip. He is s/p ORIF Left hip on 10/31. Post-op course significant for mild JESUS s/p IVF, leukocytosis likely reactive: no source of infection noted, and hyponatremia.     #Comprehensive Multidisciplinary Rehab Program:  - Gait, ADL, Functional impairments  - PT/OT/ SLP 3 hours a day 5 days a week    #Left intertrochanteric fracture - s/p L Hip ORIF 10/31  follow up with surgery in 2 weeks     #HTN   -continue amlodipine 10mg PO QD   -monitor vitals    #Hx of hyponatremia  -Continue NaCl 1g TID   -Monitor BMP     #HLD   Continue Lipitor 10mg PO at bedtime     #Hypothyroidism   Continue synthroid     #Mood / Cognition:  - Neuropsych evaluation     #Sleep:  - Maintain quiet hours and low stim environment  - Melatonin 3mg PO at bedtime, PRN    #Pain:  - Tylenol 650mg PO q6 Q6, PRN for moderate pain (1-3)   -Gabapentin 800mg PO TID   - avoid sedating meds that may affect cognitive recovery    #GI/Bowel:  - Zofran PRN for N/V  - Senna 2 tabs daily  - GI ppx:     #/Bladder:  - Monitor PVR if no void in 8h; SC for >400 cc  - Toileting schedule q4h    #Diet / Dysphagia:    - Diet: Regular  - ongoing SLP assessment  - Nutrition to follow    #Skin/ Pressure Injury Prevention:  - Incisions: left hip surgical incision with aquacel dressing c/d/i  - Turn Q2hrs in bed while awake, OOB to Chair, PT/OT/SLP     #DVT prophylaxis:  - Lovenox 40mg QD   - SCDs  - Doppler not performed     #Precautions/ Restrictions  - Falls  - Ortho:      Weight bearing status: WBAT  - COVID PCR: COVID Neg 11/4/22    --------------------------------------------  Outpatient Follow up:  Lizeth Ross)  Family Medicine  [address] [place] [address], [address]  [place] [address]  Phone: [phone]  Fax: [phone]  Follow Up Time:     Mike Nuno)  Orthopaedic Sports Medicine; Orthopaedic Surgery  [address] [facility], [address]  [place] [address]  Phone: [phone]  Fax: [phone]  Follow Up Time: 2 weeks  Jeannine Flores  [facility] Physician [facility]  NEPHRO [address]  Scheduled Appointment: 11/11/2022    Irineo Gunn  [facility] Physician [facility]  CARDIOLOGY [address]  Scheduled Appointment: 12/13/2022    Gayle Reynoso  [facility] Physician [facility]  NEUROLOGY [address]  Scheduled Appointment: 12/21/2022    Chele Vaughn  [facility]  Med Endocr [address] [facility]  Scheduled Appointment: 01/27/2023        --------------------------------------------      MEDICAL PROGNOSIS: GOOD            REHAB POTENTIAL: GOOD             ESTIMATED DISPOSITION: HOME WITH HOME CARE            ELOS: 10-14 Days   EXPECTED THERAPY:     P.T. 1hr/day       O.T. 1hr/day      S.L.P. 1hr/day     P&O Unnecessary     EXP FREQUENCY: 5 days per 7 day period     PRESCREEN COMPARISON:   I have reviewed the prescreen information and I have found no relevant changes between the preadmission screening and my post admission evaluation     RATIONALE FOR INPATIENT ADMISSION - Patient demonstrates the following: (check all that apply)  [X] Medically appropriate for rehabilitation admission  [X] Has attainable rehab goals with an appropriate initial discharge plan  [X] Has rehabilitation potential (expected to make a significant improvement within a reasonable period of time)   [X] Requires close medical management by a rehab physician, rehab nursing care, Hospitalist and comprehensive interdisciplinary team (including PT, OT, & or SLP, Prosthetics and Orthotics)

## 2022-11-05 NOTE — PROGRESS NOTE ADULT - SUBJECTIVE AND OBJECTIVE BOX
Patient is a 76y old  Male who presents with a chief complaint of left hip fracture (2022 09:28)    Patient seen and examined at bedside. No acute overnight events.     ALLERGIES:  No Known Allergies    MEDICATIONS  (STANDING):  amLODIPine   Tablet 10 milliGRAM(s) Oral daily  atorvastatin 10 milliGRAM(s) Oral at bedtime  enoxaparin Injectable 40 milliGRAM(s) SubCutaneous every 24 hours  gabapentin 800 milliGRAM(s) Oral three times a day  sodium chloride 1 Gram(s) Oral three times a day    MEDICATIONS  (PRN):  acetaminophen     Tablet .. 650 milliGRAM(s) Oral every 6 hours PRN Mild Pain (1 - 3)  melatonin 3 milliGRAM(s) Oral at bedtime PRN Insomnia  ondansetron Injectable 4 milliGRAM(s) IV Push every 8 hours PRN Nausea and/or Vomiting    Vital Signs Last 24 Hrs  T(F): 98.3 (2022 05:29), Max: 98.3 (2022 05:29)  HR: 74 (2022 05:29) (74 - 86)  BP: 119/68 (2022 05:29) (119/68 - 126/64)  RR: 14 (2022 05:29) (14 - 16)  SpO2: 96% (2022 05:29) (94% - 96%)    I&O's Summary    2022 07:01  -  2022 07:00  --------------------------------------------------------  IN: 0 mL / OUT: 1600 mL / NET: -1600 mL      BMI (kg/m2): 26.3 (10-31-22 @ 10:36)    PHYSICAL EXAM:  General: NAD, lying in bed  Eyes: Anicteric  ENT: Moist mucous membranes  Neck: Supple, No JVD  Lungs: Clear to auscultation bilaterally, normal respiratory effort  Cardio: RRR, S1/S2, No murmurs  Abdomen: Soft, Nontender, Nondistended; Bowel sounds present  Extremities: No calf tenderness, No pitting edema, no digital cyanosis; left hip pressure dressings c/d/i  Skin: Warm, dry  Psych: A&O x 3, follows commands    LABS:                        9.2    9.06  )-----------( 207      ( 2022 08:03 )             27.6       11-    138  |  104  |  26  ----------------------------<  103  3.9   |  26  |  1.28    Ca    9.1      2022 08:03                                    Urinalysis Basic - ( 2022 16:55 )    Color: Yellow / Appearance: Clear / S.010 / pH: x  Gluc: x / Ketone: Negative  / Bili: Negative / Urobili: Negative   Blood: x / Protein: Negative / Nitrite: Negative   Leuk Esterase: Negative / RBC: 0-4 /HPF / WBC Negative /HPF   Sq Epi: x / Non Sq Epi: Neg.-Few / Bacteria: Negative /HPF        COVID-19 PCR: NotDetec (22 @ 16:30)  COVID-19 PCR: NotDetec (10-31-22 @ 12:42)      RADIOLOGY & ADDITIONAL TESTS:   Personally reviewed.     Consultant(s) Notes Reviewed:  [x] YES  [ ] NO    Care Discussed with Consultants/Other Providers:

## 2022-11-05 NOTE — H&P ADULT - HISTORY OF PRESENT ILLNESS
This is a 75 YO male with PMH of HTN, HLD, hx of prostate CA, neuropathy who  presented to [facility] on 10/31 from home after mechanical fall off a stool in his kitchen on Saturday. Reports hitting his left hip on the tiled floor, was able to get up and ambulate afterwards.  He reported sharp left hip pain, rated 8/10 with movement. Patient has been using an old cane at home to ambulate due to pain, but at baseline ambulates independently. Denies numbness, tingling. Took Tylenol for his pain with improvement. In the ED:  temp 98.3, /77, , RR 16, Spo2 98% RA. H/H: 9.1/27.1. CT imaging showed intertrochanteric fracture of left hip. Ortho consulted and he was taken to OR on 10/31 for ORIF Left hip. Post-op course significant for mild JESUS s/p IVF, leukocytosis likely reactive no source of infection noted.

## 2022-11-05 NOTE — PROGRESS NOTE ADULT - ASSESSMENT
76M with PMH HTN, HLD, hx of prostate Ca presents from home after mechanical fall off a stool in his kitchen on Saturday admitted for left intertrochanteric fracture.     #Left intertrochanteric fracture - s/p L Hip ORIF 10/31  - s/p ORIF with Dr Nuno  - Pain control   - DVT ppx with Lovenox  - PT/OT recommending acute rehab  - Accepted by acute rehab, did P2P with insurance 11/4 in the afternoon - accepted, awaiting bed  - Surgery follow up in 2 weeks     #Leukocytosis  - Resolved  - UA negative  - CXR personally reviewed, read with left lower zone ill-defined opacity/infiltrates. Given patient is asymptomatic, afebrile and with normal WBC count, will watch off Abx at this time  - Continue monitoring labs in acute rehab    #Anemia  - Present since admission in 9s, was 10.9-11 in Sept  - No s/s of bleeding  - Continue monitoring CBC postoperatively    #HTN  - Continue Amlodipine  - Monitor vitals    #JESUS   - resolved     #Hx of hyponatremia  - Continue NaCl 1G TID  - Monitor BMP    #Neuropathy  - Continue gabapentin BID    #HLD  - Continue Lipitor    #Hypothyroidism  - Continue Synthroid    #DVT ppx  - Lovenox    Dispo: Acute Rehab    11/2: Left VM with callback # for damir Sofia (in [place]) at [phone]  11/3: Called damir Sofia, no answer  11/4: Called damir Sofia, no answer  76M with PMH HTN, HLD, hx of prostate Ca presents from home after mechanical fall off a stool in his kitchen on Saturday admitted for left intertrochanteric fracture.     #Left intertrochanteric fracture - s/p L Hip ORIF 10/31  - s/p ORIF with Dr Nuno  - Pain control   - DVT ppx with Lovenox  - PT/OT recommending acute rehab  - Accepted by acute rehab, did P2P with insurance 11/4 in the afternoon - accepted, awaiting bed  - Surgery follow up in 2 weeks     #Leukocytosis  - Resolved  - UA negative  - CXR personally reviewed, read with left lower zone ill-defined opacity/infiltrates. Given patient is asymptomatic, afebrile and with normal WBC count, will watch off Abx at this time  - Continue monitoring labs in acute rehab    #Anemia  - Present since admission in 9s, was 10.9-11 in Sept  - No s/s of bleeding  - Continue monitoring CBC postoperatively    #HTN  - Continue Amlodipine  - Monitor vitals    #JESUS   - resolved     #Hx of hyponatremia  - Continue NaCl 1G TID  - Monitor BMP    #Neuropathy  - Continue gabapentin BID    #HLD  - Continue Lipitor    #Hypothyroidism  - Continue Synthroid    #DVT ppx  - Lovenox    Dispo: Acute Rehab    11/2: Left VM with callback # for daughter Kimberlyn (in [place]) at [phone]  11/3: Called daughter Kimberlyn, no answer  11/4: Called daughter Kimberlyn, no answer  11/6: Patient spoke with his daughter Kimberlny today [Postmenopausal] : postmenopausal

## 2022-11-05 NOTE — DIETITIAN INITIAL EVALUATION ADULT - OTHER INFO
76M with PMH HTN, HLD, hx of prostate Ca presents from home after mechanical fall off a stool in his kitchen, patient s/p ORIF (L) hip , Diet advanced to Regular , tolerating well , appetite is reported good , consuming between % , no GI issues reported (+) BM (11/4), surgical incision

## 2022-11-05 NOTE — PATIENT PROFILE ADULT - FALL HARM RISK - HARM RISK INTERVENTIONS
Assistance with ambulation/Assistance OOB with selected safe patient handling equipment/Communicate Risk of Fall with Harm to all staff/Discuss with provider need for PT consult/Monitor gait and stability/Provide patient with walking aids - walker, cane, crutches/Reinforce activity limits and safety measures with patient and family/Sit up slowly, dangle for a short time, stand at bedside before walking/Tailored Fall Risk Interventions/Use of alarms - bed, chair and/or voice tab/Visual Cue: Yellow wristband and red socks/Bed in lowest position, wheels locked, appropriate side rails in place/Call bell, personal items and telephone in reach/Instruct patient to call for assistance before getting out of bed or chair/Non-slip footwear when patient is out of bed/[place] to call system/Physically safe environment - no spills, clutter or unnecessary equipment/Purposeful Proactive Rounding/Room/bathroom lighting operational, light cord in reach

## 2022-11-05 NOTE — PATIENT PROFILE ADULT - FUNCTIONAL ASSESSMENT - BASIC MOBILITY 6.
3-calculated by average/Not able to assess (calculate score using [facility] averaging method)

## 2022-11-05 NOTE — DISCHARGE NOTE NURSING/CASE MANAGEMENT/SOCIAL WORK - PATIENT PORTAL LINK FT
You can access the FollowMyHealth Patient Portal offered by [facility] by registering at the following website: [email or web address]. By joining [facility]’s FollowMyHealth portal, you will also be able to view your health information using other applications (apps) compatible with our system.

## 2022-11-05 NOTE — PROGRESS NOTE ADULT - REASON FOR ADMISSION
left hip fracture

## 2022-11-05 NOTE — PROGRESS NOTE ADULT - PROVIDER SPECIALTY LIST ADULT
Critical Care
Critical Care
Hospitalist
Surgery
Surgery

## 2022-11-05 NOTE — H&P ADULT - NSHPLABSRESULTS_GEN_ALL_CORE
RECENT LABS    Vital Signs Last 24 Hrs  T(C): 36.8 (05 Nov 2022 05:29), Max: 36.8 (04 Nov 2022 20:05)  T(F): 98.3 (05 Nov 2022 05:29), Max: 98.3 (05 Nov 2022 05:29)  HR: 97 (05 Nov 2022 09:41) (74 - 97)  BP: 151/72 (05 Nov 2022 09:41) (119/68 - 151/72)  BP(mean): --  RR: 14 (05 Nov 2022 05:29) (14 - 16)  SpO2: 97% (05 Nov 2022 09:41) (94% - 97%)        CAPILLARY BLOOD GLUCOSE         Xray Femur 2 Views, Left (10.31.22 @ 12:19)   IMPRESSION: Acute nondisplaced fracture intertrochanteric of the left   hip. Other findings  as above.    Xray Hip w/ Pelvis 1 View, Left (10.31.22 @ 12:19)   IMPRESSION: Acute nondisplaced fracture intertrochanteric of the left   hip.

## 2022-11-05 NOTE — H&P ADULT - ATTENDING COMMENTS
Seen and examined     75y/o M male Hx HTN, HLD, hx of prostate CA. Admitted to [facility] on 10/31 from home post fall. CT imaging showed intertrochanteric fracture of left hip. Now s/p ORIF Left hip on 10/31. Post-op course significant for mild JESUS s/p IVF, leukocytosis likely reactive:, and hyponatremia. on salt tabs  Acute rehab admission 11/5    Had an RRT today for episodic decreased consciousness with stiffness of both legs, lasting few seconds. with return to full consciousness  Examination showed no acute neurological findings or memory deficits  Neurology has recommended carotid duplex ultrasound (arterial0 and EEG  Baseline investigation with CTH ordered    He has been community-dwelling in private home, independent function, driving.  Ambulatory with cane/walker    Reports similar episode of decreased consciousness while hospitalized in the past, and similar episodes with positional changes, sit to stand    Alert and fully oriented  Normal LT sensation   MMT 4+/5 and 3/5 Left hip/knee  aquacell to left hip and thigh, no bleeding\    RECENT LABS/IMAGING                        10.3   10.28 )-----------( 289      ( 06 Nov 2022 08:36 )             31.1     11-06    135  |  100  |  25<H>  ----------------------------<  120<H>  4.2   |  25  |  1.39<H>    Ca    9.4      06 Nov 2022 08:36  Phos  4.1     11-06  Mg     2.1     11-06    TPro  6.9  /  Alb  3.2<L>  /  TBili  1.2  /  DBili  x   /  AST  25  /  ALT  37  /  AlkPhos  49  11-06    PT/INR - ( 06 Nov 2022 08:36 )   PT: 12.1 sec;   INR: 1.04 ratio         PTT - ( 06 Nov 2022 08:36 )  PTT:26.3 sec      Dx Left Intertrochanteric fracture, s/p ORIF Left hip on 10/31  Commence PT/OT  Ortho recs for wound care and Wt bearing status to be clarified  Can continue therapy WBAT Left Leg for now    vit d and vit b12 in next labs  Hyponatremia--continue salt tabs, taper off once Na level normal for sustained period  JESUS--Cr 1.39 slight risk--continue IVF as ordered    F/u result of test ordered post RRT and others recs by neurology  Ext dc to be determined at IDT Seen and examined     75y/o M male Hx HTN, HLD, hx of prostate CA. Admitted to [facility] on 10/31 from home post fall. CT imaging showed intertrochanteric fracture of left hip. Now s/p ORIF Left hip on 10/31. Post-op course significant for mild JESUS s/p IVF, leukocytosis likely reactive:, and hyponatremia. on salt tabs  Acute rehab admission 11/5    Had an RRT today for episodic decreased consciousness with stiffness of both legs, lasting few seconds. with return to full consciousness  Examination showed no acute neurological findings or memory deficits  Neurology has recommended carotid duplex ultrasound (arterial0 and EEG  Baseline investigation with CTH ordered    He has been community-dwelling in private home, independent function, driving.  Ambulatory with cane/walker    Reports similar episode of decreased consciousness while hospitalized in the past, and similar episodes with positional changes, sit to stand    Alert and fully oriented  Normal LT sensation   MMT 4+/5 and 3/5 Left hip/knee  aquacell to left hip and thigh, no bleeding\    RECENT LABS/IMAGING                        10.3   10.28 )-----------( 289      ( 06 Nov 2022 08:36 )             31.1     11-06    135  |  100  |  25<H>  ----------------------------<  120<H>  4.2   |  25  |  1.39<H>    Ca    9.4      06 Nov 2022 08:36  Phos  4.1     11-06  Mg     2.1     11-06    TPro  6.9  /  Alb  3.2<L>  /  TBili  1.2  /  DBili  x   /  AST  25  /  ALT  37  /  AlkPhos  49  11-06    PT/INR - ( 06 Nov 2022 08:36 )   PT: 12.1 sec;   INR: 1.04 ratio         PTT - ( 06 Nov 2022 08:36 )  PTT:26.3 sec      Dx Left Intertrochanteric fracture, s/p ORIF Left hip on 10/31  Commence PT/OT  Ortho recs for wound care and Wt bearing status to be clarified  Can continue therapy WBAT Left Leg for now    vit d and vit b12 in next labs  Hyponatremia--continue salt tabs, taper off once Na level normal for sustained period  JESUS--Cr 1.39 slight increase--continue IVF as ordered    F/u result of test ordered post RRT and others recs by neurology  Ext dc to be determined at IDT

## 2022-11-06 LAB
ALBUMIN SERPL ELPH-MCNC: 2.7 G/DL — LOW (ref 3.3–5)
ALBUMIN SERPL ELPH-MCNC: 3.2 G/DL — LOW (ref 3.3–5)
ALP SERPL-CCNC: 43 U/L — SIGNIFICANT CHANGE UP (ref 40–120)
ALP SERPL-CCNC: 49 U/L — SIGNIFICANT CHANGE UP (ref 40–120)
ALT FLD-CCNC: 35 U/L — SIGNIFICANT CHANGE UP (ref 10–45)
ALT FLD-CCNC: 37 U/L — SIGNIFICANT CHANGE UP (ref 10–45)
ANION GAP SERPL CALC-SCNC: 10 MMOL/L — SIGNIFICANT CHANGE UP (ref 5–17)
ANION GAP SERPL CALC-SCNC: 6 MMOL/L — SIGNIFICANT CHANGE UP (ref 5–17)
APTT BLD: 26.3 SEC — LOW (ref 27.5–35.5)
AST SERPL-CCNC: 20 U/L — SIGNIFICANT CHANGE UP (ref 10–40)
AST SERPL-CCNC: 25 U/L — SIGNIFICANT CHANGE UP (ref 10–40)
BASOPHILS # BLD AUTO: 0.04 K/UL — SIGNIFICANT CHANGE UP (ref 0–0.2)
BASOPHILS # BLD AUTO: 0.04 K/UL — SIGNIFICANT CHANGE UP (ref 0–0.2)
BASOPHILS NFR BLD AUTO: 0.4 % — SIGNIFICANT CHANGE UP (ref 0–2)
BASOPHILS NFR BLD AUTO: 0.5 % — SIGNIFICANT CHANGE UP (ref 0–2)
BILIRUB SERPL-MCNC: 0.9 MG/DL — SIGNIFICANT CHANGE UP (ref 0.2–1.2)
BILIRUB SERPL-MCNC: 1.2 MG/DL — SIGNIFICANT CHANGE UP (ref 0.2–1.2)
BUN SERPL-MCNC: 22 MG/DL — SIGNIFICANT CHANGE UP (ref 7–23)
BUN SERPL-MCNC: 25 MG/DL — HIGH (ref 7–23)
CALCIUM SERPL-MCNC: 9.1 MG/DL — SIGNIFICANT CHANGE UP (ref 8.4–10.5)
CALCIUM SERPL-MCNC: 9.4 MG/DL — SIGNIFICANT CHANGE UP (ref 8.4–10.5)
CHLORIDE SERPL-SCNC: 100 MMOL/L — SIGNIFICANT CHANGE UP (ref 96–108)
CHLORIDE SERPL-SCNC: 102 MMOL/L — SIGNIFICANT CHANGE UP (ref 96–108)
CO2 SERPL-SCNC: 25 MMOL/L — SIGNIFICANT CHANGE UP (ref 22–31)
CO2 SERPL-SCNC: 28 MMOL/L — SIGNIFICANT CHANGE UP (ref 22–31)
CREAT SERPL-MCNC: 1.34 MG/DL — HIGH (ref 0.5–1.3)
CREAT SERPL-MCNC: 1.39 MG/DL — HIGH (ref 0.5–1.3)
EGFR: 52 ML/MIN/1.73M2 — LOW
EGFR: 54 ML/MIN/1.73M2 — LOW
EOSINOPHIL # BLD AUTO: 0.24 K/UL — SIGNIFICANT CHANGE UP (ref 0–0.5)
EOSINOPHIL # BLD AUTO: 0.26 K/UL — SIGNIFICANT CHANGE UP (ref 0–0.5)
EOSINOPHIL NFR BLD AUTO: 2.3 % — SIGNIFICANT CHANGE UP (ref 0–6)
EOSINOPHIL NFR BLD AUTO: 3.2 % — SIGNIFICANT CHANGE UP (ref 0–6)
GLUCOSE BLDC GLUCOMTR-MCNC: 120 MG/DL — HIGH (ref 70–99)
GLUCOSE SERPL-MCNC: 120 MG/DL — HIGH (ref 70–99)
GLUCOSE SERPL-MCNC: 92 MG/DL — SIGNIFICANT CHANGE UP (ref 70–99)
HCT VFR BLD CALC: 28 % — LOW (ref 39–50)
HCT VFR BLD CALC: 31.1 % — LOW (ref 39–50)
HGB BLD-MCNC: 10.3 G/DL — LOW (ref 13–17)
HGB BLD-MCNC: 9.4 G/DL — LOW (ref 13–17)
IMM GRANULOCYTES NFR BLD AUTO: 1.4 % — HIGH (ref 0–0.9)
IMM GRANULOCYTES NFR BLD AUTO: 1.9 % — HIGH (ref 0–0.9)
INR BLD: 1.04 RATIO — SIGNIFICANT CHANGE UP (ref 0.88–1.16)
LYMPHOCYTES # BLD AUTO: 2.44 K/UL — SIGNIFICANT CHANGE UP (ref 1–3.3)
LYMPHOCYTES # BLD AUTO: 3.29 K/UL — SIGNIFICANT CHANGE UP (ref 1–3.3)
LYMPHOCYTES # BLD AUTO: 30.4 % — SIGNIFICANT CHANGE UP (ref 13–44)
LYMPHOCYTES # BLD AUTO: 32 % — SIGNIFICANT CHANGE UP (ref 13–44)
MAGNESIUM SERPL-MCNC: 2.1 MG/DL — SIGNIFICANT CHANGE UP (ref 1.6–2.6)
MCHC RBC-ENTMCNC: 31.6 PG — SIGNIFICANT CHANGE UP (ref 27–34)
MCHC RBC-ENTMCNC: 31.8 PG — SIGNIFICANT CHANGE UP (ref 27–34)
MCHC RBC-ENTMCNC: 33.1 GM/DL — SIGNIFICANT CHANGE UP (ref 32–36)
MCHC RBC-ENTMCNC: 33.6 GM/DL — SIGNIFICANT CHANGE UP (ref 32–36)
MCV RBC AUTO: 94.3 FL — SIGNIFICANT CHANGE UP (ref 80–100)
MCV RBC AUTO: 96 FL — SIGNIFICANT CHANGE UP (ref 80–100)
MONOCYTES # BLD AUTO: 0.96 K/UL — HIGH (ref 0–0.9)
MONOCYTES # BLD AUTO: 1.04 K/UL — HIGH (ref 0–0.9)
MONOCYTES NFR BLD AUTO: 10.1 % — SIGNIFICANT CHANGE UP (ref 2–14)
MONOCYTES NFR BLD AUTO: 12 % — SIGNIFICANT CHANGE UP (ref 2–14)
NEUTROPHILS # BLD AUTO: 4.18 K/UL — SIGNIFICANT CHANGE UP (ref 1.8–7.4)
NEUTROPHILS # BLD AUTO: 5.53 K/UL — SIGNIFICANT CHANGE UP (ref 1.8–7.4)
NEUTROPHILS NFR BLD AUTO: 52 % — SIGNIFICANT CHANGE UP (ref 43–77)
NEUTROPHILS NFR BLD AUTO: 53.8 % — SIGNIFICANT CHANGE UP (ref 43–77)
NRBC # BLD: 0 /100 WBCS — SIGNIFICANT CHANGE UP (ref 0–0)
NRBC # BLD: 0 /100 WBCS — SIGNIFICANT CHANGE UP (ref 0–0)
PHOSPHATE SERPL-MCNC: 4.1 MG/DL — SIGNIFICANT CHANGE UP (ref 2.5–4.5)
PLATELET # BLD AUTO: 260 K/UL — SIGNIFICANT CHANGE UP (ref 150–400)
PLATELET # BLD AUTO: 289 K/UL — SIGNIFICANT CHANGE UP (ref 150–400)
POTASSIUM SERPL-MCNC: 4.2 MMOL/L — SIGNIFICANT CHANGE UP (ref 3.5–5.3)
POTASSIUM SERPL-MCNC: 4.4 MMOL/L — SIGNIFICANT CHANGE UP (ref 3.5–5.3)
POTASSIUM SERPL-SCNC: 4.2 MMOL/L — SIGNIFICANT CHANGE UP (ref 3.5–5.3)
POTASSIUM SERPL-SCNC: 4.4 MMOL/L — SIGNIFICANT CHANGE UP (ref 3.5–5.3)
PROLACTIN SERPL-MCNC: 39 NG/ML — HIGH (ref 4.1–18.4)
PROT SERPL-MCNC: 6.1 G/DL — SIGNIFICANT CHANGE UP (ref 6–8.3)
PROT SERPL-MCNC: 6.9 G/DL — SIGNIFICANT CHANGE UP (ref 6–8.3)
PROTHROM AB SERPL-ACNC: 12.1 SEC — SIGNIFICANT CHANGE UP (ref 10.5–13.4)
RBC # BLD: 2.97 M/UL — LOW (ref 4.2–5.8)
RBC # BLD: 3.24 M/UL — LOW (ref 4.2–5.8)
RBC # FLD: 13.3 % — SIGNIFICANT CHANGE UP (ref 10.3–14.5)
RBC # FLD: 13.3 % — SIGNIFICANT CHANGE UP (ref 10.3–14.5)
SODIUM SERPL-SCNC: 135 MMOL/L — SIGNIFICANT CHANGE UP (ref 135–145)
SODIUM SERPL-SCNC: 136 MMOL/L — SIGNIFICANT CHANGE UP (ref 135–145)
TROPONIN I, HIGH SENSITIVITY RESULT: 9.7 NG/L — SIGNIFICANT CHANGE UP
WBC # BLD: 10.28 K/UL — SIGNIFICANT CHANGE UP (ref 3.8–10.5)
WBC # BLD: 8.03 K/UL — SIGNIFICANT CHANGE UP (ref 3.8–10.5)
WBC # FLD AUTO: 10.28 K/UL — SIGNIFICANT CHANGE UP (ref 3.8–10.5)
WBC # FLD AUTO: 8.03 K/UL — SIGNIFICANT CHANGE UP (ref 3.8–10.5)

## 2022-11-06 PROCEDURE — 93880 EXTRACRANIAL BILAT STUDY: CPT | Mod: 26

## 2022-11-06 PROCEDURE — 99223 1ST HOSP IP/OBS HIGH 75: CPT

## 2022-11-06 PROCEDURE — 70450 CT HEAD/BRAIN W/O DYE: CPT | Mod: 26

## 2022-11-06 PROCEDURE — 93010 ELECTROCARDIOGRAM REPORT: CPT

## 2022-11-06 RX ORDER — SODIUM CHLORIDE 9 MG/ML
1000 INJECTION INTRAMUSCULAR; INTRAVENOUS; SUBCUTANEOUS
Refills: 0 | Status: DISCONTINUED | OUTPATIENT
Start: 2022-11-06 | End: 2022-11-07

## 2022-11-06 RX ADMIN — SODIUM CHLORIDE 75 MILLILITER(S): 9 INJECTION INTRAMUSCULAR; INTRAVENOUS; SUBCUTANEOUS at 21:22

## 2022-11-06 RX ADMIN — SODIUM CHLORIDE 1 GRAM(S): 9 INJECTION INTRAMUSCULAR; INTRAVENOUS; SUBCUTANEOUS at 13:38

## 2022-11-06 RX ADMIN — PREGABALIN 1000 MICROGRAM(S): 225 CAPSULE ORAL at 11:56

## 2022-11-06 RX ADMIN — ATORVASTATIN CALCIUM 10 MILLIGRAM(S): 80 TABLET, FILM COATED ORAL at 21:22

## 2022-11-06 RX ADMIN — AMLODIPINE BESYLATE 10 MILLIGRAM(S): 2.5 TABLET ORAL at 06:20

## 2022-11-06 RX ADMIN — SODIUM CHLORIDE 75 MILLILITER(S): 9 INJECTION INTRAMUSCULAR; INTRAVENOUS; SUBCUTANEOUS at 08:31

## 2022-11-06 RX ADMIN — SODIUM CHLORIDE 1 GRAM(S): 9 INJECTION INTRAMUSCULAR; INTRAVENOUS; SUBCUTANEOUS at 21:22

## 2022-11-06 RX ADMIN — GABAPENTIN 800 MILLIGRAM(S): 400 CAPSULE ORAL at 06:17

## 2022-11-06 RX ADMIN — GABAPENTIN 800 MILLIGRAM(S): 400 CAPSULE ORAL at 21:22

## 2022-11-06 RX ADMIN — Medication 25 MICROGRAM(S): at 06:20

## 2022-11-06 RX ADMIN — SODIUM CHLORIDE 1 GRAM(S): 9 INJECTION INTRAMUSCULAR; INTRAVENOUS; SUBCUTANEOUS at 06:20

## 2022-11-06 RX ADMIN — GABAPENTIN 800 MILLIGRAM(S): 400 CAPSULE ORAL at 13:37

## 2022-11-06 RX ADMIN — ENOXAPARIN SODIUM 40 MILLIGRAM(S): 100 INJECTION SUBCUTANEOUS at 11:56

## 2022-11-06 NOTE — CHART NOTE - NSCHARTNOTEFT_GEN_A_CORE
Rapid Response Team called to bedside for unresponsiveness this morning. Per report from staff, patient was sitting up in his chair eating when he became temporarily unresponsive with eyes opening and stiffening of the left leg. This lasted for several seconds, and patient appeared to regain consciousness before having a similar episode lasting a comparable amount of time. On arrival of the RRT team, patient had been returned to bed and was lucid and answering questions appropriately. He denied headache, confusion, fatigue, weakness, or prodrome including dizziness or aura. Neuro exam and vitals were unremarkable as below:    Vital Signs Last 24 Hrs  T(C): 36.8 (06 Nov 2022 09:00), Max: 37.1 (05 Nov 2022 21:53)  T(F): 98.2 (06 Nov 2022 09:00), Max: 98.8 (05 Nov 2022 21:53)  HR: 92 (06 Nov 2022 09:00) (75 - 97)  BP: 134/82 (06 Nov 2022 09:00) (102/64 - 151/72)  RR: 15 (06 Nov 2022 09:00) (14 - 15)  SpO2: 99% (06 Nov 2022 09:00) (93% - 99%)    Constitutional - NAD, Comfortable  HEENT - NCAT, EOMI. PERRL  CV - Regular  Resp - No increased WOB, good respiratory effort  Abdomen - Soft, NTND  Neuro - AAOx3, following commands and MEEKS at least antigravity including LLE. CN II-XII intact. No facial weakness or asymmetry  Psychiatric - Mood stable, Affect WNL    Assessment  Patient is a 75yo Male with history of HTN, HLD, hypothyriodism, and prostate Ca s/p L-hip ORIF on 10/31. Now with RRT for brief self-limited episode of unresponsiveness with reported extremity stiffness. Concerning for orthostatic episode vs possible seizure  -EKG obtained at bedside unremarkable, NSR  -Vitals including FSBG within normal range  -Obtain orthostatic vital signs  -Start IVF NS @ 75cc/hr  -TEDs and abdominal binder at all times when OOB  -Check labs: CBC, CMP, coags, prolactin, magnesium, Morris  -CTH urgent performed; pending read  -Will consider neuro consult, possible EEG pending results of workup  -Discussed with Dr. Walton and Dr. Tata Main MD Rapid Response Team called to bedside for unresponsiveness this morning. Per report from staff, patient was sitting up in his chair eating when he became temporarily unresponsive with eyes opening and stiffening of the left leg. This lasted for several seconds, and patient appeared to regain consciousness before having a similar episode lasting a comparable amount of time. On arrival of the RRT team, patient had been returned to bed and was lucid and answering questions appropriately. He denied headache, confusion, fatigue, weakness, or prodrome including dizziness or aura. Neuro exam and vitals were unremarkable as below:    Vital Signs Last 24 Hrs  T(C): 36.8 (06 Nov 2022 09:00), Max: 37.1 (05 Nov 2022 21:53)  T(F): 98.2 (06 Nov 2022 09:00), Max: 98.8 (05 Nov 2022 21:53)  HR: 92 (06 Nov 2022 09:00) (75 - 97)  BP: 134/82 (06 Nov 2022 09:00) (102/64 - 151/72)  RR: 15 (06 Nov 2022 09:00) (14 - 15)  SpO2: 99% (06 Nov 2022 09:00) (93% - 99%)    Constitutional - NAD, Comfortable  HEENT - NCAT, EOMI. PERRL  CV - Regular  Resp - No increased WOB, good respiratory effort  Abdomen - Soft, NTND  Neuro - AAOx3, following commands and MEEKS at least antigravity including LLE. CN II-XII intact. No facial weakness or asymmetry  Psychiatric - Mood stable, Affect WNL    Assessment  Patient is a 75yo Male with history of HTN, HLD, hypothyriodism, and prostate Ca s/p L-hip ORIF on 10/31. Now with RRT for brief self-limited episode of unresponsiveness with reported extremity stiffness. Concerning for orthostatic episode vs possible seizure  -EKG obtained at bedside unremarkable, NSR  -Vitals including FSBG within normal range  -Obtain orthostatic vital signs  -Start IVF NS @ 75cc/hr  -TEDs and abdominal binder at all times when OOB  -Check labs: CBC, CMP, coags, prolactin, magnesium, Morris  -CTH urgent performed; pending read  -Will consider neuro consult, possible EEG pending results of workup  -Discussed with Dr. Walton and Dr. Tata Main MD    Addendum 1000:  -Discussed case with Neuro Dr. Bejarano; will add routine EEG and carotid US. Holding off on AEDs at this time

## 2022-11-06 NOTE — CONSULT NOTE ADULT - SUBJECTIVE AND OBJECTIVE BOX
This is a 77 YO male with PMH of HTN, HLD, hx of prostate CA, neuropathy who  presented to [facility] on 10/31 from home after mechanical fall off a stool in his kitchen on Saturday. Reports hitting his left hip on the tiled floor, was able to get up and ambulate afterwards.  He reported sharp left hip pain, rated 8/10 with movement. Patient has been using an old cane at home to ambulate due to pain, but at baseline ambulates independently. Denies numbness, tingling. Took Tylenol for his pain with improvement. In the ED:  temp 98.3, /77, , RR 16, Spo2 98% RA. H/H: 9.1/27.1. CT imaging showed intertrochanteric fracture of left hip. Ortho consulted and he was taken to OR on 10/31 for ORIF Left hip. Post-op course significant for mild JESUS s/p IVF, leukocytosis likely reactive no source of infection noted.  (05 Nov 2022 10:30)      PAST MEDICAL & SURGICAL HISTORY:  BPH (benign prostatic hypertrophy)  Nocturia  x2-3  Thoracic spine fracture  T12, June 21, 2017  Neuropathy  bilateral feet, cause unknown  Hyperlipidemia  Hypertension  Osteoarthritis  Anemia  mild, cause unknown  Prostate cancer  2008, no chemo or RT  S/P prostatectomy  2008  S/P cystoscopy  June 2017, &quot;removal of scar tissue at the base of the bladder&quot;  S/P colonoscopy  2017, negative  History of kyphoplasty    FAMILY HISTORY  Father: - at age - with history of   Mother: - at age - with history of     SOCIAL HISTORY  Substance Use (street drugs): (  ) never used  (  ) other:  Tobacco Usage:  (   ) never smoked   (   ) former smoker   (   ) current smoker  (     ) pack year  Alcohol Usage:  Sexual History:   Recent Travel:      Allergies    No Known Allergies    Intolerances    REVIEW OF SYSTEMS:  CONSTITUTIONAL: No fever, weight loss, or fatigue  EYES: No eye pain, visual disturbances, or discharge  ENMT:  No difficulty hearing, tinnitus, vertigo; No sinus or throat pain  NECK: No pain or stiffness  BREASTS: No pain, masses, or nipple discharge  RESPIRATORY: No cough, wheezing, chills or hemoptysis; No shortness of breath  CARDIOVASCULAR: No chest pain, palpitations, dizziness, or leg swelling  GASTROINTESTINAL: No abdominal or epigastric pain. No nausea, vomiting, or hematemesis; No diarrhea or constipation. No melena or hematochezia.  GENITOURINARY: No dysuria, frequency, hematuria, or incontinence  NEUROLOGICAL: No headaches, memory loss, loss of strength, numbness, or tremors  SKIN: No itching, burning, rashes, or lesions   LYMPH NODES: No enlarged glands  ENDOCRINE: No heat or cold intolerance; No hair loss  MUSCULOSKELETAL: No joint pain or swelling; No muscle, back, or extremity pain  PSYCHIATRIC: No depression, anxiety, mood swings, or difficulty sleeping  HEME/LYMPH: No easy bruising, or bleeding gums  ALLERY AND IMMUNOLOGIC: No hives or eczema    ALL ROS REVIEWED AND NORMAL EXCEPT AS STATED ABOVE    T(C): 37.1 (11-05-22 @ 21:53), Max: 37.1 (11-05-22 @ 21:53)  HR: 75 (11-06-22 @ 06:15) (75 - 97)  BP: 125/75 (11-06-22 @ 06:15) (102/64 - 151/72)  RR: 15 (11-05-22 @ 21:53) (14 - 15)  SpO2: 93% (11-05-22 @ 21:53) (93% - 98%)  Wt(kg): --Vital Signs Last 24 Hrs  T(C): 37.1 (05 Nov 2022 21:53), Max: 37.1 (05 Nov 2022 21:53)  T(F): 98.8 (05 Nov 2022 21:53), Max: 98.8 (05 Nov 2022 21:53)  HR: 75 (06 Nov 2022 06:15) (75 - 97)  BP: 125/75 (06 Nov 2022 06:15) (102/64 - 151/72)  BP(mean): --  RR: 15 (05 Nov 2022 21:53) (14 - 15)  SpO2: 93% (05 Nov 2022 21:53) (93% - 98%)    Parameters below as of 05 Nov 2022 21:53  Patient On (Oxygen Delivery Method): room air        PHYSICAL EXAM:  GENERAL: NAD, well-groomed, well-developed  HEAD:  Atraumatic, Normocephalic  EYES: EOMI, PERRLA, conjunctiva and sclera clear  ENMT: No tonsillar erythema, exudates, or enlargement; Moist mucous membranes, Good dentition, No lesions  NECK: Supple, No JVD, Normal thyroid  NERVOUS SYSTEM:  Alert & Oriented X3, Good concentration; Motor Strength 5/5 B/L upper and lower extremities; DTRs 2+ intact and symmetric  CHEST/LUNG: Clear to percussion bilaterally; No rales, rhonchi, wheezing, or rubs  HEART: Regular rate and rhythm; No murmurs, rubs, or gallops  ABDOMEN: Soft, Nontender, Nondistended; Bowel sounds present  EXTREMITIES:  2+ Peripheral Pulses, No clubbing, cyanosis, or edema  LYMPH: No lymphadenopathy noted  SKIN: No rashes or lesions    LABS:                        9.4    8.03  )-----------( 260      ( 06 Nov 2022 06:00 )             28.0     11-06    136  |  102  |  22  ----------------------------<  92  4.4   |  28  |  1.34<H>    Ca    9.1      06 Nov 2022 06:00    TPro  6.1  /  Alb  2.7<L>  /  TBili  0.9  /  DBili  x   /  AST  20  /  ALT  35  /  AlkPhos  43  11-06    CAPILLARY BLOOD GLUCOSE      RADIOLOGY & ADDITIONAL TESTS:    Consultant(s) Notes Reviewed:  [x ] YES  [ ] NO  Care Discussed with Consultants/Other Providers [ x] YES  [ ] NO  Imaging Personally Reviewed:  [ ] YES  [ x] NO This is a 77 YO male with PMH of HTN, HLD, hx of prostate CA, neuropathy who  presented to [facility] on 10/31 from home after mechanical fall off a stool in his kitchen on Saturday. Reports hitting his left hip on the tiled floor, was able to get up and ambulate afterwards.  He reported sharp left hip pain, rated 8/10 with movement. Patient has been using an old cane at home to ambulate due to pain, but at baseline ambulates independently. Denies numbness, tingling. Took Tylenol for his pain with improvement. In the ED:  temp 98.3, /77, , RR 16, Spo2 98% RA. H/H: 9.1/27.1. CT imaging showed intertrochanteric fracture of left hip. Ortho consulted and he was taken to OR on 10/31 for ORIF Left hip. Post-op course significant for mild JESUS s/p IVF, leukocytosis likely reactive no source of infection noted.      During my rounds this AM, patient became a RRT  He was sitting in a wheelchair eating breakfast when he became unresponsive and had shaking of his legs and arms.  The whole episode lasted about 30 seconds.  He then woke up and started to ask what happened.  However, it happened again.  We were able to place him back in bed.  He woke up once again; AAOx3, answering questions appropriately.  He was following commands.  We did not note any deficits.  Bloodwork was done, ECG was normal, FS were normal, vitals were stable.  CT brain ordered.  IVF ordered, orthostatics ordered.    He denied chest pain, SOB, N/V, abd pain, diarrhea, fever.      PAST MEDICAL & SURGICAL HISTORY:  BPH (benign prostatic hypertrophy)  Nocturia  x2-3  Thoracic spine fracture  T12, June 21, 2017  Neuropathy  bilateral feet, cause unknown  Hyperlipidemia  Hypertension  Osteoarthritis  Anemia  mild, cause unknown  Prostate cancer  2008, no chemo or RT  S/P prostatectomy  2008  S/P cystoscopy  June 2017, &quot;removal of scar tissue at the base of the bladder&quot;  S/P colonoscopy  2017, negative  History of kyphoplasty    FAMILY HISTORY  Denies CAD, DM , HTN, HLD, stroke in his mother and father    SOCIAL HISTORY  Substance Use (street drugs): ( x ) never used  (  ) other:  Tobacco Usage:  (  x ) never smoked   (   ) former smoker   (   ) current smoker  (     ) pack year  Alcohol Usage:Denies  Recent Travel:Denies    Allergies  No Known Allergies  Intolerances    REVIEW OF SYSTEMS:  CONSTITUTIONAL: No fever, weight loss, or fatigue  EYES: No eye pain, visual disturbances, or discharge  ENMT:  No difficulty hearing, tinnitus, vertigo; No sinus or throat pain  NECK: No pain or stiffness  BREASTS: No pain, masses, or nipple discharge  RESPIRATORY: No cough, wheezing, chills or hemoptysis; No shortness of breath  CARDIOVASCULAR: No chest pain, palpitations, dizziness, or leg swelling  GASTROINTESTINAL: No abdominal or epigastric pain. No nausea, vomiting, or hematemesis; No diarrhea or constipation. No melena or hematochezia.  GENITOURINARY: No dysuria, frequency, hematuria, or incontinence  NEUROLOGICAL: No headaches, memory loss, loss of strength, numbness, or tremors  SKIN: No itching, burning, rashes, or lesions   LYMPH NODES: No enlarged glands  ENDOCRINE: No heat or cold intolerance; No hair loss  MUSCULOSKELETAL: No joint pain or swelling; No muscle, back, or extremity pain  PSYCHIATRIC: No depression, anxiety, mood swings, or difficulty sleeping  HEME/LYMPH: No easy bruising, or bleeding gums  ALLERY AND IMMUNOLOGIC: No hives or eczema    ALL ROS REVIEWED AND NORMAL EXCEPT AS STATED ABOVE    T(C): 37.1 (11-05-22 @ 21:53), Max: 37.1 (11-05-22 @ 21:53)  HR: 75 (11-06-22 @ 06:15) (75 - 97)  BP: 125/75 (11-06-22 @ 06:15) (102/64 - 151/72)  RR: 15 (11-05-22 @ 21:53) (14 - 15)  SpO2: 93% (11-05-22 @ 21:53) (93% - 98%)  Wt(kg): --Vital Signs Last 24 Hrs  T(C): 37.1 (05 Nov 2022 21:53), Max: 37.1 (05 Nov 2022 21:53)  T(F): 98.8 (05 Nov 2022 21:53), Max: 98.8 (05 Nov 2022 21:53)  HR: 75 (06 Nov 2022 06:15) (75 - 97)  BP: 125/75 (06 Nov 2022 06:15) (102/64 - 151/72)  BP(mean): --  RR: 15 (05 Nov 2022 21:53) (14 - 15)  SpO2: 93% (05 Nov 2022 21:53) (93% - 98%)    Parameters below as of 05 Nov 2022 21:53  Patient On (Oxygen Delivery Method): room air        PHYSICAL EXAM:  GENERAL: NAD, well-groomed, well-developed  HEAD:  Atraumatic, Normocephalic  EYES: EOMI, PERRLA, conjunctiva and sclera clear  ENMT: No tonsillar erythema, exudates, or enlargement; Moist mucous membranes, Good dentition, No lesions  NECK: Supple, No JVD, Normal thyroid  NERVOUS SYSTEM:  Alert & Oriented X3, Good concentration; Motor Strength 5/5 B/L upper and lower extremities; DTRs 2+ intact and symmetric  CHEST/LUNG: Clear to percussion bilaterally; No rales, rhonchi, wheezing, or rubs  HEART: Regular rate and rhythm; No murmurs, rubs, or gallops  ABDOMEN: Soft, Nontender, Nondistended; Bowel sounds present  EXTREMITIES:  2+ Peripheral Pulses, No clubbing, cyanosis, or edema  LYMPH: No lymphadenopathy noted  SKIN: No rashes or lesions    LABS:                        9.4    8.03  )-----------( 260      ( 06 Nov 2022 06:00 )             28.0     11-06    136  |  102  |  22  ----------------------------<  92  4.4   |  28  |  1.34<H>    Ca    9.1      06 Nov 2022 06:00    TPro  6.1  /  Alb  2.7<L>  /  TBili  0.9  /  DBili  x   /  AST  20  /  ALT  35  /  AlkPhos  43  11-06    CAPILLARY BLOOD GLUCOSE      RADIOLOGY & ADDITIONAL TESTS:    Consultant(s) Notes Reviewed:  [x ] YES  [ ] NO  Care Discussed with Consultants/Other Providers [ x] YES  [ ] NO  Imaging Personally Reviewed:  [ ] YES  [ x] NO

## 2022-11-06 NOTE — DIETITIAN INITIAL EVALUATION ADULT - PERTINENT LABORATORY DATA
11-06    135  |  100  |  25<H>  ----------------------------<  120<H>  4.2   |  25  |  1.39<H>    Ca    9.4      06 Nov 2022 08:36  Phos  4.1     11-06  Mg     2.1     11-06    TPro  6.9  /  Alb  3.2<L>  /  TBili  1.2  /  DBili  x   /  AST  25  /  ALT  37  /  AlkPhos  49  11-06  POCT Blood Glucose.: 120 mg/dL (11-06-22 @ 08:22)

## 2022-11-06 NOTE — DIETITIAN INITIAL EVALUATION ADULT - PERTINENT MEDS FT
MEDICATIONS  (STANDING):  amLODIPine   Tablet 10 milliGRAM(s) Oral daily  atorvastatin 10 milliGRAM(s) Oral at bedtime  cyanocobalamin 1000 MICROGram(s) Oral daily  enoxaparin Injectable 40 milliGRAM(s) SubCutaneous every 24 hours  gabapentin 800 milliGRAM(s) Oral three times a day  levothyroxine 25 MICROGram(s) Oral daily  senna 2 Tablet(s) Oral at bedtime  sodium chloride 1 Gram(s) Oral three times a day  sodium chloride 0.9%. 1000 milliLiter(s) (75 mL/Hr) IV Continuous <Continuous>    MEDICATIONS  (PRN):  acetaminophen     Tablet .. 650 milliGRAM(s) Oral every 6 hours PRN Temp greater or equal to 38C (100.4F), Mild Pain (1 - 3)  melatonin 3 milliGRAM(s) Oral at bedtime PRN Insomnia  oxyCODONE    IR 5 milliGRAM(s) Oral every 6 hours PRN Severe Pain (7 - 10)  polyethylene glycol 3350 17 Gram(s) Oral two times a day PRN Constipation

## 2022-11-06 NOTE — CONSULT NOTE ADULT - ASSESSMENT
75 YO male with PMH of HTN, HLD, hx of prostate CA who presented to [facility] on 10/31 from home after mechanical fall at home. CT imaging showed intertrochanteric fracture of left hip. He is s/p ORIF Left hip on 10/31. Post-op course significant for mild JESUS s/p IVF, leukocytosis likely reactive: no source of infection noted, and hyponatremia.     #Normocytic anemia  - Monitor Hg/Hct, transfuse if Hg <7  - Will check iron/B12/folate in AM    #JESUS  - Monitor creatinine for now; if worsens in AM will consider IVF/renal ULT  - Encourage oral intake    #Left intertrochanteric fracture - s/p L Hip ORIF 10/31  - Tylenol 650mg PO q6 Q6, PRN for moderate pain (1-3)   -Gabapentin 800mg PO TID   follow up with surgery in 2 weeks     #HTN   -continue amlodipine 10mg PO QD   -monitor vitals    #Hx of hyponatremia  -Continue NaCl 1g TID; consider tapering dose...?  -Monitor BMP     #HLD   Continue Lipitor 10mg PO at bedtime     #Hypothyroidism   Continue synthroid     #DVT prophylaxis:Lovenox 40mg QD    77 YO male with PMH of HTN, HLD, hx of prostate CA who presented to [facility] on 10/31 from home after mechanical fall at home. CT imaging showed intertrochanteric fracture of left hip. He is s/p ORIF Left hip on 10/31. Post-op course significant for mild JESUS s/p IVF, leukocytosis likely reactive: no source of infection noted, and hyponatremia.     #Episodes of AMS  - Episode x 2 did not last long and further he did not have any confusion or post-ictal state afterwards  - Will order orthostatics, start IVF x 24hrs, encourage oral intake  - CT brain urgent pending read  - Will suggest neurology consult    #Normocytic anemia  - Monitor Hg/Hct, transfuse if Hg <7  - Will check iron/B12/folate in AM    #JESUS  - Given episode this AM, will give IVF x 24hrs  - Monitor creatinine  - Encourage oral intake    #Left intertrochanteric fracture - s/p L Hip ORIF 10/31  - Tylenol 650mg PO q6 Q6, PRN for moderate pain (1-3)   -Gabapentin 800mg PO TID   follow up with surgery in 2 weeks     #HTN   -continue amlodipine 10mg PO QD   -monitor vitals    #Hx of hyponatremia  -Continue NaCl 1g TID; consider tapering dose...?  -Monitor BMP     #HLD   Continue Lipitor 10mg PO at bedtime     #Hypothyroidism   Continue synthroid     #DVT prophylaxis:Lovenox 40mg QD

## 2022-11-06 NOTE — DIETITIAN INITIAL EVALUATION ADULT - OTHER INFO
Patient reports good appetite. No N/V/D/C. Per patient last BM was tody (11-6-22). Denies any chewing/swallowing difficulties. No known food allergies. No edema noted. Surgical incision at left hip noted as per nursing flow sheets. Patient requested prune juice daily; noted in patient diet cardex. Continue current nutrition plan of care as tolerated.

## 2022-11-07 ENCOUNTER — NON-APPOINTMENT (OUTPATIENT)
Age: 77
End: 2022-11-07

## 2022-11-07 DIAGNOSIS — S72.142A DISPLACED INTERTROCHANTERIC FRACTURE OF LEFT FEMUR, INITIAL ENCOUNTER FOR CLOSED FRACTURE: ICD-10-CM

## 2022-11-07 LAB
ANION GAP SERPL CALC-SCNC: 9 MMOL/L — SIGNIFICANT CHANGE UP (ref 5–17)
BUN SERPL-MCNC: 21 MG/DL — SIGNIFICANT CHANGE UP (ref 7–23)
CALCIUM SERPL-MCNC: 8.7 MG/DL — SIGNIFICANT CHANGE UP (ref 8.4–10.5)
CHLORIDE SERPL-SCNC: 104 MMOL/L — SIGNIFICANT CHANGE UP (ref 96–108)
CO2 SERPL-SCNC: 23 MMOL/L — SIGNIFICANT CHANGE UP (ref 22–31)
CREAT SERPL-MCNC: 1.08 MG/DL — SIGNIFICANT CHANGE UP (ref 0.5–1.3)
EGFR: 70 ML/MIN/1.73M2 — SIGNIFICANT CHANGE UP
FOLATE SERPL-MCNC: 10.6 NG/ML — SIGNIFICANT CHANGE UP
GLUCOSE SERPL-MCNC: 97 MG/DL — SIGNIFICANT CHANGE UP (ref 70–99)
HCT VFR BLD CALC: 28.1 % — LOW (ref 39–50)
HGB BLD-MCNC: 9.4 G/DL — LOW (ref 13–17)
IRON SATN MFR SERPL: 24 % — SIGNIFICANT CHANGE UP (ref 16–55)
IRON SATN MFR SERPL: 53 UG/DL — SIGNIFICANT CHANGE UP (ref 45–165)
MCHC RBC-ENTMCNC: 31.9 PG — SIGNIFICANT CHANGE UP (ref 27–34)
MCHC RBC-ENTMCNC: 33.5 GM/DL — SIGNIFICANT CHANGE UP (ref 32–36)
MCV RBC AUTO: 95.3 FL — SIGNIFICANT CHANGE UP (ref 80–100)
NRBC # BLD: 0 /100 WBCS — SIGNIFICANT CHANGE UP (ref 0–0)
PLATELET # BLD AUTO: 254 K/UL — SIGNIFICANT CHANGE UP (ref 150–400)
POTASSIUM SERPL-MCNC: 4.4 MMOL/L — SIGNIFICANT CHANGE UP (ref 3.5–5.3)
POTASSIUM SERPL-SCNC: 4.4 MMOL/L — SIGNIFICANT CHANGE UP (ref 3.5–5.3)
RBC # BLD: 2.95 M/UL — LOW (ref 4.2–5.8)
RBC # FLD: 13.3 % — SIGNIFICANT CHANGE UP (ref 10.3–14.5)
SODIUM SERPL-SCNC: 136 MMOL/L — SIGNIFICANT CHANGE UP (ref 135–145)
TIBC SERPL-MCNC: 224 UG/DL — SIGNIFICANT CHANGE UP (ref 220–430)
UIBC SERPL-MCNC: 171 UG/DL — SIGNIFICANT CHANGE UP (ref 110–370)
VIT B12 SERPL-MCNC: 1060 PG/ML — SIGNIFICANT CHANGE UP (ref 232–1245)
WBC # BLD: 7.7 K/UL — SIGNIFICANT CHANGE UP (ref 3.8–10.5)
WBC # FLD AUTO: 7.7 K/UL — SIGNIFICANT CHANGE UP (ref 3.8–10.5)

## 2022-11-07 PROCEDURE — 99233 SBSQ HOSP IP/OBS HIGH 50: CPT

## 2022-11-07 PROCEDURE — 99232 SBSQ HOSP IP/OBS MODERATE 35: CPT | Mod: GC

## 2022-11-07 RX ORDER — AMLODIPINE BESYLATE 2.5 MG/1
10 TABLET ORAL DAILY
Refills: 0 | Status: DISCONTINUED | OUTPATIENT
Start: 2022-11-08 | End: 2022-11-10

## 2022-11-07 RX ADMIN — Medication 25 MICROGRAM(S): at 05:11

## 2022-11-07 RX ADMIN — SODIUM CHLORIDE 1 GRAM(S): 9 INJECTION INTRAMUSCULAR; INTRAVENOUS; SUBCUTANEOUS at 05:11

## 2022-11-07 RX ADMIN — SODIUM CHLORIDE 1 GRAM(S): 9 INJECTION INTRAMUSCULAR; INTRAVENOUS; SUBCUTANEOUS at 17:13

## 2022-11-07 RX ADMIN — PREGABALIN 1000 MICROGRAM(S): 225 CAPSULE ORAL at 12:00

## 2022-11-07 RX ADMIN — ATORVASTATIN CALCIUM 10 MILLIGRAM(S): 80 TABLET, FILM COATED ORAL at 21:23

## 2022-11-07 RX ADMIN — GABAPENTIN 800 MILLIGRAM(S): 400 CAPSULE ORAL at 05:11

## 2022-11-07 RX ADMIN — GABAPENTIN 800 MILLIGRAM(S): 400 CAPSULE ORAL at 17:12

## 2022-11-07 RX ADMIN — ENOXAPARIN SODIUM 40 MILLIGRAM(S): 100 INJECTION SUBCUTANEOUS at 11:59

## 2022-11-07 RX ADMIN — Medication 3 MILLIGRAM(S): at 01:21

## 2022-11-07 RX ADMIN — GABAPENTIN 800 MILLIGRAM(S): 400 CAPSULE ORAL at 21:23

## 2022-11-07 RX ADMIN — AMLODIPINE BESYLATE 10 MILLIGRAM(S): 2.5 TABLET ORAL at 05:12

## 2022-11-07 RX ADMIN — SODIUM CHLORIDE 1 GRAM(S): 9 INJECTION INTRAMUSCULAR; INTRAVENOUS; SUBCUTANEOUS at 21:23

## 2022-11-07 NOTE — CHART NOTE - NSCHARTNOTEFT_GEN_A_CORE
REHABILITATION DIAGNOSIS/IMPAIRMENT GROUP CODE:  hip fracture     COMORBIDITIES/COMPLICATING CONDITIONS IMPACTING REHABILITATION:  HEALTH ISSUES - PROBLEM Dx:  neuropathy   OA  anemia      PAST MEDICAL & SURGICAL HISTORY:  BPH (benign prostatic hypertrophy)      Nocturia  x2-3      Thoracic spine fracture  T12, June 21, 2017      Neuropathy  bilateral feet, cause unknown      Hyperlipidemia      Hypertension      Osteoarthritis      Anemia  mild, cause unknown      Prostate cancer  2008, no chemo or RT      S/P prostatectomy  2008      S/P cystoscopy  June 2017, &quot;removal of scar tissue at the base of the bladder&quot;      S/P colonoscopy  2017, negative      History of kyphoplasty          Based upon consideration of the patient's impairments, functional status, complicating conditions and any other contributing factors and after information garnered from the assessments of all therapy disciplines involved in treating the patient and other pertinent clinicians:    INTERDISCIPLINARY REHABILITATION INTERVENTIONS:    [ X  ] Transfer Training  [X   ] Bed Mobility  [ X  ] Therapeutic Exercise  [ X  ] Balance/Coordination Exercises  [ X  ] Locomotion retraining  [ X  ] Stairs  [ X  ] Functional Transfer Training  [ X  ] Bowel/Bladder program  [  X ] Pain Management  [ X  ] Skin/Wound Care  [  ] Visual/Perceptual Training  [X   ] Therapeutic Recreation Activities  [  X ] Neuromuscular Re-education  [  X ] Activities of Daily Living  [   ] Speech Exercise  [   ] Swallowing Exercises  [   ] Vital Stim  [   ] Dietary Supplements  [   ] Calorie Count  [   ] Cognitive Exercises  [   ] Congnitive/Linguistic Treatment  [   ] Behavior Program  [  ] Neuropsych Therapy  [   ] Patient/Family Counseling  [ X  ] Family Training  [ X  ] Community Re-entry  [ X  ] Orthotic Evaluation  [ ] Prosthetic Eval/Training    MEDICAL PROGNOSIS:  good     REHAB POTENTIAL:  good     EXPECTED DAILY THERAPY FOR THE DURATION OF THE ADMISSION:         PT: 1hr/day       OT: 1hr/day       ST: 1hr/day       P&O: 0    EXPECTED INTENSITY OF PROGRAM:  3 hrs/day    EXPECTED FREQUENCY OF PROGRAM:  5 days/week    ESTIMATED LOS:  10-14 days    ESTIMATED DISPOSITION:  home    INTERDISCIPLINARY FUNCTIONAL OUTCOMES?GOALS:         Gait/Mobility: mod-independent       Transfers: mod-independent       ADLs: mod-independent       Functional Transfers: mod-independent       Medication Management: mod-independent       Communication: independent       Cognitive:               Bladder: continent       Bowel: continent

## 2022-11-07 NOTE — PROGRESS NOTE ADULT - SUBJECTIVE AND OBJECTIVE BOX
Patient is a 76y old  Male who presents with a chief complaint of left hip fracture (04 Nov 2022 09:28)    Patient seen and examined at bedside. c/o pain in the left hip , 2-3 /10, intermittent, upon getting in and out of bed, no n/v, no sob, no cp.    Vital Signs Last 24 Hrs  T(C): 36.7 (07 Nov 2022 07:50), Max: 36.9 (06 Nov 2022 20:35)  T(F): 98.1 (07 Nov 2022 07:50), Max: 98.5 (06 Nov 2022 20:35)  HR: 89 (07 Nov 2022 07:50) (79 - 89)  BP: 155/75 (07 Nov 2022 07:50) (121/68 - 155/75)  BP(mean): --  RR: 16 (07 Nov 2022 07:50) (16 - 16)  SpO2: 95% (07 Nov 2022 07:50) (95% - 97%)    Parameters below as of 07 Nov 2022 07:50  Patient On (Oxygen Delivery Method): room air      GENERAL- NAD  EAR/NOSE/MOUTH/THROAT - no pharyngeal exudates, no oral leisions,  MMM  EYES- CELINE, conjunctiva and Sclera clear  NECK- supple  RESPIRATORY-  clear to auscultation bilaterally, non laboured breathing  CARDIOVASCULAR - SIS2, RRR  GI - soft NT BS present  EXTREMITIES- no pedal edema  NEUROLOGY- no gross focal deficits  PSYCHIATRY- AAO X 3                9.4                  136  | 23   | 21           7.70  >-----------< 254     ------------------------< 97                    28.1                 4.4  | 104  | 1.08                                         Ca 8.7   Mg x     Ph x          MEDICATIONS  (STANDING):  amLODIPine   Tablet 10 milliGRAM(s) Oral daily  atorvastatin 10 milliGRAM(s) Oral at bedtime  cyanocobalamin 1000 MICROGram(s) Oral daily  enoxaparin Injectable 40 milliGRAM(s) SubCutaneous every 24 hours  gabapentin 800 milliGRAM(s) Oral three times a day  levothyroxine 25 MICROGram(s) Oral daily  senna 2 Tablet(s) Oral at bedtime  sodium chloride 1 Gram(s) Oral three times a day  sodium chloride 0.9%. 1000 milliLiter(s) (75 mL/Hr) IV Continuous <Continuous>    MEDICATIONS  (PRN):  acetaminophen     Tablet .. 650 milliGRAM(s) Oral every 6 hours PRN Temp greater or equal to 38C (100.4F), Mild Pain (1 - 3)  melatonin 3 milliGRAM(s) Oral at bedtime PRN Insomnia  oxyCODONE    IR 5 milliGRAM(s) Oral every 6 hours PRN Severe Pain (7 - 10)  polyethylene glycol 3350 17 Gram(s) Oral two times a day PRN Constipation

## 2022-11-07 NOTE — PROGRESS NOTE ADULT - SUBJECTIVE AND OBJECTIVE BOX
Patient is a 76y old  Male who presents with a chief complaint of S/p L Hip ORIF (07 Nov 2022 12:06)    HPI:  This is a 75 YO male with PMH of HTN, HLD, hx of prostate CA, neuropathy who  presented to [facility] on 10/31 from home after mechanical fall off a stool in his kitchen on Saturday. Reports hitting his left hip on the tiled floor, was able to get up and ambulate afterwards.  He reported sharp left hip pain, rated 8/10 with movement. Patient has been using an old cane at home to ambulate due to pain, but at baseline ambulates independently. Denies numbness, tingling. Took Tylenol for his pain with improvement. In the ED:  temp 98.3, /77, , RR 16, Spo2 98% RA. H/H: 9.1/27.1. CT imaging showed intertrochanteric fracture of left hip. Ortho consulted and he was taken to OR on 10/31 for ORIF Left hip. Post-op course significant for mild JESUS s/p IVF, leukocytosis likely reactive no source of infection noted.      SUBJECTIVE/ROS:  Patient seen and evaluated while resting in bed  Had episode of brief unresponsiveness yesterday.  Patient only recalls freezing x 10-15 seconds.  But denies having LOC.    Denies pain at rest and manageable with therapy  No CP, palpitation, SOB, HUITRON, HA, dizziness, nausea, abd pain, dysuria, or frequency.      PHYSICAL EXAM  76y  Vital Signs Last 24 Hrs  T(C): 36.7 (07 Nov 2022 07:50), Max: 36.9 (06 Nov 2022 20:35)  T(F): 98.1 (07 Nov 2022 07:50), Max: 98.5 (06 Nov 2022 20:35)  HR: 89 (07 Nov 2022 07:50) (79 - 89)  BP: 155/75 (07 Nov 2022 07:50) (121/68 - 155/75)  RR: 16 (07 Nov 2022 07:50) (16 - 16)  SpO2: 95% (07 Nov 2022 07:50) (95% - 97%)    Constitutional - NAD, Comfortable  HEENT - NCAT, EOMI  Chest - good chest expansion, good respiratory effort  Cardio - warm and well perfused  Abdomen -  Soft, NTND  Extremities - trace pedal edema - bilaterally,  No calf tenderness   MMT: LLE HF 4/5 limited by pain, 5/5 distally  Sensory - sensation to light touch  Psychiatric - Mood stable, Affect WNL  Skin: left hip aquacel dressing c/d/i    RECENT LABS:                      9.4    7.70  )-----------( 254      ( 07 Nov 2022 06:45 )             28.1     11-07    136  |  104  |  21  ----------------------------<  97  4.4   |  23  |  1.08    Ca    8.7      07 Nov 2022 06:45  Phos  4.1     11-06  Mg     2.1     11-06    TPro  6.9  /  Alb  3.2<L>  /  TBili  1.2  /  DBili  x   /  AST  25  /  ALT  37  /  AlkPhos  49  11-06    LIVER FUNCTIONS - ( 06 Nov 2022 08:36 )  Alb: 3.2 g/dL / Pro: 6.9 g/dL / ALK PHOS: 49 U/L / ALT: 37 U/L / AST: 25 U/L / GGT: x           PT/INR - ( 06 Nov 2022 08:36 )   PT: 12.1 sec;   INR: 1.04 ratio    PTT - ( 06 Nov 2022 08:36 )  PTT:26.3 sec    US Duplex Carotid Arteries Complete, Bilateral (11.06.22 @ 11:16) >  IMPRESSION: No significant hemodynamic stenosis of either carotid artery.  Atherosclerosis of the carotid arteries is present.    Measurement of carotid stenosis is based on velocity parameters that correlate the residual internal carotid diameter with that of the more distal vessel in accordance with a method such as the North American Symptomatic Carotid Endarterectomy Trial (NASCET).    CT Head No Cont (11.06.22 @ 09:14) >  HEAD CT: Mild volume loss, microvascular disease, no acute hemorrhage or midline shift.    MEDICATIONS  (STANDING):  amLODIPine   Tablet 10 milliGRAM(s) Oral daily  atorvastatin 10 milliGRAM(s) Oral at bedtime  cyanocobalamin 1000 MICROGram(s) Oral daily  enoxaparin Injectable 40 milliGRAM(s) SubCutaneous every 24 hours  gabapentin 800 milliGRAM(s) Oral three times a day  levothyroxine 25 MICROGram(s) Oral daily  senna 2 Tablet(s) Oral at bedtime  sodium chloride 1 Gram(s) Oral three times a day    MEDICATIONS  (PRN):  acetaminophen     Tablet .. 650 milliGRAM(s) Oral every 6 hours PRN Temp greater or equal to 38C (100.4F), Mild Pain (1 - 3)  melatonin 3 milliGRAM(s) Oral at bedtime PRN Insomnia  oxyCODONE    IR 5 milliGRAM(s) Oral every 6 hours PRN Severe Pain (7 - 10)  polyethylene glycol 3350 17 Gram(s) Oral two times a day PRN Constipation

## 2022-11-07 NOTE — EEG REPORT - NS EEG TEXT BOX
REPORT OF ROUTINE EEG WITH VIDEO  [facility]: [address] [place] [address], Phone: [phone] [facility]: [address] [place] [address], [place] [address], Phone: [phone] Office: [address] [place] [address], [place] [address], Phone: [phone]  Patient Name: SHANE JUNG   Age: 76 year : 1945 MRN #: -, Location: [facility] REHAB BED 112D Referring Physician: MICHAEL NAIDU  EEG #:  Study Date: 2022   Start Time: 11:20:47 AM    Study Duration: 21.4 		  Technical Information:					 On Instrument: [id number] Placement and Labeling of Electrodes: The EEG was performed utilizing 20 channels referential EEG connections (coronal over temporal over parasagittal montage) using all standard 10-20 electrode placements with EKG.  Recording was at a sampling rate of 256 samples per second per channel.  Time synchronized digital video recording was done simultaneously with EEG recording.  A low light infrared camera was used for low light recording.  Wayne and seizure detection algorithms were utilized. CSA Technical Component: Quantitative EEG analysis using a separate Compressed Spectral Array (CSA) software package was conducted in real-time and run at bedside after set up by the technician, digitally displaying the power of electrographic frequencies included in the 1-30Hz band using a graded color map.  This data was reviewed and interpreted independently, and is reported in a separate section below.  History:  ROUTINE EEG / LAB NO HV  due to covid /PHOTIC STIM DONE PATIENT STATUS: AWAKE/DROWSY 76  y o rehab M pt p/w PMHx HTN prostate CA recent hip fx w/ rrt called for unresponsiveness. R/o sz   Medication No Data.  [Abbreviation Key:  PDR=alpha rhythm/posterior dominant rhythm. A-P=anterior posterior.  Amplitude: ‘very low’:<20; ‘low’:20-49; ‘medium’:; ‘high’:>150uV.  Persistence for periodic/rhythmic patterns (% of epoch) ‘rare’:<1%; ‘occasional’:1-10%; ‘frequent’:10-50%; ‘abundant’:50-90%; ‘continuous’:>90%.  Persistence for sporadic discharges: ‘rare’:<1/hr; ‘occasional’:1/min-1/hr; ‘frequent’:>1/min; ‘abundant’:>1/10 sec.  RPP=rhythmic and periodic patterns; GRDA=generalized rhythmic delta activity; FIRDA=frontal intermittent GRDA; LRDA=lateralized rhythmic delta activity; TIRDA=temporal intermittent rhythmic delta activity;  LPD=PLED=lateralized periodic discharges; GPD=generalized periodic discharges; BIPDs =bilateral independent periodic discharges; Mf=multifocal; SIRPDs=stimulus induced rhythmic, periodic, or ictal appearing discharges; BIRDs=brief potentially ictal rhythmic discharges >4 Hz, lasting .5-10s; PFA (paroxysmal bursts >13 Hz or =8 Hz <10s).  Modifiers: +F=with fast component; +S=with spike component; +R=with rhythmic component.  S-B=burst suppression pattern.  Max=maximal. N1-drowsy; N2-stage II sleep; N3-slow wave sleep. SSS/BETS=small sharp spikes/benign epileptiform transients of sleep. HV=hyperventilation; PS=photic stimulation]  Study Interpretation:  FINDINGS:    Background: Symmetry: symmetric Continuous: continuous PDR: symmetric, well-modulated 8.5 Hz activity, with amplitude to 40 uV, that attenuated to eye opening.  Low amplitude frontal beta noted in wakefulness. Reactivity: present Voltage: normal, mostly 20-150uV Anterior Posterior Gradient: present Breach: absent  Background Slowing: Generalized slowing: none was present. Focal slowing: none was present.  State Changes: Present with normal N2 sleep transients with symmetric K-complexes. Absent  Sporadic Epileptiform Discharges:  None  Rhythmic and Periodic Patterns (RPPs): None   Electrographic and Electroclinical seizures: None  Other Clinical Events: None  Activation Procedures:  -Hyperventilation was not performed.   -Photic stimulation was performed and did not elicit any abnormalities.    Artifacts: Intermittent myogenic and movement artifacts were noted.  ECG: The heart rate on single channel ECG was predominantly between 70-80 BPM.  EEG Classification / Summary:  Normal EEG awake / drowsy / sleep  Clinical Impression: Findings indicate:  Normal EEG awake / drowsy / sleep No epileptiform pattern or seizure seen.     REPORT OF ROUTINE EEG WITH VIDEO  [facility]: [address] [place] [address], Phone: [phone] [facility]: [address] [place] [address], [place] [address], Phone: [phone] Office: [address] [place] [address], [place] [address], Phone: [phone]  Patient Name: SHANE JUNG   Age: 76 year : 1945 MRN #: -, Location: [facility] REHAB BED 112D Referring Physician: MICHAEL NAIDU  EEG #:  Study Date: 2022   Start Time: 11:20:47 AM    Study Duration: 21.4 		  Technical Information:					 On Instrument: [id number] Placement and Labeling of Electrodes: The EEG was performed utilizing 20 channels referential EEG connections (coronal over temporal over parasagittal montage) using all standard 10-20 electrode placements with EKG.  Recording was at a sampling rate of 256 samples per second per channel.  Time synchronized digital video recording was done simultaneously with EEG recording.  A low light infrared camera was used for low light recording.  Wayne and seizure detection algorithms were utilized. CSA Technical Component: Quantitative EEG analysis using a separate Compressed Spectral Array (CSA) software package was conducted in real-time and run at bedside after set up by the technician, digitally displaying the power of electrographic frequencies included in the 1-30Hz band using a graded color map.  This data was reviewed and interpreted independently, and is reported in a separate section below.  History:  ROUTINE EEG / LAB NO HV  due to covid /PHOTIC STIM DONE PATIENT STATUS: AWAKE/DROWSY 76  y o rehab M pt p/w PMHx HTN prostate CA recent hip fx w/ rrt called for unresponsiveness. R/o sz   Medication No Data.  [Abbreviation Key:  PDR=alpha rhythm/posterior dominant rhythm. A-P=anterior posterior.  Amplitude: ‘very low’:<20; ‘low’:20-49; ‘medium’:; ‘high’:>150uV.  Persistence for periodic/rhythmic patterns (% of epoch) ‘rare’:<1%; ‘occasional’:1-10%; ‘frequent’:10-50%; ‘abundant’:50-90%; ‘continuous’:>90%.  Persistence for sporadic discharges: ‘rare’:<1/hr; ‘occasional’:1/min-1/hr; ‘frequent’:>1/min; ‘abundant’:>1/10 sec.  RPP=rhythmic and periodic patterns; GRDA=generalized rhythmic delta activity; FIRDA=frontal intermittent GRDA; LRDA=lateralized rhythmic delta activity; TIRDA=temporal intermittent rhythmic delta activity;  LPD=PLED=lateralized periodic discharges; GPD=generalized periodic discharges; BIPDs =bilateral independent periodic discharges; Mf=multifocal; SIRPDs=stimulus induced rhythmic, periodic, or ictal appearing discharges; BIRDs=brief potentially ictal rhythmic discharges >4 Hz, lasting .5-10s; PFA (paroxysmal bursts >13 Hz or =8 Hz <10s).  Modifiers: +F=with fast component; +S=with spike component; +R=with rhythmic component.  S-B=burst suppression pattern.  Max=maximal. N1-drowsy; N2-stage II sleep; N3-slow wave sleep. SSS/BETS=small sharp spikes/benign epileptiform transients of sleep. HV=hyperventilation; PS=photic stimulation]  Study Interpretation:  FINDINGS:    Background: Symmetry: symmetric Continuous: continuous PDR: symmetric, well-modulated 8.5 Hz activity, with amplitude to 40 uV, that attenuated to eye opening.  Low amplitude frontal beta noted in wakefulness. Reactivity: present Voltage: normal, mostly 20-150uV Anterior Posterior Gradient: present Breach: absent  Background Slowing: Generalized slowing: none was present. Focal slowing: none was present.  State Changes: Present with normal N2 sleep transients with symmetric K-complexes. Absent  Sporadic Epileptiform Discharges:  None  Rhythmic and Periodic Patterns (RPPs): None   Electrographic and Electroclinical seizures: None  Other Clinical Events: None  Activation Procedures:  -Hyperventilation was not performed.   -Photic stimulation was performed and did not elicit any abnormalities.    Artifacts: Intermittent myogenic and movement artifacts were noted.  ECG: The heart rate on single channel ECG was predominantly between 70-80 BPM.  EEG Classification / Summary:  Normal EEG awake / drowsy / sleep  Clinical Impression: Findings indicate:  Normal EEG awake / drowsy / sleep No epileptiform pattern or seizure seen.  Adilson Perla MD Neurology Attending Physician

## 2022-11-07 NOTE — PROGRESS NOTE ADULT - ASSESSMENT
Assessment	  75 YO male with PMH of HTN, HLD, hx of prostate CA who presented to [facility] on 10/31 from home after mechanical fall at home. CT imaging showed intertrochanteric fracture of left hip. He is s/p ORIF Left hip on 10/31. Post-op course significant for mild JESUS s/p IVF, leukocytosis likely reactive: no source of infection noted, and hyponatremia.     #Comprehensive Multidisciplinary Rehab Program:  - Gait, ADL, Functional impairments  - PT/OT 3 hours a day 5 days a week    #Left intertrochanteric fracture - s/p L Hip ORIF 10/31  follow up with surgery in 2 weeks     #Brief unresponsiveness (11/6)  - CTH negative  - Carotid doppler with atherosclerosis  - Normal EEG     #HTN   -continue amlodipine 10mg PO QD   -monitor vitals    #Hx of hyponatremia  -Continue NaCl 1g TID   -Monitor BMP - 136 (11/7)     #HLD   Continue Lipitor 10mg PO at bedtime     #Hypothyroidism   Continue synthroid     #Sleep:  - Maintain quiet hours and low stim environment  - Melatonin 3mg PO at bedtime, PRN    #Pain:  - Tylenol 650mg PO q6 Q6, PRN for moderate pain (1-3)   -Gabapentin 800mg PO TID   - avoid sedating meds that may affect cognitive recovery    #GI/Bowel:  - Zofran PRN for N/V  - Senna 2 tabs daily    #/Bladder:  - Toileting schedule q4h    #Die  - Regular  - Nutrition to follow    #Skin/ Pressure Injury Prevention:  - Incisions: left hip surgical incision with aquacel dressing c/d/i  - Turn Q2hrs in bed while awake, OOB to Chair, PT/OT/SLP     #DVT prophylaxis:  - Lovenox 40mg QD   - SCDs  - Doppler not performed     #Precautions/ Restrictions  - Falls  - Ortho:      Weight bearing status: WBAT  - COVID PCR: COVID Neg 11/4/22    --------------------------------------------  Outpatient Follow up:  Lizeth Ross)  Family Medicine  [address] [place] [address], [address]  [place] [address]  Phone: [phone]  Fax: [phone]  Follow Up Time:     Mike Nuno)  Orthopaedic Sports Medicine; Orthopaedic Surgery  [address] [facility], [address]  [place] [address]  Phone: [phone]  Fax: [phone]  Follow Up Time: 2 weeks    Jeannine Flores  [facility]  NEPHRO [address]  Scheduled Appointment: 11/11/2022    Irineo Gunn  [facility]  CARDIOLOGY [address]  Scheduled Appointment: 12/13/2022    Gayle Reynoso  [facility]  NEUROLOGY [address]  Scheduled Appointment: 12/21/2022    Chele Vaughn  [facility]  Med Endocr [address] [facility]  Scheduled Appointment: 01/27/2023

## 2022-11-07 NOTE — PROGRESS NOTE ADULT - ASSESSMENT
76M with PMH HTN, HLD, hx of prostate Ca presents from home after mechanical fall off a stool in his kitchen on Saturday admitted for left intertrochanteric fracture.     #Left intertrochanteric fracture - s/p L Hip ORIF 10/31  - s/p ORIF with Dr Nuno  - Pain control   - DVT ppx with Lovenox  - PT/OT   - Surgery follow up in 2 weeks     #Leukocytosis  - Resolved  - UA negative  - CXR personally reviewed, read with left lower zone ill-defined opacity/infiltrates. Given patient is asymptomatic, afebrile and with normal WBC count, will watch off Abx at this time  - Continue monitoring labs in acute rehab    #Anemia  - Present since admission in 9s, was 10.9-11 in Sept  - No s/s of bleeding  - Continue monitoring CBC postoperatively    #HTN  - Continue Amlodipine  - Monitor vitals    #JESUS   - resolved     #Hx of hyponatremia  - Continue NaCl 1G TID  - Monitor BMP    #Neuropathy  - Continue gabapentin     #HLD  - Continue Lipitor    #Hypothyroidism  - Continue Synthroid    #DVT ppx  - Lovenox    will follow  d/w rehab team

## 2022-11-07 NOTE — PROGRESS NOTE ADULT - NS ATTEND AMEND GEN_ALL_CORE FT
77 YO male with PMH of HTN, HLD, hx of prostate CA who presented to [facility] on 10/31 from home after mechanical fall at home. CT imaging showed intertrochanteric fracture of left hip. He is s/p ORIF Left hip on 10/31. Post-op course significant for mild JESUS s/p IVF, leukocytosis likely reactive: no source of infection noted, and hyponatremia.     #Comprehensive Multidisciplinary Rehab Program:  - Gait, ADL, Functional impairments  - PT/OT 3 hours a day 5 days a week    #Left intertrochanteric fracture - s/p L Hip ORIF 10/31  follow up with surgery in 2 weeks     #Brief unresponsiveness (11/6)  - CTH negative  - Carotid doppler with atherosclerosis  - Normal EEG

## 2022-11-08 PROCEDURE — 99232 SBSQ HOSP IP/OBS MODERATE 35: CPT | Mod: GC

## 2022-11-08 RX ADMIN — SODIUM CHLORIDE 1 GRAM(S): 9 INJECTION INTRAMUSCULAR; INTRAVENOUS; SUBCUTANEOUS at 05:34

## 2022-11-08 RX ADMIN — Medication 3 MILLIGRAM(S): at 21:11

## 2022-11-08 RX ADMIN — AMLODIPINE BESYLATE 10 MILLIGRAM(S): 2.5 TABLET ORAL at 05:36

## 2022-11-08 RX ADMIN — ATORVASTATIN CALCIUM 10 MILLIGRAM(S): 80 TABLET, FILM COATED ORAL at 21:00

## 2022-11-08 RX ADMIN — Medication 25 MICROGRAM(S): at 05:35

## 2022-11-08 RX ADMIN — SENNA PLUS 2 TABLET(S): 8.6 TABLET ORAL at 21:00

## 2022-11-08 RX ADMIN — GABAPENTIN 800 MILLIGRAM(S): 400 CAPSULE ORAL at 05:34

## 2022-11-08 RX ADMIN — GABAPENTIN 800 MILLIGRAM(S): 400 CAPSULE ORAL at 21:01

## 2022-11-08 RX ADMIN — SODIUM CHLORIDE 1 GRAM(S): 9 INJECTION INTRAMUSCULAR; INTRAVENOUS; SUBCUTANEOUS at 21:01

## 2022-11-08 NOTE — PROGRESS NOTE ADULT - SUBJECTIVE AND OBJECTIVE BOX
Patient is a 76y old  Male who presents with a chief complaint of S/p L Hip ORIF (07 Nov 2022 12:06)    HPI:  This is a 77 YO male with PMH of HTN, HLD, hx of prostate CA, neuropathy who  presented to [facility] on 10/31 from home after mechanical fall off a stool in his kitchen on Saturday. Reports hitting his left hip on the tiled floor, was able to get up and ambulate afterwards.  He reported sharp left hip pain, rated 8/10 with movement. Patient has been using an old cane at home to ambulate due to pain, but at baseline ambulates independently. Denies numbness, tingling. Took Tylenol for his pain with improvement. In the ED:  temp 98.3, /77, , RR 16, Spo2 98% RA. H/H: 9.1/27.1. CT imaging showed intertrochanteric fracture of left hip. Ortho consulted and he was taken to OR on 10/31 for ORIF Left hip. Post-op course significant for mild JESUS s/p IVF, leukocytosis likely reactive no source of infection noted.      SUBJECTIVE/ROS:  Patient seen and evaluated in therapy   Denies pain at rest and manageable with therapy  No CP, palpitation, SOB, HUITRON, HA, dizziness, nausea, abd pain, dysuria, or frequency.      Vital Signs Last 24 Hrs  T(C): 36.4 (08 Nov 2022 07:35), Max: 36.9 (07 Nov 2022 20:06)  T(F): 97.6 (08 Nov 2022 07:35), Max: 98.5 (07 Nov 2022 20:06)  HR: 88 (08 Nov 2022 07:35) (82 - 88)  BP: 145/64 (08 Nov 2022 07:35) (127/61 - 145/64)  BP(mean): --  RR: 15 (08 Nov 2022 07:35) (15 - 16)  SpO2: 96% (08 Nov 2022 07:35) (96% - 96%)    Parameters below as of 08 Nov 2022 07:35  Patient On (Oxygen Delivery Method): room air        Constitutional - NAD, Comfortable  HEENT - NCAT, EOMI  Chest - good chest expansion, good respiratory effort  Cardio - warm and well perfused  Abdomen -  Soft, NTND  Extremities - trace pedal edema - bilaterally,  No calf tenderness   MMT: LLE HF 4/5 limited by pain, 5/5 distally  Sensory - sensation to light touch  Psychiatric - Mood stable, Affect WNL  Skin: left hip aquacel dressing c/d/i    RECENT LABS:                      9.4    7.70  )-----------( 254      ( 07 Nov 2022 06:45 )             28.1     11-07    136  |  104  |  21  ----------------------------<  97  4.4   |  23  |  1.08    Ca    8.7      07 Nov 2022 06:45  Phos  4.1     11-06  Mg     2.1     11-06    TPro  6.9  /  Alb  3.2<L>  /  TBili  1.2  /  DBili  x   /  AST  25  /  ALT  37  /  AlkPhos  49  11-06    LIVER FUNCTIONS - ( 06 Nov 2022 08:36 )  Alb: 3.2 g/dL / Pro: 6.9 g/dL / ALK PHOS: 49 U/L / ALT: 37 U/L / AST: 25 U/L / GGT: x           PT/INR - ( 06 Nov 2022 08:36 )   PT: 12.1 sec;   INR: 1.04 ratio    PTT - ( 06 Nov 2022 08:36 )  PTT:26.3 sec    US Duplex Carotid Arteries Complete, Bilateral (11.06.22 @ 11:16) >  IMPRESSION: No significant hemodynamic stenosis of either carotid artery.  Atherosclerosis of the carotid arteries is present.    Measurement of carotid stenosis is based on velocity parameters that correlate the residual internal carotid diameter with that of the more distal vessel in accordance with a method such as the North American Symptomatic Carotid Endarterectomy Trial (NASCET).    CT Head No Cont (11.06.22 @ 09:14) >  HEAD CT: Mild volume loss, microvascular disease, no acute hemorrhage or midline shift.    MEDICATIONS  (STANDING):  amLODIPine   Tablet 10 milliGRAM(s) Oral daily  atorvastatin 10 milliGRAM(s) Oral at bedtime  cyanocobalamin 1000 MICROGram(s) Oral daily  enoxaparin Injectable 40 milliGRAM(s) SubCutaneous every 24 hours  gabapentin 800 milliGRAM(s) Oral three times a day  levothyroxine 25 MICROGram(s) Oral daily  senna 2 Tablet(s) Oral at bedtime  sodium chloride 1 Gram(s) Oral three times a day    MEDICATIONS  (PRN):  acetaminophen     Tablet .. 650 milliGRAM(s) Oral every 6 hours PRN Temp greater or equal to 38C (100.4F), Mild Pain (1 - 3)  melatonin 3 milliGRAM(s) Oral at bedtime PRN Insomnia  oxyCODONE    IR 5 milliGRAM(s) Oral every 6 hours PRN Severe Pain (7 - 10)  polyethylene glycol 3350 17 Gram(s) Oral two times a day PRN Constipation

## 2022-11-08 NOTE — PROGRESS NOTE ADULT - ASSESSMENT
Assessment	  77 YO male with PMH of HTN, HLD, hx of prostate CA who presented to [facility] on 10/31 from home after mechanical fall at home. CT imaging showed intertrochanteric fracture of left hip. He is s/p ORIF Left hip on 10/31. Post-op course significant for mild JESUS s/p IVF, leukocytosis likely reactive: no source of infection noted, and hyponatremia.     #Comprehensive Multidisciplinary Rehab Program:  - Gait, ADL, Functional impairments  - PT/OT 3 hours a day 5 days a week    #Left intertrochanteric fracture - s/p L Hip ORIF 10/31  follow up with surgery in 2 weeks     #Brief unresponsiveness (11/6)  - CTH negative  - Carotid doppler with atherosclerosis  - Normal EEG     #HTN   -continue amlodipine 10mg PO QD   -monitor vitals    #Hx of hyponatremia  -Continue NaCl 1g TID   -Monitor BMP - 136 (11/7)     #HLD   Continue Lipitor 10mg PO at bedtime     #Hypothyroidism   Continue synthroid     #Sleep:  - Maintain quiet hours and low stim environment  - Melatonin 3mg PO at bedtime, PRN    #Pain:  - Tylenol 650mg PO q6 Q6, PRN for moderate pain (1-3)   -Gabapentin 800mg PO TID   - avoid sedating meds that may affect cognitive recovery    #GI/Bowel:  - Zofran PRN for N/V  - Senna 2 tabs daily    #/Bladder:  - Toileting schedule q4h    #Die  - Regular  - Nutrition to follow    #Skin/ Pressure Injury Prevention:  - Incisions: left hip surgical incision with aquacel dressing c/d/i  - Turn Q2hrs in bed while awake, OOB to Chair, PT/OT/SLP     #DVT prophylaxis:  - Lovenox 40mg QD   - SCDs  - Doppler not performed     #Precautions/ Restrictions  - Falls  - Ortho:      Weight bearing status: WBAT  - COVID PCR: COVID Neg 11/4/22    --------------------------------------------  Outpatient Follow up:  Lizeth Ross)  Family Medicine  [address] [place] [address], [address]  [place] [address]  Phone: [phone]  Fax: [phone]  Follow Up Time:     Mike Nuno)  Orthopaedic Sports Medicine; Orthopaedic Surgery  [address] [facility], [address]  [place] [address]  Phone: [phone]  Fax: [phone]  Follow Up Time: 2 weeks    Jeannine Flores  [facility]  NEPHRO [address]  Scheduled Appointment: 11/11/2022    Irineo Gunn  [facility]  CARDIOLOGY [address]  Scheduled Appointment: 12/13/2022    Gayle Reynoso  [facility]  NEUROLOGY [address]  Scheduled Appointment: 12/21/2022    Chele Vaughn  [facility]  Med Endocr [address] [facility]  Scheduled Appointment: 01/27/2023

## 2022-11-09 ENCOUNTER — TRANSCRIPTION ENCOUNTER (OUTPATIENT)
Age: 77
End: 2022-11-09

## 2022-11-09 PROCEDURE — 99232 SBSQ HOSP IP/OBS MODERATE 35: CPT

## 2022-11-09 PROCEDURE — 99232 SBSQ HOSP IP/OBS MODERATE 35: CPT | Mod: GC

## 2022-11-09 RX ORDER — AMLODIPINE BESYLATE 2.5 MG/1
1 TABLET ORAL
Qty: 0 | Refills: 0 | DISCHARGE

## 2022-11-09 RX ORDER — GABAPENTIN 400 MG/1
2 CAPSULE ORAL
Qty: 0 | Refills: 0 | DISCHARGE
Start: 2022-11-09

## 2022-11-09 RX ORDER — LEVOTHYROXINE SODIUM 125 MCG
1 TABLET ORAL
Qty: 0 | Refills: 0 | DISCHARGE

## 2022-11-09 RX ORDER — PREGABALIN 225 MG/1
1 CAPSULE ORAL
Qty: 0 | Refills: 0 | DISCHARGE
Start: 2022-11-09

## 2022-11-09 RX ORDER — LEVOTHYROXINE SODIUM 125 MCG
1 TABLET ORAL
Qty: 0 | Refills: 0 | DISCHARGE
Start: 2022-11-09

## 2022-11-09 RX ORDER — AMLODIPINE BESYLATE 2.5 MG/1
1 TABLET ORAL
Qty: 0 | Refills: 0 | DISCHARGE
Start: 2022-11-09

## 2022-11-09 RX ORDER — ACETAMINOPHEN 500 MG
2 TABLET ORAL
Qty: 0 | Refills: 0 | DISCHARGE
Start: 2022-11-09

## 2022-11-09 RX ORDER — POLYETHYLENE GLYCOL 3350 17 G/17G
17 POWDER, FOR SOLUTION ORAL
Qty: 0 | Refills: 0 | DISCHARGE
Start: 2022-11-09

## 2022-11-09 RX ORDER — SENNA PLUS 8.6 MG/1
2 TABLET ORAL
Qty: 0 | Refills: 0 | DISCHARGE
Start: 2022-11-09

## 2022-11-09 RX ORDER — ATORVASTATIN CALCIUM 80 MG/1
1 TABLET, FILM COATED ORAL
Qty: 0 | Refills: 0 | DISCHARGE
Start: 2022-11-09

## 2022-11-09 RX ORDER — PREGABALIN 225 MG/1
1 CAPSULE ORAL
Qty: 0 | Refills: 0 | DISCHARGE

## 2022-11-09 RX ORDER — ATORVASTATIN CALCIUM 80 MG/1
1 TABLET, FILM COATED ORAL
Qty: 0 | Refills: 0 | DISCHARGE

## 2022-11-09 RX ORDER — SODIUM CHLORIDE 9 MG/ML
1 INJECTION INTRAMUSCULAR; INTRAVENOUS; SUBCUTANEOUS DAILY
Refills: 0 | Status: DISCONTINUED | OUTPATIENT
Start: 2022-11-09 | End: 2022-11-10

## 2022-11-09 RX ORDER — LANOLIN ALCOHOL/MO/W.PET/CERES
1 CREAM (GRAM) TOPICAL
Qty: 0 | Refills: 0 | DISCHARGE
Start: 2022-11-09

## 2022-11-09 RX ADMIN — ENOXAPARIN SODIUM 40 MILLIGRAM(S): 100 INJECTION SUBCUTANEOUS at 11:21

## 2022-11-09 RX ADMIN — ATORVASTATIN CALCIUM 10 MILLIGRAM(S): 80 TABLET, FILM COATED ORAL at 21:34

## 2022-11-09 RX ADMIN — SENNA PLUS 2 TABLET(S): 8.6 TABLET ORAL at 21:34

## 2022-11-09 RX ADMIN — PREGABALIN 1000 MICROGRAM(S): 225 CAPSULE ORAL at 11:22

## 2022-11-09 RX ADMIN — SODIUM CHLORIDE 1 GRAM(S): 9 INJECTION INTRAMUSCULAR; INTRAVENOUS; SUBCUTANEOUS at 05:15

## 2022-11-09 RX ADMIN — AMLODIPINE BESYLATE 10 MILLIGRAM(S): 2.5 TABLET ORAL at 05:15

## 2022-11-09 RX ADMIN — GABAPENTIN 800 MILLIGRAM(S): 400 CAPSULE ORAL at 05:14

## 2022-11-09 RX ADMIN — Medication 25 MICROGRAM(S): at 05:15

## 2022-11-09 RX ADMIN — GABAPENTIN 800 MILLIGRAM(S): 400 CAPSULE ORAL at 13:46

## 2022-11-09 RX ADMIN — GABAPENTIN 800 MILLIGRAM(S): 400 CAPSULE ORAL at 21:34

## 2022-11-09 RX ADMIN — SODIUM CHLORIDE 1 GRAM(S): 9 INJECTION INTRAMUSCULAR; INTRAVENOUS; SUBCUTANEOUS at 13:47

## 2022-11-09 NOTE — CHART NOTE - NSCHARTNOTEFT_GEN_A_CORE
Nutrition Follow Up Note  Hospital Course   (Per Electronic Medical Record)    Source:  Patient [X]  Medical Record [X]      Diet:   Regular Diet (IDDSI Level 7) w/ Thin Liquids (IDDSI Level 0)  Tolerates Diet Consistency Well  No Chewing/Swallowing Difficulties  No Recent Nausea, Vomiting, Diarrhea or Constipation (as Per Patient)  Consumes % of Meals (as Per Documentation) -  States Good PO Intake/Appetite (Per Patient)    Enteral/Parenteral Nutrition: Not Applicable    Current Weight: 197.5lb on 11/5  Obtain New Weight  Obtain Weights Weekly     Pertinent Medications: MEDICATIONS  (STANDING):  amLODIPine   Tablet 10 milliGRAM(s) Oral daily  atorvastatin 10 milliGRAM(s) Oral at bedtime  cyanocobalamin 1000 MICROGram(s) Oral daily  enoxaparin Injectable 40 milliGRAM(s) SubCutaneous every 24 hours  gabapentin 800 milliGRAM(s) Oral three times a day  levothyroxine 25 MICROGram(s) Oral daily  senna 2 Tablet(s) Oral at bedtime  sodium chloride 1 Gram(s) Oral three times a day    MEDICATIONS  (PRN):  acetaminophen     Tablet .. 650 milliGRAM(s) Oral every 6 hours PRN Temp greater or equal to 38C (100.4F), Mild Pain (1 - 3)  melatonin 3 milliGRAM(s) Oral at bedtime PRN Insomnia  oxyCODONE    IR 5 milliGRAM(s) Oral every 6 hours PRN Severe Pain (7 - 10)  polyethylene glycol 3350 17 Gram(s) Oral two times a day PRN Constipation    Pertinent Labs:  11-07 Na136 mmol/L Glu 97 mg/dL K+ 4.4 mmol/L Cr  1.08 mg/dL BUN 21 mg/dL 11-06 Phos 4.1 mg/dL 11-06 Alb 3.2 g/dL<L>    Skin: No Pressure Ulcers  Surgical Incision on Left Hip  (as Per Nursing Flow Sheet)     Edema: None Noted (as Per Documentation)     Last Bowel Movement: on 11/7    Estimated Needs:   [X] No Change Since Previous Assessment    Previous Nutrition Diagnosis:   No Active Nutrition Dx @ This Present Time    Nutrition Diagnosis is [X] Not Applicable    New Nutrition Diagnosis: [X] Not Applicable    Interventions:   1. Recommend Continue Nutrition Plan of Care     Monitoring & Evaluation:   [X] Weights   [X] PO Intake   [X] Skin Integrity   [X] Follow Up (Per Protocol)  [X] Tolerance to Diet Prescription   [X] Other: Labs     Registered Dietitian/Nutritionist Remains Available.  Jaylan Julien, LIBRADO    Phone# [phone]

## 2022-11-09 NOTE — DISCHARGE NOTE PROVIDER - NSDCMRMEDTOKEN_GEN_ALL_CORE_FT
acetaminophen 325 mg oral tablet: 2 tab(s) orally every 6 hours, As needed, Temp greater or equal to 38C (100.4F), Mild Pain (1 - 3)  alendronate 70 mg oral tablet: 1 tab(s) orally once a week  amLODIPine 10 mg oral tablet: 1 tab(s) orally once a day  aspirin 81 mg oral delayed release tablet: 1 tab(s) orally once a day  atorvastatin 10 mg oral tablet: 1 tab(s) orally once a day (at bedtime)  Citracal + D 250 mg-12.5 mcg (500 intl units) oral tablet, chewable: 2 tab(s) orally 2 times a day (with meals)  cyanocobalamin 1000 mcg oral tablet: 1 tab(s) orally once a day  Fish Oil 1200 mg oral capsule: 1 cap(s) orally once a day  gabapentin 400 mg oral capsule: 2 cap(s) orally 3 times a day  levothyroxine 25 mcg (0.025 mg) oral tablet: 1 tab(s) orally once a day  melatonin 3 mg oral tablet: 1 tab(s) orally once a day (at bedtime), As needed, Insomnia  polyethylene glycol 3350 oral powder for reconstitution: 17 gram(s) orally 2 times a day, As needed, Constipation  senna leaf extract oral tablet: 2 tab(s) orally once a day (at bedtime), As Needed constipation  sodium chloride 1 g oral tablet: orally 3 times a day   acetaminophen 325 mg oral tablet: 2 tab(s) orally every 6 hours, As needed, Temp greater or equal to 38C (100.4F), Mild Pain (1 - 3)  alendronate 70 mg oral tablet: 1 tab(s) orally once a week  amLODIPine 5 mg oral tablet: 1 tab(s) orally once a day  aspirin 81 mg oral delayed release tablet: 1 tab(s) orally once a day  atorvastatin 10 mg oral tablet: 1 tab(s) orally once a day (at bedtime)  Citracal + D 250 mg-12.5 mcg (500 intl units) oral tablet, chewable: 2 tab(s) orally 2 times a day (with meals)  cyanocobalamin 1000 mcg oral tablet: 1 tab(s) orally once a day  Fish Oil 1200 mg oral capsule: 1 cap(s) orally once a day  gabapentin 400 mg oral capsule: 2 cap(s) orally 3 times a day  levothyroxine 25 mcg (0.025 mg) oral tablet: 1 tab(s) orally once a day  melatonin 3 mg oral tablet: 1 tab(s) orally once a day (at bedtime), As needed, Insomnia  polyethylene glycol 3350 oral powder for reconstitution: 17 gram(s) orally 2 times a day, As needed, Constipation  senna leaf extract oral tablet: 2 tab(s) orally once a day (at bedtime), As Needed constipation  sodium chloride 1 g oral tablet: 1 tab(s) orally 2 times a day

## 2022-11-09 NOTE — DISCHARGE NOTE PROVIDER - CARE PROVIDER_API CALL
Lizeth Ross)  Family Medicine  [address] [place] [address], [address]  [place] [address]  Phone: [phone]  Fax: [phone]  Follow Up Time: 1 week    Mike Nuno)  Orthopaedic Sports Medicine; Orthopaedic Surgery  [address] [facility] [address], [address]  [place] [address]  Phone: [phone]  Fax: [phone]  Follow Up Time: 2 weeks    Clay Boston)  PhysicalRehab Medicine  [address] [place] [address]  Phone: [phone]  Fax: [phone]  Follow Up Time: 1 month

## 2022-11-09 NOTE — PROGRESS NOTE ADULT - NS ATTEND AMEND GEN_ALL_CORE FT
Constitutional - NAD, Comfortable  HEENT - NCAT, EOMI  Chest - good chest expansion, good respiratory effort  Cardio - warm and well perfused  Abdomen -  Soft, NTND  Extremities - trace pedal edema - bilaterally,  No calf tenderness   MMT: LLE HF 4/5 limited by pain, 5/5 distally  Sensory - sensation to light touch  Psychiatric - Mood stable, Affect WNL  Skin: left hip incision  x 3 with staples - healing well.

## 2022-11-09 NOTE — DISCHARGE NOTE PROVIDER - CARE PROVIDERS DIRECT ADDRESSES
[email or web address],[email or web address]

## 2022-11-09 NOTE — DISCHARGE NOTE PROVIDER - PROVIDER TOKENS
PROVIDER:[TOKEN:[[id number]],FOLLOWUP:[1 week]],PROVIDER:[TOKEN:[[id number]],FOLLOWUP:[2 weeks]],PROVIDER:[TOKEN:[[id number]],FOLLOWUP:[1 month]]

## 2022-11-09 NOTE — PROGRESS NOTE ADULT - SUBJECTIVE AND OBJECTIVE BOX
Patient is a 76y old  Male who presents with a chief complaint of S/p L Hip ORIF (07 Nov 2022 12:06)    HPI:  This is a 75 YO male with PMH of HTN, HLD, hx of prostate CA, neuropathy who  presented to [facility] on 10/31 from home after mechanical fall off a stool in his kitchen on Saturday. Reports hitting his left hip on the tiled floor, was able to get up and ambulate afterwards.  He reported sharp left hip pain, rated 8/10 with movement. Patient has been using an old cane at home to ambulate due to pain, but at baseline ambulates independently. Denies numbness, tingling. Took Tylenol for his pain with improvement. In the ED:  temp 98.3, /77, , RR 16, Spo2 98% RA. H/H: 9.1/27.1. CT imaging showed intertrochanteric fracture of left hip. Ortho consulted and he was taken to OR on 10/31 for ORIF Left hip. Post-op course significant for mild JESUS s/p IVF, leukocytosis likely reactive no source of infection noted.      SUBJECTIVE/ROS:  Patient seen and evaluated while resting in bed  Tolerating therapy - no pain at rest, pain 2-3/10 in therapy.  Have not taken narcotic  Denies CP, SOB, HA, dizziness, nausea, abd pain, dysuria, or frequency.  LBM yesterday  +neuropathy? x 12 years. Reports bilateral feet tingling sensation.  Taking gabapentin 800 TID.  Has tried cymbalta initially but that gave him negative SE + hyponatrema and has been on sodium tabs chronically, and also tried lyrica as well.  Uncertain if there's any benefit with gabapentin use, but does not want to change dose.  Reviewed discharge medications - does not need any medications prescribed (he has everything mail ordered with refills).  Educated to monitor BP before taking medications to avoid hypotension.  Patient lives alone and feels capable of self care and IADLs.  Has son in [place] that can visit if he really need.    Vital Signs Last 24 Hrs  T(C): 36.7 (11-09-22 @ 07:17), Max: 37.1 (11-08-22 @ 20:08)  T(F): 98.1 (11-09-22 @ 07:17), Max: 98.8 (11-08-22 @ 20:08)  HR: 69 (11-09-22 @ 07:17) (69 - 80)  BP: 113/62 (11-09-22 @ 07:17) (113/62 - 126/68)  RR: 14 (11-09-22 @ 07:17) (14 - 15)  SpO2: 95% (11-09-22 @ 07:17) (95% - 95%)    Constitutional - NAD, Comfortable  HEENT - NCAT, EOMI  Chest - good chest expansion, good respiratory effort  Cardio - warm and well perfused  Abdomen -  Soft, NTND  Extremities - trace pedal edema - bilaterally,  No calf tenderness   MMT: LLE HF 4/5 limited by pain, 5/5 distally  Sensory - sensation to light touch  Psychiatric - Mood stable, Affect WNL  Skin: left hip vision x 3 with staples - healing well.      RECENT LABS:                      9.4    7.70  )-----------( 254      ( 07 Nov 2022 06:45 )             28.1     11-07    136  |  104  |  21  ----------------------------<  97  4.4   |  23  |  1.08    Ca    8.7      07 Nov 2022 06:45  Phos  4.1     11-06  Mg     2.1     11-06    TPro  6.9  /  Alb  3.2<L>  /  TBili  1.2  /  DBili  x   /  AST  25  /  ALT  37  /  AlkPhos  49  11-06    LIVER FUNCTIONS - ( 06 Nov 2022 08:36 )  Alb: 3.2 g/dL / Pro: 6.9 g/dL / ALK PHOS: 49 U/L / ALT: 37 U/L / AST: 25 U/L / GGT: x           PT/INR - ( 06 Nov 2022 08:36 )   PT: 12.1 sec;   INR: 1.04 ratio    PTT - ( 06 Nov 2022 08:36 )  PTT:26.3 sec    US Duplex Carotid Arteries Complete, Bilateral (11.06.22 @ 11:16) >  IMPRESSION: No significant hemodynamic stenosis of either carotid artery.  Atherosclerosis of the carotid arteries is present.    Measurement of carotid stenosis is based on velocity parameters that correlate the residual internal carotid diameter with that of the more distal vessel in accordance with a method such as the [employer] American Symptomatic Carotid Endarterectomy Trial (NASCET).    CT Head No Cont (11.06.22 @ 09:14) >  HEAD CT: Mild volume loss, microvascular disease, no acute hemorrhage or midline shift.    MEDICATIONS  (STANDING):  amLODIPine   Tablet 10 milliGRAM(s) Oral daily  atorvastatin 10 milliGRAM(s) Oral at bedtime  cyanocobalamin 1000 MICROGram(s) Oral daily  enoxaparin Injectable 40 milliGRAM(s) SubCutaneous every 24 hours  gabapentin 800 milliGRAM(s) Oral three times a day  levothyroxine 25 MICROGram(s) Oral daily  senna 2 Tablet(s) Oral at bedtime  sodium chloride 1 Gram(s) Oral daily    MEDICATIONS  (PRN):  acetaminophen     Tablet .. 650 milliGRAM(s) Oral every 6 hours PRN Temp greater or equal to 38C (100.4F), Mild Pain (1 - 3)  melatonin 3 milliGRAM(s) Oral at bedtime PRN Insomnia  oxyCODONE    IR 5 milliGRAM(s) Oral every 6 hours PRN Severe Pain (7 - 10)  polyethylene glycol 3350 17 Gram(s) Oral two times a day PRN Constipation

## 2022-11-09 NOTE — PROGRESS NOTE ADULT - ASSESSMENT
Assessment	  77 YO male with PMH of HTN, HLD, hx of prostate CA who presented to [facility] on 10/31 from home after mechanical fall at home. CT imaging showed intertrochanteric fracture of left hip. He is s/p ORIF Left hip on 10/31. Post-op course significant for mild JESUS s/p IVF, leukocytosis likely reactive: no source of infection noted, and hyponatremia.     #Left intertrochanteric fracture   - s/p L Hip ORIF 10/31  - primary aquacell removed - hip incisions x 3 C/D/I with staples.  - Comprehensive Multidisciplinary Rehab Program:  - Gait, ADL, Functional impairments  - PT/OT 3 hours a day 5 days a week    #Brief unresponsiveness (11/6)  - CTH negative  - Carotid doppler with atherosclerosis  - Normal EEG     #HTN   -continue amlodipine 10mg PO QD   -monitor vitals    #Hx of hyponatremia - from cymbalta use  -NaCl 1g TID - will trial on QD  -Monitor BMP - 136 (11/7)     #Neuropathy? - B12 deficiency?  - on B12 supplement  - Gabapentin 800 TID  - Has tried cymbalta and lyrica in the past with no benefit    #HLD   Continue Lipitor 10mg PO at bedtime     #Hypothyroidism   - Continue synthroid 25mcg    #Sleep:  - Maintain quiet hours and low stim environment  - Melatonin 3mg PO at bedtime, PRN    #Pain:  - Tylenol 650mg Q6, PRN for moderate pain (1-3)   - Oxycodone PRN - has not used   - avoid sedating meds that may affect cognitive recovery  - nonpharm modalities: cold compress    #GI/Bowel:  - Zofran PRN for N/V  - Senna 2 tabs daily    #/Bladder:  - Toileting schedule q4h    #Die  - Regular  - Nutrition to follow    #Skin/ Pressure Injury Prevention:  - Incisions: left hip surgical incision with aquacel dressing c/d/i  - Turn Q2hrs in bed while awake, OOB to Chair, PT/OT/SLP     #DVT prophylaxis:  - Lovenox 40mg QD   - SCDs  - Doppler not performed     #Precautions/ Restrictions  - Falls  - Ortho:      Weight bearing status: WBAT  - COVID PCR: COVID Neg 11/4/22    IDT 11/9  Lives along in a PH (), 4 WON and 4 up to bedroom and bathroom. Basement for laundry  Functional: OT: overall SV. PT: ambulating 100' RW CSV, 50' SAC CG.  did 12 steps CG  Goal: mod I with self care/transfer/amb/stairs  TDD: 11/12 home + HC    --------------------------------------------  Outpatient Follow up:  Lizeth Ross)  Family Medicine  [address] [place] [address], [address]  [place] [address]  Phone: [phone]  Fax: [phone]  Follow Up Time:     Mike Nuno)  Orthopaedic Sports Medicine; Orthopaedic Surgery  [address] [facility], [address] [place] [address]  Phone: [phone]  Fax: [phone]  Follow Up Time: 2 weeks    Jeannine Flores  [facility]  NEPHRO [address]  Scheduled Appointment: 11/11/2022    Irineo Gunn  [facility]  CARDIOLOGY [address]  Scheduled Appointment: 12/13/2022    Gayle Reynoso  [facility]  NEUROLOGY [address]  Scheduled Appointment: 12/21/2022    Chele Vaughn  [facility]  Med Endocr [address] [facility]  Scheduled Appointment: 01/27/2023

## 2022-11-09 NOTE — DISCHARGE NOTE PROVIDER - HOSPITAL COURSE
77 YO male with PMH of HTN, HLD, hx of prostate CA, neuropathy who  presented to [facility] on 10/31 from home after mechanical fall off a stool in his kitchen on Saturday. Reports hitting his left hip on the tiled floor, was able to get up and ambulate afterwards.  He reported sharp left hip pain, rated 8/10 with movement. Patient has been using an old cane at home to ambulate due to pain, but at baseline ambulates independently. Denies numbness, tingling. Took Tylenol for his pain with improvement. In the ED:  temp 98.3, /77, , RR 16, Spo2 98% RA. H/H: 9.1/27.1. CT imaging showed intertrochanteric fracture of left hip. Ortho consulted and he was taken to OR on 10/31 for ORIF Left hip. Post-op course significant for mild JESUS s/p IVF, leukocytosis likely reactive no source of infection noted.     Pt was stable upon rehab admission to  [facility]. Admitted with gait instabilty, ADL, and functional impairments.     Rehab Course unremarkable  - Primary dressing removed 11/9 - left hip incisions x 3 healing well with staples.  Staples may be removed day of discharge    All other medical co-morbidities were stable.     Pt tolerated course of inpatient PT/OT rehab with significant functional improvements and met rehab goals prior to discharge.    Pt was medically cleared on _11/12__  for discharged to _home__      77 YO male with PMH of HTN, HLD, hx of prostate CA, neuropathy who  presented to [facility] on 10/31 from home after mechanical fall off a stool in his kitchen on Saturday. Reports hitting his left hip on the tiled floor, was able to get up and ambulate afterwards.  He reported sharp left hip pain, rated 8/10 with movement. Patient has been using an old cane at home to ambulate due to pain, but at baseline ambulates independently. Denies numbness, tingling. Took Tylenol for his pain with improvement. In the ED:  temp 98.3, /77, , RR 16, Spo2 98% RA. H/H: 9.1/27.1. CT imaging showed intertrochanteric fracture of left hip. Ortho consulted and he was taken to OR on 10/31 for ORIF Left hip. Post-op course significant for mild JESUS s/p IVF, leukocytosis likely reactive no source of infection noted.     Pt was stable upon rehab admission to  [facility]patient [facility] Facility. Admitted with gait instabilty, ADL, and functional impairments.     Rehab Course unremarkable  - Primary dressing removed 11/9 - left hip incisions x 3 healing well with staples.  Staples may be removed day of discharge  - trial to wean salt tab - Sodium level acceptable @ 134 on 11/11    All other medical co-morbidities were stable.     Pt tolerated course of inpatient PT/OT rehab with significant functional improvements and met rehab goals prior to discharge.    Pt was medically cleared on _11/12__  for discharged to _home__

## 2022-11-09 NOTE — DISCHARGE NOTE PROVIDER - NSDCCPCAREPLAN_GEN_ALL_CORE_FT
PRINCIPAL DISCHARGE DIAGNOSIS  Diagnosis: Intertrochanteric fracture of left hip  Assessment and Plan of Treatment: You were admitted for left hip fracture.  You had surgery on 10/31/22 with Dr. Nuno.  Follow up with Dr Nuno in two weeks.  Follow up with medical team at rehab and then with your primary care doctor.      SECONDARY DISCHARGE DIAGNOSES  Diagnosis: Neuropathy  Assessment and Plan of Treatment: Neuropathy - tingling sensation to bilateral feet x 12 years.  unclear etiology - vitamin B12 deficiency?? B12 level 1060 (normal)  On B12 supplement  Currently stable on Gabapentin 800mg 3 times daily.   Previously tried Cymbalta and lyrica with side effects/no benefits      Diagnosis: Hyponatremia  Assessment and Plan of Treatment: Chronic hyponatremia -> as per patient, it was from Cymbalta use  wasn't on a fluid restriction during rehab stay given it's a chronic condition.  On salt tab 1g 3 times daily (long term).  Unclear provider's goal to wean?  Trialed salt tab 1g daily  Upon discharge, sodium level        on 11/10    Diagnosis: Hypertension  Assessment and Plan of Treatment: blood pressure noted to be soft at times.  During your rehab course, amlodipine 10mg wasn't always given - order is to hold if systolic (top number of blood pressure) is < 120.    Advised to monitor blood pressure at home - have breakfast and then take blood pressure prior to taking anti-hypertensive to avoid potential hypotension/risk of fall     PRINCIPAL DISCHARGE DIAGNOSIS  Diagnosis: Intertrochanteric fracture of left hip  Assessment and Plan of Treatment: You were admitted for left hip fracture.  You had surgery on 10/31/22 with Dr. Nuno.  Staples removed 11/12 (day of discharge)  Follow up with Dr Nuno in 1-2 weeks.  Follow up with rehab provider, Dr. Boston -> if you additional rehab at that time, you may ask for PT/OT prescription      SECONDARY DISCHARGE DIAGNOSES  Diagnosis: Neuropathy  Assessment and Plan of Treatment: Neuropathy - tingling sensation to bilateral feet x 12 years.  unclear etiology - vitamin B12 deficiency?? B12 level 1060 (normal)  On B12 supplement  Currently stable on Gabapentin 800mg 3 times daily.   Previously tried Cymbalta and lyrica with side effects/no benefits      Diagnosis: Hyponatremia  Assessment and Plan of Treatment: Chronic hyponatremia -> as per patient, it was from Cymbalta use  wasn't on a fluid restriction during rehab stay given it's a chronic condition.  On salt tab 1g 3 times daily.  Unclear provider's goal to wean?  Trialed salt tab 1g daily but sodium dipped.  Discharge on Salt tab 1g twice daily - to be monitored by PCP outpatient  Upon discharge, sodium level 134  on 11/11 - while on 1g twice daily x 1 day    Diagnosis: Hypertension  Assessment and Plan of Treatment: blood pressure noted to be soft at times.   Amlodipine decreased to 5mg.  You had an episode of unresponsiveness - workup was negative.  Given your intermittent softer BP reading it can be attributed to that  Advised to monitor blood pressure at home - have breakfast and then take blood pressure prior to taking anti-hypertensive.  If your blood pressure is (top number) is less than 120 you may not need to take medication (to avoid potential hypotension/risk of fall/unresponsiveness).  You can tae your BP throughout the day to see what your blood pressure range is.  Keep a log and discuss with your PCP and /or cardiologist

## 2022-11-09 NOTE — DISCHARGE NOTE PROVIDER - NSDCFUSCHEDAPPT_GEN_ALL_CORE_FT
Irineo Gunn  [facility]  CARDIOLOGY [address]  Scheduled Appointment: 12/13/2022    Gayle Reynoso  [facility]  NEUROLOGY [address]  Scheduled Appointment: 12/21/2022    Chele Vaughn  [facility]  Med Endocr [address] [facility]  Scheduled Appointment: 01/27/2023

## 2022-11-10 LAB
ANION GAP SERPL CALC-SCNC: 3 MMOL/L — LOW (ref 5–17)
BUN SERPL-MCNC: 24 MG/DL — HIGH (ref 7–23)
CALCIUM SERPL-MCNC: 9 MG/DL — SIGNIFICANT CHANGE UP (ref 8.4–10.5)
CHLORIDE SERPL-SCNC: 102 MMOL/L — SIGNIFICANT CHANGE UP (ref 96–108)
CO2 SERPL-SCNC: 28 MMOL/L — SIGNIFICANT CHANGE UP (ref 22–31)
CREAT SERPL-MCNC: 1.19 MG/DL — SIGNIFICANT CHANGE UP (ref 0.5–1.3)
EGFR: 62 ML/MIN/1.73M2 — SIGNIFICANT CHANGE UP
GLUCOSE SERPL-MCNC: 92 MG/DL — SIGNIFICANT CHANGE UP (ref 70–99)
HCT VFR BLD CALC: 26.4 % — LOW (ref 39–50)
HGB BLD-MCNC: 9 G/DL — LOW (ref 13–17)
MCHC RBC-ENTMCNC: 32 PG — SIGNIFICANT CHANGE UP (ref 27–34)
MCHC RBC-ENTMCNC: 34.1 GM/DL — SIGNIFICANT CHANGE UP (ref 32–36)
MCV RBC AUTO: 94 FL — SIGNIFICANT CHANGE UP (ref 80–100)
NRBC # BLD: 0 /100 WBCS — SIGNIFICANT CHANGE UP (ref 0–0)
PLATELET # BLD AUTO: 277 K/UL — SIGNIFICANT CHANGE UP (ref 150–400)
POTASSIUM SERPL-MCNC: 4.2 MMOL/L — SIGNIFICANT CHANGE UP (ref 3.5–5.3)
POTASSIUM SERPL-SCNC: 4.2 MMOL/L — SIGNIFICANT CHANGE UP (ref 3.5–5.3)
RBC # BLD: 2.81 M/UL — LOW (ref 4.2–5.8)
RBC # FLD: 13.3 % — SIGNIFICANT CHANGE UP (ref 10.3–14.5)
SODIUM SERPL-SCNC: 133 MMOL/L — LOW (ref 135–145)
WBC # BLD: 6.56 K/UL — SIGNIFICANT CHANGE UP (ref 3.8–10.5)
WBC # FLD AUTO: 6.56 K/UL — SIGNIFICANT CHANGE UP (ref 3.8–10.5)

## 2022-11-10 PROCEDURE — 99232 SBSQ HOSP IP/OBS MODERATE 35: CPT

## 2022-11-10 RX ORDER — SODIUM CHLORIDE 9 MG/ML
1 INJECTION INTRAMUSCULAR; INTRAVENOUS; SUBCUTANEOUS
Refills: 0 | Status: DISCONTINUED | OUTPATIENT
Start: 2022-11-10 | End: 2022-11-12

## 2022-11-10 RX ORDER — AMLODIPINE BESYLATE 2.5 MG/1
5 TABLET ORAL DAILY
Refills: 0 | Status: DISCONTINUED | OUTPATIENT
Start: 2022-11-11 | End: 2022-11-12

## 2022-11-10 RX ADMIN — AMLODIPINE BESYLATE 10 MILLIGRAM(S): 2.5 TABLET ORAL at 05:12

## 2022-11-10 RX ADMIN — ENOXAPARIN SODIUM 40 MILLIGRAM(S): 100 INJECTION SUBCUTANEOUS at 12:57

## 2022-11-10 RX ADMIN — ATORVASTATIN CALCIUM 10 MILLIGRAM(S): 80 TABLET, FILM COATED ORAL at 21:02

## 2022-11-10 RX ADMIN — GABAPENTIN 800 MILLIGRAM(S): 400 CAPSULE ORAL at 21:03

## 2022-11-10 RX ADMIN — Medication 25 MICROGRAM(S): at 05:12

## 2022-11-10 RX ADMIN — PREGABALIN 1000 MICROGRAM(S): 225 CAPSULE ORAL at 12:55

## 2022-11-10 RX ADMIN — GABAPENTIN 800 MILLIGRAM(S): 400 CAPSULE ORAL at 14:56

## 2022-11-10 RX ADMIN — GABAPENTIN 800 MILLIGRAM(S): 400 CAPSULE ORAL at 05:13

## 2022-11-10 RX ADMIN — SENNA PLUS 2 TABLET(S): 8.6 TABLET ORAL at 21:02

## 2022-11-10 RX ADMIN — SODIUM CHLORIDE 1 GRAM(S): 9 INJECTION INTRAMUSCULAR; INTRAVENOUS; SUBCUTANEOUS at 18:33

## 2022-11-10 NOTE — PROGRESS NOTE ADULT - SUBJECTIVE AND OBJECTIVE BOX
Patient is a 76y old  Male who presents with a chief complaint of left hip fracture (04 Nov 2022 09:28)    Patient seen and examined at bedside. sitting in chair by the bedside, doign well with PT,  no new complaints, no n/v, no sob, no cp.    Vital Signs Last 24 Hrs  T(C): 36.5 (10 Nov 2022 08:15), Max: 36.8 (09 Nov 2022 19:48)  T(F): 97.7 (10 Nov 2022 08:15), Max: 98.3 (09 Nov 2022 19:48)  HR: 85 (10 Nov 2022 08:15) (67 - 85)  BP: 105/63 (10 Nov 2022 08:15) (105/63 - 124/65)  BP(mean): --  RR: 15 (10 Nov 2022 08:15) (14 - 15)  SpO2: 98% (10 Nov 2022 08:15) (94% - 98%)    Parameters below as of 10 Nov 2022 08:15  Patient On (Oxygen Delivery Method): room air      GENERAL- NAD  EAR/NOSE/MOUTH/THROAT - no pharyngeal exudates, no oral lesion's  MMM  EYES- CELINE, conjunctiva and Sclera clear  NECK- supple  RESPIRATORY-  clear to auscultation bilaterally, non laboured breathing  CARDIOVASCULAR - SIS2, RRR  GI - soft NT BS present  EXTREMITIES- no pedal edema  NEUROLOGY- no gross focal deficits  PSYCHIATRY- AAO X 3                      9.0                  133  | 28   | 24           6.56  >-----------< 277     ------------------------< 92                    26.4                 4.2  | 102  | 1.19                                         Ca 9.0   Mg x     Ph x            MEDICATIONS  (STANDING):  amLODIPine   Tablet 10 milliGRAM(s) Oral daily  atorvastatin 10 milliGRAM(s) Oral at bedtime  cyanocobalamin 1000 MICROGram(s) Oral daily  enoxaparin Injectable 40 milliGRAM(s) SubCutaneous every 24 hours  gabapentin 800 milliGRAM(s) Oral three times a day  levothyroxine 25 MICROGram(s) Oral daily  senna 2 Tablet(s) Oral at bedtime  sodium chloride 1 Gram(s) Oral two times a day    MEDICATIONS  (PRN):  acetaminophen     Tablet .. 650 milliGRAM(s) Oral every 6 hours PRN Temp greater or equal to 38C (100.4F), Mild Pain (1 - 3)  melatonin 3 milliGRAM(s) Oral at bedtime PRN Insomnia  oxyCODONE    IR 5 milliGRAM(s) Oral every 6 hours PRN Severe Pain (7 - 10)  polyethylene glycol 3350 17 Gram(s) Oral two times a day PRN Constipation

## 2022-11-10 NOTE — PROGRESS NOTE ADULT - ASSESSMENT
Assessment	  77 YO male with PMH of HTN, HLD, hx of prostate CA who presented to [facility] on 10/31 from home after mechanical fall at home. CT imaging showed intertrochanteric fracture of left hip. He is s/p ORIF Left hip on 10/31. Post-op course significant for mild JESUS s/p IVF, leukocytosis likely reactive: no source of infection noted, and hyponatremia.     #Left intertrochanteric fracture   - s/p L Hip ORIF 10/31  - primary aquacell removed - hip incisions x 3 C/D/I with staples.  - Comprehensive Multidisciplinary Rehab Program:  - Gait, ADL, Functional impairments  - PT/OT 3 hours a day 5 days a week    #Brief unresponsiveness (11/6)  - CTH negative  - Carotid doppler with atherosclerosis  - Normal EEG     #HTN   -continue amlodipine 10mg PO QD   -monitor vitals    #Hx of hyponatremia - from cymbalta use  -NaCl 1g TID - will trial on QD  -Monitor BMP - 136 (11/7)     #Neuropathy? - B12 deficiency?  - on B12 supplement  - Gabapentin 800 TID  - Has tried cymbalta and lyrica in the past with no benefit    #HLD   Continue Lipitor 10mg PO at bedtime     #Hypothyroidism   - Continue synthroid 25mcg    #Sleep:  - Maintain quiet hours and low stim environment  - Melatonin 3mg PO at bedtime, PRN    #Pain:  - Tylenol 650mg Q6, PRN for moderate pain (1-3)   - Oxycodone PRN - has not used   - avoid sedating meds that may affect cognitive recovery  - nonpharm modalities: cold compress    #GI/Bowel:  - Zofran PRN for N/V  - Senna 2 tabs daily    #/Bladder:  - Toileting schedule q4h    #Die  - Regular  - Nutrition to follow    #Skin/ Pressure Injury Prevention:  - Incisions: left hip surgical incision with aquacel dressing c/d/i  - Turn Q2hrs in bed while awake, OOB to Chair, PT/OT/SLP     #DVT prophylaxis:  - Lovenox 40mg QD   - SCDs  - Doppler not performed     #Precautions/ Restrictions  - Falls  - Ortho:      Weight bearing status: WBAT  - COVID PCR: COVID Neg 11/4/22    IDT 11/9  Lives along in a PH (), 4 WON and 4 up to bedroom and bathroom. Basement for laundry  Functional: OT: overall SV. PT: ambulating 100' RW CSV, 50' SAC CG.  did 12 steps CG  Goal: mod I with self care/transfer/amb/stairs  TDD: 11/12 home + HC    --------------------------------------------  Outpatient Follow up:  Lizeth Ross)  Family Medicine  [address] [place] [address], [address]  [place] [address]  Phone: [phone]  Fax: [phone]  Follow Up Time:     Mike Nuno)  Orthopaedic Sports Medicine; Orthopaedic Surgery  [address] [facility], [address] [place] [address]  Phone: [phone]  Fax: [phone]  Follow Up Time: 2 weeks    Jeannine Flores  [facility] Physician [facility]  NEPHRO [address]  Scheduled Appointment: 11/11/2022    Irineo Gunn  [facility] Physician [facility]  CARDIOLOGY [address]  Scheduled Appointment: 12/13/2022    Gayle Reynoso  [facility] Physician [facility]  NEUROLOGY [address]  Scheduled Appointment: 12/21/2022    Chele Vaughn  [facility] Physician [facility]  Med Endocr [address] [facility]  Scheduled Appointment: 01/27/2023   Assessment	  75 YO male with PMH of HTN, HLD, hx of prostate CA who presented to [facility] on 10/31 from home after mechanical fall at home. CT imaging showed intertrochanteric fracture of left hip. He is s/p ORIF Left hip on 10/31. Post-op course significant for mild JESUS s/p IVF, leukocytosis likely reactive: no source of infection noted, and hyponatremia.     #Left intertrochanteric fracture   - s/p L Hip ORIF 10/31  - primary aquacell removed - hip incisions x 3 C/D/I with staples.  - Comprehensive Multidisciplinary Rehab Program:  - Gait, ADL, Functional impairments  - PT/OT 3 hours a day 5 days a week    #Brief unresponsiveness (11/6)  - CTH negative  - Carotid doppler with atherosclerosis  - Normal EEG     #HTN   -continue amlodipine 10mg PO QD -> dec 5mg  -monitor vitals - BP soft at times,  after medication    #Hx of hyponatremia - from cymbalta use  -NaCl 1g TID - will trial on QD (11/9) -> inc BID  -Monitor BMP - 133 (11/10)     #Neuropathy? - B12 deficiency?  - on B12 supplement  - Gabapentin 800 TID  - Has tried cymbalta and lyrica in the past with no benefit    #HLD   Continue Lipitor 10mg PO at bedtime     #Hypothyroidism   - Continue synthroid 25mcg    #Sleep:  - Maintain quiet hours and low stim environment  - Melatonin 3mg PO at bedtime, PRN    #Pain:  - Tylenol 650mg Q6, PRN for moderate pain (1-3)   - Oxycodone PRN - has not used   - avoid sedating meds that may affect cognitive recovery  - nonpharm modalities: cold compress    #GI/Bowel:  - Zofran PRN for N/V  - Senna 2 tabs daily    #/Bladder:  - Toileting schedule q4h    #Die  - Regular  - Nutrition to follow    #Skin/ Pressure Injury Prevention:  - Incisions: left hip surgical incision with aquacel dressing c/d/i  - Turn Q2hrs in bed while awake, OOB to Chair, PT/OT    #DVT prophylaxis:  - Lovenox 40mg QD   - teds    #Precautions/ Restrictions  - Falls  - Weight bearing status: WBAT  - COVID PCR: COVID Neg 11/4/22    IDT 11/9  Lives along in a PH (), 4 WON and 4 up to bedroom and bathroom. Basement for laundry  Functional: OT: overall SV. PT: ambulating 100' RW CSV, 50' SAC CG.  did 12 steps CG  Goal: mod I with self care/transfer/amb/stairs  TDD: 11/12 home + HC    --------------------------------------------  Outpatient Follow up:  Lizeth Ross)  Family Medicine  [address] [place] [address], [address]  [place] [address]  Phone: [phone]  Fax: [phone]  Follow Up Time:     Mike Nuno)  Orthopaedic Sports Medicine; Orthopaedic Surgery  [address] [facility], [address]  [place] [address]  Phone: [phone]  Fax: [phone]  Follow Up Time: 2 weeks    Jeannine Flores  [facility]  NEPHRO [address]  Scheduled Appointment: 11/11/2022    Irineo Gunn  [facility]  CARDIOLOGY [address]  Scheduled Appointment: 12/13/2022    Gayle Reynoso  [facility]  NEUROLOGY [address]  Scheduled Appointment: 12/21/2022    Chele Vaughn  [facility]  Med Endocr [address]  Scheduled Appointment: 01/27/2023

## 2022-11-10 NOTE — PROGRESS NOTE ADULT - SUBJECTIVE AND OBJECTIVE BOX
Patient is a 76y old  Male who presents with a chief complaint of S/p L Hip ORIF (07 Nov 2022 12:06)    HPI:  This is a 75 YO male with PMH of HTN, HLD, hx of prostate CA, neuropathy who  presented to [facility] on 10/31 from home after mechanical fall off a stool in his kitchen on Saturday. Reports hitting his left hip on the tiled floor, was able to get up and ambulate afterwards.  He reported sharp left hip pain, rated 8/10 with movement. Patient has been using an old cane at home to ambulate due to pain, but at baseline ambulates independently. Denies numbness, tingling. Took Tylenol for his pain with improvement. In the ED:  temp 98.3, /77, , RR 16, Spo2 98% RA. H/H: 9.1/27.1. CT imaging showed intertrochanteric fracture of left hip. Ortho consulted and he was taken to OR on 10/31 for ORIF Left hip. Post-op course significant for mild JESUS s/p IVF, leukocytosis likely reactive no source of infection noted.      SUBJECTIVE/ROS:  Patient seen and evaluated while sitting in WC at bedside   Tolerating therapy - dressing came off from friction of pants and movement.  Incision still looking good with staples  Pain is minimal - not taking narcotics  Discussed discharge - medications and follow ups.  Pt states that he has a cardiologist (no cardiac history per se) but because he had an episode where he fainted years ago while at the gym.  Since he's sees cardiologist [id number].    reviewed how he takes medications - states that he typically take blood pressure medications then take BP.  Advised to take BP prior to taking anti-hypertensive to prevent hypotension and risk of fall/fainting.   Denies CP, SOB, HA, dizziness, nausea, abd pain, dysuria, or frequency.  Reviewed labs with patient - Sodium low since reducing salt tab.  Will need to wean as tolerated    Vital Signs Last 24 Hrs  T(C): 36.5 (11-10-22 @ 08:15), Max: 36.8 (11-09-22 @ 19:48)  T(F): 97.7 (11-10-22 @ 08:15), Max: 98.3 (11-09-22 @ 19:48)  HR: 85 (11-10-22 @ 08:15) (67 - 85)  BP: 105/63 (11-10-22 @ 08:15) (105/63 - 124/65)  RR: 15 (11-10-22 @ 08:15) (14 - 15)  SpO2: 98% (11-10-22 @ 08:15) (94% - 98%)      RECENT LABS:             LAB                        9.0    6.56  )-----------( 277      ( 10 Nov 2022 06:34 )             26.4     11-10    133<L>  |  102  |  24<H>  ----------------------------<  92  4.2   |  28  |  1.19    Ca    9.0      10 Nov 2022 06:34    US Duplex Carotid Arteries Complete, Bilateral (11.06.22 @ 11:16) >  IMPRESSION: No significant hemodynamic stenosis of either carotid artery.  Atherosclerosis of the carotid arteries is present.    Measurement of carotid stenosis is based on velocity parameters that correlate the residual internal carotid diameter with that of the more distal vessel in accordance with a method such as the [place] [employer] Symptomatic Carotid Endarterectomy Trial (NASCET).    CT Head No Cont (11.06.22 @ 09:14) >  HEAD CT: Mild volume loss, microvascular disease, no acute hemorrhage or midline shift.    MEDICATIONS  (STANDING):  amLODIPine   Tablet 10 milliGRAM(s) Oral daily  atorvastatin 10 milliGRAM(s) Oral at bedtime  cyanocobalamin 1000 MICROGram(s) Oral daily  enoxaparin Injectable 40 milliGRAM(s) SubCutaneous every 24 hours  gabapentin 800 milliGRAM(s) Oral three times a day  levothyroxine 25 MICROGram(s) Oral daily  senna 2 Tablet(s) Oral at bedtime  sodium chloride 1 Gram(s) Oral daily    MEDICATIONS  (PRN):  acetaminophen     Tablet .. 650 milliGRAM(s) Oral every 6 hours PRN Temp greater or equal to 38C (100.4F), Mild Pain (1 - 3)  melatonin 3 milliGRAM(s) Oral at bedtime PRN Insomnia  oxyCODONE    IR 5 milliGRAM(s) Oral every 6 hours PRN Severe Pain (7 - 10)  polyethylene glycol 3350 17 Gram(s) Oral two times a day PRN Constipation    Constitutional - NAD, Comfortable  HEENT - NCAT, EOMI  Chest - good chest expansion, good respiratory effort  Cardio - warm and well perfused  Abdomen -  Soft, NTND  Extremities - trace pedal edema - bilaterally,  No calf tenderness   MMT: LLE HF 4/5 limited by pain, 5/5 distally  Sensory - sensation to light touch  Psychiatric - Mood stable, Affect WNL  Skin: left hip vision x 3 with staples - healing well.             Patient is a 76y old  Male who presents with a chief complaint of S/p L Hip ORIF (07 Nov 2022 12:06)    HPI:  This is a 75 YO male with PMH of HTN, HLD, hx of prostate CA, neuropathy who  presented to [facility] on 10/31 from home after mechanical fall off a stool in his kitchen on Saturday. Reports hitting his left hip on the tiled floor, was able to get up and ambulate afterwards.  He reported sharp left hip pain, rated 8/10 with movement. Patient has been using an old cane at home to ambulate due to pain, but at baseline ambulates independently. Denies numbness, tingling. Took Tylenol for his pain with improvement. In the ED:  temp 98.3, /77, , RR 16, Spo2 98% RA. H/H: 9.1/27.1. CT imaging showed intertrochanteric fracture of left hip. Ortho consulted and he was taken to OR on 10/31 for ORIF Left hip. Post-op course significant for mild JESUS s/p IVF, leukocytosis likely reactive no source of infection noted.      SUBJECTIVE/ROS:  Patient seen and evaluated while sitting in WC at bedside   Tolerating therapy - dressing came off from friction of pants and movement.  Incision still looking good with staples  Pain is minimal - not taking narcotics  Discussed discharge - medications and follow ups.  Pt states that he has a cardiologist (no cardiac history per se) but because he had an episode where he fainted years ago while at the gym.  Since he's sees cardiologist [id number].    reviewed how he takes medications - states that he typically take blood pressure medications then take BP.  Advised to take BP prior to taking anti-hypertensive to prevent hypotension and risk of fall/fainting.   Denies CP, SOB, HA, dizziness, nausea, abd pain, dysuria, or frequency.  Reviewed labs with patient - Sodium low since reducing salt tab.  Will need to wean as tolerated    Vital Signs Last 24 Hrs  T(C): 36.5 (11-10-22 @ 08:15), Max: 36.8 (11-09-22 @ 19:48)  T(F): 97.7 (11-10-22 @ 08:15), Max: 98.3 (11-09-22 @ 19:48)  HR: 85 (11-10-22 @ 08:15) (67 - 85)  BP: 105/63 (11-10-22 @ 08:15) (105/63 - 124/65)  RR: 15 (11-10-22 @ 08:15) (14 - 15)  SpO2: 98% (11-10-22 @ 08:15) (94% - 98%)      RECENT LABS:             LAB                        9.0    6.56  )-----------( 277      ( 10 Nov 2022 06:34 )             26.4     11-10    133<L>  |  102  |  24<H>  ----------------------------<  92  4.2   |  28  |  1.19    Ca    9.0      10 Nov 2022 06:34    US Duplex Carotid Arteries Complete, Bilateral (11.06.22 @ 11:16) >  IMPRESSION: No significant hemodynamic stenosis of either carotid artery.  Atherosclerosis of the carotid arteries is present.    Measurement of carotid stenosis is based on velocity parameters that correlate the residual internal carotid diameter with that of the more distal vessel in accordance with a method such as the [employer] American Symptomatic Carotid Endarterectomy Trial (NASCET).    CT Head No Cont (11.06.22 @ 09:14) >  HEAD CT: Mild volume loss, microvascular disease, no acute hemorrhage or midline shift.    MEDICATIONS  (STANDING):  amLODIPine   Tablet 5 milliGRAM(s) Oral daily  atorvastatin 10 milliGRAM(s) Oral at bedtime  cyanocobalamin 1000 MICROGram(s) Oral daily  enoxaparin Injectable 40 milliGRAM(s) SubCutaneous every 24 hours  gabapentin 800 milliGRAM(s) Oral three times a day  levothyroxine 25 MICROGram(s) Oral daily  senna 2 Tablet(s) Oral at bedtime  sodium chloride 1 Gram(s) Oral two times a day    MEDICATIONS  (PRN):  acetaminophen     Tablet .. 650 milliGRAM(s) Oral every 6 hours PRN Temp greater or equal to 38C (100.4F), Mild Pain (1 - 3)  melatonin 3 milliGRAM(s) Oral at bedtime PRN Insomnia  oxyCODONE    IR 5 milliGRAM(s) Oral every 6 hours PRN Severe Pain (7 - 10)  polyethylene glycol 3350 17 Gram(s) Oral two times a day PRN Constipation      Constitutional - NAD, Comfortable  HEENT - NCAT, EOMI  Chest - good chest expansion, good respiratory effort  Cardio - warm and well perfused  Abdomen -  Soft, NTND  Extremities - trace pedal edema - bilaterally,  No calf tenderness   MMT: LLE HF 4/5 limited by pain, 5/5 distally  Sensory - sensation to light touch  Psychiatric - Mood stable, Affect WNL  Skin: left hip vision x 3 with staples - healing well.

## 2022-11-11 ENCOUNTER — TRANSCRIPTION ENCOUNTER (OUTPATIENT)
Age: 77
End: 2022-11-11

## 2022-11-11 ENCOUNTER — APPOINTMENT (OUTPATIENT)
Dept: NEPHROLOGY | Facility: CLINIC | Age: 77
End: 2022-11-11

## 2022-11-11 LAB
ANION GAP SERPL CALC-SCNC: 5 MMOL/L — SIGNIFICANT CHANGE UP (ref 5–17)
BUN SERPL-MCNC: 23 MG/DL — SIGNIFICANT CHANGE UP (ref 7–23)
CALCIUM SERPL-MCNC: 8.7 MG/DL — SIGNIFICANT CHANGE UP (ref 8.4–10.5)
CHLORIDE SERPL-SCNC: 101 MMOL/L — SIGNIFICANT CHANGE UP (ref 96–108)
CO2 SERPL-SCNC: 28 MMOL/L — SIGNIFICANT CHANGE UP (ref 22–31)
CREAT SERPL-MCNC: 1.28 MG/DL — SIGNIFICANT CHANGE UP (ref 0.5–1.3)
EGFR: 58 ML/MIN/1.73M2 — LOW
GLUCOSE SERPL-MCNC: 94 MG/DL — SIGNIFICANT CHANGE UP (ref 70–99)
POTASSIUM SERPL-MCNC: 4.7 MMOL/L — SIGNIFICANT CHANGE UP (ref 3.5–5.3)
POTASSIUM SERPL-SCNC: 4.7 MMOL/L — SIGNIFICANT CHANGE UP (ref 3.5–5.3)
SARS-COV-2 RNA SPEC QL NAA+PROBE: SIGNIFICANT CHANGE UP
SODIUM SERPL-SCNC: 134 MMOL/L — LOW (ref 135–145)

## 2022-11-11 PROCEDURE — 99232 SBSQ HOSP IP/OBS MODERATE 35: CPT

## 2022-11-11 RX ORDER — SODIUM CHLORIDE 9 MG/ML
1 INJECTION INTRAMUSCULAR; INTRAVENOUS; SUBCUTANEOUS
Qty: 0 | Refills: 0 | DISCHARGE
Start: 2022-11-11

## 2022-11-11 RX ORDER — AMLODIPINE BESYLATE 2.5 MG/1
1 TABLET ORAL
Qty: 0 | Refills: 0 | DISCHARGE

## 2022-11-11 RX ORDER — SODIUM CHLORIDE 9 MG/ML
0 INJECTION INTRAMUSCULAR; INTRAVENOUS; SUBCUTANEOUS
Qty: 0 | Refills: 0 | DISCHARGE

## 2022-11-11 RX ADMIN — SODIUM CHLORIDE 1 GRAM(S): 9 INJECTION INTRAMUSCULAR; INTRAVENOUS; SUBCUTANEOUS at 06:03

## 2022-11-11 RX ADMIN — Medication 25 MICROGRAM(S): at 06:03

## 2022-11-11 RX ADMIN — SODIUM CHLORIDE 1 GRAM(S): 9 INJECTION INTRAMUSCULAR; INTRAVENOUS; SUBCUTANEOUS at 18:08

## 2022-11-11 RX ADMIN — GABAPENTIN 800 MILLIGRAM(S): 400 CAPSULE ORAL at 15:27

## 2022-11-11 RX ADMIN — GABAPENTIN 800 MILLIGRAM(S): 400 CAPSULE ORAL at 06:03

## 2022-11-11 RX ADMIN — AMLODIPINE BESYLATE 5 MILLIGRAM(S): 2.5 TABLET ORAL at 06:03

## 2022-11-11 RX ADMIN — ATORVASTATIN CALCIUM 10 MILLIGRAM(S): 80 TABLET, FILM COATED ORAL at 21:17

## 2022-11-11 RX ADMIN — GABAPENTIN 800 MILLIGRAM(S): 400 CAPSULE ORAL at 21:16

## 2022-11-11 RX ADMIN — PREGABALIN 1000 MICROGRAM(S): 225 CAPSULE ORAL at 11:28

## 2022-11-11 RX ADMIN — ENOXAPARIN SODIUM 40 MILLIGRAM(S): 100 INJECTION SUBCUTANEOUS at 11:29

## 2022-11-11 NOTE — PROGRESS NOTE ADULT - SUBJECTIVE AND OBJECTIVE BOX
Patient is a 76y old  Male who presents with a chief complaint of left hip fracture (04 Nov 2022 09:28)    Patient seen and examined at bedside. sitting in chair by the bedside, doing well with PT,  no new complaints, no n/v, no sob, no cp.    Vital Signs Last 24 Hrs  T(C): 36.8 (11 Nov 2022 08:34), Max: 36.8 (11 Nov 2022 08:34)  T(F): 98.2 (11 Nov 2022 08:34), Max: 98.2 (11 Nov 2022 08:34)  HR: 89 (11 Nov 2022 08:34) (71 - 89)  BP: 110/68 (11 Nov 2022 08:34) (110/61 - 116/61)  BP(mean): --  RR: 16 (11 Nov 2022 08:34) (15 - 16)  SpO2: 98% (11 Nov 2022 08:34) (96% - 98%)    Parameters below as of 11 Nov 2022 08:34  Patient On (Oxygen Delivery Method): room air      GENERAL- NAD  EAR/NOSE/MOUTH/THROAT - no pharyngeal exudates, no oral lesion's  MMM  EYES- CELINE, conjunctiva and Sclera clear  NECK- supple  RESPIRATORY-  clear to auscultation bilaterally, non laboured breathing  CARDIOVASCULAR - SIS2, RRR  GI - soft NT BS present  EXTREMITIES- no pedal edema  NEUROLOGY- no gross focal deficits  PSYCHIATRY- AAO X 3                x                    134  | 28   | 23           x     >-----------< x       ------------------------< 94                    x                    4.7  | 101  | 1.28                                         Ca 8.7   Mg x     Ph x            MEDICATIONS  (STANDING):  amLODIPine   Tablet 5 milliGRAM(s) Oral daily  atorvastatin 10 milliGRAM(s) Oral at bedtime  cyanocobalamin 1000 MICROGram(s) Oral daily  enoxaparin Injectable 40 milliGRAM(s) SubCutaneous every 24 hours  gabapentin 800 milliGRAM(s) Oral three times a day  levothyroxine 25 MICROGram(s) Oral daily  senna 2 Tablet(s) Oral at bedtime  sodium chloride 1 Gram(s) Oral two times a day    MEDICATIONS  (PRN):  acetaminophen     Tablet .. 650 milliGRAM(s) Oral every 6 hours PRN Temp greater or equal to 38C (100.4F), Mild Pain (1 - 3)  melatonin 3 milliGRAM(s) Oral at bedtime PRN Insomnia  polyethylene glycol 3350 17 Gram(s) Oral two times a day PRN Constipation

## 2022-11-11 NOTE — PROGRESS NOTE ADULT - SUBJECTIVE AND OBJECTIVE BOX
Patient is a 76y old  Male who presents with a chief complaint of S/p L Hip ORIF (07 Nov 2022 12:06)    HPI:  This is a 75 YO male with PMH of HTN, HLD, hx of prostate CA, neuropathy who  presented to [facility] on 10/31 from home after mechanical fall off a stool in his kitchen on Saturday. Reports hitting his left hip on the tiled floor, was able to get up and ambulate afterwards.  He reported sharp left hip pain, rated 8/10 with movement. Patient has been using an old cane at home to ambulate due to pain, but at baseline ambulates independently. Denies numbness, tingling. Took Tylenol for his pain with improvement. In the ED:  temp 98.3, /77, , RR 16, Spo2 98% RA. H/H: 9.1/27.1. CT imaging showed intertrochanteric fracture of left hip. Ortho consulted and he was taken to OR on 10/31 for ORIF Left hip. Post-op course significant for mild JESUS s/p IVF, leukocytosis likely reactive no source of infection noted.      SUBJECTIVE/ROS:  Patient seen and evaluated while sitting in WC at bedside   DOING WELL     Vital Signs Last 24 Hrs  T(C): 37 (11 Nov 2022 19:31), Max: 37 (11 Nov 2022 19:31)  T(F): 98.6 (11 Nov 2022 19:31), Max: 98.6 (11 Nov 2022 19:31)  HR: 83 (11 Nov 2022 19:31) (71 - 89)  BP: 114/61 (11 Nov 2022 19:31) (110/68 - 116/61)  BP(mean): --  RR: 16 (11 Nov 2022 19:31) (16 - 16)  SpO2: 95% (11 Nov 2022 19:31) (95% - 98%)    Parameters below as of 11 Nov 2022 19:31  Patient On (Oxygen Delivery Method): room air          RECENT LABS:             LAB                        9.0    6.56  )-----------( 277      ( 10 Nov 2022 06:34 )             26.4     11-10    133<L>  |  102  |  24<H>  ----------------------------<  92  4.2   |  28  |  1.19    Ca    9.0      10 Nov 2022 06:34    US Duplex Carotid Arteries Complete, Bilateral (11.06.22 @ 11:16) >  IMPRESSION: No significant hemodynamic stenosis of either carotid artery.  Atherosclerosis of the carotid arteries is present.    Measurement of carotid stenosis is based on velocity parameters that correlate the residual internal carotid diameter with that of the more distal vessel in accordance with a method such as the [employer] American Symptomatic Carotid Endarterectomy Trial (NASCET).    CT Head No Cont (11.06.22 @ 09:14) >  HEAD CT: Mild volume loss, microvascular disease, no acute hemorrhage or midline shift.    MEDICATIONS  (STANDING):  amLODIPine   Tablet 5 milliGRAM(s) Oral daily  atorvastatin 10 milliGRAM(s) Oral at bedtime  cyanocobalamin 1000 MICROGram(s) Oral daily  enoxaparin Injectable 40 milliGRAM(s) SubCutaneous every 24 hours  gabapentin 800 milliGRAM(s) Oral three times a day  levothyroxine 25 MICROGram(s) Oral daily  senna 2 Tablet(s) Oral at bedtime  sodium chloride 1 Gram(s) Oral two times a day    MEDICATIONS  (PRN):  acetaminophen     Tablet .. 650 milliGRAM(s) Oral every 6 hours PRN Temp greater or equal to 38C (100.4F), Mild Pain (1 - 3)  melatonin 3 milliGRAM(s) Oral at bedtime PRN Insomnia  oxyCODONE    IR 5 milliGRAM(s) Oral every 6 hours PRN Severe Pain (7 - 10)  polyethylene glycol 3350 17 Gram(s) Oral two times a day PRN Constipation      Constitutional - NAD, Comfortable  HEENT - NCAT, EOMI  Chest - good chest expansion, good respiratory effort  Cardio - warm and well perfused  Abdomen -  Soft, NTND  Extremities - trace pedal edema - bilaterally,  No calf tenderness   MMT: LLE HF 4/5 limited by pain, 5/5 distally  Sensory - sensation to light touch  Psychiatric - Mood stable, Affect WNL  Skin: left hip vision x 3 with staples - healing well.

## 2022-11-11 NOTE — PROGRESS NOTE ADULT - ASSESSMENT
76M with PMH HTN, HLD, hx of prostate Ca presents from home after mechanical fall off a stool in his kitchen on Saturday admitted for left intertrochanteric fracture.     #Left intertrochanteric fracture - s/p L Hip ORIF 10/31  - s/p ORIF with Dr Nuno  - Pain control   - DVT ppx with Lovenox  - PT/OT   - Surgery follow as outpatient     #Leukocytosis  - Resolved  - UA negative  - CXR personally reviewed, read with left lower zone ill-defined opacity/infiltrates. Given patient is asymptomatic, afebrile and with normal WBC count, will watch off Abx at this time  - Continue monitoring labs in acute rehab    #Anemia  - Present since admission in 9s, was 10.9-11 in Sept  - No s/s of bleeding  - Continue monitoring CBC postoperatively    #HTN  - Continue Amlodipine  - Monitor vitals    #JESUS   - resolved     #Hx of hyponatremia  - Continue NaCl 1G TID  - Monitor BMP    #Neuropathy  - Continue gabapentin     #HLD  - Continue Lipitor    #Hypothyroidism  - Continue Synthroid    #DVT ppx  - Lovenox    will follow  likely d/c in am to home   d/w rehab team

## 2022-11-11 NOTE — DISCHARGE NOTE NURSING/CASE MANAGEMENT/SOCIAL WORK - NSDCPEFALRISK_GEN_ALL_CORE
For information on Fall & Injury Prevention, visit: [email or web address] OR  [email or web address] OR  https://www.cdc.gov/steadi/patient.html

## 2022-11-11 NOTE — PROGRESS NOTE ADULT - ASSESSMENT
Assessment	  77 YO male with PMH of HTN, HLD, hx of prostate CA who presented to [facility] on 10/31 from home after mechanical fall at home. CT imaging showed intertrochanteric fracture of left hip. He is s/p ORIF Left hip on 10/31. Post-op course significant for mild JESUS s/p IVF, leukocytosis likely reactive: no source of infection noted, and hyponatremia.     #Left intertrochanteric fracture   - s/p L Hip ORIF 10/31  - primary aquacell removed - hip incisions x 3 C/D/I   - Comprehensive Multidisciplinary Rehab Program:  - Gait, ADL, Functional impairments  - PT/OT 3 hours a day 5 days a week      #HTN   -continue amlodipine 10mg PO QD -> dec 5mg  -monitor vitals - BP soft at times,  after medication    #Hx of hyponatremia - from cymbalta use  -NaCl 1g TID - will trial on QD (11/9) -> inc BID  -Monitor BMP - 133 (11/10)     #Neuropathy? - B12 deficiency?  - on B12 supplement  - Gabapentin 800 TID  - Has tried cymbalta and lyrica in the past with no benefit    #HLD   Continue Lipitor 10mg PO at bedtime     #Hypothyroidism   - Continue synthroid 25mcg    #Sleep:  - Maintain quiet hours and low stim environment  - Melatonin 3mg PO at bedtime, PRN    #Pain:  - Tylenol 650mg Q6, PRN for moderate pain (1-3)   - Oxycodone PRN - has not used   - avoid sedating meds that may affect cognitive recovery  - nonpharm modalities: cold compress    #GI/Bowel:  - Zofran PRN for N/V  - Senna 2 tabs daily    #/Bladder:  - Toileting schedule q4h    #Die  - Regular  - Nutrition to follow    #Skin/ Pressure Injury Prevention:  - Incisions: left hip surgical incision with aquacel dressing c/d/i  - Turn Q2hrs in bed while awake, OOB to Chair, PT/OT    #DVT prophylaxis:  - Lovenox 40mg QD   - teds    #Precautions/ Restrictions  - Falls  - Weight bearing status: WBAT  - COVID PCR: COVID Neg 11/4/22    IDT 11/9  Lives along in a PH (), 4 WON and 4 up to bedroom and bathroom. Basement for laundry  Functional: OT: overall SV. PT: ambulating 100' RW CSV, 50' SAC CG.  did 12 steps CG  Goal: mod I with self care/transfer/amb/stairs  TDD: 11/12 home + HC    --------------------------------------------  Outpatient Follow up:  Lizeth Ross)  Family Medicine  [address] [place] [address], [address]  [place] [address]  Phone: [phone]  Fax: [phone]  Follow Up Time:     Mike Nuno)  Orthopaedic Sports Medicine; Orthopaedic Surgery  [address]  [place] [address]  Phone: [phone]  Fax: [phone]  Follow Up Time: 2 weeks    Jeannine Flores  [facility] Physician [facility]  NEPHRO [address]  Scheduled Appointment: 11/11/2022    Irineo Gunn  [facility] Physician [facility]  CARDIOLOGY [address]  Scheduled Appointment: 12/13/2022    Gayle Reynoso  [facility] Physician [facility]  NEUROLOGY [address]  Scheduled Appointment: 12/21/2022    Chele Vaughn  [facility] Physician [facility]  Med Endocr [address]  Scheduled Appointment: 01/27/2023

## 2022-11-11 NOTE — DISCHARGE NOTE NURSING/CASE MANAGEMENT/SOCIAL WORK - PATIENT PORTAL LINK FT
You can access the FollowMyHealth Patient Portal offered by [facility] by registering at the following website: [email or web address]. By joining [employer]’s FollowMyHealth portal, you will also be able to view your health information using other applications (apps) compatible with our system.

## 2022-11-12 VITALS
SYSTOLIC BLOOD PRESSURE: 131 MMHG | TEMPERATURE: 99 F | DIASTOLIC BLOOD PRESSURE: 67 MMHG | OXYGEN SATURATION: 96 % | HEART RATE: 77 BPM | RESPIRATION RATE: 18 BRPM

## 2022-11-12 PROCEDURE — 85730 THROMBOPLASTIN TIME PARTIAL: CPT

## 2022-11-12 PROCEDURE — 85027 COMPLETE CBC AUTOMATED: CPT

## 2022-11-12 PROCEDURE — 82962 GLUCOSE BLOOD TEST: CPT

## 2022-11-12 PROCEDURE — 97112 NEUROMUSCULAR REEDUCATION: CPT

## 2022-11-12 PROCEDURE — 97110 THERAPEUTIC EXERCISES: CPT

## 2022-11-12 PROCEDURE — 84146 ASSAY OF PROLACTIN: CPT

## 2022-11-12 PROCEDURE — 70450 CT HEAD/BRAIN W/O DYE: CPT

## 2022-11-12 PROCEDURE — 80048 BASIC METABOLIC PNL TOTAL CA: CPT

## 2022-11-12 PROCEDURE — 97163 PT EVAL HIGH COMPLEX 45 MIN: CPT

## 2022-11-12 PROCEDURE — 84100 ASSAY OF PHOSPHORUS: CPT

## 2022-11-12 PROCEDURE — 36415 COLL VENOUS BLD VENIPUNCTURE: CPT

## 2022-11-12 PROCEDURE — 84484 ASSAY OF TROPONIN QUANT: CPT

## 2022-11-12 PROCEDURE — 93880 EXTRACRANIAL BILAT STUDY: CPT

## 2022-11-12 PROCEDURE — U0003: CPT

## 2022-11-12 PROCEDURE — 97535 SELF CARE MNGMENT TRAINING: CPT

## 2022-11-12 PROCEDURE — 82607 VITAMIN B-12: CPT

## 2022-11-12 PROCEDURE — 85025 COMPLETE CBC W/AUTO DIFF WBC: CPT

## 2022-11-12 PROCEDURE — 83735 ASSAY OF MAGNESIUM: CPT

## 2022-11-12 PROCEDURE — U0005: CPT

## 2022-11-12 PROCEDURE — 83550 IRON BINDING TEST: CPT

## 2022-11-12 PROCEDURE — 95812 EEG 41-60 MINUTES: CPT

## 2022-11-12 PROCEDURE — 97116 GAIT TRAINING THERAPY: CPT

## 2022-11-12 PROCEDURE — 87635 SARS-COV-2 COVID-19 AMP PRB: CPT

## 2022-11-12 PROCEDURE — 93005 ELECTROCARDIOGRAM TRACING: CPT

## 2022-11-12 PROCEDURE — 80053 COMPREHEN METABOLIC PANEL: CPT

## 2022-11-12 PROCEDURE — 97530 THERAPEUTIC ACTIVITIES: CPT

## 2022-11-12 PROCEDURE — 97167 OT EVAL HIGH COMPLEX 60 MIN: CPT

## 2022-11-12 PROCEDURE — 82746 ASSAY OF FOLIC ACID SERUM: CPT

## 2022-11-12 PROCEDURE — 83540 ASSAY OF IRON: CPT

## 2022-11-12 PROCEDURE — 99232 SBSQ HOSP IP/OBS MODERATE 35: CPT

## 2022-11-12 PROCEDURE — 85610 PROTHROMBIN TIME: CPT

## 2022-11-12 RX ADMIN — AMLODIPINE BESYLATE 5 MILLIGRAM(S): 2.5 TABLET ORAL at 05:40

## 2022-11-12 RX ADMIN — SODIUM CHLORIDE 1 GRAM(S): 9 INJECTION INTRAMUSCULAR; INTRAVENOUS; SUBCUTANEOUS at 05:40

## 2022-11-12 RX ADMIN — GABAPENTIN 800 MILLIGRAM(S): 400 CAPSULE ORAL at 05:40

## 2022-11-12 RX ADMIN — PREGABALIN 1000 MICROGRAM(S): 225 CAPSULE ORAL at 11:38

## 2022-11-12 RX ADMIN — ENOXAPARIN SODIUM 40 MILLIGRAM(S): 100 INJECTION SUBCUTANEOUS at 11:39

## 2022-11-12 RX ADMIN — GABAPENTIN 800 MILLIGRAM(S): 400 CAPSULE ORAL at 11:38

## 2022-11-12 RX ADMIN — Medication 25 MICROGRAM(S): at 05:40

## 2022-11-12 NOTE — PROGRESS NOTE ADULT - ASSESSMENT
Imp: Patient with diagnosis of   L hip ORIF       admitted for comprehensive acute rehabilitation.    Plan:  - PT/OT/SLP  - DVT prophylaxis  - Skin- Turn q2h, check skin daily  - Continue current medications; patient medically stable.   -Active issues-   - Patient is stable to continue current rehabilitation program.

## 2022-11-12 NOTE — PROGRESS NOTE ADULT - PROVIDER SPECIALTY LIST ADULT
Addended by: MIRTHA CARPENTER on: 6/6/2022 11:28 AM     Modules accepted: Orders    
Hospitalist
Rehab Medicine
Hospitalist
Hospitalist
Rehab Medicine
Rehab Medicine
Hospitalist
Hospitalist
Rehab Medicine

## 2022-11-12 NOTE — PROGRESS NOTE ADULT - REASON FOR ADMISSION
S/p L Hip ORIF

## 2022-11-12 NOTE — PROGRESS NOTE ADULT - SUBJECTIVE AND OBJECTIVE BOX
Patient is a 76y old  Male who presents with a chief complaint of left hip fracture    Patient seen and examined at bedside    Vital Signs Last 24 Hrs  T(C): 37.1 (12 Nov 2022 08:17), Max: 37.1 (12 Nov 2022 08:17)  T(F): 98.8 (12 Nov 2022 08:17), Max: 98.8 (12 Nov 2022 08:17)  HR: 77 (12 Nov 2022 08:17) (70 - 83)  BP: 131/67 (12 Nov 2022 08:17) (114/61 - 131/67)  RR: 18 (12 Nov 2022 08:17) (16 - 18)  SpO2: 96% (12 Nov 2022 08:17) (95% - 96%)    Parameters below as of 12 Nov 2022 08:17  Patient On (Oxygen Delivery Method): room air    GENERAL- NAD  EAR/NOSE/MOUTH/THROAT - no pharyngeal exudates, no oral lesion's  MMM  EYES- CELINE, conjunctiva and Sclera clear  NECK- supple  RESPIRATORY-  clear to auscultation bilaterally, non laboured breathing  CARDIOVASCULAR - SIS2, RRR  GI - soft NT BS present  EXTREMITIES- no pedal edema  NEUROLOGY- no gross focal deficits  PSYCHIATRY- AAO X 3                x                    134  | 28   | 23           x     >-----------< x       ------------------------< 94                    x                    4.7  | 101  | 1.28                                         Ca 8.7   Mg x     Ph x            MEDICATIONS  (STANDING):  amLODIPine   Tablet 5 milliGRAM(s) Oral daily  atorvastatin 10 milliGRAM(s) Oral at bedtime  cyanocobalamin 1000 MICROGram(s) Oral daily  enoxaparin Injectable 40 milliGRAM(s) SubCutaneous every 24 hours  gabapentin 800 milliGRAM(s) Oral three times a day  levothyroxine 25 MICROGram(s) Oral daily  senna 2 Tablet(s) Oral at bedtime  sodium chloride 1 Gram(s) Oral two times a day    MEDICATIONS  (PRN):  acetaminophen     Tablet .. 650 milliGRAM(s) Oral every 6 hours PRN Temp greater or equal to 38C (100.4F), Mild Pain (1 - 3)  melatonin 3 milliGRAM(s) Oral at bedtime PRN Insomnia  polyethylene glycol 3350 17 Gram(s) Oral two times a day PRN Constipation

## 2022-11-12 NOTE — PROGRESS NOTE ADULT - SUBJECTIVE AND OBJECTIVE BOX
Cc: Gait dysfunction    HPI: Patient with no new medical issues overnight.  Pain controlled, no chest pain, no N/V, no Fevers/Chills. No other new ROS  Has been tolerating rehabilitation program.    acetaminophen     Tablet .. 650 milliGRAM(s) Oral every 6 hours PRN  amLODIPine   Tablet 5 milliGRAM(s) Oral daily  atorvastatin 10 milliGRAM(s) Oral at bedtime  cyanocobalamin 1000 MICROGram(s) Oral daily  enoxaparin Injectable 40 milliGRAM(s) SubCutaneous every 24 hours  gabapentin 800 milliGRAM(s) Oral three times a day  levothyroxine 25 MICROGram(s) Oral daily  melatonin 3 milliGRAM(s) Oral at bedtime PRN  polyethylene glycol 3350 17 Gram(s) Oral two times a day PRN  senna 2 Tablet(s) Oral at bedtime  sodium chloride 1 Gram(s) Oral two times a day      T(C): 37.1 (11-12-22 @ 08:17), Max: 37.1 (11-12-22 @ 08:17)  HR: 77 (11-12-22 @ 08:17) (70 - 77)  BP: 131/67 (11-12-22 @ 08:17) (117/63 - 131/67)  RR: 18 (11-12-22 @ 08:17) (18 - 18)  SpO2: 96% (11-12-22 @ 08:17) (96% - 96%)    In NAD  HEENT- EOMI  Heart- RRR, S1S2  Lungs- CTA bl.  Abd- + BS, NT  Ext- No calf pain  Neuro- Exam unchanged

## 2022-11-12 NOTE — PROGRESS NOTE ADULT - ASSESSMENT
76M with PMH HTN, HLD, hx of prostate Ca presents from home after mechanical fall off a stool in his kitchen on Saturday admitted for left intertrochanteric fracture.     #Left intertrochanteric fracture - s/p L Hip ORIF 10/31  - s/p ORIF with Dr Nuno  - Pain control   - DVT ppx with Lovenox  - Surgery follow as outpatient     #Leukocytosis  - Resolved  - UA negative  - CXR personally reviewed, read with left lower zone ill-defined opacity/infiltrates. Given patient is asymptomatic, afebrile and with normal WBC count, will watch off Abx at this time  - Continue monitoring labs in acute rehab    #Anemia  - Present since admission in 9s, was 10.9-11 in Sept  - No s/s of bleeding    #HTN  - Continue Amlodipine    #JESUS   - resolved     #Hx of hyponatremia  - Continue NaCl 1G TID  - Monitor BMP    #Neuropathy  - Continue gabapentin     #HLD  - Continue Lipitor    #Hypothyroidism  - Continue Synthroid    #DVT ppx: Lovenox

## 2022-11-14 ENCOUNTER — NON-APPOINTMENT (OUTPATIENT)
Age: 77
End: 2022-11-14

## 2022-11-14 RX ORDER — ZOSTER VACCINE RECOMBINANT, ADJUVANTED 50 MCG/0.5
50 KIT INTRAMUSCULAR
Qty: 1 | Refills: 0 | Status: COMPLETED | COMMUNITY
Start: 2020-12-04 | End: 2022-11-14

## 2022-11-14 RX ORDER — CYANOCOBALAMIN (VITAMIN B-12) 1000 MCG
1000 TABLET ORAL
Refills: 0 | Status: ACTIVE | COMMUNITY

## 2022-11-14 RX ORDER — LORATADINE 5 MG
17 TABLET,CHEWABLE ORAL DAILY
Refills: 0 | Status: ACTIVE | COMMUNITY
Start: 2022-11-14

## 2022-11-14 RX ORDER — MELATONIN 3 MG
3 CAPSULE ORAL AT BEDTIME
Refills: 0 | Status: ACTIVE | COMMUNITY

## 2022-11-14 RX ORDER — ZOSTER VACCINE RECOMBINANT, ADJUVANTED 50 MCG/0.5
50 KIT INTRAMUSCULAR
Qty: 1 | Refills: 1 | Status: COMPLETED | COMMUNITY
Start: 2020-10-27 | End: 2022-11-14

## 2022-11-14 NOTE — PHYSICAL EXAM
Due to lab error what they need but [facility]  [address], [place] [address]      Progress Note          Subjective   Patient ID: Ninfa is a 66 year old male.    Chief Complaint   Patient presents with   • Diabetes     HPI:   Patient is a 66-year-old -American male with a diagnosis of hypertension, diabetes mellitus, hyperlipidemia, chronic venous stasis of the lower extremities, obesity, BPH, stage III chronic kidney disease, and obstructive sleep apnea.  Today he presents to the office for follow-up on his multiple medical problems.  No history of fever, chills, or body aches.  His appetite is good and he has been sleeping well.  The patient's previous medications were refilled in July 2022.  However, he did not refill his prescriptions when they ran out.  He is requesting a refill of his prescriptions today.  He does not understand why he refills have already been sent to the pharmacy during his last office visit.  He has declined the flu vaccine and the COVID-19 bivalent booster vaccine.  He has not taken his medications for more than a week.  The patient also complains of nocturia of 3 times per night.  He denies daytime somnolence.    Patient's medications, allergies, past medical, surgical, social and family histories were reviewed and updated as appropriate.    Review of Systems   Constitutional: Negative for activity change, appetite change, chills, diaphoresis, fatigue, fever and unexpected weight change.   HENT: Negative for congestion, ear discharge, ear pain, hearing loss, nosebleeds, postnasal drip, rhinorrhea, sinus pressure, sinus pain, sore throat, tinnitus, trouble swallowing and voice change.    Eyes: Negative for photophobia, pain, discharge, redness, itching and visual disturbance.   Respiratory: Negative for apnea, cough, shortness of breath and wheezing.    Cardiovascular: Positive for leg swelling. Negative for chest pain and palpitations.        The  patient complains of swelling of the legs.  No history of leg pain.  See HPI for details.   Gastrointestinal: Negative for abdominal distention, abdominal pain, anal bleeding, blood in stool, constipation, diarrhea, nausea, rectal pain and vomiting.   Endocrine: Negative for polydipsia, polyphagia and polyuria.   Genitourinary: Negative for difficulty urinating, dysuria, flank pain, frequency, hematuria, penile discharge, penile pain, penile swelling, scrotal swelling, testicular pain and urgency.   Musculoskeletal: Negative for arthralgias, back pain, gait problem, joint swelling, myalgias, neck pain and neck stiffness.   Skin: Negative for rash and wound.   Allergic/Immunologic: Negative for food allergies and immunocompromised state.   Neurological: Negative for dizziness, tremors, seizures, syncope, weakness, light-headedness and headaches.   Hematological: Negative.    Psychiatric/Behavioral: Negative for agitation, behavioral problems, confusion, hallucinations, self-injury, sleep disturbance and suicidal ideas. The patient is not nervous/anxious and is not hyperactive.        Objective   Physical Exam  Vitals and nursing note reviewed.   Constitutional:       Comments: Morbidly obese, middle-aged -American male in no distress.  He is alert and oriented x3.  The patient presents to the office alone today.  He is providing all of the history.  He is a good historian.   HENT:      Head: Normocephalic and atraumatic.   Eyes:      General: No scleral icterus.        Right eye: No discharge.         Left eye: No discharge.      Extraocular Movements: Extraocular movements intact.      Conjunctiva/sclera: Conjunctivae normal.   Neck:      Thyroid: No thyromegaly.      Vascular: No JVD.   Cardiovascular:      Rate and Rhythm: Normal rate and regular rhythm.      Pulses: Normal pulses.      Heart sounds: Normal heart sounds. No murmur heard.    No gallop.   Pulmonary:      Effort: Pulmonary effort is normal. No  respiratory distress.      Breath sounds: Normal breath sounds. No wheezing or rales.   Chest:      Chest wall: No tenderness.   Abdominal:      Tenderness: There is no right CVA tenderness or left CVA tenderness.      Comments: Normal bowel sounds.  Morbidly obese abdomen, soft and nontender.  No masses.  No organomegaly.  No rebound tenderness.   Musculoskeletal:      Cervical back: Normal range of motion and neck supple.      Comments: Full range of motion of cervical, thoracic, and lumbar spines.  Negative paravertebral muscle spasms.  Negative kyphosis or scoliosis.  Negative CVA tenderness bilaterally.    2+ edema of bilateral lower extremities extending from ankles up to mid calf.  Negative calf tenderness bilaterally.  Skin is intact.  No open lesions at this time.  Old scarring healed lesions on pretibial region of bilateral lower extremities midway between the knees and the ankles.   Lymphadenopathy:      Cervical: No cervical adenopathy.   Skin:     General: Skin is warm and dry.      Coloration: Skin is not jaundiced.      Findings: No bruising, erythema, lesion or rash.   Neurological:      Mental Status: He is oriented to person, place, and time.      Cranial Nerves: No cranial nerve deficit.      Sensory: No sensory deficit.      Motor: No weakness or abnormal muscle tone.      Coordination: Coordination normal.      Gait: Gait normal.   Psychiatric:         Mood and Affect: Mood normal.         Behavior: Behavior normal.         Thought Content: Thought content normal.         Judgment: Judgment normal.         Office Visit on 11/14/2022   Component Date Value Ref Range Status   • Glucose Blood, POC 11/14/2022 95  65 - 99 mg/dL Final         Assessment   Problem List Items Addressed This Visit        Cardiac and Vasculature    HTN (hypertension), benign     The patient's blood pressure is uncontrolled secondary to medication noncompliance.  His blood pressure today is 153/82.  On his last office  visit, his blood pressure was 130/70.  I will continue losartan at 100 mg p.o. daily, potassium chloride 20 mEq p.o. twice daily, furosemide at 40 mg p.o. twice daily, and he will follow a low-sodium high-fiber diet.  I have ordered a CBC, CMP, lipid profile, and hemoglobin A1c.  The results will be discussed at the patient's next office visit.         Relevant Medications    losartan (COZAAR) 100 MG tablet    potassium CHLORIDE (KLOR-CON M) 20 MEQ porsha ER tablet    atorvastatin (LIPITOR) 20 MG tablet    furosemide (LASIX) 40 MG tablet    Other Relevant Orders    CBC WITH DIFFERENTIAL    COMPREHENSIVE METABOLIC PANEL    LIPID PANEL WITHOUT REFLEX    Mixed hyperlipidemia     Continue atorvastatin at 20 mg p.o. daily.         Relevant Medications    losartan (COZAAR) 100 MG tablet    atorvastatin (LIPITOR) 20 MG tablet    furosemide (LASIX) 40 MG tablet    Other Relevant Orders    LIPID PANEL WITHOUT REFLEX    Venous stasis of lower extremity     Chronic venous stasis has worsened secondary to the patient being noncompliant with his hypertensive medications and diuretic.  I will again encourage the patient to take furosemide twice daily and apply knee-high support hose.         Relevant Medications    losartan (COZAAR) 100 MG tablet    atorvastatin (LIPITOR) 20 MG tablet    furosemide (LASIX) 40 MG tablet       Endocrine and Metabolic    Obesity, morbid, BMI 40.0-49.9 (CMS/HCC)     The patient was encouraged to increase his exercise, decrease his caloric intake, and reduce his weight.         Type 2 diabetes mellitus with hyperglycemia, without long-term current use of insulin (CMS/[facility]) - Primary     The patient's blood glucose is 95 mg/dL.  His last hemoglobin A1c was 6.8.  I will again order his hemoglobin A1c today.  If his hemoglobin A1c is again elevated, I will start metformin.    The patient was referred to ophthalmology for an annual exam.         Relevant Orders    POCT BLOOD SUGAR HAND HELD DEVICE  (Completed)    SERVICE TO OPHTHALMOLOGY    CBC WITH DIFFERENTIAL    COMPREHENSIVE METABOLIC PANEL    GLYCOHEMOGLOBIN    LIPID PANEL WITHOUT REFLEX    MICROALBUMIN URINE RANDOM       Genitourinary and Reproductive    BPH associated with nocturia     BPH is controlled.  Continue tamsulosin at 0.4 mg p.o. daily.         Relevant Medications    tamsulosin (FLOMAX) 0.4 MG Cap    Stage 3a chronic kidney disease (CMS/[facility])     Stage III chronic kidney disease persists.  I will continue to monitor GFR, BUN, and creatinine.  The patient was encouraged to avoid all nonsteroidal anti-inflammatory agents (i.e. Advil, Aleve, Motrin, Naprosyn, and Celebrex).         Relevant Orders    MICROALBUMIN URINE RANDOM       Sleep    PEREZ (obstructive sleep apnea)     The patient was referred for a sleep study.  I will continue to follow.         Relevant Orders    SERVICE TO SLEEP MEDICINE         [Well Nourished] : well nourished [EOMI] : extraocular movements intact [Supple] : supple [Clear to Auscultation] : lungs were clear to auscultation bilaterally [Normal S1, S2] : normal S1 and S2 [No Edema] : there was no peripheral edema [Non Tender] : non-tender [Normal Gait] : normal gait [Normal Affect] : the affect was normal [de-identified] : skin moles on face

## 2022-11-17 ENCOUNTER — APPOINTMENT (OUTPATIENT)
Dept: FAMILY MEDICINE | Facility: CLINIC | Age: 77
End: 2022-11-17

## 2022-11-17 VITALS
RESPIRATION RATE: 18 BRPM | HEART RATE: 105 BPM | OXYGEN SATURATION: 98 % | TEMPERATURE: 97.3 F | DIASTOLIC BLOOD PRESSURE: 60 MMHG | BODY MASS INDEX: 26.18 KG/M2 | HEIGHT: 74 IN | WEIGHT: 204 LBS | SYSTOLIC BLOOD PRESSURE: 122 MMHG

## 2022-11-17 DIAGNOSIS — I65.29 OCCLUSION AND STENOSIS OF UNSPECIFIED CAROTID ARTERY: ICD-10-CM

## 2022-11-17 PROCEDURE — 99496 TRANSJ CARE MGMT HIGH F2F 7D: CPT | Mod: 25

## 2022-11-20 ENCOUNTER — NON-APPOINTMENT (OUTPATIENT)
Age: 77
End: 2022-11-20

## 2022-11-21 LAB
ALBUMIN SERPL ELPH-MCNC: 4.1 G/DL
ALP BLD-CCNC: 67 U/L
ALT SERPL-CCNC: 13 U/L
ANION GAP SERPL CALC-SCNC: 10 MMOL/L
AST SERPL-CCNC: 11 U/L
BASOPHILS # BLD AUTO: 0.04 K/UL
BASOPHILS NFR BLD AUTO: 0.6 %
BILIRUB SERPL-MCNC: 0.6 MG/DL
BUN SERPL-MCNC: 20 MG/DL
CALCIUM SERPL-MCNC: 9.6 MG/DL
CHLORIDE SERPL-SCNC: 105 MMOL/L
CO2 SERPL-SCNC: 24 MMOL/L
CREAT SERPL-MCNC: 1.2 MG/DL
EGFR: 62 ML/MIN/1.73M2
EOSINOPHIL # BLD AUTO: 0.17 K/UL
EOSINOPHIL NFR BLD AUTO: 2.4 %
GLUCOSE SERPL-MCNC: 106 MG/DL
HCT VFR BLD CALC: 28.8 %
HGB BLD-MCNC: 9.5 G/DL
IMM GRANULOCYTES NFR BLD AUTO: 0.7 %
LYMPHOCYTES # BLD AUTO: 1.67 K/UL
LYMPHOCYTES NFR BLD AUTO: 23.1 %
MAN DIFF?: NORMAL
MCHC RBC-ENTMCNC: 32.5 PG
MCHC RBC-ENTMCNC: 33 GM/DL
MCV RBC AUTO: 98.6 FL
MONOCYTES # BLD AUTO: 0.98 K/UL
MONOCYTES NFR BLD AUTO: 13.6 %
NEUTROPHILS # BLD AUTO: 4.32 K/UL
NEUTROPHILS NFR BLD AUTO: 59.6 %
PLATELET # BLD AUTO: 337 K/UL
POTASSIUM SERPL-SCNC: 4.4 MMOL/L
PROT SERPL-MCNC: 6.5 G/DL
RBC # BLD: 2.92 M/UL
RBC # FLD: 13.4 %
SODIUM SERPL-SCNC: 139 MMOL/L
WBC # FLD AUTO: 7.23 K/UL

## 2022-11-22 PROBLEM — I65.29 CAROTID ATHEROSCLEROSIS: Status: ACTIVE | Noted: 2022-11-17

## 2022-11-22 NOTE — HISTORY OF PRESENT ILLNESS
[Post-hospitalization from ___ Hospital] : Post-hospitalization from [unfilled] Hospital [Admitted on: ___] : The patient was admitted on [unfilled] [Discharged on ___] : discharged on [unfilled] [Discharge Summary] : discharge summary [Pertinent Labs] : pertinent labs [Radiology Findings] : radiology findings [Discharge Med List] : discharge medication list [Med Reconciliation] : medication reconciliation has been completed [FreeTextEntry2] : Patient admitted to  [facility] on 10/31 for acute left hip fracture.  Patient fell off dining room stool and landed rather harshly on left hip.  He received an ORIF, and was transferred into inpatient subacute rehab in [facility].  Course was complicated by 1 event of decreased arousability, hyponatremia, and hypotension.\par Reviewed CT scan of the head which was negative for any acute findings.  Ultrasound of the carotid Dopplers were indicative of bulb atherosclerotic plaques bilaterally of carotid arteries, but no significant stenosis or obstruction of blood flow.  They decreased amlodipine from 10 mg to 5 mg.  And he is continuing his sodium tablets which she has been taking chronically. \par \par Overall he feels well.  Has an appointment with orthopedic surgeon coming up.  Surgical wound is well approximated with no discharge or periwound erythema.  He has been taking blood pressure at home which has been averaging in the 120s/80s.  Has denied any shortness of breath, lightheadedness, or dizziness.  He will follow-up with cardiologist regarding the atherosclerosis of the carotid arteries. \par \par This is his second hip fracture.  Likely bone with pathological fractures.  Already taking alendronate and is following up with rheumatologist.  Patient will inform rheumatologist of her recent fracture.

## 2022-11-22 NOTE — PHYSICAL EXAM
[Normal] : no joint swelling and grossly normal strength and tone [de-identified] : Left lateral surgical incision well approximated and healed.  Has good range of motion of left hip.  Ambulating with cane [49023 - High Complexity requires an extensive number of possible diagnoses and/or the management options, extensive complexity of the medical data (tests, etc.) to be reviewed, and a high risk of significant complications, morbidity, and/or mortality as w] : High Complexity

## 2022-12-01 ENCOUNTER — RX RENEWAL (OUTPATIENT)
Age: 77
End: 2022-12-01

## 2022-12-01 ENCOUNTER — APPOINTMENT (OUTPATIENT)
Dept: ORTHOPEDIC SURGERY | Facility: CLINIC | Age: 77
End: 2022-12-01

## 2022-12-01 VITALS — BODY MASS INDEX: 26.31 KG/M2 | HEIGHT: 74 IN | WEIGHT: 205 LBS

## 2022-12-01 DIAGNOSIS — S72.142A DISPLACED INTERTROCHANTERIC FRACTURE OF LEFT FEMUR, INITIAL ENCOUNTER FOR CLOSED FRACTURE: ICD-10-CM

## 2022-12-01 DIAGNOSIS — S72.002D FRACTURE OF UNSPECIFIED PART OF NECK OF LEFT FEMUR, SUBSEQUENT ENCOUNTER FOR CLOSED FRACTURE WITH ROUTINE HEALING: ICD-10-CM

## 2022-12-01 PROCEDURE — 73502 X-RAY EXAM HIP UNI 2-3 VIEWS: CPT

## 2022-12-01 PROCEDURE — 99024 POSTOP FOLLOW-UP VISIT: CPT

## 2022-12-05 ENCOUNTER — RX RENEWAL (OUTPATIENT)
Age: 77
End: 2022-12-05

## 2022-12-07 ENCOUNTER — RX RENEWAL (OUTPATIENT)
Age: 77
End: 2022-12-07

## 2022-12-13 ENCOUNTER — APPOINTMENT (OUTPATIENT)
Dept: CARDIOLOGY | Facility: CLINIC | Age: 77
End: 2022-12-13

## 2022-12-13 ENCOUNTER — NON-APPOINTMENT (OUTPATIENT)
Age: 77
End: 2022-12-13

## 2022-12-13 VITALS
BODY MASS INDEX: 26.18 KG/M2 | TEMPERATURE: 97.4 F | HEART RATE: 82 BPM | WEIGHT: 204 LBS | RESPIRATION RATE: 16 BRPM | OXYGEN SATURATION: 100 % | HEIGHT: 74 IN | SYSTOLIC BLOOD PRESSURE: 152 MMHG | DIASTOLIC BLOOD PRESSURE: 76 MMHG

## 2022-12-13 PROCEDURE — 93000 ELECTROCARDIOGRAM COMPLETE: CPT

## 2022-12-13 PROCEDURE — 99215 OFFICE O/P EST HI 40 MIN: CPT

## 2022-12-13 NOTE — CARDIOLOGY SUMMARY
[___] : [unfilled] [de-identified] : Dec 13, 2022. Sinus  Rhythm \par Poor R wave progression \par Nonspecific ST changes \par \par

## 2022-12-13 NOTE — HISTORY OF PRESENT ILLNESS
[FreeTextEntry1] : Since his last visit with me, he was hospitalized 2 times in October and November 2022.\par Initial hospitalization was for JESUS and hyponatremia.  Possibly this was secondary to combination of SSRI and losartan.  Both medications were discontinued.\par Second admission was status post mechanical fall.  He hurt his left hip and was diagnosed with a fracture.  Status post surgery.  He spent 10 days hospitalized and also in rehab.\par He is now ambulating well without any issue.\par He has no cardiac complaints.  No chest pain or chest pressure.  No shortness of breath or dyspnea on exertion.  No palpitations.  No dizziness.  No syncope.  No edema, no orthopnea.\par \par

## 2022-12-13 NOTE — DISCUSSION/SUMMARY
[FreeTextEntry1] : 77-year-old man with hypertension, hyperlipidemia, mild aortic stenosis, abnormal EKG \par Status post hospitalization x2 for JESUS and status post mechanical fall with hip fracture requiring surgery.\par He is asymptomatic with respect to cardiac issues.\par Blood pressure stable on today's examination.  There is no JVD.  Lung fields are clear.  No edema.  He is euvolemic.\par EKG is sinus rhythm.  Nonspecific ST changes.\par The following is recommended.\par \par Plan\par 1.  Current medication list is reviewed, no changes.\par 2.  He will follow-up with me in the office in 6 months, echocardiogram.\par 3.  Advised to try to maintain an active aerobic lifestyle.\par 4.  Cardiac issues were discussed with the patient, all questions answered.\par

## 2022-12-13 NOTE — REASON FOR VISIT
[FreeTextEntry1] : Cardiology follow-up visit for evaluation management of hypertension, hyperlipidemia, mild aortic stenosis, abnormal EKG\par \par

## 2022-12-13 NOTE — PHYSICAL EXAM
[Normal S1, S2] : normal S1, S2 [No Rub] : no rub [No Gallop] : no gallop [Murmur] : murmur [Normal] : alert and oriented, normal memory [de-identified] : l/Vl systolic

## 2022-12-14 ENCOUNTER — APPOINTMENT (OUTPATIENT)
Dept: PHYSICAL MEDICINE AND REHAB | Facility: CLINIC | Age: 77
End: 2022-12-14

## 2022-12-19 DIAGNOSIS — Z20.822 CONTACT WITH AND (SUSPECTED) EXPOSURE TO COVID-19: ICD-10-CM

## 2022-12-21 LAB — SARS-COV-2 N GENE NPH QL NAA+PROBE: DETECTED

## 2022-12-24 ENCOUNTER — RX RENEWAL (OUTPATIENT)
Age: 77
End: 2022-12-24

## 2023-01-03 ENCOUNTER — APPOINTMENT (OUTPATIENT)
Dept: NEPHROLOGY | Facility: CLINIC | Age: 78
End: 2023-01-03
Payer: MEDICARE

## 2023-01-03 VITALS
TEMPERATURE: 97.8 F | OXYGEN SATURATION: 98 % | SYSTOLIC BLOOD PRESSURE: 154 MMHG | HEIGHT: 74 IN | DIASTOLIC BLOOD PRESSURE: 83 MMHG | HEART RATE: 86 BPM

## 2023-01-03 PROCEDURE — 99215 OFFICE O/P EST HI 40 MIN: CPT

## 2023-01-05 ENCOUNTER — RX RENEWAL (OUTPATIENT)
Age: 78
End: 2023-01-05

## 2023-01-06 ENCOUNTER — RX RENEWAL (OUTPATIENT)
Age: 78
End: 2023-01-06

## 2023-01-06 LAB
ALDOSTERONE SERUM: 6.6 NG/DL
ANION GAP SERPL CALC-SCNC: 12 MMOL/L
ANION GAP SERPL CALC-SCNC: 9 MMOL/L
BUN SERPL-MCNC: 16 MG/DL
BUN SERPL-MCNC: 18 MG/DL
CALCIUM SERPL-MCNC: 10 MG/DL
CALCIUM SERPL-MCNC: 10.1 MG/DL
CHLORIDE SERPL-SCNC: 104 MMOL/L
CHLORIDE SERPL-SCNC: 98 MMOL/L
CO2 SERPL-SCNC: 23 MMOL/L
CO2 SERPL-SCNC: 26 MMOL/L
CORTIS SERPL-MCNC: 9.5 UG/DL
CREAT SERPL-MCNC: 1.18 MG/DL
CREAT SERPL-MCNC: 1.2 MG/DL
EGFR: 62 ML/MIN/1.73M2
EGFR: 63 ML/MIN/1.73M2
GLUCOSE SERPL-MCNC: 95 MG/DL
GLUCOSE SERPL-MCNC: 98 MG/DL
POTASSIUM SERPL-SCNC: 5 MMOL/L
POTASSIUM SERPL-SCNC: 5 MMOL/L
RENIN ACTIVITY, PLASMA: 1.7 NG/ML/HR
SODIUM SERPL-SCNC: 132 MMOL/L
SODIUM SERPL-SCNC: 138 MMOL/L

## 2023-01-06 NOTE — ASSESSMENT
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 \par the time line of events suggests that it might be related to the medication Cymbalta.Cymbalta has now been stopped and his sodium has normalized. \par \par - Reduce salt tabs to 1 gm daily \par - will repeat labs in 1 month and plan to taper sodium chloride tabs altogether.

## 2023-01-06 NOTE — PHYSICAL EXAM
[General Appearance - Alert] : alert [General Appearance - In No Acute Distress] : in no acute distress [General Appearance - Well Nourished] : well nourished [General Appearance - Well Developed] : well developed [General Appearance - Well-Appearing] : healthy appearing [Sclera] : the sclera and conjunctiva were normal [PERRL With Normal Accommodation] : pupils were equal in size, round, and reactive to light [Extraocular Movements] : extraocular movements were intact [Outer Ear] : the ears and nose were normal in appearance [Hearing Threshold Finger Rub Not [place]] : hearing was normal [Examination Of The Oral Cavity] : the lips and gums were normal [Both Tympanic Membranes Were Examined] : both tympanic membranes were normal [Neck Appearance] : the appearance of the neck was normal [Respiration, Rhythm And Depth] : normal respiratory rhythm and effort [Exaggerated Use Of Accessory Muscles For Inspiration] : no accessory muscle use [Auscultation Breath Sounds / Voice Sounds] : lungs were clear to auscultation bilaterally [Chest Palpation] : palpation of the chest revealed no abnormalities [Heart Rate And Rhythm] : heart rate was normal and rhythm regular [Heart Sounds Gallop] : no gallops [Murmurs] : no murmurs [Heart Sounds Pericardial Friction Rub] : no pericardial rub [Arterial Pulses Carotid] : carotid pulses were normal with no bruits [Abdominal Aorta] : the abdominal aorta was normal [Edema] : there was no peripheral edema [Bowel Sounds] : normal bowel sounds [Abdomen Soft] : soft [Abdomen Tenderness] : non-tender [No CVA Tenderness] : no ~M costovertebral angle tenderness [No Spinal Tenderness] : no spinal tenderness [Abnormal Walk] : normal gait [Nail Clubbing] : no clubbing  or cyanosis of the fingernails [Involuntary Movements] : no involuntary movements were seen [Musculoskeletal - Swelling] : no joint swelling seen [Motor Tone] : muscle strength and tone were normal [Skin Color & Pigmentation] : normal skin color and pigmentation [Skin Turgor] : normal skin turgor [] : no rash [Skin Lesions] : no skin lesions [Cranial Nerves] : cranial nerves 2-12 were intact [Deep Tendon Reflexes (DTR)] : deep tendon reflexes were 2+ and symmetric [Sensation] : the sensory exam was normal to light touch and pinprick [Motor Exam] : the motor exam was normal [Oriented To Time, Place, And Person] : oriented to person, place, and time [Impaired Insight] : insight and judgment were intact [Affect] : the affect was normal [Mood] : the mood was normal

## 2023-01-06 NOTE — HISTORY OF PRESENT ILLNESS
[FreeTextEntry1] : referred for hyponatremia \par \par 76 yo male with medical problems as listed below, who had a flu shot in september and states he felt very tired after, leading to his presenting to an outside hospital and being admitted with JESUS , weakness, nausea, hyponatremia, hyperkalemia. his sodium was found to be 126-128, he received normal saline with improvement in his kidney function but sodium stayed around 128. 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 He was given 1 dose of Tolvaptan 15 mg and discharged on fluid restriction and sodium chloride 1 gm daily \par \par looking back at his records, his sodium has been ~134-135. states he was never really told that he had any problem with sodium in the past. \par \par also he was initiated o Cymbalta in early september \par feb 2022- sodium was 138 \par July- 130\par september 2022- 128 when he was hospitalized \par 10/7- 128 \par \par since last visit \par \par he was taken off Cymbalta and now the sodium has been normal \par he continues to take salt tabs 1 gm TID and fluid restriction \par

## 2023-01-11 ENCOUNTER — RX RENEWAL (OUTPATIENT)
Age: 78
End: 2023-01-11

## 2023-02-07 ENCOUNTER — APPOINTMENT (OUTPATIENT)
Dept: ENDOCRINOLOGY | Facility: CLINIC | Age: 78
End: 2023-02-07
Payer: MEDICARE

## 2023-02-07 VITALS
DIASTOLIC BLOOD PRESSURE: 80 MMHG | HEART RATE: 87 BPM | HEIGHT: 74 IN | OXYGEN SATURATION: 99 % | WEIGHT: 209 LBS | SYSTOLIC BLOOD PRESSURE: 140 MMHG | BODY MASS INDEX: 26.82 KG/M2

## 2023-02-07 DIAGNOSIS — M84.48XA PATHOLOGICAL FRACTURE, OTHER SITE, INITIAL ENCOUNTER FOR FRACTURE: ICD-10-CM

## 2023-02-07 PROCEDURE — 99214 OFFICE O/P EST MOD 30 MIN: CPT

## 2023-02-07 NOTE — HISTORY OF PRESENT ILLNESS
[Hip Surgery] : hip surgery [Alendronate (Fosomax)] : Alendronate [Calcium (dietary)] : dietary Calcium [FreeTextEntry1] : Patient is a 77-year-old man, here for initial evaluation for management of osteoporosis.\par Patient was seen by Dr. DENISE from rheumatology in January 2021.\par \par Bone density in December 2019 showing an L2-L4 T score of -1.9, total hip T score of -2.0, as well as left femoral neck T score of -2.7, one third radius T score of -2.6\par \par Prior work-up included normal vitamin D level, normal thyroid function level, and normal comprehensive metabolic panel.\par \par Patient was recommended to start RECLAST but he stated that Dr. Denise's office did not get in touch with him to arrange for treatment.  Therefore he is presenting here for treatment planning.\par \par Patient also had a history of T12 fracture from standing height status post kyphoplasty.  Per our chart review, he was seen by Dr. Wyatt in our office in October 2017, he was recommended to start alendronate 70 mg 1 weekly.  However, it appears that patient has not been adherent with his medication.\par \par Patient was finally started on alendronate in 2021.  Report adherence to her medication.

## 2023-02-07 NOTE — PHYSICAL EXAM
[Alert] : alert [Well Nourished] : well nourished [No Acute Distress] : no acute distress [Well Developed] : well developed [Normal Sclera/Conjunctiva] : normal sclera/conjunctiva [EOMI] : extra ocular movement intact [No Proptosis] : no proptosis [Normal Oropharynx] : the oropharynx was normal [Thyroid Not Enlarged] : the thyroid was not enlarged [No Thyroid Nodules] : no palpable thyroid nodules [No Respiratory Distress] : no respiratory distress [No Accessory Muscle Use] : no accessory muscle use [Clear to Auscultation] : lungs were clear to auscultation bilaterally [Normal S1, S2] : normal S1 and S2 [Normal Rate] : heart rate was normal [Regular Rhythm] : with a regular rhythm [No Edema] : no peripheral edema [Pedal Pulses Normal] : the pedal pulses are present [Normal Bowel Sounds] : normal bowel sounds [Not Tender] : non-tender [Not Distended] : not distended [Soft] : abdomen soft [Normal Anterior Cervical Nodes] : no anterior cervical lymphadenopathy [No Spinal Tenderness] : no spinal tenderness [Spine Straight] : spine straight [No Stigmata of Cushings Syndrome] : no stigmata of Cushings Syndrome [Normal Gait] : normal gait [Normal Strength/Tone] : muscle strength and tone were normal [No Rash] : no rash [Acanthosis Nigricans] : no acanthosis nigricans [Normal Reflexes] : deep tendon reflexes were 2+ and symmetric [No Tremors] : no tremors [Oriented x3] : oriented to person, place, and time [de-identified] : ambuatling with cane.

## 2023-02-07 NOTE — ASSESSMENT
[Denosumab Therapy] : Risks  and benefits of denosumab therapy were discussed with the patient including eczema, cellulitis, osteonecrosis of the jaw and atypical femur fractures [Bisphosphonate Therapy] : Risks and benefits of bisphosphonate therapy were  discussed with the patient including gastroesophageal irritation, osteonecrosis of the jaw, and atypical femur fractures, and acute phase reaction [FreeTextEntry1] : 77-year-old male, with history of osteoporosis, worse at the femoral neck, history of prostate cancer, he also has a history of T12 fracture, here for evaluation of osteoporosis.  He was recommended to start on alendronate back in October 2017, unclear patient has been adherent with his medication.  Bone density in 2019 showed a T score of -2.6 in the femoral neck which suggest that patient should be treated with osteoporosis medication.\par \par Patient was on alendronate which he reports that he was adherent to since April 2021.\par Patient had a left hip fracture on October 31, 2022.  He was sitting on a stool at his kitchen and he fell.  Status post cephalomedullary nailing.  He is doing well.\par \par 1.  Osteoporosis\par Obtain blood work below.\par Obtain bone mineral density.\par We will change to Prolia treatment.\par \par 2. Hyponatremia\par Following up with nephrology.\par \par

## 2023-02-07 NOTE — RESULTS/DATA
[FreeTextEntry1] : December 3, 2019\par Lumbar spine: L1-L4: BMD 0.908, T score -1.9, Z score -0.8\par Left hip: BMD 0.731, T score -2.0, Z score -1.1\par

## 2023-02-13 ENCOUNTER — APPOINTMENT (OUTPATIENT)
Dept: RADIOLOGY | Facility: HOSPITAL | Age: 78
End: 2023-02-13

## 2023-02-13 LAB
25(OH)D3 SERPL-MCNC: 42.4 NG/ML
ALBUMIN SERPL ELPH-MCNC: 4 G/DL
ALP BLD-CCNC: 45 U/L
ALT SERPL-CCNC: 12 U/L
ANION GAP SERPL CALC-SCNC: 11 MMOL/L
AST SERPL-CCNC: 15 U/L
BILIRUB SERPL-MCNC: 0.8 MG/DL
BUN SERPL-MCNC: 17 MG/DL
CALCIUM SERPL-MCNC: 9.8 MG/DL
CALCIUM SERPL-MCNC: 9.8 MG/DL
CHLORIDE SERPL-SCNC: 102 MMOL/L
CO2 SERPL-SCNC: 24 MMOL/L
CREAT SERPL-MCNC: 1.18 MG/DL
EGFR: 63 ML/MIN/1.73M2
ESTIMATED AVERAGE GLUCOSE: 120 MG/DL
GLUCOSE SERPL-MCNC: 100 MG/DL
HBA1C MFR BLD HPLC: 5.8 %
MAGNESIUM SERPL-MCNC: 2.1 MG/DL
PARATHYROID HORMONE INTACT: 49 PG/ML
PHOSPHATE SERPL-MCNC: 3.6 MG/DL
POTASSIUM SERPL-SCNC: 5.2 MMOL/L
PROT SERPL-MCNC: 6.7 G/DL
SODIUM SERPL-SCNC: 138 MMOL/L
T4 FREE SERPL-MCNC: 0.9 NG/DL
TSH SERPL-ACNC: 2.94 UIU/ML

## 2023-02-15 ENCOUNTER — APPOINTMENT (OUTPATIENT)
Dept: NEUROLOGY | Facility: CLINIC | Age: 78
End: 2023-02-15
Payer: MEDICARE

## 2023-02-15 VITALS
WEIGHT: 209 LBS | SYSTOLIC BLOOD PRESSURE: 142 MMHG | BODY MASS INDEX: 26.82 KG/M2 | HEART RATE: 88 BPM | DIASTOLIC BLOOD PRESSURE: 78 MMHG | HEIGHT: 74 IN

## 2023-02-15 DIAGNOSIS — Z91.81 HISTORY OF FALLING: ICD-10-CM

## 2023-02-15 DIAGNOSIS — G62.9 POLYNEUROPATHY, UNSPECIFIED: ICD-10-CM

## 2023-02-15 PROCEDURE — 99215 OFFICE O/P EST HI 40 MIN: CPT

## 2023-02-15 NOTE — PHYSICAL EXAM
[General Appearance - Alert] : alert [General Appearance - Well Developed] : well developed [Oriented To Time, Place, And Person] : oriented to person, place, and time [Person] : oriented to person [Place] : oriented to place [Time] : oriented to time [Short Term Intact] : short term memory intact [Span Intact] : the attention span was normal [Naming Objects] : no difficulty naming common objects [Fluency] : fluency intact [Current Events] : adequate knowledge of current events [Cranial Nerves Optic (II)] : visual acuity intact bilaterally,  visual fields full to confrontation, pupils equal round and reactive to light [Cranial Nerves Oculomotor (III)] : extraocular motion intact [Cranial Nerves Vestibulocochlear (VIII)] : hearing was intact bilaterally [Cranial Nerves Accessory (XI - Cranial And Spinal)] : head turning and shoulder shrug symmetric [Motor Tone] : muscle tone was normal in all four extremities [Abnormal Walk] : normal gait [Motor Strength] : muscle strength was normal in all four extremities [Coordination - Dysmetria Impaired Finger-to-Nose Bilateral] : not present [2+] : Patella left 2+ [FreeTextEntry7] : Decreased to light touch and pinprick to the mid shins bilateral [FreeTextEntry8] : pitting edema in feet

## 2023-02-15 NOTE — HISTORY OF PRESENT ILLNESS
[FreeTextEntry1] : 77-year-old gentleman who is here for initial consultation of neuropathy that has been going on for the past 10 years. Patient states that he noticed numbness in the balls of his feet and was started on gabapentin with slow titration. Initially helped with the symptoms but when it stopped helping Dr. [place] increased the dose. He is currently taking Neurontin 800 mg t.i.d. he states that he has tried Lyrica and the past however it was not well tolerated. \par \par Severity: 4/10\par freq: constant\par quality: numbness\par worsened by cold.\par \par interval history. Even with the increased gabapentin, the balls of his feet still are painful with weight bearing. He has no side effects from the gabapentin. \par \par interval history: saw podiatry and has insoles that are helping somewhat. The lidoderm patch is helping somewhat. He still complains of a coldness. No side effects from the gabapentin. \par \par interval history: He states he tried effexor and when he stopped it for 2 days, the pain was worse. He states he's back on it and it's better. No side effects from the medications.\par \par interval history: despite taking 150 of effexor, his neuropathy is still there. He's on gabapentin 800 and then 1200 at night. He was asked to wean off of effexor since it didn't help. \par \par no depression or anxiety. bp is normal. \par \par interval history: he's on gabapentin 800 and 1200 at night. He tried cymbalta but he wants to stop b/c it is not helping. He has tried lyrica but ddin't like the side effects, we are not going to try pamelor due to his history of abnl ekg. \par \par interval history: he is s/p back surgery from a recent fall. His neuropathy is the same as before. Through all the neuropathic medications, he still prefers lkdoderm patch for his neuropathy. \par \par Interval history: Patient is currently on gabapentin 800 mg/800 mg / 800 mg for a total of 2400 mg a day.  Patient states that he continues to have numbness and pain in his feet and a cold sensation at night.  Patient is noted to have bilateral ankle edema on exam.  Patient has seen his cardiologist with no pathology.  Patient is noted to have renal insufficiency as per the chart.\par \par Interval history: Patient states that his neuropathy is about the same.  Patient saw vascular surgery who diagnosed him with vascular insufficiency.  \par \par Interval history February 15, 2023: Since his last visit patient states that he was on the Cymbalta however after his recent fall and hospitalization in October and in November patient went back on the gabapentin 800 mg 3 times daily and has no further swelling.  Patient also had blood pressure medication lowered while he was in the hospital as that was the likely cause of his fall.  Patient's EEG was normal during the November admission.\par

## 2023-02-15 NOTE — DISCUSSION/SUMMARY
[FreeTextEntry1] : 77-year-old gentleman who is here for follow-up of his neuropathy since 2018.  The contributors of his symptoms include hypothyroidism, elevated hemoglobin A1c, hemochromatosis, lumbar radiculopathy, mild renal insufficiency and vascular insufficiency.  Patient was tried on Cymbalta as his gabapentin may have contributed to his ankle edema.  Patient stopped taking it for fear that it may have caused him to fall.  Patient is now back on gabapentin 800 mg 3 times a day with no recurrence of his ankle edema.  Patient will follow-up with me in 6 months.\par \par I spent the time noted on the day of this patient encounter preparing for, providing and documenting the above E/M service and counseling and educate patient on differential, workup, disease course, and treatment/management. Education was provided to the patient during this encounter. All questions and concerns were answered and addressed in detail. The patient verbalized understanding and agreed to plan. Patient was advised to continue to monitor for neurologic symptoms and to notify my office or go to the nearest emergency room if there are any changes. Any orders/referrals and communications were provided as well. \par Side effects of the above medications were discussed in detail including but not limited to applicable black box warning and teratogenicity as appropriate. \par Patient was advised to bring previous records to my office. \par \par \par

## 2023-02-28 ENCOUNTER — APPOINTMENT (OUTPATIENT)
Dept: ENDOCRINOLOGY | Facility: CLINIC | Age: 78
End: 2023-02-28
Payer: MEDICARE

## 2023-02-28 ENCOUNTER — MED ADMIN CHARGE (OUTPATIENT)
Age: 78
End: 2023-02-28

## 2023-02-28 VITALS — WEIGHT: 206 LBS | BODY MASS INDEX: 27.9 KG/M2 | HEIGHT: 72 IN

## 2023-02-28 PROCEDURE — ZZZZZ: CPT

## 2023-02-28 PROCEDURE — 96401 CHEMO ANTI-NEOPL SQ/IM: CPT

## 2023-02-28 RX ORDER — DENOSUMAB 60 MG/ML
60 INJECTION SUBCUTANEOUS
Qty: 1 | Refills: 0 | Status: COMPLETED | OUTPATIENT
Start: 2023-02-27

## 2023-03-08 ENCOUNTER — APPOINTMENT (OUTPATIENT)
Dept: FAMILY MEDICINE | Facility: CLINIC | Age: 78
End: 2023-03-08
Payer: MEDICARE

## 2023-03-08 VITALS
OXYGEN SATURATION: 98 % | BODY MASS INDEX: 28.58 KG/M2 | SYSTOLIC BLOOD PRESSURE: 144 MMHG | WEIGHT: 211 LBS | RESPIRATION RATE: 17 BRPM | HEIGHT: 72 IN | TEMPERATURE: 97.4 F | HEART RATE: 81 BPM | DIASTOLIC BLOOD PRESSURE: 78 MMHG

## 2023-03-08 DIAGNOSIS — R73.09 OTHER ABNORMAL GLUCOSE: ICD-10-CM

## 2023-03-08 DIAGNOSIS — E78.5 HYPERLIPIDEMIA, UNSPECIFIED: ICD-10-CM

## 2023-03-08 PROCEDURE — G0439: CPT

## 2023-03-08 RX ORDER — LOSARTAN POTASSIUM 50 MG/1
50 TABLET, FILM COATED ORAL
Qty: 90 | Refills: 1 | Status: DISCONTINUED | COMMUNITY
Start: 2020-11-10 | End: 2023-03-08

## 2023-03-08 RX ORDER — ALENDRONATE SODIUM 70 MG/1
70 TABLET ORAL
Qty: 6 | Refills: 1 | Status: COMPLETED | COMMUNITY
Start: 2017-11-27 | End: 2023-03-08

## 2023-03-08 RX ORDER — AMLODIPINE BESYLATE 5 MG/1
5 TABLET ORAL
Qty: 90 | Refills: 1 | Status: DISCONTINUED | COMMUNITY
Start: 2020-10-27 | End: 2023-03-08

## 2023-03-08 NOTE — HEALTH RISK ASSESSMENT
[Good] : ~his/her~  mood as  good [No] : In the past 12 months have you used drugs other than those required for medical reasons? No [No falls in past year] : Patient reported no falls in the past year [0] : 2) Feeling down, depressed, or hopeless: Not at all (0) [PHQ-2 Negative - No further assessment needed] : PHQ-2 Negative - No further assessment needed [de-identified] : active [de-identified] : regular [[id number]] : 0 [No Retinopathy] : No retinopathy [EyeExamDate] : 03/23 [Patient reported bone density results were abnormal] : Patient reported bone density results were abnormal [HIV test declined] : HIV test declined [Hepatitis C test declined] : Hepatitis C test declined [Change in mental status noted] : No change in mental status noted [Language] : denies difficulty with language [Behavior] : denies difficulty with behavior [Learning/Retaining New Information] : denies difficulty learning/retaining new information [Handling Complex Tasks] : denies difficulty handling complex tasks [Reasoning] : denies difficulty with reasoning [Spatial Ability and Orientation] : denies difficulty with spatial ability and orientation [None] : None [With Family] : lives with family [Retired] : retired [College] : College [] :  [Sexually Active] : not sexually active [Feels Safe at Home] : Feels safe at home [Fully functional (bathing, dressing, toileting, transferring, walking, feeding)] : Fully functional (bathing, dressing, toileting, transferring, walking, feeding) [Fully functional (using the telephone, shopping, preparing meals, housekeeping, doing laundry, using] : Fully functional and needs no help or supervision to perform IADLs (using the telephone, shopping, preparing meals, housekeeping, doing laundry, using transportation, managing medications and managing finances) [Independent] : managing finances [Reports changes in hearing] : Reports no changes in hearing [Reports changes in vision] : Reports no changes in vision [Reports normal functional visual acuity (ie: able to read med bottle)] : Reports normal functional visual acuity [Reports changes in dental health] : Reports no changes in dental health [Smoke Detector] : smoke detector [Carbon Monoxide Detector] : carbon monoxide detector [Seat Belt] : does not use seat belt [BoneDensityDate] : 02/23 [ColonoscopyDate] : 5 years ago [With Patient/Caregiver] : , with patient/caregiver [Designated Healthcare Proxy] : Designated healthcare proxy [Name: ___] : Health Care Proxy's Name: [unfilled]  [Relationship: ___] : Relationship: [unfilled] [AdvancecareDate] : 03/23 [Never] : Never

## 2023-03-08 NOTE — HISTORY OF PRESENT ILLNESS
[FreeTextEntry1] : see HPI [de-identified] : Here for AWV. He is AAOX3,NAD.. He follows urology and had psa done. His urologist is dr. Mathis. He was diagnosed with Glaucoma by eye doctor. he follows endocrinologist and nephrologist.He has been off Losartan. Eye exam done by dr. Law at [facility]. He took first shot of Prolia couple weeks ago for Osteoporosis.

## 2023-03-08 NOTE — PHYSICAL EXAM
[No Acute Distress] : no acute distress [Well Nourished] : well nourished [Normal Voice/Communication] : normal voice/communication [Ill-Appearing] : ill-appearing [PERRL] : pupils equal round and reactive to light [EOMI] : extraocular movements intact [Normal Oropharynx] : the oropharynx was normal [No JVD] : no jugular venous distention [Supple] : supple [No Respiratory Distress] : no respiratory distress  [No Accessory Muscle Use] : no accessory muscle use [Clear to Auscultation] : lungs were clear to auscultation bilaterally [Normal Rate] : normal rate  [Regular Rhythm] : with a regular rhythm [Normal S1, S2] : normal S1 and S2 [No Varicosities] : no varicosities [No Edema] : there was no peripheral edema [Soft] : abdomen soft [Non Tender] : non-tender [Non-distended] : non-distended [No Masses] : no abdominal mass palpated [No HSM] : no HSM [Normal Bowel Sounds] : normal bowel sounds [Normal Posterior Cervical Nodes] : no posterior cervical lymphadenopathy [Normal Anterior Cervical Nodes] : no anterior cervical lymphadenopathy [No Spinal Tenderness] : no spinal tenderness [No Joint Swelling] : no joint swelling [No Rash] : no rash [Coordination Grossly Intact] : coordination grossly intact [No Focal Deficits] : no focal deficits [Normal Gait] : normal gait [Normal Affect] : the affect was normal [Normal Mood] : the mood was normal [Normal Insight/Judgement] : insight and judgment were intact [FreeTextEntry1] : p

## 2023-03-08 NOTE — REVIEW OF SYSTEMS
[Fatigue] : no fatigue [Headache] : no headache [Dizziness] : no dizziness [Fainting] : no fainting [Confusion] : no confusion [Unsteady Walk] : no ataxia [Memory Loss] : no memory loss [Negative] : Heme/Lymph [FreeTextEntry7] : decreased appetite

## 2023-03-12 LAB
ALBUMIN SERPL ELPH-MCNC: 4.2 G/DL
ALP BLD-CCNC: 42 U/L
ALT SERPL-CCNC: 13 U/L
ANION GAP SERPL CALC-SCNC: 12 MMOL/L
AST SERPL-CCNC: 16 U/L
BILIRUB SERPL-MCNC: 0.7 MG/DL
BUN SERPL-MCNC: 22 MG/DL
CALCIUM SERPL-MCNC: 9.3 MG/DL
CHLORIDE SERPL-SCNC: 103 MMOL/L
CO2 SERPL-SCNC: 22 MMOL/L
CREAT SERPL-MCNC: 1.25 MG/DL
EGFR: 59 ML/MIN/1.73M2
GLUCOSE SERPL-MCNC: 93 MG/DL
POTASSIUM SERPL-SCNC: 5 MMOL/L
PROT SERPL-MCNC: 6.6 G/DL
SODIUM SERPL-SCNC: 136 MMOL/L

## 2023-04-05 ENCOUNTER — APPOINTMENT (OUTPATIENT)
Dept: NEPHROLOGY | Facility: CLINIC | Age: 78
End: 2023-04-05
Payer: MEDICARE

## 2023-04-05 VITALS
OXYGEN SATURATION: 96 % | BODY MASS INDEX: 28.67 KG/M2 | SYSTOLIC BLOOD PRESSURE: 166 MMHG | HEART RATE: 69 BPM | TEMPERATURE: 97.6 F | HEIGHT: 72 IN | DIASTOLIC BLOOD PRESSURE: 75 MMHG | WEIGHT: 211.64 LBS

## 2023-04-05 PROCEDURE — 99214 OFFICE O/P EST MOD 30 MIN: CPT

## 2023-04-05 NOTE — HISTORY OF PRESENT ILLNESS
[FreeTextEntry1] : referred for hyponatremia \par \par 76 yo male with medical problems as listed below, who had a flu shot in september and states he felt very tired after, leading to his presenting to an outside hospital and being admitted with JESUS , weakness, nausea, hyponatremia, hyperkalemia. his sodium was found to be 126-128, he received normal saline with improvement in his kidney function but sodium stayed around 128. 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 He was given 1 dose of Tolvaptan 15 mg and discharged on fluid restriction and sodium chloride 1 gm daily \par \par looking back at his records, his sodium has been ~134-135. states he was never really told that he had any problem with sodium in the past. \par \par also he was initiated o Cymbalta in early september \par feb 2022- sodium was 138 \par July- 130\par september 2022- 128 when he was hospitalized \par 10/7- 128 \par \par since last visit \par \par he was taken off Cymbalta and now the sodium has been normal \par he takes sodium chloride 1 gm daily \par last Na was 136 \par home BP has been normal \par

## 2023-04-05 NOTE — ASSESSMENT
[FreeTextEntry1] : Hyponatremia- \par \par SIADH from Cymbalta use. He has resolved since the drug was stopped. stop sodium chloride 1 gm daily that he is currently on. Continue to make sure not drinking more than 1.5 L of water. \par \par will repeat labs in 3 months.

## 2023-04-05 NOTE — PHYSICAL EXAM
[General Appearance - Alert] : alert [General Appearance - In No Acute Distress] : in no acute distress [General Appearance - Well Nourished] : well nourished [General Appearance - Well Developed] : well developed [General Appearance - Well-Appearing] : healthy appearing [Sclera] : the sclera and conjunctiva were normal [PERRL With Normal Accommodation] : pupils were equal in size, round, and reactive to light [Extraocular Movements] : extraocular movements were intact [Outer Ear] : the ears and nose were normal in appearance [Hearing Threshold Finger Rub Not [place]] : hearing was normal [Examination Of The Oral Cavity] : the lips and gums were normal [Both Tympanic Membranes Were Examined] : both tympanic membranes were normal [Neck Appearance] : the appearance of the neck was normal [Respiration, Rhythm And Depth] : normal respiratory rhythm and effort [Exaggerated Use Of Accessory Muscles For Inspiration] : no accessory muscle use [Auscultation Breath Sounds / Voice Sounds] : lungs were clear to auscultation bilaterally [Chest Palpation] : palpation of the chest revealed no abnormalities [Heart Rate And Rhythm] : heart rate was normal and rhythm regular [Heart Sounds Gallop] : no gallops [Murmurs] : no murmurs [Heart Sounds Pericardial Friction Rub] : no pericardial rub [Arterial Pulses Carotid] : carotid pulses were normal with no bruits [Abdominal Aorta] : the abdominal aorta was normal [Edema] : there was no peripheral edema [Bowel Sounds] : normal bowel sounds [Abdomen Soft] : soft [Abdomen Tenderness] : non-tender [No CVA Tenderness] : no ~M costovertebral angle tenderness [No Spinal Tenderness] : no spinal tenderness [Abnormal Walk] : normal gait [Nail Clubbing] : no clubbing  or cyanosis of the fingernails [Involuntary Movements] : no involuntary movements were seen [Musculoskeletal - Swelling] : no joint swelling seen [Motor Tone] : muscle strength and tone were normal [Skin Turgor] : normal skin turgor [Skin Color & Pigmentation] : normal skin color and pigmentation [] : no rash [Skin Lesions] : no skin lesions [Deep Tendon Reflexes (DTR)] : deep tendon reflexes were 2+ and symmetric [Cranial Nerves] : cranial nerves 2-12 were intact [Sensation] : the sensory exam was normal to light touch and pinprick [Motor Exam] : the motor exam was normal [Oriented To Time, Place, And Person] : oriented to person, place, and time [Impaired Insight] : insight and judgment were intact [Affect] : the affect was normal [Mood] : the mood was normal

## 2023-05-02 NOTE — DIETITIAN INITIAL EVALUATION ADULT - ENERGY INTAKE
Population health chart review while working a Diabetic A1c non-controlled report. Attempted to reach pt bu phone to schedule DM labs, no answer, left VM and sending letter.   05/02/22023  EFAX SENT TO [facility] REC DEPT FOR MOST RECENT LIPID PANEL  
Adequate (%)

## 2023-06-01 ENCOUNTER — RX RENEWAL (OUTPATIENT)
Age: 78
End: 2023-06-01

## 2023-06-13 ENCOUNTER — APPOINTMENT (OUTPATIENT)
Dept: CARDIOLOGY | Facility: CLINIC | Age: 78
End: 2023-06-13
Payer: MEDICARE

## 2023-06-13 ENCOUNTER — NON-APPOINTMENT (OUTPATIENT)
Age: 78
End: 2023-06-13

## 2023-06-13 VITALS
OXYGEN SATURATION: 97 % | HEIGHT: 72 IN | DIASTOLIC BLOOD PRESSURE: 73 MMHG | BODY MASS INDEX: 28.58 KG/M2 | SYSTOLIC BLOOD PRESSURE: 137 MMHG | TEMPERATURE: 95.3 F | WEIGHT: 211 LBS | HEART RATE: 72 BPM | RESPIRATION RATE: 20 BRPM

## 2023-06-13 PROCEDURE — 93306 TTE W/DOPPLER COMPLETE: CPT

## 2023-06-13 PROCEDURE — 99213 OFFICE O/P EST LOW 20 MIN: CPT

## 2023-06-13 PROCEDURE — 93000 ELECTROCARDIOGRAM COMPLETE: CPT

## 2023-06-13 NOTE — DISCUSSION/SUMMARY
[FreeTextEntry1] : 77-year-old man with hypertension, hyperlipidemia, aortic stenosis and abnormal EKG.\par He has a good functional capacity and has been asymptomatic with respect to cardiac issues.\par Blood pressure stable.  There is no JVD.  Lung fields are clear.  No edema.  He is euvolemic.\par EKG sinus rhythm.  Poor R wave progression.  Nonspecific ST changes.  Largely unchanged from prior.\par Echocardiogram done today in the office.  Normal left ventricular systolic function, mild aortic stenosis.\par Current cardiac status appears to be stable.\par \par Plan\par 1.  Current medication list is reviewed, no changes.\par 2.  Advised to continue to try to maintain an active aerobic lifestyle.\par 3.  He will follow-up with me in the office in 6 months.\par 4.  Advised him to monitor for any cardiac symptoms and to report back to me if there are any changes or if he has any other concerns.  Cardiac issues were discussed, all questions answered.\par

## 2023-06-13 NOTE — CARDIOLOGY SUMMARY
[___] : [unfilled] [de-identified] : June 13 2023. Sinus  Rhythm \par Poor R wave progression \par Nonspecific ST changes \par \par

## 2023-06-13 NOTE — PHYSICAL EXAM
[Normal S1, S2] : normal S1, S2 [No Rub] : no rub [No Gallop] : no gallop [Murmur] : murmur [Normal] : alert and oriented, normal memory [de-identified] : l/Vl systolic

## 2023-06-13 NOTE — HISTORY OF PRESENT ILLNESS
[FreeTextEntry1] : Since his last visit with me, he has been feeling well.  He has been going to the gym 3 times per week and denies any cardiopulmonary limitations.\par No complaints of chest pain or chest pressure.  No shortness of breath or dyspnea on exertion.  Denies palpitations.  No dizziness, lightheadedness, syncope or near syncope.  No edema, no orthopnea.  No PND.\par Reports compliance to current medications.\par

## 2023-07-06 ENCOUNTER — RX RENEWAL (OUTPATIENT)
Age: 78
End: 2023-07-06

## 2023-07-06 ENCOUNTER — LABORATORY RESULT (OUTPATIENT)
Age: 78
End: 2023-07-06

## 2023-07-12 ENCOUNTER — APPOINTMENT (OUTPATIENT)
Dept: FAMILY MEDICINE | Facility: CLINIC | Age: 78
End: 2023-07-12
Payer: MEDICARE

## 2023-07-12 VITALS
WEIGHT: 213 LBS | SYSTOLIC BLOOD PRESSURE: 128 MMHG | RESPIRATION RATE: 16 BRPM | HEART RATE: 83 BPM | OXYGEN SATURATION: 97 % | DIASTOLIC BLOOD PRESSURE: 70 MMHG | TEMPERATURE: 97 F | HEIGHT: 74 IN | BODY MASS INDEX: 27.34 KG/M2

## 2023-07-12 DIAGNOSIS — R25.2 CRAMP AND SPASM: ICD-10-CM

## 2023-07-12 DIAGNOSIS — E87.1 HYPO-OSMOLALITY AND HYPONATREMIA: ICD-10-CM

## 2023-07-12 PROCEDURE — 99214 OFFICE O/P EST MOD 30 MIN: CPT

## 2023-07-12 NOTE — ASSESSMENT
[FreeTextEntry1] : Approximately 30 min of face to face time spent, reviewed chart, prior labwork, addressed various health concerns, reviewed new labs. Answered all pt questions, coordinated care for patient. \par

## 2023-07-12 NOTE — REVIEW OF SYSTEMS
[Fatigue] : fatigue [Muscle Pain] : muscle pain [Headache] : no headache [Dizziness] : dizziness [Fainting] : no fainting [Confusion] : no confusion [Unsteady Walk] : no ataxia [Memory Loss] : no memory loss [Negative] : Heme/Lymph [FreeTextEntry9] : leg cramps

## 2023-07-12 NOTE — HISTORY OF PRESENT ILLNESS
[FreeTextEntry1] : follow up, gabapentin, pneumonia vaccine, leg cramping [de-identified] : Mr Maurilio Gordillo is a 78 yo male presents today for medication reconcilliation, reports he would like to have entered in the chart, he takes 800mg 1 tab TID, instead of 2 tabs of 400mg TID. Pt notified rectified in chart. Secondly, inquires if he needs the new Prevnar 20 vaccine, informed he has completed his pneumonia vaccine series. of Prevnar 13 and Pneumovax 23 in the past, thus will not need the new Prevnar 20. Lastly, reports more leg cramping at night, advised to optimize sodium and hydration at least 1.5L a day. Pt had hx of hyponatremia in the past, also following up with nephrologist.

## 2023-07-28 ENCOUNTER — APPOINTMENT (OUTPATIENT)
Dept: NEPHROLOGY | Facility: CLINIC | Age: 78
End: 2023-07-28
Payer: MEDICARE

## 2023-07-28 VITALS
TEMPERATURE: 97.3 F | OXYGEN SATURATION: 98 % | WEIGHT: 213 LBS | BODY MASS INDEX: 27.34 KG/M2 | HEIGHT: 74 IN | DIASTOLIC BLOOD PRESSURE: 74 MMHG | HEART RATE: 71 BPM | SYSTOLIC BLOOD PRESSURE: 166 MMHG

## 2023-07-28 PROCEDURE — 99214 OFFICE O/P EST MOD 30 MIN: CPT

## 2023-07-28 RX ORDER — NORMAL SALT TABLETS 1 G/G
1 TABLET ORAL TWICE DAILY
Refills: 0 | Status: DISCONTINUED | COMMUNITY
End: 2023-07-28

## 2023-07-28 NOTE — HISTORY OF PRESENT ILLNESS
[FreeTextEntry1] : referred for hyponatremia \par \par 76 yo male with medical problems as listed below, who had a flu shot in september and states he felt very tired after, leading to his presenting to an outside hospital and being admitted with JESUS , weakness, nausea, hyponatremia, hyperkalemia. his sodium was found to be 126-128, he received normal saline with improvement in his kidney function but sodium stayed around 128. 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 He was given 1 dose of Tolvaptan 15 mg and discharged on fluid restriction and sodium chloride 1 gm daily \par \par looking back at his records, his sodium has been ~134-135. states he was never really told that he had any problem with sodium in the past. \par \par also he was initiated o Cymbalta in early september \par feb 2022- sodium was 138 \par July- 130\par september 2022- 128 when he was hospitalized \par 10/7- 128 \par \par he was taken off Cymbalta and now the sodium has been normal \par he was taken off salt tabs\par \par however last labs showed a sodium of 132. the only difference was he started drinking 1.5-2 L of water \par no new meds\par

## 2023-07-28 NOTE — ASSESSMENT
[FreeTextEntry1] : Hyponatremia- \par \par SIADH from Cymbalta use. He has resolved since the drug was stopped. however last sodium was 132. I asked him to stop drinking " lots and lots of water" and drink to thirst. increase protein intake. will repeat labs in about a month.no other changes at the current time \par \par I also advised him to make a return appointment with hematology for anemia.

## 2023-07-28 NOTE — PHYSICAL EXAM
[General Appearance - Alert] : alert [General Appearance - In No Acute Distress] : in no acute distress [General Appearance - Well Nourished] : well nourished [General Appearance - Well Developed] : well developed [General Appearance - Well-Appearing] : healthy appearing [Sclera] : the sclera and conjunctiva were normal [Outer Ear] : the ears and nose were normal in appearance [Hearing Threshold Finger Rub Not [place]] : hearing was normal [Examination Of The Oral Cavity] : the lips and gums were normal [Neck Appearance] : the appearance of the neck was normal [Neck Cervical Mass (___cm)] : no neck mass was observed [Jugular Venous Distention Increased] : there was no jugular-venous distention [Respiration, Rhythm And Depth] : normal respiratory rhythm and effort [Exaggerated Use Of Accessory Muscles For Inspiration] : no accessory muscle use [Auscultation Breath Sounds / Voice Sounds] : lungs were clear to auscultation bilaterally [Heart Rate And Rhythm] : heart rate was normal and rhythm regular [Heart Sounds] : normal S1 and S2 [Arterial Pulses Carotid] : carotid pulses were normal with no bruits [Edema] : there was no peripheral edema [Bowel Sounds] : normal bowel sounds [Abdomen Soft] : soft [Abdomen Tenderness] : non-tender [No CVA Tenderness] : no ~M costovertebral angle tenderness [Abnormal Walk] : normal gait [Nail Clubbing] : no clubbing  or cyanosis of the fingernails [Musculoskeletal - Swelling] : no joint swelling seen [Oriented To Time, Place, And Person] : oriented to person, place, and time [Impaired Insight] : insight and judgment were intact [Affect] : the affect was normal [PERRL With Normal Accommodation] : pupils were equal in size, round, and reactive to light [Extraocular Movements] : extraocular movements were intact [Both Tympanic Membranes Were Examined] : both tympanic membranes were normal [Chest Palpation] : palpation of the chest revealed no abnormalities [Heart Sounds Gallop] : no gallops [Murmurs] : no murmurs [Heart Sounds Pericardial Friction Rub] : no pericardial rub [Abdominal Aorta] : the abdominal aorta was normal [No Spinal Tenderness] : no spinal tenderness [Involuntary Movements] : no involuntary movements were seen [Motor Tone] : muscle strength and tone were normal [Skin Color & Pigmentation] : normal skin color and pigmentation [Skin Turgor] : normal skin turgor [] : no rash [Skin Lesions] : no skin lesions [Cranial Nerves] : cranial nerves 2-12 were intact [Deep Tendon Reflexes (DTR)] : deep tendon reflexes were 2+ and symmetric [Sensation] : the sensory exam was normal to light touch and pinprick [Motor Exam] : the motor exam was normal [Mood] : the mood was normal

## 2023-08-15 LAB
ANION GAP SERPL CALC-SCNC: 8 MMOL/L
APPEARANCE: CLEAR
BACTERIA: NEGATIVE /HPF
BILIRUBIN URINE: NEGATIVE
BLOOD URINE: NEGATIVE
BUN SERPL-MCNC: 22 MG/DL
CALCIUM SERPL-MCNC: 9.7 MG/DL
CAST: 0 /LPF
CHLORIDE SERPL-SCNC: 95 MMOL/L
CO2 SERPL-SCNC: 25 MMOL/L
COLOR: YELLOW
CREAT SERPL-MCNC: 1.29 MG/DL
EGFR: 56 ML/MIN/1.73M2
EPITHELIAL CELLS: 0 /HPF
GLUCOSE QUALITATIVE U: NEGATIVE MG/DL
GLUCOSE SERPL-MCNC: 107 MG/DL
KETONES URINE: NEGATIVE MG/DL
LEUKOCYTE ESTERASE URINE: NEGATIVE
MICROSCOPIC-UA: NORMAL
NITRITE URINE: NEGATIVE
OSMOLALITY UR: 449 MOSM/KG
PH URINE: 6.5
POTASSIUM SERPL-SCNC: 4.9 MMOL/L
POTASSIUM UR-SCNC: 35.4 MMOL/L
PROTEIN URINE: NEGATIVE MG/DL
RED BLOOD CELLS URINE: 0 /HPF
SODIUM ?TM SUB UR QN: 108 MMOL/L
SODIUM SERPL-SCNC: 128 MMOL/L
SPECIFIC GRAVITY URINE: 1.01
UROBILINOGEN URINE: 0.2 MG/DL
WHITE BLOOD CELLS URINE: 0 /HPF

## 2023-08-16 ENCOUNTER — APPOINTMENT (OUTPATIENT)
Dept: NEUROLOGY | Facility: CLINIC | Age: 78
End: 2023-08-16

## 2023-08-29 ENCOUNTER — RX RENEWAL (OUTPATIENT)
Age: 78
End: 2023-08-29

## 2023-09-11 ENCOUNTER — APPOINTMENT (OUTPATIENT)
Dept: ENDOCRINOLOGY | Facility: CLINIC | Age: 78
End: 2023-09-11
Payer: MEDICARE

## 2023-09-11 VITALS
DIASTOLIC BLOOD PRESSURE: 80 MMHG | HEART RATE: 71 BPM | SYSTOLIC BLOOD PRESSURE: 140 MMHG | WEIGHT: 210 LBS | BODY MASS INDEX: 26.95 KG/M2 | HEIGHT: 74 IN | OXYGEN SATURATION: 96 %

## 2023-09-11 PROCEDURE — 99213 OFFICE O/P EST LOW 20 MIN: CPT | Mod: 25

## 2023-09-11 PROCEDURE — 96401 CHEMO ANTI-NEOPL SQ/IM: CPT

## 2023-09-11 RX ORDER — DENOSUMAB 60 MG/ML
60 INJECTION SUBCUTANEOUS
Qty: 1 | Refills: 0 | Status: COMPLETED | OUTPATIENT
Start: 2023-09-11

## 2023-09-11 RX ADMIN — DENOSUMAB 0 MG/ML: 60 INJECTION SUBCUTANEOUS at 00:00

## 2023-10-30 ENCOUNTER — RX RENEWAL (OUTPATIENT)
Age: 78
End: 2023-10-30

## 2023-10-30 RX ORDER — GABAPENTIN 400 MG/1
400 CAPSULE ORAL 3 TIMES DAILY
Qty: 180 | Refills: 10 | Status: ACTIVE | COMMUNITY
Start: 2022-11-14 | End: 1900-01-01

## 2023-11-29 NOTE — DIETITIAN INITIAL EVALUATION ADULT - PERTINENT MEDS FT
Patient is 51 y/o male here to discuss surveillance colonoscopy Has hx SSA, TA, last C-scope 7/21, due for 2 yr f/u Last labs reviewed from 9/23 w CBC, CMP wnl States reflux symptoms not going well x 1 week In past week, waking with nausea often, abdominal upset, will vomiting, afterwards feels OK rest of day, able to eat Some increased heartburn, has sensation of something stuck mid-chest, no dysphagia, appetite lower/doesn't feel like eating BMs once daily, no straining, no change in characteristics, no blood/mucus/melena in stool On pantoprazole QD presently  Cannot attribute to recent diet changes or holidays worsening symptoms No unexpected weight loss but admits rybelsus x 7-8 months w some progressive wt loss as expected No recent dose changes on rybelsus in past 6 months Does admit since then appetite lower, sometimes less interest in food, cannot finish food, no usual nausea States ready to proceed w C-scope but had hard time w prep Caused significant nausea/vomiting on second dose w suprep w first attempt Tried sutab pills second attempt but also caused nausea/vomiting on second dose Did try to stop rybelsus 4 days prior to second attempt but still had a lot of intolerability w prep Couldn't finish liquid, had a hard time tolerating the volume
MEDICATIONS  (STANDING):  amLODIPine   Tablet 10 milliGRAM(s) Oral daily  atorvastatin 10 milliGRAM(s) Oral at bedtime  enoxaparin Injectable 40 milliGRAM(s) SubCutaneous every 24 hours  gabapentin 800 milliGRAM(s) Oral three times a day  sodium chloride 1 Gram(s) Oral three times a day    MEDICATIONS  (PRN):  acetaminophen     Tablet .. 650 milliGRAM(s) Oral every 6 hours PRN Mild Pain (1 - 3)  melatonin 3 milliGRAM(s) Oral at bedtime PRN Insomnia  ondansetron Injectable 4 milliGRAM(s) IV Push every 8 hours PRN Nausea and/or Vomiting

## 2023-12-12 ENCOUNTER — NON-APPOINTMENT (OUTPATIENT)
Age: 78
End: 2023-12-12

## 2023-12-12 ENCOUNTER — APPOINTMENT (OUTPATIENT)
Dept: CARDIOLOGY | Facility: CLINIC | Age: 78
End: 2023-12-12
Payer: MEDICARE

## 2023-12-12 VITALS
TEMPERATURE: 95.7 F | HEART RATE: 83 BPM | OXYGEN SATURATION: 100 % | BODY MASS INDEX: 29.12 KG/M2 | HEIGHT: 72 IN | WEIGHT: 215 LBS | RESPIRATION RATE: 18 BRPM | SYSTOLIC BLOOD PRESSURE: 143 MMHG | DIASTOLIC BLOOD PRESSURE: 70 MMHG

## 2023-12-12 DIAGNOSIS — E78.5 HYPERLIPIDEMIA, UNSPECIFIED: ICD-10-CM

## 2023-12-12 PROCEDURE — 99213 OFFICE O/P EST LOW 20 MIN: CPT

## 2023-12-12 PROCEDURE — 93000 ELECTROCARDIOGRAM COMPLETE: CPT

## 2023-12-18 ENCOUNTER — RX RENEWAL (OUTPATIENT)
Age: 78
End: 2023-12-18

## 2023-12-18 RX ORDER — LEVOTHYROXINE SODIUM 0.03 MG/1
25 TABLET ORAL
Qty: 90 | Refills: 3 | Status: ACTIVE | COMMUNITY
Start: 2021-07-28 | End: 1900-01-01

## 2024-01-03 ENCOUNTER — RX RENEWAL (OUTPATIENT)
Age: 79
End: 2024-01-03

## 2024-02-11 ENCOUNTER — NON-APPOINTMENT (OUTPATIENT)
Age: 79
End: 2024-02-11

## 2024-03-02 NOTE — PATIENT PROFILE ADULT - VISION (WITH CORRECTIVE LENSES IF THE PATIENT USUALLY WEARS THEM):
28-Feb-2024 22:28 Normal vision: sees adequately in most situations; can see medication labels, newsprint

## 2024-03-11 ENCOUNTER — APPOINTMENT (OUTPATIENT)
Age: 79
End: 2024-03-11
Payer: MEDICARE

## 2024-03-11 VITALS
OXYGEN SATURATION: 98 % | HEIGHT: 72 IN | HEART RATE: 93 BPM | TEMPERATURE: 97.1 F | SYSTOLIC BLOOD PRESSURE: 130 MMHG | WEIGHT: 220 LBS | DIASTOLIC BLOOD PRESSURE: 72 MMHG | BODY MASS INDEX: 29.8 KG/M2 | RESPIRATION RATE: 15 BRPM

## 2024-03-11 DIAGNOSIS — R26.9 UNSPECIFIED ABNORMALITIES OF GAIT AND MOBILITY: ICD-10-CM

## 2024-03-11 DIAGNOSIS — M79.89 OTHER SPECIFIED SOFT TISSUE DISORDERS: ICD-10-CM

## 2024-03-11 DIAGNOSIS — E83.119 HEMOCHROMATOSIS, UNSPECIFIED: ICD-10-CM

## 2024-03-11 DIAGNOSIS — E87.1 HYPO-OSMOLALITY AND HYPONATREMIA: ICD-10-CM

## 2024-03-11 DIAGNOSIS — M80.00XS AGE-RELATED OSTEOPOROSIS WITH CURRENT PATHOLOGICAL FRACTURE, UNSPECIFIED SITE, SEQUELA: ICD-10-CM

## 2024-03-11 DIAGNOSIS — E03.9 HYPOTHYROIDISM, UNSPECIFIED: ICD-10-CM

## 2024-03-11 DIAGNOSIS — Z00.00 ENCOUNTER FOR GENERAL ADULT MEDICAL EXAMINATION W/OUT ABNORMAL FINDINGS: ICD-10-CM

## 2024-03-11 DIAGNOSIS — N18.30 CHRONIC KIDNEY DISEASE, STAGE 3 UNSPECIFIED: ICD-10-CM

## 2024-03-11 PROCEDURE — G0439: CPT

## 2024-03-11 RX ORDER — GABAPENTIN 800 MG/1
800 TABLET, COATED ORAL 3 TIMES DAILY
Qty: 270 | Refills: 0 | Status: DISCONTINUED | COMMUNITY
Start: 2023-07-12 | End: 2024-03-11

## 2024-03-11 NOTE — REVIEW OF SYSTEMS
[Recent Change In Weight] : ~T recent weight change [Negative] : Heme/Lymph [Fatigue] : no fatigue [Headache] : no headache [Dizziness] : no dizziness [Fainting] : no fainting [Unsteady Walk] : no ataxia [Confusion] : no confusion [Memory Loss] : no memory loss [FreeTextEntry7] : decreased appetite

## 2024-03-11 NOTE — HEALTH RISK ASSESSMENT
[Good] : ~his/her~  mood as  good [No] : In the past 12 months have you used drugs other than those required for medical reasons? No [No falls in past year] : Patient reported no falls in the past year [0] : 2) Feeling down, depressed, or hopeless: Not at all (0) [PHQ-2 Negative - No further assessment needed] : PHQ-2 Negative - No further assessment needed [No Retinopathy] : No retinopathy [Patient reported bone density results were abnormal] : Patient reported bone density results were abnormal [HIV test declined] : HIV test declined [Hepatitis C test declined] : Hepatitis C test declined [None] : None [With Family] : lives with family [Retired] : retired [College] : College [] :  [Feels Safe at Home] : Feels safe at home [Fully functional (bathing, dressing, toileting, transferring, walking, feeding)] : Fully functional (bathing, dressing, toileting, transferring, walking, feeding) [Fully functional (using the telephone, shopping, preparing meals, housekeeping, doing laundry, using] : Fully functional and needs no help or supervision to perform IADLs (using the telephone, shopping, preparing meals, housekeeping, doing laundry, using transportation, managing medications and managing finances) [Independent] : managing finances [Reports normal functional visual acuity (ie: able to read med bottle)] : Reports normal functional visual acuity [Smoke Detector] : smoke detector [Carbon Monoxide Detector] : carbon monoxide detector [With Patient/Caregiver] : , with patient/caregiver [Designated Healthcare Proxy] : Designated healthcare proxy [Relationship: ___] : Relationship: [unfilled] [Name: ___] : Health Care Proxy's Name: [unfilled]  [Never] : Never [Patient declined colonoscopy] : Patient declined colonoscopy [de-identified] : urologist dr. Mathis, eye doctor dr. Law [de-identified] : regular [de-identified] : active- goes to [employer] 3 days a week  [EyeExamDate] : 03/23 [[id number]] : 0 [Change in mental status noted] : No change in mental status noted [Language] : denies difficulty with language [Behavior] : denies difficulty with behavior [Learning/Retaining New Information] : denies difficulty learning/retaining new information [Reasoning] : denies difficulty with reasoning [Handling Complex Tasks] : denies difficulty handling complex tasks [Spatial Ability and Orientation] : denies difficulty with spatial ability and orientation [Sexually Active] : not sexually active [Reports changes in vision] : Reports no changes in vision [Reports changes in hearing] : Reports no changes in hearing [Reports changes in dental health] : Reports no changes in dental health [Seat Belt] : does not use seat belt [BoneDensityDate] : 02/23 [BoneDensityComments] : Osteoporosis [ColonoscopyDate] : more than STEOPOROSIS5 years ago [AdvancecareDate] : 03/23

## 2024-03-11 NOTE — PLAN
[FreeTextEntry1] : low chol. DASH diet, avoid conc. sweets.  continue current management.  Flu vaccine received in fall 2023.  labs obtained. pt. takes Iron three times a week.  AORTIC STENOSIS MILD -cardiology.   elevate legs when restin.

## 2024-03-11 NOTE — CURRENT MEDS
[None] : Patient does not have any barriers to medication adherence [Takes medication as prescribed] : takes [Yes] : Reviewed medication list for presence of high-risk medications. [Blood Thinners] : blood thinners

## 2024-03-11 NOTE — HISTORY OF PRESENT ILLNESS
[FreeTextEntry1] : see HPI [de-identified] : Here for AWV. He is AAOX3, NAD.. He follows urology and had PSA done. His urologist is dr. Mathis. He was diagnosed with Glaucoma by eye doctor. he saw nephrologist. He has been off Losartan. Eye exam done by dr. Law at [facility]. He receives Prolia injection for Osteoporosis from specialist.  No falls. He is taking medicines regularly for Hypothyroidism, Hyperlipidemia, Neuropathy and HTN. He has resting tremors and mild ankle swelling b/l noted by visiting nurse last month. Patient had seen vascular doctor in past.

## 2024-03-11 NOTE — PHYSICAL EXAM
[No Acute Distress] : no acute distress [Well Nourished] : well nourished [Normal Voice/Communication] : normal voice/communication [Ill-Appearing] : ill-appearing [EOMI] : extraocular movements intact [PERRL] : pupils equal round and reactive to light [Normal Oropharynx] : the oropharynx was normal [No JVD] : no jugular venous distention [Supple] : supple [No Respiratory Distress] : no respiratory distress  [No Accessory Muscle Use] : no accessory muscle use [Clear to Auscultation] : lungs were clear to auscultation bilaterally [Regular Rhythm] : with a regular rhythm [Normal Rate] : normal rate  [Normal S1, S2] : normal S1 and S2 [No Varicosities] : no varicosities [No Edema] : there was no peripheral edema [Soft] : abdomen soft [Non Tender] : non-tender [Non-distended] : non-distended [No Masses] : no abdominal mass palpated [No HSM] : no HSM [Normal Bowel Sounds] : normal bowel sounds [Normal Posterior Cervical Nodes] : no posterior cervical lymphadenopathy [Normal Anterior Cervical Nodes] : no anterior cervical lymphadenopathy [No Spinal Tenderness] : no spinal tenderness [No Rash] : no rash [No Joint Swelling] : no joint swelling [Coordination Grossly Intact] : coordination grossly intact [No Focal Deficits] : no focal deficits [Normal Gait] : normal gait [Normal Affect] : the affect was normal [Normal Mood] : the mood was normal [Normal Insight/Judgement] : insight and judgment were intact

## 2024-03-11 NOTE — COUNSELING
[Engage in a relaxing activity] : Engage in a relaxing activity [Behavioral health counseling provided] : Behavioral health counseling provided [None] : None [Good understanding] : Patient has a good understanding of lifestyle changes and steps needed to achieve self management goal

## 2024-03-12 ENCOUNTER — RX RENEWAL (OUTPATIENT)
Age: 79
End: 2024-03-12

## 2024-03-12 ENCOUNTER — NON-APPOINTMENT (OUTPATIENT)
Age: 79
End: 2024-03-12

## 2024-03-12 RX ORDER — AMLODIPINE BESYLATE 10 MG/1
10 TABLET ORAL
Qty: 90 | Refills: 3 | Status: ACTIVE | COMMUNITY
Start: 2018-07-10 | End: 1900-01-01

## 2024-03-13 ENCOUNTER — APPOINTMENT (OUTPATIENT)
Dept: ENDOCRINOLOGY | Facility: CLINIC | Age: 79
End: 2024-03-13
Payer: MEDICARE

## 2024-03-13 VITALS — HEIGHT: 72.25 IN | WEIGHT: 216 LBS | BODY MASS INDEX: 28.94 KG/M2

## 2024-03-13 DIAGNOSIS — M81.0 AGE-RELATED OSTEOPOROSIS W/OUT CURRENT PATHOLOGICAL FRACTURE: ICD-10-CM

## 2024-03-13 PROCEDURE — 77085 DXA BONE DENSITY AXL VRT FX: CPT

## 2024-03-13 PROCEDURE — 96401 CHEMO ANTI-NEOPL SQ/IM: CPT

## 2024-03-13 PROCEDURE — ZZZZZ: CPT

## 2024-03-13 RX ORDER — DENOSUMAB 60 MG/ML
60 INJECTION SUBCUTANEOUS
Qty: 1 | Refills: 0 | Status: COMPLETED | OUTPATIENT
Start: 2024-03-12

## 2024-03-22 LAB
25(OH)D3 SERPL-MCNC: 48.1 NG/ML
ALBUMIN MFR SERPL ELPH: 58.8 %
ALBUMIN SERPL ELPH-MCNC: 4.3 G/DL
ALBUMIN SERPL-MCNC: 4.1 G/DL
ALBUMIN/GLOB SERPL: 1.4 RATIO
ALP BLD-CCNC: 36 U/L
ALPHA1 GLOB MFR SERPL ELPH: 3.9 %
ALPHA1 GLOB SERPL ELPH-MCNC: 0.3 G/DL
ALPHA2 GLOB MFR SERPL ELPH: 9.3 %
ALPHA2 GLOB SERPL ELPH-MCNC: 0.7 G/DL
ALT SERPL-CCNC: 15 U/L
ANION GAP SERPL CALC-SCNC: 10 MMOL/L
AST SERPL-CCNC: 19 U/L
B-GLOBULIN MFR SERPL ELPH: 11.8 %
B-GLOBULIN SERPL ELPH-MCNC: 0.8 G/DL
BASOPHILS # BLD AUTO: 0.04 K/UL
BASOPHILS NFR BLD AUTO: 0.5 %
BILIRUB SERPL-MCNC: 0.8 MG/DL
BUN SERPL-MCNC: 28 MG/DL
CALCIUM SERPL-MCNC: 10.1 MG/DL
CEA SERPL-MCNC: 5.3 NG/ML
CHLORIDE SERPL-SCNC: 104 MMOL/L
CHOLEST SERPL-MCNC: 134 MG/DL
CO2 SERPL-SCNC: 25 MMOL/L
CREAT SERPL-MCNC: 1.95 MG/DL
EGFR: 34 ML/MIN/1.73M2
EOSINOPHIL # BLD AUTO: 0.13 K/UL
EOSINOPHIL NFR BLD AUTO: 1.6 %
ESTIMATED AVERAGE GLUCOSE: 120 MG/DL
FERRITIN SERPL-MCNC: 230 NG/ML
GAMMA GLOB FLD ELPH-MCNC: 1.1 G/DL
GAMMA GLOB MFR SERPL ELPH: 16.2 %
GLUCOSE SERPL-MCNC: 97 MG/DL
HBA1C MFR BLD HPLC: 5.8 %
HCT VFR BLD CALC: 33.1 %
HDLC SERPL-MCNC: 56 MG/DL
HGB BLD-MCNC: 10.9 G/DL
IMM GRANULOCYTES NFR BLD AUTO: 0.3 %
INTERPRETATION SERPL IEP-IMP: NORMAL
IRON SATN MFR SERPL: 29 %
IRON SERPL-MCNC: 84 UG/DL
LDLC SERPL CALC-MCNC: 58 MG/DL
LYMPHOCYTES # BLD AUTO: 2.03 K/UL
LYMPHOCYTES NFR BLD AUTO: 25.6 %
MAN DIFF?: NORMAL
MCHC RBC-ENTMCNC: 31.3 PG
MCHC RBC-ENTMCNC: 32.9 GM/DL
MCV RBC AUTO: 95.1 FL
MONOCYTES # BLD AUTO: 0.92 K/UL
MONOCYTES NFR BLD AUTO: 11.6 %
NEUTROPHILS # BLD AUTO: 4.79 K/UL
NEUTROPHILS NFR BLD AUTO: 60.4 %
NONHDLC SERPL-MCNC: 78 MG/DL
NT-PROBNP SERPL-MCNC: 112 PG/ML
PLATELET # BLD AUTO: 210 K/UL
POTASSIUM SERPL-SCNC: 5 MMOL/L
PROT SERPL-MCNC: 7 G/DL
RBC # BLD: 3.48 M/UL
RBC # FLD: 12.7 %
SODIUM SERPL-SCNC: 139 MMOL/L
TIBC SERPL-MCNC: 290 UG/DL
TRIGL SERPL-MCNC: 113 MG/DL
TSH SERPL-ACNC: 2.57 UIU/ML
UIBC SERPL-MCNC: 206 UG/DL
WBC # FLD AUTO: 7.93 K/UL

## 2024-04-26 ENCOUNTER — NON-APPOINTMENT (OUTPATIENT)
Age: 79
End: 2024-04-26

## 2024-05-06 NOTE — ED ADULT TRIAGE NOTE - HEIGHT IN CM
PRIMARY DIAGNOSIS: GI BLEED    OUTPATIENT/OBSERVATION GOALS TO BE MET BEFORE DISCHARGE  Orthostatic performed: No    Stable Hgb Yes.   Recent Labs   Lab Test 05/06/24  0724 05/05/24  1037 05/04/24  1912   HGB 8.5* 9.2* 8.8*       Resolved or declined bleeding episodes: Yes Last episode: none    Appropriate testing complete: No reapt EGD and colonoscopy tomorrow    Cleared for discharge by consultants (if involved): No, GI following    Safe discharge environment identified: No, OT consult  pending    Discharge Planner Nurse   Safe discharge environment identified: No  Barriers to discharge: Yes       Entered by: Aman Ann RN 05/06/2024 4:40 PM     Please review provider order for any additional goals.   Nurse to notify provider when observation goals have been met and patient is ready for discharge.   187.96

## 2024-06-09 ENCOUNTER — NON-APPOINTMENT (OUTPATIENT)
Age: 79
End: 2024-06-09

## 2024-06-11 ENCOUNTER — NON-APPOINTMENT (OUTPATIENT)
Age: 79
End: 2024-06-11

## 2024-06-11 ENCOUNTER — APPOINTMENT (OUTPATIENT)
Dept: CARDIOLOGY | Facility: CLINIC | Age: 79
End: 2024-06-11
Payer: MEDICARE

## 2024-06-11 VITALS
HEIGHT: 72 IN | SYSTOLIC BLOOD PRESSURE: 139 MMHG | OXYGEN SATURATION: 97 % | BODY MASS INDEX: 29.12 KG/M2 | WEIGHT: 215 LBS | DIASTOLIC BLOOD PRESSURE: 74 MMHG | HEART RATE: 77 BPM

## 2024-06-11 DIAGNOSIS — I35.0 NONRHEUMATIC AORTIC (VALVE) STENOSIS: ICD-10-CM

## 2024-06-11 DIAGNOSIS — I10 ESSENTIAL (PRIMARY) HYPERTENSION: ICD-10-CM

## 2024-06-11 DIAGNOSIS — R94.31 ABNORMAL ELECTROCARDIOGRAM [ECG] [EKG]: ICD-10-CM

## 2024-06-11 PROCEDURE — 99213 OFFICE O/P EST LOW 20 MIN: CPT

## 2024-06-11 PROCEDURE — 93000 ELECTROCARDIOGRAM COMPLETE: CPT

## 2024-06-11 NOTE — REASON FOR VISIT
[FreeTextEntry1] : Cardiology follow-up visit for evaluation management of hypertension, hyperlipidemia, mild aortic stenosis, abnormal EKG

## 2024-06-11 NOTE — HISTORY OF PRESENT ILLNESS
[FreeTextEntry1] : Since his last visit with me, he has been feeling well. 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 No cardiac complaints.  No chest pain or chest pressure.  No shortness of breath.  No palpitations.  No dizziness or lightheadedness.  No syncope. He does note occasional lower extremity edema at the end of the day, improves in the mornings. No orthopnea.  No PND.

## 2024-06-11 NOTE — CARDIOLOGY SUMMARY
[___] : [unfilled] [de-identified] : June 13, 2023.  Normal left ventricular systolic function, mild aortic stenosis. [de-identified] : June 11 2024. Sinus Rhythm  -Nonspecific T-abnormality. ABNORMAL

## 2024-06-11 NOTE — DISCUSSION/SUMMARY
[FreeTextEntry1] : 78-year-old man with hypertension, hyperlipidemia, aortic stenosis, abnormal EKG. He has a good functional capacity. Lower extremity edema noted by patient likely secondary to amlodipine. On physical examination, blood pressure is stable.  There is no JVD.  Lungs are clear.  Trace lower extremity edema noted. EKG sinus rhythm.  Nonspecific ST changes. Current cardiac status appears to be stable.  Plan 1.  Current medications reviewed, no changes. 2.  Advised to elevate lower extremities to better extent possible. 3.  He will follow-up with me in the office in 6 months, repeat echocardiogram to monitor aortic stenosis. 4.  Advised him to monitor for any cardiac symptoms and to report back to me if there are any changes or if he has any other concerns.  Cardiac issues were discussed and all of his questions have been answered to his satisfaction.  [EKG obtained to assist in diagnosis and management of assessed problem(s)] : EKG obtained to assist in diagnosis and management of assessed problem(s)

## 2024-06-11 NOTE — PHYSICAL EXAM
[Normal S1, S2] : normal S1, S2 [No Rub] : no rub [No Gallop] : no gallop [Murmur] : murmur [No Cyanosis] : no cyanosis [No Clubbing] : no clubbing [No Varicosities] : no varicosities [Normal] : alert and oriented, normal memory [de-identified] : l/Vl systolic [de-identified] : trace edema

## 2024-07-02 PROBLEM — C43.62 MALIGNANT MELANOMA OF LEFT UPPER ARM: Status: ACTIVE | Noted: 2024-07-02

## 2024-07-08 ENCOUNTER — APPOINTMENT (OUTPATIENT)
Dept: SURGICAL ONCOLOGY | Facility: CLINIC | Age: 79
End: 2024-07-08
Payer: MEDICARE

## 2024-07-08 VITALS
HEART RATE: 95 BPM | HEIGHT: 72 IN | RESPIRATION RATE: 16 BRPM | OXYGEN SATURATION: 97 % | DIASTOLIC BLOOD PRESSURE: 75 MMHG | BODY MASS INDEX: 28.71 KG/M2 | WEIGHT: 212 LBS | SYSTOLIC BLOOD PRESSURE: 139 MMHG

## 2024-07-08 PROCEDURE — 99205 OFFICE O/P NEW HI 60 MIN: CPT

## 2024-07-11 ENCOUNTER — RESULT REVIEW (OUTPATIENT)
Age: 79
End: 2024-07-11

## 2024-07-11 ENCOUNTER — OUTPATIENT (OUTPATIENT)
Dept: OUTPATIENT SERVICES | Facility: HOSPITAL | Age: 79
LOS: 1 days | End: 2024-07-11
Payer: MEDICARE

## 2024-07-11 DIAGNOSIS — Z98.890 OTHER SPECIFIED POSTPROCEDURAL STATES: Chronic | ICD-10-CM

## 2024-07-11 DIAGNOSIS — Z90.79 ACQUIRED ABSENCE OF OTHER GENITAL ORGAN(S): Chronic | ICD-10-CM

## 2024-07-11 DIAGNOSIS — C43.62 MALIGNANT MELANOMA OF LEFT UPPER LIMB, INCLUDING SHOULDER: ICD-10-CM

## 2024-07-11 PROCEDURE — 88321 CONSLTJ&REPRT SLD PREP ELSWR: CPT

## 2024-07-15 ENCOUNTER — APPOINTMENT (OUTPATIENT)
Dept: SURGICAL ONCOLOGY | Facility: CLINIC | Age: 79
End: 2024-07-15

## 2024-07-15 DIAGNOSIS — C43.62 MALIGNANT MELANOMA OF LEFT UPPER LIMB, INCLUDING SHOULDER: ICD-10-CM

## 2024-07-15 PROCEDURE — 14001 TIS TRNFR TRUNK 10.1-30SQCM: CPT

## 2024-07-15 PROCEDURE — 24077 RAD RESCJ TUM TISS A/E <5CM: CPT | Mod: LT

## 2024-07-16 LAB — SURGICAL PATHOLOGY STUDY: SIGNIFICANT CHANGE UP

## 2024-07-25 LAB — CORE LAB BIOPSY: NORMAL

## 2024-08-09 NOTE — H&P PST ADULT - NS PRO PT REFERRAL QUES 2 YN
[facility]  [address]  [address]  [facility] [address]  Dept: [phone]  Dept Fax: [phone]  Loc: [phone]      Nataliya Portillo is a 70 y.o. female who presents today for:  Chief Complaint   Patient presents with    Urinary Frequency       HPI:   Nataliya Portillo is 70 y.o. presents today for acute visit.     Urinary frequency, dysuria x 5 days.   Sometimes holds urine for extended periods of time.   Used azo OTC x 2 days     RA - on rinvoq; had cortisone injection left knee  Undergoing testing for ablation for back pain      Objective:     Vitals:    08/09/24 1436   BP: 118/82   Pulse: (!) 111   Resp: 16   Temp: 97.1 °F (36.2 °C)   SpO2: 96%     Wt Readings from Last 3 Encounters:   08/09/24 75.3 kg (166 lb)   04/05/24 76.7 kg (169 lb 3.2 oz)   03/28/24 76.7 kg (169 lb)       BP Readings from Last 3 Encounters:   08/09/24 118/82   04/05/24 120/60   03/28/24 128/80       Lab Results   Component Value Date    WBC 7.1 08/08/2023    HGB 12.5 08/08/2023    HCT 37.4 08/08/2023    MCV 92.2 08/08/2023     08/08/2023     Lab Results   Component Value Date     08/08/2023    K 4.2 08/08/2023     08/08/2023    CO2 22 08/08/2023    BUN 20 08/08/2023    CREATININE 0.7 08/08/2023    GLUCOSE 86 08/08/2023    CALCIUM 9.0 08/08/2023    BILITOT 0.3 08/08/2023    ALKPHOS 51 08/08/2023    AST 23 08/08/2023    ALT 15 08/08/2023    LABGLOM >60 08/08/2023    GFRAA >60 06/10/2019    AGRATIO 1.3 08/08/2023    GLOB 2.9 06/10/2019     Lab Results   Component Value Date    TSH 1.82 06/10/2019     Lab Results   Component Value Date    LABA1C 5.0 07/22/2020    LABA1C 5.0 07/22/2020     Lab Results   Component Value Date    .8 06/10/2019     Lab Results   Component Value Date    CHOL 160 08/08/2023    CHOL 137 06/10/2019    CHOL 182 02/23/2018     Lab Results   Component Value Date    TRIG 60 08/08/2023    TRIG 97 11/08/2021    TRIG 56 06/10/2019     Lab 
no

## 2024-08-12 ENCOUNTER — APPOINTMENT (OUTPATIENT)
Dept: SURGICAL ONCOLOGY | Facility: CLINIC | Age: 79
End: 2024-08-12
Payer: MEDICARE

## 2024-08-12 VITALS
HEIGHT: 72 IN | OXYGEN SATURATION: 97 % | BODY MASS INDEX: 28.99 KG/M2 | SYSTOLIC BLOOD PRESSURE: 131 MMHG | HEART RATE: 72 BPM | DIASTOLIC BLOOD PRESSURE: 75 MMHG | RESPIRATION RATE: 17 BRPM | WEIGHT: 214 LBS

## 2024-08-12 DIAGNOSIS — C43.62 MALIGNANT MELANOMA OF LEFT UPPER LIMB, INCLUDING SHOULDER: ICD-10-CM

## 2024-08-12 PROCEDURE — 99024 POSTOP FOLLOW-UP VISIT: CPT

## 2024-08-12 NOTE — ASSESSMENT
[FreeTextEntry1] : IMP: 79yo M w/ 0.4 mm melanoma to LUE w/ regression, at least pT1a (margins negative)  s/p in-office WLE of left upper arm melanoma on 7/15/2024, path: residual melanoma in situ, margins negative    PLAN: RTO 3 months  continue f/u w/ Dr. Hodge

## 2024-08-12 NOTE — HISTORY OF PRESENT ILLNESS
[de-identified] : Mr. SHANE JUNG is an 78-year-old man, referred by Dr. Silverio Hodge for consultation regarding a LUE melanoma, here for a post-op visit.   This was found on a full body exam; he did not notice any worrisome lesions. HX of melanoma to his back (2022) and left ear (2019), denies prior BCC/SCC PMH/PSH: HTN, HLD, AS  shave biopsy 6/15/2024 - left upper arm: malignant melanoma, 0.4 mm, regression present, Nathaniel level II, no ulceration present, at least pT1a *margins negative   s/p in-office WLE of left upper arm melanoma on 7/15/2024, path: residual melanoma in situ, margins negative

## 2024-08-12 NOTE — PHYSICAL EXAM
[Normal] : supple, no neck mass and thyroid not enlarged [Normal Supraclavicular Lymph Nodes] : normal supraclavicular lymph nodes [Normal Axillary Lymph Nodes] : normal axillary lymph nodes [Normal] : oriented to person, place and time, with appropriate affect [de-identified] : Normal epitrochlear nodes [de-identified] : left upper arm excision site healing nicely

## 2024-08-12 NOTE — ASSESSMENT
[FreeTextEntry1] : IMP: 77yo M w/ 0.4 mm melanoma to LUE w/ regression, at least pT1a (margins negative)  s/p in-office WLE of left upper arm melanoma on 7/15/2024, path: residual melanoma in situ, margins negative    PLAN: RTO 3 months  continue f/u w/ Dr. Hodge

## 2024-08-12 NOTE — PHYSICAL EXAM
[Normal] : supple, no neck mass and thyroid not enlarged [Normal Supraclavicular Lymph Nodes] : normal supraclavicular lymph nodes [Normal Axillary Lymph Nodes] : normal axillary lymph nodes [Normal] : oriented to person, place and time, with appropriate affect [de-identified] : Normal epitrochlear nodes [de-identified] : left upper arm excision site healing nicely

## 2024-08-12 NOTE — HISTORY OF PRESENT ILLNESS
[de-identified] : Mr. SHANE JUNG is an 78-year-old man, referred by Dr. Silverio Hodge for consultation regarding a LUE melanoma, here for a post-op visit.   This was found on a full body exam; he did not notice any worrisome lesions. HX of melanoma to his back (2022) and left ear (2019), denies prior BCC/SCC PMH/PSH: HTN, HLD, AS  shave biopsy 6/15/2024 - left upper arm: malignant melanoma, 0.4 mm, regression present, Nathaniel level II, no ulceration present, at least pT1a *margins negative   s/p in-office WLE of left upper arm melanoma on 7/15/2024, path: residual melanoma in situ, margins negative

## 2024-08-12 NOTE — CONSULT LETTER
[Dear  ___] : Dear  [unfilled], [Consult Letter:] : I had the pleasure of evaluating your patient, [unfilled]. [Please see my note below.] : Please see my note below. [Consult Closing:] : Thank you very much for allowing me to participate in the care of this patient.  If you have any questions, please do not hesitate to contact me. [Sincerely,] : Sincerely, [Courtesy Letter:] : I had the pleasure of seeing your patient, [unfilled], in my office today. [FreeTextEntry2] : Silverio Hodge MD [FreeTextEntry1] : He will follow-up with both of us. [FreeTextEntry3] : Cali Dimas MD FACS Chief of Surgical Oncology

## 2024-09-16 ENCOUNTER — APPOINTMENT (OUTPATIENT)
Dept: ENDOCRINOLOGY | Facility: CLINIC | Age: 79
End: 2024-09-16
Payer: MEDICARE

## 2024-09-16 VITALS
BODY MASS INDEX: 28.31 KG/M2 | DIASTOLIC BLOOD PRESSURE: 66 MMHG | HEART RATE: 71 BPM | SYSTOLIC BLOOD PRESSURE: 128 MMHG | WEIGHT: 209 LBS | HEIGHT: 72 IN | OXYGEN SATURATION: 99 %

## 2024-09-16 PROCEDURE — 96401 CHEMO ANTI-NEOPL SQ/IM: CPT

## 2024-09-16 PROCEDURE — 99214 OFFICE O/P EST MOD 30 MIN: CPT | Mod: 25

## 2024-09-16 RX ORDER — DENOSUMAB 60 MG/ML
60 INJECTION SUBCUTANEOUS
Qty: 1 | Refills: 0 | Status: COMPLETED | OUTPATIENT
Start: 2024-09-16

## 2024-09-16 RX ADMIN — DENOSUMAB 0 MG/ML: 60 INJECTION SUBCUTANEOUS at 00:00

## 2024-09-16 NOTE — ASSESSMENT
[FreeTextEntry1] : 78 -year-old male, with history of osteoporosis, worse at the femoral neck, history of prostate cancer, he also has a history of T12 fracture, here for evaluation of osteoporosis.  He was recommended to start on alendronate back in October 2017, unclear patient has been adherent with his medication.  Bone density in 2019 showed a T score of -2.6 in the femoral neck which suggest that patient should be treated with osteoporosis medication.  Patient was on alendronate which he reports that he was adherent to since April 2021. Patient had a left hip fracture on October 31, 2022.  He was sitting on a stool at his kitchen and he fell.  Status post cephalomedullary nailing.  He is doing well, switch to Prolia since Feb 2023.    1.  Osteoporosis Started on Prolia in February 2023 Tolerated the medication well. Prolia given from stock today 09/16/2024  Repeat bone density in March 2025 February 2023 L2-L4: BMD 0.917, T score -1.8, Z score -0.6 October 2021: L1-L4: BMD 0.880, T score -1.9, Z score -0.8 (different machine) December 3, 2019: L1-L4: BMD 0.908, T score -1.9, Z score -0.8  Left hip December 6, 2021: Left hip: BMD 0.658, T score -2.5, Z score -1.5 December 3, 2019: Left hip BMD: 0.731, T score -2.0, Z score -1.1  33% radius February 2023: BMD 0.633, T score -3.5, Z score -1.5 December 2019: 33% radius: BMD 0.674, T score -2.6, Z score -1.0  Recommend Calcium intake of 1200mg daily, via diet if possible, can take calcium and vitamin D supplement 500+d once daily if not able to get calcium  Weight bearing exercise as tolerated (he is exercing once every 3 days)

## 2024-09-16 NOTE — RESULTS/DATA
[TextEntry] :     Bone density February 2023 L2-L4: BMD 0.917, T score -1.8, Z score -0.6 October 2021: L1-L4: BMD 0.880, T score -1.9, Z score -0.8 (different machine) December 3, 2019: L1-L4: BMD 0.908, T score -1.9, Z score -0.8  Left hip December 6, 2021: Left hip: BMD 0.658, T score -2.5, Z score -1.5 December 3, 2019: Left hip BMD: 0.731, T score -2.0, Z score -1.1  33% radius February 2023: BMD 0.633, T score -3.5, Z score -1.5 December 2019: 33% radius: BMD 0.674, T score -2.6, Z score -1.0

## 2024-09-16 NOTE — HISTORY OF PRESENT ILLNESS
[FreeTextEntry1] : Patient is a 78-year-old man, here for initial evaluation for management of osteoporosis. Patient was seen by Dr. DENISE from rheumatology in January 2021.  Bone density in December 2019 showing an L2-L4 T score of -1.9, total hip T score of -2.0, as well as left femoral neck T score of -2.7, one third radius T score of -2.6  Prior work-up included normal vitamin D level, normal thyroid function level, and normal comprehensive metabolic panel.  Patient was recommended to start RECLAST, but he stated that Dr. Denise's office did not get in touch with him to arrange for treatment.  Therefore, he is presenting here for treatment planning.  Patient also had a history of T12 fracture from standing height status post kyphoplasty.  Per our chart review, he was seen by Dr. Wyatt in our office in October 2017, he was recommended to start alendronate 70 mg 1 weekly.  However, it appears that patient has not been adherent with his medication.  Patient was finally started on alendronate in 2021.  Report adherence to her medication.  Patient had a hip fracture on October 2022, he was sign-on stool in his kitchen and he fell.  Status post cephalomedullary nailing.  Patient was changed to Prolia treatment in February 2023.  Received the first  dose of Prolia in February 2023.  Tolerated the medication well.  Here for his second injection.

## 2024-09-16 NOTE — PHYSICAL EXAM
[Alert] : alert [Well Nourished] : well nourished [No Acute Distress] : no acute distress [Well Developed] : well developed [Normal Sclera/Conjunctiva] : normal sclera/conjunctiva [EOMI] : extra ocular movement intact [No Proptosis] : no proptosis [Normal Oropharynx] : the oropharynx was normal [Thyroid Not Enlarged] : the thyroid was not enlarged [No Thyroid Nodules] : no palpable thyroid nodules [No Respiratory Distress] : no respiratory distress [No Accessory Muscle Use] : no accessory muscle use [Clear to Auscultation] : lungs were clear to auscultation bilaterally [Normal S1, S2] : normal S1 and S2 [Normal Rate] : heart rate was normal [Regular Rhythm] : with a regular rhythm [No Edema] : no peripheral edema [Pedal Pulses Normal] : the pedal pulses are present [Normal Bowel Sounds] : normal bowel sounds [Not Tender] : non-tender [Not Distended] : not distended [Soft] : abdomen soft [Normal Anterior Cervical Nodes] : no anterior cervical lymphadenopathy [No Spinal Tenderness] : no spinal tenderness [Spine Straight] : spine straight [No Stigmata of Cushings Syndrome] : no stigmata of Cushings Syndrome [Normal Gait] : normal gait [Normal Strength/Tone] : muscle strength and tone were normal [No Rash] : no rash [Acanthosis Nigricans] : no acanthosis nigricans [Normal Reflexes] : deep tendon reflexes were 2+ and symmetric [No Tremors] : no tremors [Oriented x3] : oriented to person, place, and time [de-identified] : ambuatling with cane.

## 2024-09-18 LAB
25(OH)D3 SERPL-MCNC: 43.2 NG/ML
ALBUMIN SERPL ELPH-MCNC: 4.4 G/DL
ALP BLD-CCNC: 36 U/L
ALT SERPL-CCNC: 13 U/L
ANION GAP SERPL CALC-SCNC: 10 MMOL/L
AST SERPL-CCNC: 14 U/L
BILIRUB SERPL-MCNC: 0.9 MG/DL
BUN SERPL-MCNC: 23 MG/DL
CALCIUM SERPL-MCNC: 9.8 MG/DL
CHLORIDE SERPL-SCNC: 98 MMOL/L
CO2 SERPL-SCNC: 26 MMOL/L
CREAT SERPL-MCNC: 1.56 MG/DL
EGFR: 45 ML/MIN/1.73M2
GLUCOSE SERPL-MCNC: 129 MG/DL
POTASSIUM SERPL-SCNC: 4.5 MMOL/L
PROT SERPL-MCNC: 6.7 G/DL
SODIUM SERPL-SCNC: 134 MMOL/L
TSH SERPL-ACNC: 2.1 UIU/ML

## 2024-09-23 ENCOUNTER — RX RENEWAL (OUTPATIENT)
Age: 79
End: 2024-09-23

## 2024-10-07 ENCOUNTER — RX RENEWAL (OUTPATIENT)
Age: 79
End: 2024-10-07

## 2024-10-28 ENCOUNTER — RX RENEWAL (OUTPATIENT)
Age: 79
End: 2024-10-28

## 2024-11-04 NOTE — ED PROVIDER NOTE - CPE EDP CARDIAC NORM
normal... For information on Fall & Injury Prevention, visit: [email or web address] OR  [email or web address] OR  https://www.cdc.gov/steadi/patient.html

## 2024-11-14 ENCOUNTER — APPOINTMENT (OUTPATIENT)
Dept: SURGICAL ONCOLOGY | Facility: CLINIC | Age: 79
End: 2024-11-14

## 2024-12-10 ENCOUNTER — APPOINTMENT (OUTPATIENT)
Dept: CARDIOLOGY | Facility: CLINIC | Age: 79
End: 2024-12-10
Payer: MEDICARE

## 2024-12-10 ENCOUNTER — NON-APPOINTMENT (OUTPATIENT)
Age: 79
End: 2024-12-10

## 2024-12-10 VITALS
HEART RATE: 76 BPM | RESPIRATION RATE: 18 BRPM | SYSTOLIC BLOOD PRESSURE: 133 MMHG | WEIGHT: 214 LBS | OXYGEN SATURATION: 100 % | HEIGHT: 72 IN | DIASTOLIC BLOOD PRESSURE: 83 MMHG | BODY MASS INDEX: 28.99 KG/M2

## 2024-12-10 DIAGNOSIS — R94.31 ABNORMAL ELECTROCARDIOGRAM [ECG] [EKG]: ICD-10-CM

## 2024-12-10 DIAGNOSIS — E78.5 HYPERLIPIDEMIA, UNSPECIFIED: ICD-10-CM

## 2024-12-10 DIAGNOSIS — I35.0 NONRHEUMATIC AORTIC (VALVE) STENOSIS: ICD-10-CM

## 2024-12-10 PROCEDURE — 99214 OFFICE O/P EST MOD 30 MIN: CPT

## 2024-12-10 PROCEDURE — 93306 TTE W/DOPPLER COMPLETE: CPT

## 2024-12-10 PROCEDURE — 93000 ELECTROCARDIOGRAM COMPLETE: CPT

## 2024-12-12 NOTE — ED ADULT TRIAGE NOTE - GLASGOW COMA SCALE: BEST MOTOR RESPONSE, MLM
Prep Survey      Flowsheet Row Responses   [facility] facility patient discharged from? [facility]   Is LACE score < 7 ? No   Eligibility Readm Mgmt   Discharge diagnosis Infective endocarditis   Does the patient have one of the following disease processes/diagnoses(primary or secondary)? Other   Does the patient have Home health ordered? No   Is there a DME ordered? No   Prep survey completed? Yes            JUDD VILLASENOR - Registered Nurse           (M6) obeys commands

## 2024-12-16 ENCOUNTER — APPOINTMENT (OUTPATIENT)
Dept: FAMILY MEDICINE | Facility: CLINIC | Age: 79
End: 2024-12-16
Payer: MEDICARE

## 2024-12-16 VITALS
DIASTOLIC BLOOD PRESSURE: 77 MMHG | OXYGEN SATURATION: 98 % | RESPIRATION RATE: 16 BRPM | TEMPERATURE: 98 F | BODY MASS INDEX: 28.99 KG/M2 | HEIGHT: 72 IN | HEART RATE: 82 BPM | SYSTOLIC BLOOD PRESSURE: 136 MMHG | WEIGHT: 214 LBS

## 2024-12-16 DIAGNOSIS — M80.00XS AGE-RELATED OSTEOPOROSIS WITH CURRENT PATHOLOGICAL FRACTURE, UNSPECIFIED SITE, SEQUELA: ICD-10-CM

## 2024-12-16 DIAGNOSIS — R26.9 UNSPECIFIED ABNORMALITIES OF GAIT AND MOBILITY: ICD-10-CM

## 2024-12-16 DIAGNOSIS — D64.9 ANEMIA, UNSPECIFIED: ICD-10-CM

## 2024-12-16 DIAGNOSIS — N18.30 CHRONIC KIDNEY DISEASE, STAGE 3 UNSPECIFIED: ICD-10-CM

## 2024-12-16 DIAGNOSIS — R97.0 ELEVATED CARCINOEMBRYONIC ANTIGEN [CEA]: ICD-10-CM

## 2024-12-16 DIAGNOSIS — G62.9 POLYNEUROPATHY, UNSPECIFIED: ICD-10-CM

## 2024-12-16 DIAGNOSIS — I10 ESSENTIAL (PRIMARY) HYPERTENSION: ICD-10-CM

## 2024-12-16 PROCEDURE — 99214 OFFICE O/P EST MOD 30 MIN: CPT

## 2024-12-19 LAB
25(OH)D3 SERPL-MCNC: 33.3 NG/ML
ALBUMIN SERPL ELPH-MCNC: 4.2 G/DL
ALP BLD-CCNC: 31 U/L
ALT SERPL-CCNC: 17 U/L
ANION GAP SERPL CALC-SCNC: 11 MMOL/L
APPEARANCE: CLEAR
AST SERPL-CCNC: 19 U/L
BASOPHILS # BLD AUTO: 0.05 K/UL
BASOPHILS NFR BLD AUTO: 0.8 %
BILIRUB SERPL-MCNC: 0.8 MG/DL
BILIRUBIN URINE: NEGATIVE
BLOOD URINE: NEGATIVE
BUN SERPL-MCNC: 21 MG/DL
CALCIUM SERPL-MCNC: 9.7 MG/DL
CEA SERPL-MCNC: 5.8 NG/ML
CHLORIDE SERPL-SCNC: 104 MMOL/L
CHOLEST SERPL-MCNC: 157 MG/DL
CO2 SERPL-SCNC: 23 MMOL/L
COLOR: YELLOW
CREAT SERPL-MCNC: 1.54 MG/DL
CREAT SPEC-SCNC: 88 MG/DL
CREAT/PROT UR: 0.1 RATIO
EGFR: 45 ML/MIN/1.73M2
EOSINOPHIL # BLD AUTO: 0.19 K/UL
EOSINOPHIL NFR BLD AUTO: 3 %
ESTIMATED AVERAGE GLUCOSE: 117 MG/DL
FERRITIN SERPL-MCNC: 258 NG/ML
FOLATE SERPL-MCNC: 11.7 NG/ML
GLUCOSE QUALITATIVE U: NEGATIVE MG/DL
GLUCOSE SERPL-MCNC: 95 MG/DL
HBA1C MFR BLD HPLC: 5.7 %
HCT VFR BLD CALC: 33.9 %
HDLC SERPL-MCNC: 55 MG/DL
HGB BLD-MCNC: 11.2 G/DL
IMM GRANULOCYTES NFR BLD AUTO: 0.3 %
IRON SATN MFR SERPL: 34 %
IRON SERPL-MCNC: 91 UG/DL
KETONES URINE: NEGATIVE MG/DL
LDLC SERPL CALC-MCNC: 83 MG/DL
LEUKOCYTE ESTERASE URINE: NEGATIVE
LYMPHOCYTES # BLD AUTO: 2.3 K/UL
LYMPHOCYTES NFR BLD AUTO: 36.6 %
MAGNESIUM SERPL-MCNC: 2.1 MG/DL
MAN DIFF?: NORMAL
MCHC RBC-ENTMCNC: 31.2 PG
MCHC RBC-ENTMCNC: 33 G/DL
MCV RBC AUTO: 94.4 FL
MONOCYTES # BLD AUTO: 0.73 K/UL
MONOCYTES NFR BLD AUTO: 11.6 %
NEUTROPHILS # BLD AUTO: 2.99 K/UL
NEUTROPHILS NFR BLD AUTO: 47.7 %
NITRITE URINE: NEGATIVE
NONHDLC SERPL-MCNC: 102 MG/DL
PH URINE: 6
PLATELET # BLD AUTO: 227 K/UL
POTASSIUM SERPL-SCNC: 4.6 MMOL/L
PROT SERPL-MCNC: 6.9 G/DL
PROT UR-MCNC: 6 MG/DL
PROTEIN URINE: NEGATIVE MG/DL
RBC # BLD: 3.59 M/UL
RBC # FLD: 12.7 %
SODIUM SERPL-SCNC: 139 MMOL/L
SPECIFIC GRAVITY URINE: 1.02
TIBC SERPL-MCNC: 265 UG/DL
TRIGL SERPL-MCNC: 103 MG/DL
TSH SERPL-ACNC: 2.9 UIU/ML
UIBC SERPL-MCNC: 174 UG/DL
UROBILINOGEN URINE: 0.2 MG/DL
VIT B12 SERPL-MCNC: 884 PG/ML
WBC # FLD AUTO: 6.28 K/UL

## 2025-01-22 ENCOUNTER — APPOINTMENT (OUTPATIENT)
Dept: GASTROENTEROLOGY | Facility: CLINIC | Age: 80
End: 2025-01-22
Payer: MEDICARE

## 2025-01-22 VITALS
TEMPERATURE: 98 F | OXYGEN SATURATION: 98 % | BODY MASS INDEX: 29.39 KG/M2 | RESPIRATION RATE: 16 BRPM | SYSTOLIC BLOOD PRESSURE: 156 MMHG | HEIGHT: 72 IN | DIASTOLIC BLOOD PRESSURE: 78 MMHG | HEART RATE: 80 BPM | WEIGHT: 217 LBS

## 2025-01-22 DIAGNOSIS — D64.9 ANEMIA, UNSPECIFIED: ICD-10-CM

## 2025-01-22 DIAGNOSIS — R97.0 ELEVATED CARCINOEMBRYONIC ANTIGEN [CEA]: ICD-10-CM

## 2025-01-22 DIAGNOSIS — D63.8 ANEMIA IN OTHER CHRONIC DISEASES CLASSIFIED ELSEWHERE: ICD-10-CM

## 2025-01-22 PROCEDURE — 99204 OFFICE O/P NEW MOD 45 MIN: CPT

## 2025-01-22 RX ORDER — POLYETHYLENE GLYCOL 3350 17 G/17G
17 POWDER, FOR SOLUTION ORAL DAILY
Qty: 1 | Refills: 0 | Status: COMPLETED | COMMUNITY
Start: 2025-01-22 | End: 2025-01-27

## 2025-01-22 RX ORDER — SODIUM SULFATE, POTASSIUM SULFATE AND MAGNESIUM SULFATE 1.6; 3.13; 17.5 G/177ML; G/177ML; G/177ML
17.5-3.13-1.6 SOLUTION ORAL
Qty: 1 | Refills: 0 | Status: COMPLETED | COMMUNITY
Start: 2025-01-22 | End: 2025-01-23

## 2025-01-24 ENCOUNTER — NON-APPOINTMENT (OUTPATIENT)
Age: 80
End: 2025-01-24

## 2025-01-30 ENCOUNTER — OUTPATIENT (OUTPATIENT)
Dept: OUTPATIENT SERVICES | Facility: HOSPITAL | Age: 80
LOS: 1 days | End: 2025-01-30
Payer: MEDICARE

## 2025-01-30 ENCOUNTER — TRANSCRIPTION ENCOUNTER (OUTPATIENT)
Age: 80
End: 2025-01-30

## 2025-01-30 ENCOUNTER — APPOINTMENT (OUTPATIENT)
Dept: GASTROENTEROLOGY | Facility: HOSPITAL | Age: 80
End: 2025-01-30

## 2025-01-30 ENCOUNTER — RESULT REVIEW (OUTPATIENT)
Age: 80
End: 2025-01-30

## 2025-01-30 DIAGNOSIS — D64.9 ANEMIA, UNSPECIFIED: ICD-10-CM

## 2025-01-30 DIAGNOSIS — R97.0 ELEVATED CARCINOEMBRYONIC ANTIGEN [CEA]: ICD-10-CM

## 2025-01-30 DIAGNOSIS — Z98.890 OTHER SPECIFIED POSTPROCEDURAL STATES: Chronic | ICD-10-CM

## 2025-01-30 DIAGNOSIS — Z90.79 ACQUIRED ABSENCE OF OTHER GENITAL ORGAN(S): Chronic | ICD-10-CM

## 2025-01-30 PROCEDURE — 43239 EGD BIOPSY SINGLE/MULTIPLE: CPT

## 2025-01-30 PROCEDURE — 88305 TISSUE EXAM BY PATHOLOGIST: CPT | Mod: 26

## 2025-01-30 PROCEDURE — 45381 COLONOSCOPY SUBMUCOUS NJX: CPT

## 2025-01-30 PROCEDURE — 45385 COLONOSCOPY W/LESION REMOVAL: CPT

## 2025-01-30 PROCEDURE — 88312 SPECIAL STAINS GROUP 1: CPT | Mod: 26

## 2025-01-30 PROCEDURE — 88312 SPECIAL STAINS GROUP 1: CPT

## 2025-01-30 PROCEDURE — 88305 TISSUE EXAM BY PATHOLOGIST: CPT

## 2025-01-30 RX ORDER — BACTERIOSTATIC SODIUM CHLORIDE 0.9 %
500 VIAL (ML) INJECTION
Refills: 0 | Status: COMPLETED | OUTPATIENT
Start: 2025-01-30 | End: 2025-01-30

## 2025-01-30 RX ADMIN — Medication 75 MILLILITER(S): at 11:36

## 2025-02-03 LAB — SURGICAL PATHOLOGY STUDY: SIGNIFICANT CHANGE UP

## 2025-02-11 ENCOUNTER — NON-APPOINTMENT (OUTPATIENT)
Age: 80
End: 2025-02-11

## 2025-02-11 ENCOUNTER — APPOINTMENT (OUTPATIENT)
Dept: NEUROLOGY | Facility: CLINIC | Age: 80
End: 2025-02-11
Payer: MEDICARE

## 2025-02-11 VITALS
BODY MASS INDEX: 29.39 KG/M2 | HEIGHT: 72 IN | HEART RATE: 77 BPM | DIASTOLIC BLOOD PRESSURE: 79 MMHG | TEMPERATURE: 97.9 F | SYSTOLIC BLOOD PRESSURE: 146 MMHG | WEIGHT: 217 LBS

## 2025-02-11 DIAGNOSIS — G62.9 POLYNEUROPATHY, UNSPECIFIED: ICD-10-CM

## 2025-02-11 PROCEDURE — 99214 OFFICE O/P EST MOD 30 MIN: CPT

## 2025-02-11 PROCEDURE — G2211 COMPLEX E/M VISIT ADD ON: CPT

## 2025-02-25 NOTE — ED PROVIDER NOTE - TOBACCO USE
Physical Therapy    Patient is cleared for discharge from PT standpoint:  YES [x]     NO []     ISADORA RagsdaleT    Never smoker

## 2025-03-17 ENCOUNTER — APPOINTMENT (OUTPATIENT)
Dept: ENDOCRINOLOGY | Facility: CLINIC | Age: 80
End: 2025-03-17

## 2025-03-17 ENCOUNTER — APPOINTMENT (OUTPATIENT)
Dept: ENDOCRINOLOGY | Facility: CLINIC | Age: 80
End: 2025-03-17
Payer: MEDICARE

## 2025-03-17 DIAGNOSIS — M80.00XS AGE-RELATED OSTEOPOROSIS WITH CURRENT PATHOLOGICAL FRACTURE, UNSPECIFIED SITE, SEQUELA: ICD-10-CM

## 2025-03-17 PROCEDURE — 96372 THER/PROPH/DIAG INJ SC/IM: CPT

## 2025-03-17 PROCEDURE — 96401 CHEMO ANTI-NEOPL SQ/IM: CPT

## 2025-03-17 RX ORDER — DENOSUMAB 60 MG/ML
60 INJECTION SUBCUTANEOUS
Qty: 1 | Refills: 0 | Status: COMPLETED | OUTPATIENT
Start: 2025-03-12

## 2025-04-02 ENCOUNTER — APPOINTMENT (OUTPATIENT)
Dept: ORTHOPEDIC SURGERY | Facility: CLINIC | Age: 80
End: 2025-04-02

## 2025-05-06 NOTE — PATIENT PROFILE ADULT - FUNCTIONAL ASSESSMENT - BASIC MOBILITY 3.
daily drinker, last drink 4/17  3-4 beers daily, denies withdrawal symptoms  Continue thiamine, folic acid and multivitamin  CIWA per nursing     4 = No assist / stand by assistance

## 2025-06-10 ENCOUNTER — APPOINTMENT (OUTPATIENT)
Dept: CARDIOLOGY | Facility: CLINIC | Age: 80
End: 2025-06-10
Payer: MEDICARE

## 2025-06-10 VITALS
RESPIRATION RATE: 18 BRPM | OXYGEN SATURATION: 99 % | WEIGHT: 210 LBS | BODY MASS INDEX: 28.44 KG/M2 | DIASTOLIC BLOOD PRESSURE: 88 MMHG | HEART RATE: 79 BPM | HEIGHT: 72 IN | SYSTOLIC BLOOD PRESSURE: 154 MMHG

## 2025-06-10 PROCEDURE — 93000 ELECTROCARDIOGRAM COMPLETE: CPT

## 2025-06-10 PROCEDURE — 99213 OFFICE O/P EST LOW 20 MIN: CPT

## 2025-06-23 NOTE — PATIENT PROFILE ADULT - FUNCTIONAL ASSESSMENT - BASIC MOBILITY 5.
Patient returned Miracle's call  Please call him back at # on chart  Thank you   4 = No assist / stand by assistance

## 2025-09-15 ENCOUNTER — NON-APPOINTMENT (OUTPATIENT)
Age: 80
End: 2025-09-15

## 2025-09-19 ENCOUNTER — APPOINTMENT (OUTPATIENT)
Dept: ENDOCRINOLOGY | Facility: CLINIC | Age: 80
End: 2025-09-19
Payer: MEDICARE

## 2025-09-19 VITALS — WEIGHT: 212 LBS | BODY MASS INDEX: 28.4 KG/M2 | HEIGHT: 72.3 IN

## 2025-09-19 DIAGNOSIS — M81.0 AGE-RELATED OSTEOPOROSIS W/OUT CURRENT PATHOLOGICAL FRACTURE: ICD-10-CM

## 2025-09-19 LAB
25(OH)D3 SERPL-MCNC: 35.8 NG/ML
ALBUMIN SERPL ELPH-MCNC: 4.4 G/DL
ALP BLD-CCNC: 31 U/L
ALT SERPL-CCNC: 24 U/L
ANION GAP SERPL CALC-SCNC: 13 MMOL/L
AST SERPL-CCNC: 23 U/L
BILIRUB SERPL-MCNC: 0.9 MG/DL
BUN SERPL-MCNC: 22 MG/DL
CALCIUM SERPL-MCNC: 9.9 MG/DL
CHLORIDE SERPL-SCNC: 102 MMOL/L
CO2 SERPL-SCNC: 21 MMOL/L
CREAT SERPL-MCNC: 1.5 MG/DL
EGFRCR SERPLBLD CKD-EPI 2021: 46 ML/MIN/1.73M2
GLUCOSE SERPL-MCNC: 93 MG/DL
POTASSIUM SERPL-SCNC: 6 MMOL/L
PROT SERPL-MCNC: 7 G/DL
SODIUM SERPL-SCNC: 137 MMOL/L

## 2025-09-19 PROCEDURE — 96401 CHEMO ANTI-NEOPL SQ/IM: CPT

## 2025-09-19 RX ORDER — DENOSUMAB 60 MG/ML
60 INJECTION SUBCUTANEOUS
Qty: 1 | Refills: 0 | Status: COMPLETED | OUTPATIENT
Start: 2025-09-15

## (undated) DEVICE — GLV 8 ESTEEM BLUE

## (undated) DEVICE — FOLEY TRAY 16FR SURESTEP LTX BG

## (undated) DEVICE — SNARE EXACTO COLD 9MMX230CM

## (undated) DEVICE — Device

## (undated) DEVICE — SUT POLYSORB 0 30" GS-21 UNDYED

## (undated) DEVICE — SNARE POLYP SENS SM 13MM 240CM

## (undated) DEVICE — SOL IRR POUR H2O 1000ML

## (undated) DEVICE — FOLEY HOLDER STATLOCK 2 WAY ADULT

## (undated) DEVICE — DRILL BIT STRYKER ORTHO 4.2 X 340MM

## (undated) DEVICE — POLY TRAP ETRAP

## (undated) DEVICE — DRAPE C ARMOUR

## (undated) DEVICE — PACK MINOR

## (undated) DEVICE — TUBING CAP SET ERBEFLO CLEVERCAP HYBRID CO2 FOR OLYMPUS SCOPES AND UCR

## (undated) DEVICE — FORCEP RADIAL JAW 4 W NDL 2.4MM 2.8MM 240CM ORANGE DISP

## (undated) DEVICE — DRAPE INSTRUMENT POUCH

## (undated) DEVICE — PREP CHLORAPREP ORANGE 2PCT 26ML

## (undated) DEVICE — DRSG ACE BANDAGE 4" NS

## (undated) DEVICE — PLATE NESSY 170

## (undated) DEVICE — WRAP COMPRESSION CALF LG

## (undated) DEVICE — POSITIONER STRAP ARMBOARD VELCRO TS-30 12

## (undated) DEVICE — DRSG AQUACEL 3.5X6"

## (undated) DEVICE — SUT POLYSORB 1 36" GS-21 UNDYED

## (undated) DEVICE — GLV 8 PROTEXIS

## (undated) DEVICE — POSITIONER FOAM HEAD CRADLE

## (undated) DEVICE — SUT POLYSORB 2-0 30" GS-21 UNDYED

## (undated) DEVICE — SOL IRR POUR H2O 1500ML

## (undated) DEVICE — BLANKET WARMER UPPER ADULT

## (undated) DEVICE — KIT ENDO PROCEDURE CUST W/VLV

## (undated) DEVICE — SOLIDIFIER CANN EXPRESS 3K

## (undated) DEVICE — DRAPE ISOLATION 125X83"

## (undated) DEVICE — STAPLER SKIN VISI-STAT 35 WIDE

## (undated) DEVICE — DRAPE TOWEL BLUE 17" X 24"

## (undated) DEVICE — CANISTER SUCTION LID GUARD 3000CC

## (undated) DEVICE — SOL IRR POUR NS 0.9% 500ML

## (undated) DEVICE — FORCEP RADIAL JAW 4 240CM DISP

## (undated) DEVICE — SPONGE LAP X-RAY DETECTABLE 18X18"